# Patient Record
Sex: MALE | Race: WHITE | NOT HISPANIC OR LATINO | Employment: OTHER | URBAN - METROPOLITAN AREA
[De-identification: names, ages, dates, MRNs, and addresses within clinical notes are randomized per-mention and may not be internally consistent; named-entity substitution may affect disease eponyms.]

---

## 2017-06-16 ENCOUNTER — TRANSFERRED RECORDS (OUTPATIENT)
Dept: HEALTH INFORMATION MANAGEMENT | Facility: CLINIC | Age: 62
End: 2017-06-16

## 2017-07-10 DIAGNOSIS — M54.50 BACK PAIN, LUMBOSACRAL: Primary | ICD-10-CM

## 2017-07-21 ENCOUNTER — HOSPITAL ENCOUNTER (OUTPATIENT)
Dept: GENERAL RADIOLOGY | Facility: CLINIC | Age: 62
End: 2017-07-21
Attending: NEUROLOGICAL SURGERY
Payer: COMMERCIAL

## 2017-07-21 ENCOUNTER — HOSPITAL ENCOUNTER (OUTPATIENT)
Dept: MRI IMAGING | Facility: CLINIC | Age: 62
Discharge: HOME | End: 2017-07-21
Attending: NEUROLOGICAL SURGERY | Admitting: NEUROLOGICAL SURGERY
Payer: COMMERCIAL

## 2017-07-21 DIAGNOSIS — M54.50 BACK PAIN, LUMBOSACRAL: ICD-10-CM

## 2017-07-21 PROCEDURE — 40000986 XR LUMBAR SPINE G/E 4 VW

## 2017-07-21 PROCEDURE — 72148 MRI LUMBAR SPINE W/O DYE: CPT

## 2017-07-26 ENCOUNTER — OFFICE VISIT (OUTPATIENT)
Dept: NEUROSURGERY | Facility: CLINIC | Age: 62
End: 2017-07-26
Attending: NEUROLOGICAL SURGERY
Payer: COMMERCIAL

## 2017-07-26 VITALS
SYSTOLIC BLOOD PRESSURE: 128 MMHG | TEMPERATURE: 99.1 F | OXYGEN SATURATION: 95 % | BODY MASS INDEX: 31.1 KG/M2 | HEIGHT: 69 IN | HEART RATE: 93 BPM | DIASTOLIC BLOOD PRESSURE: 86 MMHG | WEIGHT: 210 LBS

## 2017-07-26 DIAGNOSIS — M43.10 ACQUIRED SPONDYLOLISTHESIS: ICD-10-CM

## 2017-07-26 DIAGNOSIS — M54.50 BACK PAIN, LUMBOSACRAL: Primary | ICD-10-CM

## 2017-07-26 PROCEDURE — 99203 OFFICE O/P NEW LOW 30 MIN: CPT | Performed by: NEUROLOGICAL SURGERY

## 2017-07-26 PROCEDURE — 99211 OFF/OP EST MAY X REQ PHY/QHP: CPT | Performed by: NEUROLOGICAL SURGERY

## 2017-07-26 ASSESSMENT — PAIN SCALES - GENERAL: PAINLEVEL: MILD PAIN (2)

## 2017-07-26 NOTE — MR AVS SNAPSHOT
"              After Visit Summary   2017    Lucio Hernandez    MRN: [id number]           Patient Information     Date Of Birth          1955        Visit Information        Provider Department      2017 11:40 AM Dale Szymanski MD [facility]        Care Instructions    -Please call clinic at [phone] if you wish to proceed with surgery.             Follow-ups after your visit        Who to contact     If you have questions or need follow up information about today's clinic visit or your schedule please contact [facility] directly at [phone].  Normal or non-critical lab and imaging results will be communicated to you by MyChart, letter or phone within 4 business days after the clinic has received the results. If you do not hear from us within 7 days, please contact the clinic through [employer] or phone. If you have a critical or abnormal lab result, we will notify you by phone as soon as possible.  Submit refill requests through [employer] or call your pharmacy and they will forward the refill request to us. Please allow 3 business days for your refill to be completed.          Additional Information About Your Visit        MyChart Information     [employer] lets you send messages to your doctor, view your test results, renew your prescriptions, schedule appointments and more. To sign up, go to [email or web address] . Click on \"Log in\" on the left side of the screen, which will take you to the Welcome page. Then click on \"Sign up Now\" on the right side of the page.     You will be asked to enter the access code listed below, as well as some personal information. Please follow the directions to create your username and password.     Your access code is: [email or web address]  Expires: 10/24/2017 12:38 PM     Your access code will  in 90 days. If you need help or a new code, please call your [facility] clinic or [phone].        Care EveryWhere ID     " "This is your Care EveryWhere ID. This could be used by other organizations to access your [facility] medical records  [id number]        Your Vitals Were     Pulse Temperature Height Pulse Oximetry BMI (Body Mass Index)       93 99.1  F (37.3  C) 5' 9\" (1.753 m) 95% 31.01 kg/m2        Blood Pressure from Last 3 Encounters:   07/26/17 128/86   12/07/15 122/80   10/12/15 120/61    Weight from Last 3 Encounters:   07/26/17 210 lb (95.3 kg)   12/07/15 206 lb 12.8 oz (93.8 kg)   10/12/15 216 lb 12.8 oz (98.3 kg)              Today, you had the following     No orders found for display       Primary Care Provider Office Phone # Fax #    Dean Cody [phone]       [place] [address]        Equal Access to Services     [facility]: Hadii aad ku hadasho Soomaali, waaxda luqadaha, qaybta kaalmada adeegyada, waxay idiin hayaan ángel ontiveros . So [facility] [phone].    ATENCIÓN: Si marcia espserg, tiene a vela disposición servicios gratuitos de asistencia lingüística. Llame al [phone].    We comply with applicable federal civil rights laws and [place] laws. We do not discriminate on the basis of race, color, national origin, age, disability sex, sexual orientation or gender identity.            Thank you!     Thank you for choosing [facility]  for your care. Our goal is always to provide you with excellent care. Hearing back from our patients is one way we can continue to improve our services. Please take a few minutes to complete the written survey that you may receive in the mail after your visit with us. Thank you!             Your Updated Medication List - Protect others around you: Learn how to safely use, store and throw away your medicines at www.disposemymeds.org.          This list is accurate as of: 7/26/17 12:38 PM.  Always use your most recent med list.                   Brand Name Dispense Instructions for use " Diagnosis    LISINOPRIL PO      Take 20 mg by mouth daily        multivitamin, therapeutic Tabs tablet      Take 1 tablet by mouth daily

## 2017-07-26 NOTE — NURSING NOTE
"Lucio Hernandez is a 61 year old male who presents for:  Chief Complaint   Patient presents with     Neurologic Problem     F/U on XR and MRI, low back pain        Initial Vitals:  /86 (BP Location: Right arm, Patient Position: Chair, Cuff Size: Adult Large)  Pulse 93  Temp 99.1  F (37.3  C)  Ht 5' 9\" (1.753 m)  Wt 210 lb (95.3 kg)  SpO2 95%  BMI 31.01 kg/m2 Estimated body mass index is 31.01 kg/(m^2) as calculated from the following:    Height as of this encounter: 5' 9\" (1.753 m).    Weight as of this encounter: 210 lb (95.3 kg).. Body surface area is 2.15 meters squared. BP completed using cuff size: large  Mild Pain (2)    Do you feel safe in your environment?  Yes  Do you need any refills today? No    Nursing Comments: F/U on XR and MRI, low back pain.  Patient rates low back pain today as 2.      5 min. nursing intake time  Merary Duvall CMA      Discharge plan:    -Please call clinic at [phone] if you wish to proceed with surgery.     2 min. nursing discharge time  Merary Duvall CMA    "

## 2017-07-27 NOTE — PROGRESS NOTES
Neurosurgery Spine Consult [facility]        CC: LBP    Primary care Provider: Dean Cody    Referring provider:   No referring provider defined for this encounter.      [place]: DrPhong Lucio Hernandez is a 61 year old male that presents to clinic with a complaint of low back pain and pelvic pain. He has had these symptoms for 6 months and has been seen by Dr. De La Rosa. He has insomnia with laying on his sides. His pain appears to be in the trochanteric hip area. He has no clear radicular pain. He has tried PT and IRASEMA without resolution.       Past Medical History:   Diagnosis Date     Cancer (H)      Hypertension      Uncomplicated asthma     seasonal        Past Surgical History:   Procedure Laterality Date     BACK SURGERY  2000    C5-C6 framinotomy     CYSTOSCOPY, BLADDER NECK CUTS, COMBINED N/A 8/27/2015    Procedure: COMBINED CYSTOSCOPY, BLADDER NECK CUTS;  Surgeon: Paco Haskins MD;  Location:  OR     CYSTOSCOPY, DILATE URETHRA, COMBINED N/A 2/27/2015    Procedure: COMBINED CYSTOSCOPY, DILATE URETHRA;  Surgeon: Paco Haskins MD;  Location:  OR     CYSTOSCOPY, DILATE URETHRA, COMBINED N/A 4/21/2015    Procedure: COMBINED CYSTOSCOPY, DILATE URETHRA;  Surgeon: Eduardo Landaverde MD;  Location:  OR     CYSTOSCOPY, DILATE URETHRA, COMBINED N/A 4/23/2015    Procedure: COMBINED CYSTOSCOPY, DILATE URETHRA;  Surgeon: Paco Haskins MD;  Location:  OR     ENT SURGERY      tonsillectomy     GENITOURINARY SURGERY  2006    prostatectomy     INSERT CATHETER BLADDER N/A 4/23/2015    Procedure: INSERT CATHETER BLADDER;  Surgeon: Paco Haskins MD;  Location:  OR     LAPAROSCOPIC APPENDECTOMY  6/11/2014    Procedure: LAPAROSCOPIC APPENDECTOMY;  Surgeon: Scar Schroeder MD;  Location:  OR     ORTHOPEDIC SURGERY      shoulder x 3       Current Outpatient Prescriptions   Medication     LISINOPRIL PO     multivitamin, therapeutic (THERA-VIT) TABS     No  current facility-administered medications for this visit.        No Known Allergies    Social History     Social History     Marital status:      Spouse name: N/A     Number of children: N/A     Years of education: N/A     Social History Main Topics     Smoking status: Never Smoker     Smokeless tobacco: Never Used     Alcohol use Yes      Comment: one drink a day      Drug use: No     Sexual activity: Not Asked     Other Topics Concern     None     Social History Narrative       History reviewed. No pertinent family history.      Review Of Systems  Skin: negative  Eyes: negative  Ears/Nose/Throat: negative  Respiratory: No shortness of breath, dyspnea on exertion, cough, or hemoptysis  Cardiovascular: negative  Gastrointestinal: negative  Genitourinary: negative  Musculoskeletal: as above and back pain  Neurologic: as above  Psychiatric: negative  Hematologic/Lymphatic/Immunologic: negative  Endocrine: negative    B/P: 128/86, T: 99.1, P: 93, R: Data Unavailable    Examination:  Awake  Alert  Oriented x 3  Speech clear  Cranial nerves II - XII intact  Face symmetric  Back nontender   Normal ROM of back  Motor exam    RLE - iliopsoas 5/5, quads 5/5, hamstrings 5/5, dorsiflexion 5/5, plantar flexion 5/5, eversion 5/5, inversion 5/5, EHL 5/5   LLE -  iliopsoas 5/5, quads 5/5, hamstrings 5/5, dorsiflexion 5/5, plantar flexion 5/5, eversion 5/5, inversion 5/5, EHL 5/5  Sensation intact  Clonus negative  DTR 1+  Negative Lase'gareth's sign  Ambulation stable    Imaging:   MRI lumbar - L4-5 grade 1 spondylolisthesis with stenosis and L5-S1 DDD with a small right sided bulging disk without compression of the root    Flexion/Extension - L4-5 gross instability       Assessment/Plan:   1. We discussed a L4-5 TLIF. He will think about this and call if he wants to proceed. I discussed with the patient and spouse the risk of surgery to include, but, not be limited to; nerve injury, pseudoarthrosis, failure of hardware,  failure of improvement of symptoms, CSF leak,  infection, post op hematoma, the need for recurrent surgery, paralysis, coma and death         Dale Szymanski MD, MS, FAANS  Neurosurgeon  [facility]  [facility]  [address] [place] [address], [address]  [place] [address] [phone]

## 2017-10-31 ENCOUNTER — HOSPITAL ENCOUNTER (EMERGENCY)
Facility: CLINIC | Age: 62
Discharge: HOME | End: 2017-11-01
Attending: EMERGENCY MEDICINE | Admitting: EMERGENCY MEDICINE
Payer: COMMERCIAL

## 2017-10-31 VITALS
DIASTOLIC BLOOD PRESSURE: 89 MMHG | WEIGHT: 215 LBS | HEIGHT: 69 IN | TEMPERATURE: 98.4 F | BODY MASS INDEX: 31.84 KG/M2 | RESPIRATION RATE: 18 BRPM | SYSTOLIC BLOOD PRESSURE: 143 MMHG | OXYGEN SATURATION: 95 %

## 2017-10-31 DIAGNOSIS — R07.81 RIB PAIN ON LEFT SIDE: ICD-10-CM

## 2017-10-31 PROCEDURE — 99283 EMERGENCY DEPT VISIT LOW MDM: CPT | Mod: 25

## 2017-10-31 NOTE — ED AVS SNAPSHOT
Emergency Department    [address] [facility] [address]    Phone:  [phone]    Fax:  [phone]                                       Lucio Hernandez   MRN: [id number]    Department:   Emergency Department   Date of Visit:  10/31/2017           Patient Information     Date Of Birth          1955        Your diagnoses for this visit were:     Rib pain on left side        You were seen by Carlos Cortes MD.      Follow-up Information     Follow up with Dean Cody MD. Call in 2 days.    Specialty:  Internal Medicine    Why:  As needed    Contact information:    [address] [address]  [phone]          Follow up with  Emergency Department.    Specialty:  EMERGENCY MEDICINE    Why:  If symptoms worsen    Contact information:    [address] [facility] [id number] [phone]        Discharge Instructions          *CHEST PAIN, UNCERTAIN CAUSE    Based on your exam today, the exact cause of your chest pain is not certain. Your condition does not seem serious at this time, and your pain does not appear to be coming from your heart. However, sometimes the signs of a serious problem take more time to appear. Therefore, watch for the warning signs listed below.  HOME CARE:  1. Rest today and avoid strenuous activity.  2. Take any prescribed medicine as directed.  FOLLOW UP with your doctor in 1-3 days.   GET PROMPT MEDICAL ATTENTION if any of the following occur:    A change in the type of pain: if it feels different, becomes more severe, lasts longer, or begins to spread into your shoulder, arm, neck, jaw or back    Shortness of breath or increased pain with breathing    Weakness, dizziness, or fainting    Cough with blood or dark colored sputum (phlegm)    Fever over 101  F (38.3  C)    Swelling, pain or redness in one leg    9555-8469 [employer]. [address] [place] [address], [place]  [address]. All rights reserved. This information is not intended as a substitute for professional medical care. Always follow your healthcare professional's instructions.  This information has been modified by your health care provider with permission from the publisher.      24 Hour Appointment Hotline       To make an appointment at any [facility], call [phone] ([phone]). If you don't have a family doctor or clinic, we will help you find one. [facility] are conveniently located to serve the needs of you and your family.             Review of your medicines      START taking        Dose / Directions Last dose taken    guaiFENesin-codeine 100-10 MG/5ML Soln solution   Commonly known as:  ROBITUSSIN AC   Dose:  1 tsp.   Quantity:  40 mL        Take 5 mLs by mouth every 4 hours as needed for cough   Refills:  0          Our records show that you are taking the medicines listed below. If these are incorrect, please call your family doctor or clinic.        Dose / Directions Last dose taken    LISINOPRIL PO   Dose:  20 mg        Take 20 mg by mouth daily   Refills:  0        multivitamin, therapeutic Tabs tablet   Dose:  1 tablet        Take 1 tablet by mouth daily   Refills:  0                Prescriptions were sent or printed at these locations (1 Prescription)                   Other Prescriptions                Printed at Department/Unit printer (1 of 1)         guaiFENesin-codeine (ROBITUSSIN AC) 100-10 MG/5ML SOLN solution                Procedures and tests performed during your visit     Ribs XR, unilat 3 views + PA chest,  left      Orders Needing Specimen Collection     None      Pending Results     Date and Time Order Name Status Description    10/31/2017 2356 Ribs XR, unilat 3 views + PA chest,  left Preliminary             Pending Culture Results     No orders found for last 3 day(s).            Pending Results Instructions     If you had any lab results that were not finalized at the  time of your Discharge, you can call the ED Lab Result RN at [phone]. You will be contacted by this team for any positive Lab results or changes in treatment. The nurses are available 7 days a week from 10A to 6:30P.  You can leave a message 24 hours per day and they will return your call.        Test Results From Your Hospital Stay        11/1/2017 12:34 AM      Narrative     XR RIBS & CHEST LT 3VW  11/1/2017 12:15 AM      HISTORY: Left rib pain after coughing. Rule out pneumothorax.     COMPARISON: None.    FINDINGS: Frontal chest and two views of the lower left ribs. The  heart size is normal. There is minimal left basilar atelectasis or  scar. The lungs are otherwise clear. No pneumothorax. There is no  evidence of rib fracture.        Impression     IMPRESSION: No acute abnormality.                Clinical Quality Measure: Blood Pressure Screening     Your blood pressure was checked while you were in the emergency department today. The last reading we obtained was  BP: 143/89 . Please read the guidelines below about what these numbers mean and what you should do about them.  If your systolic blood pressure (the top number) is less than 120 and your diastolic blood pressure (the bottom number) is less than 80, then your blood pressure is normal. There is nothing more that you need to do about it.  If your systolic blood pressure (the top number) is 120-139 or your diastolic blood pressure (the bottom number) is 80-89, your blood pressure may be higher than it should be. You should have your blood pressure rechecked within a year by a primary care provider.  If your systolic blood pressure (the top number) is 140 or greater or your diastolic blood pressure (the bottom number) is 90 or greater, you may have high blood pressure. High blood pressure is treatable, but if left untreated over time it can put you at risk for heart attack, stroke, or kidney failure. You should have your blood pressure rechecked by a  "primary care provider within the next 4 weeks.  If your provider in the emergency department today gave you specific instructions to follow-up with your doctor or provider even sooner than that, you should follow that instruction and not wait for up to 4 weeks for your follow-up visit.        Thank you for choosing [facility]       Thank you for choosing [facility] for your care. Our goal is always to provide you with excellent care. Hearing back from our patients is one way we can continue to improve our services. Please take a few minutes to complete the written survey that you may receive in the mail after you visit with us. Thank you!        [employer] Information     [employer] lets you send messages to your doctor, view your test results, renew your prescriptions, schedule appointments and more. To sign up, go to [email or web address] . Click on \"Log in\" on the left side of the screen, which will take you to the Welcome page. Then click on \"Sign up Now\" on the right side of the page.     You will be asked to enter the access code listed below, as well as some personal information. Please follow the directions to create your username and password.     Your access code is: [email or web address]  Expires: 2018 12:44 AM     Your access code will  in 90 days. If you need help or a new code, please call your [facility] clinic or [phone].        Care EveryWhere ID     This is your Care EveryWhere ID. This could be used by other organizations to access your [facility] medical records  [id number]        Equal Access to Services     [employer]KHJIND[employer] ASHER : Hadii chase pretty [place], waaxda lucullenadaha, qaybta kaalmada catracho, puneet ontiveros . So [facility] [phone].    ATENCIÓN: Si habla español, tiene a vela disposición servicios gratuitos de asistencia lingüística. Llame al [phone].    We comply with applicable federal civil rights laws and [place] laws. We do not discriminate on the basis of " race, color, national origin, age, disability, sex, sexual orientation, or gender identity.            After Visit Summary       This is your record. Keep this with you and show to your community pharmacist(s) and doctor(s) at your next visit.

## 2017-10-31 NOTE — ED AVS SNAPSHOT
Emergency Department    [address] [place] [address]    Phone:  [phone]    Fax:  [phone]                                       Lucio Hernandez   MRN: [id number]    Department:   Emergency Department   Date of Visit:  10/31/2017           After Visit Summary Signature Page     I have received my discharge instructions, and my questions have been answered. I have discussed any challenges I see with this plan with the nurse or doctor.    ..........................................................................................................................................  Patient/Patient Representative Signature      ..........................................................................................................................................  Patient Representative Print Name and Relationship to Patient    ..................................................               ................................................  Date                                            Time    ..........................................................................................................................................  Reviewed by Signature/Title    ...................................................              ..............................................  Date                                                            Time

## 2017-11-01 ENCOUNTER — APPOINTMENT (OUTPATIENT)
Dept: GENERAL RADIOLOGY | Facility: CLINIC | Age: 62
End: 2017-11-01
Attending: EMERGENCY MEDICINE
Payer: COMMERCIAL

## 2017-11-01 PROCEDURE — 71101 X-RAY EXAM UNILAT RIBS/CHEST: CPT | Mod: LT

## 2017-11-01 RX ORDER — CODEINE PHOSPHATE AND GUAIFENESIN 10; 100 MG/5ML; MG/5ML
1 SOLUTION ORAL EVERY 4 HOURS PRN
Qty: 40 ML | Refills: 0 | Status: ON HOLD | OUTPATIENT
Start: 2017-11-01 | End: 2017-12-07

## 2017-11-01 ASSESSMENT — ENCOUNTER SYMPTOMS
FEVER: 0
NECK PAIN: 0
COUGH: 1

## 2017-11-01 NOTE — DISCHARGE INSTRUCTIONS
*CHEST PAIN, UNCERTAIN CAUSE    Based on your exam today, the exact cause of your chest pain is not certain. Your condition does not seem serious at this time, and your pain does not appear to be coming from your heart. However, sometimes the signs of a serious problem take more time to appear. Therefore, watch for the warning signs listed below.  HOME CARE:  1. Rest today and avoid strenuous activity.  2. Take any prescribed medicine as directed.  FOLLOW UP with your doctor in 1-3 days.   GET PROMPT MEDICAL ATTENTION if any of the following occur:    A change in the type of pain: if it feels different, becomes more severe, lasts longer, or begins to spread into your shoulder, arm, neck, jaw or back    Shortness of breath or increased pain with breathing    Weakness, dizziness, or fainting    Cough with blood or dark colored sputum (phlegm)    Fever over 101  F (38.3  C)    Swelling, pain or redness in one leg    8787-9556 [employer]. [address] [place] [address]. All rights reserved. This information is not intended as a substitute for professional medical care. Always follow your healthcare professional's instructions.  This information has been modified by your health care provider with permission from the publisher.

## 2017-11-01 NOTE — ED PROVIDER NOTES
"  History     Chief Complaint:  Rib Pain    HPI   Lucio Hernandez is a 61 year old male with a history of prostate cancer, hypertension, and uncomplicated asthma who presents with rib pain. The patient states he has had a cough for 2 weeks and saw his primary doctor today where he was diagnosed with mild pneumonia and possible bronchitis. He was prescribed Augmentin and Prednisone. Throughout the day today, the patient reports he has had continual coughing and after an intense coughing fit, he felt a sharp pain in his left rib area. The patient states his persistent coughing has made the rib pain worse throughout the day, so he came in for evaluation. Pain constant, non radiating, sharp in nature. The patient reports his cough is worse when he is lying down, and he is trying to use inhalers and Nyquil during the night to no significant relief.     Allergies:  No known drug allergies    Medications:    Lisinopril  Multivitamin  Augmentin  Prednisone    Past Medical History:    Cancer, prostate  Hypertension  Uncomplicated asthma    Past Surgical History:    C5-C6 framinotomy  Tonsillectomy  Prostatectomy  Laparoscopic appendectomy  Orthopedic surgery, shoulder x3    Family History:    History reviewed. No pertinent family history.     Social History:  Smoking status: No  Alcohol use: Yes, one drink daily  PCP: Dean Cody  Presents to the ED with his spouse  Marital Status:   [2]     Review of Systems   Constitutional: Negative for fever.   Respiratory: Positive for cough.    Musculoskeletal: Negative for neck pain.        Positive for left-sided rib pain   All other systems reviewed and are negative.    Physical Exam     Patient Vitals for the past 24 hrs:   BP Temp Temp src Heart Rate Resp SpO2 Height Weight   10/31/17 2338 143/89 98.4  F (36.9  C) Temporal 87 18 95 % 1.753 m (5' 9\") 97.5 kg (215 lb)     Physical Exam  General: Pt seen on [facility], pleasant, cooperative, and alert to " conversation  Eyes: Tracking well, clear conj BL  ENT: MMM, neck supple with no TTP  CV: no JVD noted  Respiratory:   No tachypnea, no accessory m use, speaking in full sentences.  + mild ttp to R lateral inf rib.  No skin changes.  Lungs CTAB   Abd: Soft, non tender, no guarding, no rebound. Non distended  Skin: No skin changes, no edema or rash present to extremities  Neuro: Clear speech and no facial droop.  Psych: Not RIS, no e/o AH/VH        Emergency Department Course     Imaging:  Radiographic findings were communicated with the patient who voiced understanding of the findings.    Ribs XR, unilat 3 views + PA chest, left:  No acute abnormality.  As read by Radiology.    Emergency Department Course:  Past medical records, nursing notes, and vitals reviewed.  0019: I performed an exam of the patient and obtained history, as documented above.  The patient was sent for a rib and chest x-ray while in the emergency department, findings above.    0050: I rechecked the patient. Explained findings to the patient and his spouse.    I rechecked the patient. Findings and plan explained to the patient. Patient discharged home with instructions regarding supportive care, medications, and reasons to return. The importance of close follow-up was reviewed.     Impression & Plan      Medical Decision Making:  Lucio Hernandez is a 61 year old male who presents with left-sided chest pain after a coughing fit, unclear cause at this point. The patient had mild flank tenderness, but no reason to have a contusion or broken rib. He has a normal physical exam aside from the mild focal tenderness. Additionally, his rib and chest x-ray was unremarkable. He has normal vital signs here, and he is respiratory appropriately. He does have appropriate therapy for what appears to be pneumonia diagnosed in the outpatient setting. With this in mind, we will give Robitussin with Codeine for several nights only, and instruct over the counter  Robitussin throughout the day. He can also take Tylenol and Motrin for his left-sided rib pain, which I do feel is likely to his coughing fit given this is what the patient states. The patient is okay with this plan.    Diagnosis:    ICD-10-CM   1. Rib pain on left side R07.81     Disposition: Discharged to home    Discharge Medications:  New Prescriptions    GUAIFENESIN-CODEINE (ROBITUSSIN AC) 100-10 MG/5ML SOLN SOLUTION    Take 5 mLs by mouth every 4 hours as needed for cough     Meghan Portillo  10/31/2017    EMERGENCY DEPARTMENT    I, Meghan Portillo, am serving as a scribe at 12:19 AM on 11/1/2017 to document services personally performed by Carlos oCrtes based on my observations and the provider's statements to me.        Carlos Cortes MD  11/01/17 0326

## 2017-12-01 ENCOUNTER — ANESTHESIA EVENT (OUTPATIENT)
Dept: SURGERY | Facility: CLINIC | Age: 62
DRG: 501 | End: 2017-12-01
Payer: COMMERCIAL

## 2017-12-02 ENCOUNTER — APPOINTMENT (OUTPATIENT)
Dept: GENERAL RADIOLOGY | Facility: CLINIC | Age: 62
DRG: 501 | End: 2017-12-02
Attending: THORACIC SURGERY (CARDIOTHORACIC VASCULAR SURGERY)
Payer: COMMERCIAL

## 2017-12-02 ENCOUNTER — SURGERY (OUTPATIENT)
Age: 62
End: 2017-12-02

## 2017-12-02 ENCOUNTER — HOSPITAL ENCOUNTER (INPATIENT)
Facility: CLINIC | Age: 62
LOS: 5 days | Discharge: HOME | DRG: 501 | End: 2017-12-07
Attending: THORACIC SURGERY (CARDIOTHORACIC VASCULAR SURGERY) | Admitting: THORACIC SURGERY (CARDIOTHORACIC VASCULAR SURGERY)
Payer: COMMERCIAL

## 2017-12-02 ENCOUNTER — ANESTHESIA (OUTPATIENT)
Dept: SURGERY | Facility: CLINIC | Age: 62
DRG: 501 | End: 2017-12-02
Payer: COMMERCIAL

## 2017-12-02 DIAGNOSIS — S22.42XK CLOSED FRACTURE OF MULTIPLE RIBS OF LEFT SIDE WITH NONUNION, SUBSEQUENT ENCOUNTER: Primary | ICD-10-CM

## 2017-12-02 PROBLEM — S22.49XA RIB FRACTURES: Status: ACTIVE | Noted: 2017-12-02

## 2017-12-02 LAB
ABO + RH BLD: NORMAL
ABO + RH BLD: NORMAL
ANION GAP SERPL CALCULATED.3IONS-SCNC: 5 MMOL/L (ref 3–14)
BLD GP AB SCN SERPL QL: NORMAL
BLOOD BANK CMNT PATIENT-IMP: NORMAL
BUN SERPL-MCNC: 19 MG/DL (ref 7–30)
CALCIUM SERPL-MCNC: 8.6 MG/DL (ref 8.5–10.1)
CHLORIDE SERPL-SCNC: 103 MMOL/L (ref 94–109)
CO2 SERPL-SCNC: 31 MMOL/L (ref 20–32)
CREAT SERPL-MCNC: 0.85 MG/DL (ref 0.66–1.25)
ERYTHROCYTE [DISTWIDTH] IN BLOOD BY AUTOMATED COUNT: 13.1 % (ref 10–15)
GFR SERPL CREATININE-BSD FRML MDRD: >90 ML/MIN/1.7M2
GLUCOSE SERPL-MCNC: 109 MG/DL (ref 70–99)
HCT VFR BLD AUTO: 48.4 % (ref 40–53)
HGB BLD-MCNC: 17.1 G/DL (ref 13.3–17.7)
INR PPP: 0.81 (ref 0.86–1.14)
MCH RBC QN AUTO: 34.7 PG (ref 26.5–33)
MCHC RBC AUTO-ENTMCNC: 35.3 G/DL (ref 31.5–36.5)
MCV RBC AUTO: 98 FL (ref 78–100)
PLATELET # BLD AUTO: 233 10E9/L (ref 150–450)
POTASSIUM SERPL-SCNC: 3.9 MMOL/L (ref 3.4–5.3)
RBC # BLD AUTO: 4.93 10E12/L (ref 4.4–5.9)
SODIUM SERPL-SCNC: 139 MMOL/L (ref 133–144)
SPECIMEN EXP DATE BLD: NORMAL
WBC # BLD AUTO: 10.8 10E9/L (ref 4–11)

## 2017-12-02 PROCEDURE — 71000013 ZZH RECOVERY PHASE 1 LEVEL 1 EA ADDTL HR: Performed by: THORACIC SURGERY (CARDIOTHORACIC VASCULAR SURGERY)

## 2017-12-02 PROCEDURE — S0020 INJECTION, BUPIVICAINE HYDRO: HCPCS | Performed by: THORACIC SURGERY (CARDIOTHORACIC VASCULAR SURGERY)

## 2017-12-02 PROCEDURE — S0028 INJECTION, FAMOTIDINE, 20 MG: HCPCS | Performed by: THORACIC SURGERY (CARDIOTHORACIC VASCULAR SURGERY)

## 2017-12-02 PROCEDURE — 0KQJ0ZZ REPAIR LEFT THORAX MUSCLE, OPEN APPROACH: ICD-10-PCS | Performed by: THORACIC SURGERY (CARDIOTHORACIC VASCULAR SURGERY)

## 2017-12-02 PROCEDURE — 88304 TISSUE EXAM BY PATHOLOGIST: CPT | Mod: 26 | Performed by: THORACIC SURGERY (CARDIOTHORACIC VASCULAR SURGERY)

## 2017-12-02 PROCEDURE — C1713 ANCHOR/SCREW BN/BN,TIS/BN: HCPCS | Performed by: THORACIC SURGERY (CARDIOTHORACIC VASCULAR SURGERY)

## 2017-12-02 PROCEDURE — 25000128 H RX IP 250 OP 636: Performed by: ANESTHESIOLOGY

## 2017-12-02 PROCEDURE — 36000093 ZZH SURGERY LEVEL 4 1ST 30 MIN: Performed by: THORACIC SURGERY (CARDIOTHORACIC VASCULAR SURGERY)

## 2017-12-02 PROCEDURE — 25000128 H RX IP 250 OP 636: Performed by: NURSE ANESTHETIST, CERTIFIED REGISTERED

## 2017-12-02 PROCEDURE — 37000008 ZZH ANESTHESIA TECHNICAL FEE, 1ST 30 MIN: Performed by: THORACIC SURGERY (CARDIOTHORACIC VASCULAR SURGERY)

## 2017-12-02 PROCEDURE — 21400002 ZZH R&B CCU CICU CRITICAL

## 2017-12-02 PROCEDURE — 88311 DECALCIFY TISSUE: CPT | Performed by: THORACIC SURGERY (CARDIOTHORACIC VASCULAR SURGERY)

## 2017-12-02 PROCEDURE — 86901 BLOOD TYPING SEROLOGIC RH(D): CPT | Performed by: THORACIC SURGERY (CARDIOTHORACIC VASCULAR SURGERY)

## 2017-12-02 PROCEDURE — 85610 PROTHROMBIN TIME: CPT | Performed by: THORACIC SURGERY (CARDIOTHORACIC VASCULAR SURGERY)

## 2017-12-02 PROCEDURE — 40000986 XR CHEST PORT 1 VW

## 2017-12-02 PROCEDURE — 88304 TISSUE EXAM BY PATHOLOGIST: CPT | Performed by: THORACIC SURGERY (CARDIOTHORACIC VASCULAR SURGERY)

## 2017-12-02 PROCEDURE — 25000566 ZZH SEVOFLURANE, EA 15 MIN: Performed by: THORACIC SURGERY (CARDIOTHORACIC VASCULAR SURGERY)

## 2017-12-02 PROCEDURE — 27210995 ZZH RX 272: Performed by: THORACIC SURGERY (CARDIOTHORACIC VASCULAR SURGERY)

## 2017-12-02 PROCEDURE — 36415 COLL VENOUS BLD VENIPUNCTURE: CPT | Performed by: THORACIC SURGERY (CARDIOTHORACIC VASCULAR SURGERY)

## 2017-12-02 PROCEDURE — 0PH104Z INSERTION OF INTERNAL FIXATION DEVICE INTO 1 TO 2 RIBS, OPEN APPROACH: ICD-10-PCS | Performed by: THORACIC SURGERY (CARDIOTHORACIC VASCULAR SURGERY)

## 2017-12-02 PROCEDURE — 86900 BLOOD TYPING SEROLOGIC ABO: CPT | Performed by: THORACIC SURGERY (CARDIOTHORACIC VASCULAR SURGERY)

## 2017-12-02 PROCEDURE — 80048 BASIC METABOLIC PNL TOTAL CA: CPT | Performed by: THORACIC SURGERY (CARDIOTHORACIC VASCULAR SURGERY)

## 2017-12-02 PROCEDURE — 25000128 H RX IP 250 OP 636: Performed by: THORACIC SURGERY (CARDIOTHORACIC VASCULAR SURGERY)

## 2017-12-02 PROCEDURE — 25000125 ZZHC RX 250: Performed by: THORACIC SURGERY (CARDIOTHORACIC VASCULAR SURGERY)

## 2017-12-02 PROCEDURE — 85027 COMPLETE CBC AUTOMATED: CPT | Performed by: THORACIC SURGERY (CARDIOTHORACIC VASCULAR SURGERY)

## 2017-12-02 PROCEDURE — 37000009 ZZH ANESTHESIA TECHNICAL FEE, EACH ADDTL 15 MIN: Performed by: THORACIC SURGERY (CARDIOTHORACIC VASCULAR SURGERY)

## 2017-12-02 PROCEDURE — 88311 DECALCIFY TISSUE: CPT | Mod: 26 | Performed by: THORACIC SURGERY (CARDIOTHORACIC VASCULAR SURGERY)

## 2017-12-02 PROCEDURE — 36000063 ZZH SURGERY LEVEL 4 EA 15 ADDTL MIN: Performed by: THORACIC SURGERY (CARDIOTHORACIC VASCULAR SURGERY)

## 2017-12-02 PROCEDURE — 86850 RBC ANTIBODY SCREEN: CPT | Performed by: THORACIC SURGERY (CARDIOTHORACIC VASCULAR SURGERY)

## 2017-12-02 PROCEDURE — 27211024 ZZHC OR SUPPLY OTHER OPNP: Performed by: THORACIC SURGERY (CARDIOTHORACIC VASCULAR SURGERY)

## 2017-12-02 PROCEDURE — 25000132 ZZH RX MED GY IP 250 OP 250 PS 637: Performed by: THORACIC SURGERY (CARDIOTHORACIC VASCULAR SURGERY)

## 2017-12-02 PROCEDURE — 40000170 ZZH STATISTIC PRE-PROCEDURE ASSESSMENT II: Performed by: THORACIC SURGERY (CARDIOTHORACIC VASCULAR SURGERY)

## 2017-12-02 PROCEDURE — 71000012 ZZH RECOVERY PHASE 1 LEVEL 1 FIRST HR: Performed by: THORACIC SURGERY (CARDIOTHORACIC VASCULAR SURGERY)

## 2017-12-02 PROCEDURE — 40000885 ZZH STATISTIC STEP DOWN HRS EVENING

## 2017-12-02 PROCEDURE — 27210794 ZZH OR GENERAL SUPPLY STERILE: Performed by: THORACIC SURGERY (CARDIOTHORACIC VASCULAR SURGERY)

## 2017-12-02 PROCEDURE — 25000125 ZZHC RX 250: Performed by: NURSE ANESTHETIST, CERTIFIED REGISTERED

## 2017-12-02 RX ORDER — LIDOCAINE 40 MG/G
CREAM TOPICAL
Status: DISCONTINUED | OUTPATIENT
Start: 2017-12-02 | End: 2017-12-07 | Stop reason: HOSPADM

## 2017-12-02 RX ORDER — ONDANSETRON 4 MG/1
4 TABLET, ORALLY DISINTEGRATING ORAL EVERY 30 MIN PRN
Status: DISCONTINUED | OUTPATIENT
Start: 2017-12-02 | End: 2017-12-02 | Stop reason: HOSPADM

## 2017-12-02 RX ORDER — FENTANYL CITRATE 50 UG/ML
INJECTION, SOLUTION INTRAMUSCULAR; INTRAVENOUS PRN
Status: DISCONTINUED | OUTPATIENT
Start: 2017-12-02 | End: 2017-12-02

## 2017-12-02 RX ORDER — CLONAZEPAM 0.25 MG/1
0.25 TABLET, ORALLY DISINTEGRATING ORAL 2 TIMES DAILY PRN
Status: DISCONTINUED | OUTPATIENT
Start: 2017-12-02 | End: 2017-12-07 | Stop reason: HOSPADM

## 2017-12-02 RX ORDER — LISINOPRIL 10 MG/1
20 TABLET ORAL DAILY
Status: DISCONTINUED | OUTPATIENT
Start: 2017-12-03 | End: 2017-12-07 | Stop reason: HOSPADM

## 2017-12-02 RX ORDER — HYDROMORPHONE HYDROCHLORIDE 2 MG/1
2-4 TABLET ORAL
Status: DISCONTINUED | OUTPATIENT
Start: 2017-12-02 | End: 2017-12-07 | Stop reason: HOSPADM

## 2017-12-02 RX ORDER — CODEINE PHOSPHATE AND GUAIFENESIN 10; 100 MG/5ML; MG/5ML
5 SOLUTION ORAL EVERY 4 HOURS PRN
Status: DISCONTINUED | OUTPATIENT
Start: 2017-12-02 | End: 2017-12-07 | Stop reason: HOSPADM

## 2017-12-02 RX ORDER — NALOXONE HYDROCHLORIDE 0.4 MG/ML
.1-.4 INJECTION, SOLUTION INTRAMUSCULAR; INTRAVENOUS; SUBCUTANEOUS
Status: DISCONTINUED | OUTPATIENT
Start: 2017-12-02 | End: 2017-12-02

## 2017-12-02 RX ORDER — DIPHENHYDRAMINE HCL 25 MG
25 CAPSULE ORAL EVERY 6 HOURS PRN
Status: DISCONTINUED | OUTPATIENT
Start: 2017-12-02 | End: 2017-12-07 | Stop reason: HOSPADM

## 2017-12-02 RX ORDER — CEFAZOLIN SODIUM 2 G/100ML
2 INJECTION, SOLUTION INTRAVENOUS
Status: COMPLETED | OUTPATIENT
Start: 2017-12-02 | End: 2017-12-02

## 2017-12-02 RX ORDER — PROCHLORPERAZINE 25 MG
25 SUPPOSITORY, RECTAL RECTAL EVERY 12 HOURS PRN
Status: DISCONTINUED | OUTPATIENT
Start: 2017-12-02 | End: 2017-12-07 | Stop reason: HOSPADM

## 2017-12-02 RX ORDER — NEOSTIGMINE METHYLSULFATE 1 MG/ML
VIAL (ML) INJECTION PRN
Status: DISCONTINUED | OUTPATIENT
Start: 2017-12-02 | End: 2017-12-02

## 2017-12-02 RX ORDER — PROPOFOL 10 MG/ML
INJECTION, EMULSION INTRAVENOUS PRN
Status: DISCONTINUED | OUTPATIENT
Start: 2017-12-02 | End: 2017-12-02

## 2017-12-02 RX ORDER — DIPHENHYDRAMINE HYDROCHLORIDE 50 MG/ML
25 INJECTION INTRAMUSCULAR; INTRAVENOUS EVERY 6 HOURS PRN
Status: DISCONTINUED | OUTPATIENT
Start: 2017-12-02 | End: 2017-12-07 | Stop reason: HOSPADM

## 2017-12-02 RX ORDER — VECURONIUM BROMIDE 1 MG/ML
INJECTION, POWDER, LYOPHILIZED, FOR SOLUTION INTRAVENOUS PRN
Status: DISCONTINUED | OUTPATIENT
Start: 2017-12-02 | End: 2017-12-02

## 2017-12-02 RX ORDER — CALCIUM CARBONATE 500 MG/1
1000 TABLET, CHEWABLE ORAL 4 TIMES DAILY PRN
Status: DISCONTINUED | OUTPATIENT
Start: 2017-12-02 | End: 2017-12-07 | Stop reason: HOSPADM

## 2017-12-02 RX ORDER — ROPIVACAINE HYDROCHLORIDE 2 MG/ML
INJECTION, SOLUTION EPIDURAL; INFILTRATION; PERINEURAL PRN
Status: DISCONTINUED | OUTPATIENT
Start: 2017-12-02 | End: 2017-12-02

## 2017-12-02 RX ORDER — BUPIVACAINE HYDROCHLORIDE 5 MG/ML
INJECTION, SOLUTION PERINEURAL PRN
Status: DISCONTINUED | OUTPATIENT
Start: 2017-12-02 | End: 2017-12-02 | Stop reason: HOSPADM

## 2017-12-02 RX ORDER — ONDANSETRON 4 MG/1
4 TABLET, ORALLY DISINTEGRATING ORAL EVERY 6 HOURS PRN
Status: DISCONTINUED | OUTPATIENT
Start: 2017-12-02 | End: 2017-12-02

## 2017-12-02 RX ORDER — HYDROMORPHONE HYDROCHLORIDE 1 MG/ML
.3-.5 INJECTION, SOLUTION INTRAMUSCULAR; INTRAVENOUS; SUBCUTANEOUS EVERY 5 MIN PRN
Status: DISCONTINUED | OUTPATIENT
Start: 2017-12-02 | End: 2017-12-02 | Stop reason: HOSPADM

## 2017-12-02 RX ORDER — GLYCOPYRROLATE 0.2 MG/ML
INJECTION, SOLUTION INTRAMUSCULAR; INTRAVENOUS PRN
Status: DISCONTINUED | OUTPATIENT
Start: 2017-12-02 | End: 2017-12-02

## 2017-12-02 RX ORDER — ONDANSETRON 2 MG/ML
4 INJECTION INTRAMUSCULAR; INTRAVENOUS EVERY 6 HOURS PRN
Status: DISCONTINUED | OUTPATIENT
Start: 2017-12-02 | End: 2017-12-02

## 2017-12-02 RX ORDER — NALOXONE HYDROCHLORIDE 0.4 MG/ML
.1-.4 INJECTION, SOLUTION INTRAMUSCULAR; INTRAVENOUS; SUBCUTANEOUS
Status: DISCONTINUED | OUTPATIENT
Start: 2017-12-02 | End: 2017-12-07 | Stop reason: HOSPADM

## 2017-12-02 RX ORDER — ONDANSETRON 4 MG/1
4 TABLET, ORALLY DISINTEGRATING ORAL EVERY 6 HOURS PRN
Status: DISCONTINUED | OUTPATIENT
Start: 2017-12-02 | End: 2017-12-07 | Stop reason: HOSPADM

## 2017-12-02 RX ORDER — ACETAMINOPHEN 325 MG/1
650 TABLET ORAL EVERY 4 HOURS PRN
Status: DISCONTINUED | OUTPATIENT
Start: 2017-12-05 | End: 2017-12-07 | Stop reason: HOSPADM

## 2017-12-02 RX ORDER — MAGNESIUM HYDROXIDE 1200 MG/15ML
LIQUID ORAL PRN
Status: DISCONTINUED | OUTPATIENT
Start: 2017-12-02 | End: 2017-12-02 | Stop reason: HOSPADM

## 2017-12-02 RX ORDER — NALBUPHINE HYDROCHLORIDE 10 MG/ML
2.5-5 INJECTION, SOLUTION INTRAMUSCULAR; INTRAVENOUS; SUBCUTANEOUS EVERY 6 HOURS PRN
Status: DISCONTINUED | OUTPATIENT
Start: 2017-12-02 | End: 2017-12-07 | Stop reason: HOSPADM

## 2017-12-02 RX ORDER — LIDOCAINE HYDROCHLORIDE 20 MG/ML
INJECTION, SOLUTION INFILTRATION; PERINEURAL PRN
Status: DISCONTINUED | OUTPATIENT
Start: 2017-12-02 | End: 2017-12-02

## 2017-12-02 RX ORDER — LISINOPRIL 10 MG/1
20 TABLET ORAL DAILY
Status: DISCONTINUED | OUTPATIENT
Start: 2017-12-02 | End: 2017-12-02

## 2017-12-02 RX ORDER — SODIUM CHLORIDE 9 MG/ML
INJECTION, SOLUTION INTRAVENOUS CONTINUOUS
Status: DISCONTINUED | OUTPATIENT
Start: 2017-12-02 | End: 2017-12-03

## 2017-12-02 RX ORDER — ONDANSETRON 2 MG/ML
4 INJECTION INTRAMUSCULAR; INTRAVENOUS EVERY 6 HOURS PRN
Status: DISCONTINUED | OUTPATIENT
Start: 2017-12-02 | End: 2017-12-07 | Stop reason: HOSPADM

## 2017-12-02 RX ORDER — ACETAMINOPHEN 325 MG/1
975 TABLET ORAL EVERY 8 HOURS
Status: COMPLETED | OUTPATIENT
Start: 2017-12-02 | End: 2017-12-05

## 2017-12-02 RX ORDER — SODIUM CHLORIDE, SODIUM LACTATE, POTASSIUM CHLORIDE, CALCIUM CHLORIDE 600; 310; 30; 20 MG/100ML; MG/100ML; MG/100ML; MG/100ML
INJECTION, SOLUTION INTRAVENOUS CONTINUOUS
Status: DISCONTINUED | OUTPATIENT
Start: 2017-12-02 | End: 2017-12-02 | Stop reason: HOSPADM

## 2017-12-02 RX ORDER — ONDANSETRON 2 MG/ML
4 INJECTION INTRAMUSCULAR; INTRAVENOUS EVERY 30 MIN PRN
Status: DISCONTINUED | OUTPATIENT
Start: 2017-12-02 | End: 2017-12-02 | Stop reason: HOSPADM

## 2017-12-02 RX ORDER — PROCHLORPERAZINE MALEATE 10 MG
10 TABLET ORAL EVERY 6 HOURS PRN
Status: DISCONTINUED | OUTPATIENT
Start: 2017-12-02 | End: 2017-12-07 | Stop reason: HOSPADM

## 2017-12-02 RX ORDER — FENTANYL CITRATE 50 UG/ML
50-100 INJECTION, SOLUTION INTRAMUSCULAR; INTRAVENOUS
Status: DISCONTINUED | OUTPATIENT
Start: 2017-12-02 | End: 2017-12-03

## 2017-12-02 RX ORDER — ONDANSETRON 2 MG/ML
INJECTION INTRAMUSCULAR; INTRAVENOUS PRN
Status: DISCONTINUED | OUTPATIENT
Start: 2017-12-02 | End: 2017-12-02

## 2017-12-02 RX ORDER — GINSENG 100 MG
CAPSULE ORAL 3 TIMES DAILY
Status: DISCONTINUED | OUTPATIENT
Start: 2017-12-02 | End: 2017-12-07

## 2017-12-02 RX ORDER — DEXAMETHASONE SODIUM PHOSPHATE 4 MG/ML
INJECTION, SOLUTION INTRA-ARTICULAR; INTRALESIONAL; INTRAMUSCULAR; INTRAVENOUS; SOFT TISSUE PRN
Status: DISCONTINUED | OUTPATIENT
Start: 2017-12-02 | End: 2017-12-02

## 2017-12-02 RX ORDER — FENTANYL CITRATE 0.05 MG/ML
25-50 INJECTION, SOLUTION INTRAMUSCULAR; INTRAVENOUS
Status: DISCONTINUED | OUTPATIENT
Start: 2017-12-02 | End: 2017-12-02 | Stop reason: HOSPADM

## 2017-12-02 RX ORDER — IBUPROFEN 600 MG/1
600 TABLET, FILM COATED ORAL 4 TIMES DAILY
Status: DISCONTINUED | OUTPATIENT
Start: 2017-12-02 | End: 2017-12-07 | Stop reason: HOSPADM

## 2017-12-02 RX ORDER — AMOXICILLIN 250 MG
1-2 CAPSULE ORAL 2 TIMES DAILY
Status: DISCONTINUED | OUTPATIENT
Start: 2017-12-02 | End: 2017-12-07 | Stop reason: HOSPADM

## 2017-12-02 RX ADMIN — IBUPROFEN 600 MG: 600 TABLET ORAL at 18:30

## 2017-12-02 RX ADMIN — PHENYLEPHRINE HYDROCHLORIDE 100 MCG: 10 INJECTION INTRAVENOUS at 08:57

## 2017-12-02 RX ADMIN — SODIUM CHLORIDE 1000 ML: 0.9 IRRIGANT IRRIGATION at 08:51

## 2017-12-02 RX ADMIN — ONDANSETRON 4 MG: 2 INJECTION INTRAMUSCULAR; INTRAVENOUS at 09:46

## 2017-12-02 RX ADMIN — PHENYLEPHRINE HYDROCHLORIDE 200 MCG: 10 INJECTION INTRAVENOUS at 09:36

## 2017-12-02 RX ADMIN — FENTANYL CITRATE 50 MCG: 50 INJECTION, SOLUTION INTRAMUSCULAR; INTRAVENOUS at 08:29

## 2017-12-02 RX ADMIN — SUCCINYLCHOLINE CHLORIDE 100 MG: 20 INJECTION, SOLUTION INTRAMUSCULAR; INTRAVENOUS at 08:29

## 2017-12-02 RX ADMIN — NEOSTIGMINE METHYLSULFATE 5 MG: 1 INJECTION INTRAMUSCULAR; INTRAVENOUS; SUBCUTANEOUS at 10:01

## 2017-12-02 RX ADMIN — PHENYLEPHRINE HYDROCHLORIDE 100 MCG: 10 INJECTION INTRAVENOUS at 09:54

## 2017-12-02 RX ADMIN — PHENYLEPHRINE HYDROCHLORIDE 200 MCG: 10 INJECTION INTRAVENOUS at 10:12

## 2017-12-02 RX ADMIN — PHENYLEPHRINE HYDROCHLORIDE 100 MCG: 10 INJECTION INTRAVENOUS at 09:09

## 2017-12-02 RX ADMIN — DEXAMETHASONE SODIUM PHOSPHATE 4 MG: 4 INJECTION, SOLUTION INTRA-ARTICULAR; INTRALESIONAL; INTRAMUSCULAR; INTRAVENOUS; SOFT TISSUE at 08:34

## 2017-12-02 RX ADMIN — PHENYLEPHRINE HYDROCHLORIDE 200 MCG: 10 INJECTION INTRAVENOUS at 09:28

## 2017-12-02 RX ADMIN — SODIUM CHLORIDE, POTASSIUM CHLORIDE, SODIUM LACTATE AND CALCIUM CHLORIDE: 600; 310; 30; 20 INJECTION, SOLUTION INTRAVENOUS at 09:58

## 2017-12-02 RX ADMIN — BUPIVACAINE HYDROCHLORIDE 30 ML: 5 INJECTION, SOLUTION PERINEURAL at 10:19

## 2017-12-02 RX ADMIN — MIDAZOLAM HYDROCHLORIDE 2 MG: 1 INJECTION, SOLUTION INTRAMUSCULAR; INTRAVENOUS at 07:53

## 2017-12-02 RX ADMIN — FENTANYL CITRATE 50 MCG: 50 INJECTION, SOLUTION INTRAMUSCULAR; INTRAVENOUS at 10:02

## 2017-12-02 RX ADMIN — PHENYLEPHRINE HYDROCHLORIDE 100 MCG: 10 INJECTION INTRAVENOUS at 09:10

## 2017-12-02 RX ADMIN — Medication 0.5 MG: at 11:43

## 2017-12-02 RX ADMIN — MIDAZOLAM HYDROCHLORIDE 1 MG: 1 INJECTION, SOLUTION INTRAMUSCULAR; INTRAVENOUS at 08:01

## 2017-12-02 RX ADMIN — ACETAMINOPHEN 975 MG: 325 TABLET, FILM COATED ORAL at 18:30

## 2017-12-02 RX ADMIN — FENTANYL CITRATE 50 MCG: 50 INJECTION, SOLUTION INTRAMUSCULAR; INTRAVENOUS at 09:05

## 2017-12-02 RX ADMIN — ROPIVACAINE HYDROCHLORIDE: 5 INJECTION, SOLUTION EPIDURAL; INFILTRATION; PERINEURAL at 12:10

## 2017-12-02 RX ADMIN — PHENYLEPHRINE HYDROCHLORIDE 100 MCG: 10 INJECTION INTRAVENOUS at 09:13

## 2017-12-02 RX ADMIN — PROPOFOL 30 MG: 10 INJECTION, EMULSION INTRAVENOUS at 09:56

## 2017-12-02 RX ADMIN — ROCURONIUM BROMIDE 10 MG: 10 INJECTION INTRAVENOUS at 08:41

## 2017-12-02 RX ADMIN — ROCURONIUM BROMIDE 40 MG: 10 INJECTION INTRAVENOUS at 08:37

## 2017-12-02 RX ADMIN — SENNOSIDES AND DOCUSATE SODIUM 2 TABLET: 8.6; 5 TABLET ORAL at 21:05

## 2017-12-02 RX ADMIN — VECURONIUM BROMIDE 1 MG: 1 INJECTION, POWDER, LYOPHILIZED, FOR SOLUTION INTRAVENOUS at 08:53

## 2017-12-02 RX ADMIN — FENTANYL CITRATE 100 MCG: 50 INJECTION, SOLUTION INTRAMUSCULAR; INTRAVENOUS at 08:00

## 2017-12-02 RX ADMIN — SODIUM CHLORIDE, POTASSIUM CHLORIDE, SODIUM LACTATE AND CALCIUM CHLORIDE: 600; 310; 30; 20 INJECTION, SOLUTION INTRAVENOUS at 09:05

## 2017-12-02 RX ADMIN — ROPIVACAINE HYDROCHLORIDE 6 ML: 2 INJECTION, SOLUTION EPIDURAL; INFILTRATION at 08:43

## 2017-12-02 RX ADMIN — VECURONIUM BROMIDE 2 MG: 1 INJECTION, POWDER, LYOPHILIZED, FOR SOLUTION INTRAVENOUS at 09:31

## 2017-12-02 RX ADMIN — MIDAZOLAM HYDROCHLORIDE 1 MG: 1 INJECTION, SOLUTION INTRAMUSCULAR; INTRAVENOUS at 08:22

## 2017-12-02 RX ADMIN — CEFAZOLIN SODIUM 2 G: 2 INJECTION, SOLUTION INTRAVENOUS at 08:36

## 2017-12-02 RX ADMIN — PHENYLEPHRINE HYDROCHLORIDE 100 MCG: 10 INJECTION INTRAVENOUS at 08:54

## 2017-12-02 RX ADMIN — PHENYLEPHRINE HYDROCHLORIDE 200 MCG: 10 INJECTION INTRAVENOUS at 09:16

## 2017-12-02 RX ADMIN — ROPIVACAINE HYDROCHLORIDE 5 ML: 2 INJECTION, SOLUTION EPIDURAL; INFILTRATION at 11:58

## 2017-12-02 RX ADMIN — PHENYLEPHRINE HYDROCHLORIDE 100 MCG: 10 INJECTION INTRAVENOUS at 09:42

## 2017-12-02 RX ADMIN — SODIUM CHLORIDE, POTASSIUM CHLORIDE, SODIUM LACTATE AND CALCIUM CHLORIDE: 600; 310; 30; 20 INJECTION, SOLUTION INTRAVENOUS at 08:22

## 2017-12-02 RX ADMIN — DEXMEDETOMIDINE HYDROCHLORIDE 20 MCG: 100 INJECTION, SOLUTION INTRAVENOUS at 10:08

## 2017-12-02 RX ADMIN — CLONAZEPAM 0.25 MG: 0.25 TABLET, ORALLY DISINTEGRATING ORAL at 21:06

## 2017-12-02 RX ADMIN — PHENYLEPHRINE HYDROCHLORIDE 200 MCG: 10 INJECTION INTRAVENOUS at 09:23

## 2017-12-02 RX ADMIN — RANITIDINE 150 MG: 150 TABLET ORAL at 21:07

## 2017-12-02 RX ADMIN — PHENYLEPHRINE HYDROCHLORIDE 100 MCG: 10 INJECTION INTRAVENOUS at 09:06

## 2017-12-02 RX ADMIN — PHENYLEPHRINE HYDROCHLORIDE 100 MCG: 10 INJECTION INTRAVENOUS at 09:30

## 2017-12-02 RX ADMIN — SODIUM CHLORIDE: 9 INJECTION, SOLUTION INTRAVENOUS at 16:59

## 2017-12-02 RX ADMIN — GLYCOPYRROLATE 0.8 MG: 0.2 INJECTION, SOLUTION INTRAMUSCULAR; INTRAVENOUS at 10:01

## 2017-12-02 RX ADMIN — PROPOFOL 200 MG: 10 INJECTION, EMULSION INTRAVENOUS at 08:29

## 2017-12-02 RX ADMIN — PHENYLEPHRINE HYDROCHLORIDE 100 MCG: 10 INJECTION INTRAVENOUS at 08:41

## 2017-12-02 RX ADMIN — ROPIVACAINE HYDROCHLORIDE 4 ML: 2 INJECTION, SOLUTION EPIDURAL; INFILTRATION at 10:14

## 2017-12-02 RX ADMIN — GLYCOPYRROLATE 0.2 MG: 0.2 INJECTION, SOLUTION INTRAMUSCULAR; INTRAVENOUS at 09:28

## 2017-12-02 RX ADMIN — LIDOCAINE HYDROCHLORIDE 60 MG: 20 INJECTION, SOLUTION INFILTRATION; PERINEURAL at 08:29

## 2017-12-02 ASSESSMENT — LIFESTYLE VARIABLES: TOBACCO_USE: 0

## 2017-12-02 ASSESSMENT — COPD QUESTIONNAIRES: COPD: 0

## 2017-12-02 NOTE — BRIEF OP NOTE
[facility] Brief Operative Note    Pre-operative diagnosis: INTERCOSTAL HERNIA, fracture 7th and 8th left ribs   Post-operative diagnosis same   Procedure: LEFT THORACOTOMY, REPAIR OF INTERCOSTAL HERNIA, INTERNAL FIXATION LEFT 7TH AND 8 TH RIB FRACTURES    Surgeon(s): Surgeon(s) and Role:     * Myron Damon MD - Primary     * Klaus Estrella MD - Assisting   Estimated blood loss: 100 mL    Specimens:   ID Type Source Tests Collected by Time Destination   A : portion of 8th rib, intercostal tissue Tissue Other SURGICAL PATHOLOGY EXAM Myron Damon MD 12/2/2017  9:09 AM    B : PORTION OF 7TH RIB Tissue Other SURGICAL PATHOLOGY EXAM Myron Damon MD 12/2/2017  9:29 AM       Findings: AS ABOVE

## 2017-12-02 NOTE — OR NURSING
Time out done in pre-op before epidural insertion.  Present Dr. Morris Valderrama, Christine Hodgkins, ALESSANDRO and Lety Love RN.  Cardiac monitor, NIBP, oximetry attached.

## 2017-12-02 NOTE — IP AVS SNAPSHOT
[facility] [facility]    [address] [place] [address]    Phone:  [phone]                                       After Visit Summary   12/2/2017    Lucio Hernandez    MRN: [id number]           After Visit Summary Signature Page     I have received my discharge instructions, and my questions have been answered. I have discussed any challenges I see with this plan with the nurse or doctor.    ..........................................................................................................................................  Patient/Patient Representative Signature      ..........................................................................................................................................  Patient Representative Print Name and Relationship to Patient    ..................................................               ................................................  Date                                            Time    ..........................................................................................................................................  Reviewed by Signature/Title    ...................................................              ..............................................  Date                                                            Time

## 2017-12-02 NOTE — OR NURSING
RN transfers patient to [address] without incident -- 2 bags of belongings and glasses returned to patient. RN gives handoff report with LYNNETTE Holman.

## 2017-12-02 NOTE — ANESTHESIA PREPROCEDURE EVALUATION
Anesthesia Evaluation     . Pt has had prior anesthetic.     No history of anesthetic complications          ROS/MED HX    ENT/Pulmonary:     (+)asthma , recent URI unresolved cough: . .   (-) tobacco use, COPD and sleep apnea   Neurologic:       Cardiovascular:     (+) hypertension----. : . . . :. .      (-) CAD   METS/Exercise Tolerance:     Hematologic:         Musculoskeletal:         GI/Hepatic:        (-) GERD and liver disease   Renal/Genitourinary:      (-) renal disease   Endo:      (-) Type I DM and Type II DM   Psychiatric:         Infectious Disease:         Malignancy:   (+) Malignancy History of Prostate          Other:                     Physical Exam      Airway   Mallampati: III  TM distance: >3 FB  Neck ROM: full    Dental   (+) chipped  Comment: Worn edges    Cardiovascular   Rhythm and rate: regular and normal      Pulmonary    breath sounds clear to auscultation                    Anesthesia Plan      History & Physical Review  History and physical reviewed and following examination; no interval change.    ASA Status:  3 .    NPO Status:  > 8 hours    Plan for General and ETT with Intravenous induction. Maintenance will be Inhalation.    PONV prophylaxis:  Ondansetron (or other 5HT-3) and Dexamethasone or Solumedrol  Additional equipment: Videolaryngoscope and Double Lumen ETT      Postoperative Care  Postoperative pain management:  Neuraxial analgesia.      Consents  Anesthetic plan, risks, benefits and alternatives discussed with:  Patient..                          .

## 2017-12-02 NOTE — IP AVS SNAPSHOT
MRN:[id number]                      After Visit Summary   12/2/2017    Lucio Hernandez    MRN: [id number]           Thank you!     Thank you for choosing [facility] for your care. Our goal is always to provide you with excellent care. Hearing back from our patients is one way we can continue to improve our services. Please take a few minutes to complete the written survey that you may receive in the mail after you visit with us. Thank you!        Patient Information     Date Of Birth          1955        Designated Caregiver       Most Recent Value    Caregiver    Will someone help with your care after discharge? yes    Name of designated caregiver Yarelis Hernandez    Phone number of caregiver [phone]    Caregiver address [address]       About your hospital stay     You were admitted on:  December 2, 2017 You last received care in the:  [facility] [facility]    You were discharged on:  December 7, 2017        Reason for your hospital stay       Lung surgery with Dr. Damon.   Pathology of portion of left 7th and 8th ribs and intercostal tissue confirms negative for malignancy.                  Who to Call     For medical emergencies, please call 911.  For non-urgent questions about your medical care, please call your primary care provider or clinic, [phone]  For questions related to your surgery, please call your surgery clinic        Attending Provider     Provider Specialty    Myron Damon MD Thoracic Diseases       Primary Care Provider Office Phone # Fax #    Dean Cody [phone]      After Care Instructions     Activity       Your activity upon discharge: no lifting greater than 8-10 pounds with your left arm.  Conduct range of motion activities to left shoulder as directed to prevent frozen shoulder.            Diet       Follow this diet upon discharge: Orders Placed This Encounter      Advance Diet  "as Tolerated: Regular Diet Adult                  Further instructions from your care team       [facility]  Discharge Orders & Follow-up Care  Video-Assisted: Thoracoscopy - Thoracotomy    Call Siobhan at Dr. Damon  office at [phone] to make a return appointment with a chest x-ray on_Monday, Dec. 18_.  Your appointment will be with Meme Smith PA-C and Dr. Damon.      Our office is located at:  [address], [address], [place] [address].  Siobhan will explain where to park when you call for an appointment.    A. Patient Care  Call Dr Damon  office @ [phone] if:  ? Severe chills or a fever or 100.4 F.  temperature on two occasions  ? Increased incisional pain and/or redness  ? Presence of unusual incisional or chest tube site drainage  ? Coughing up bright red blood or greenish-yellow secretions  ? Significant increase in shortness of breath    Pain Relief  You have been given a prescription for narcotic pain medicine.  You may also take ibuprofen and acetaminophen over the counter.  Recommended dosages are:  600 mg Ibuprofen every 6 hours as needed and 650 mg Acetaminophen every 4 hours as needed.  Many patients get good pain relief by \"staggering\" these medications.     No driving while on narcotics.     Activity  _XXX__ No heavy lifting greater than 8-10 pounds on the operative side for 4-6 weeks for a thoracotomy    Wound Care  Remove all dressings tomorrow morning, Dec. 8 and then you may shower.  Wash the incision and chest tube site(s) daily with soap and water. No bathing or immersing incision underwater for approximately 2 weeks or until the chest tube sites are completely healed.     Place a dry gauze dressing over the chest tube site(s) because it(they) will drain a few days.  Do not be alarmed if there is drainage of a large amount of fluid (should be pink or yellow-- or somewhat bloody) either spontaneously or with coughing or exertion. If " "this happens, just place a large dry gauze dressing over the chest tube site-- it will stop and scab over in about a week or so. Once the drainage stops, then leave the chest tube site(s) open to air.     White steri strips will be present on the incision(s). They will peel off within about 10 days-- otherwise, they will be removed at your post-op appointment.     B. Respiratory  _XXX__ Utilize Incentive spirometer 10 times in a row every few hours while awake for a week after discharging home from the hospital            Pending Results     Date and Time Order Name Status Description    2017 0005 XR Chest 2 Views Preliminary             Statement of Approval     Ordered          17 1326  I have reviewed and agree with all the recommendations and orders detailed in this document.  EFFECTIVE NOW     Approved and electronically signed by:  Meme Smith PA-C             Admission Information     Date & Time Provider Department Dept. Phone    2017 Myron Damon MD [facility] [facility] [phone]      Your Vitals Were     Blood Pressure Pulse Temperature Respirations Weight Pulse Oximetry    146/87 (BP Location: Left arm) 90 99.4  F (37.4  C) (Oral) 16 98.6 kg (217 lb 6 oz) 96%    BMI (Body Mass Index)                   32.1 kg/m2           MyChart Information     [employer] lets you send messages to your doctor, view your test results, renew your prescriptions, schedule appointments and more. To sign up, go to [email or web address] . Click on \"Log in\" on the left side of the screen, which will take you to the Welcome page. Then click on \"Sign up Now\" on the right side of the page.     You will be asked to enter the access code listed below, as well as some personal information. Please follow the directions to create your username and password.     Your access code is: [email or web address]  Expires: 2018 11:44 PM     Your access code will  in 90 days. If you need " help or a new code, please call your [place] clinic or [phone].        Care EveryWhere ID     This is your Care EveryWhere ID. This could be used by other organizations to access your [place] medical records  [id number]        Equal Access to Services     SUKHJINDER ASHER : Hadii aad ku hadliutawanda K[employer], wadianada luqadaha, qaybta kaalmada adejamari, puneet gonzalez pinalyndsey jernigancorkyjake florian. So [facility] [place] [phone].    ATENCIÓN: Si habla español, tiene a vela disposición servicios gratuitos de asistencia lingüística. Llame al [phone].    We comply with applicable federal civil rights laws and [place] laws. We do not discriminate on the basis of race, color, national origin, age, disability, sex, sexual orientation, or gender identity.               Review of your medicines      START taking        Dose / Directions    acetaminophen 325 MG tablet   Commonly known as:  TYLENOL        Dose:  650 mg   Take 2 tablets (650 mg) by mouth every 4 hours as needed for other (surgical pain)   Quantity:  100 tablet   Refills:  0       HYDROmorphone 2 MG tablet   Commonly known as:  DILAUDID        Dose:  2-4 mg   Take 1-2 tablets (2-4 mg) by mouth every 3 hours as needed for moderate to severe pain   Quantity:  80 tablet   Refills:  0       ibuprofen 600 MG tablet   Commonly known as:  ADVIL/MOTRIN        Dose:  600 mg   Take 1 tablet (600 mg) by mouth 4 times daily (before meals and nightly)   Quantity:  120 tablet   Refills:  0       polyethylene glycol Packet   Commonly known as:  MIRALAX/GLYCOLAX        Dose:  17 g   Take 17 g by mouth daily as needed for constipation   Quantity:  7 packet   Refills:  0       senna-docusate 8.6-50 MG per tablet   Commonly known as:  SENOKOT-S;PERICOLACE        Dose:  1-2 tablet   Take 1-2 tablets by mouth 2 times daily as needed for constipation   Quantity:  40 tablet   Refills:  0         CONTINUE these medicines which have NOT CHANGED        Dose / Directions    CLONAZEPAM PO         Dose:  0.25 mg   Take 0.25 mg by mouth 2 times daily as needed for anxiety   Refills:  0       LISINOPRIL PO        Dose:  20 mg   Take 20 mg by mouth daily   Refills:  0       multivitamin, therapeutic Tabs tablet        Dose:  1 tablet   Take 1 tablet by mouth daily   Refills:  0         STOP taking     guaiFENesin-codeine 100-10 MG/5ML Soln solution   Commonly known as:  ROBITUSSIN AC                Where to get your medicines      These medications were sent to [facility] [place], [place] - [address]  [address], [place] [address]     Phone:  [phone]     senna-docusate 8.6-50 MG per tablet         Some of these will need a paper prescription and others can be bought over the counter. Ask your nurse if you have questions.     Bring a paper prescription for each of these medications     HYDROmorphone 2 MG tablet       You don't need a prescription for these medications     acetaminophen 325 MG tablet    ibuprofen 600 MG tablet    polyethylene glycol Packet                Protect others around you: Learn how to safely use, store and throw away your medicines at www.disposemymeds.org.             Medication List: This is a list of all your medications and when to take them. Check marks below indicate your daily home schedule. Keep this list as a reference.      Medications           Morning Afternoon Evening Bedtime As Needed    acetaminophen 325 MG tablet   Commonly known as:  TYLENOL   Take 2 tablets (650 mg) by mouth every 4 hours as needed for other (surgical pain)   Last time this was given:  650 mg on 12/7/2017  9:03 AM                                   CLONAZEPAM PO   Take 0.25 mg by mouth 2 times daily as needed for anxiety   Last time this was given:  0.25 mg on 12/6/2017  8:26 PM                                   HYDROmorphone 2 MG tablet   Commonly known as:  DILAUDID   Take 1-2 tablets (2-4 mg) by mouth every 3 hours as needed for moderate to severe pain   Last time  this was given:  2 mg on 12/7/2017  1:11 PM                                   ibuprofen 600 MG tablet   Commonly known as:  ADVIL/MOTRIN   Take 1 tablet (600 mg) by mouth 4 times daily (before meals and nightly)   Last time this was given:  600 mg on 12/7/2017  1:09 PM                                            LISINOPRIL PO   Take 20 mg by mouth daily   Last time this was given:  20 mg on 12/7/2017  9:04 AM                                   multivitamin, therapeutic Tabs tablet   Take 1 tablet by mouth daily                                   polyethylene glycol Packet   Commonly known as:  MIRALAX/GLYCOLAX   Take 17 g by mouth daily as needed for constipation                                   senna-docusate 8.6-50 MG per tablet   Commonly known as:  SENOKOT-S;PERICOLACE   Take 1-2 tablets by mouth 2 times daily as needed for constipation   Last time this was given:  2 tablets on 12/7/2017  9:03 AM

## 2017-12-02 NOTE — ANESTHESIA CARE TRANSFER NOTE
Patient: Lucio Hernandez    Procedure(s):  LEFT THORACOTOMY, REPAIR OF INTERCOSTAL HERNIA  AND INTERNAL FIXATION RIB FRACTURES OF RIBS 7 AND 8 - Wound Class: II-Clean Contaminated    Diagnosis: INTERCOSTAL HERNIA  Diagnosis Additional Information: No value filed.    Anesthesia Type:   General, ETT     Note:  Airway :Face Mask  Patient transferred to:PACU  Comments: Level of Conscious:Awake  Vital Signs   BP:110/68   HR:82   RR:14   O2 Saturation:98   Oxygen LPM: 8   Temp:36.5  Dentition:Unchanged from preop  Patient Status:Stable  Report to PACU RN.Handoff Report: Identifed the Patient, Identified the Reponsible Provider, Reviewed the pertinent medical history, Discussed the surgical course, Reviewed Intra-OP anesthesia mangement and issues during anesthesia, Set expectations for post-procedure period and Allowed opportunity for questions and acknowledgement of understanding      Vitals: (Last set prior to Anesthesia Care Transfer)    CRNA VITALS  12/2/2017 0957 - 12/2/2017 1036      12/2/2017             NIBP: 117/75    Pulse: 75    NIBP Mean: 86    Ht Rate: 74    SpO2: 98 %    Resp Rate (set): 10                Electronically Signed By: Christine Marie Volp Hodgkins, CRNA, APRN CRNA  December 2, 2017  10:36 AM

## 2017-12-02 NOTE — ANESTHESIA POSTPROCEDURE EVALUATION
Patient: Lucio Hernandez    Procedure(s):  LEFT THORACOTOMY, REPAIR OF INTERCOSTAL HERNIA  AND INTERNAL FIXATION RIB FRACTURES OF RIBS 7 AND 8 - Wound Class: II-Clean Contaminated    Diagnosis:INTERCOSTAL HERNIA  Diagnosis Additional Information: No value filed.    Anesthesia Type:  General, ETT    Note:  Anesthesia Post Evaluation    Patient location during evaluation: PACU  Patient participation: Able to fully participate in evaluation  Level of consciousness: awake and alert  Pain management: adequate  Airway patency: patent  Cardiovascular status: acceptable  Respiratory status: acceptable  Hydration status: acceptable  PONV: none     Anesthetic complications: None    Comments: T3 to T11 epidural level. Mild shoulder pain only complaint.         Last vitals:  Vitals:    12/02/17 1130 12/02/17 1140 12/02/17 1150   BP: 113/76 121/83 120/84   Pulse:      Resp: 14 16 12   Temp: 37.1  C (98.8  F) 37.1  C (98.8  F) 37.2  C (99  F)   SpO2: 96% 95% 94%         Electronically Signed By: Godfrey Valderrama MD  December 2, 2017  12:19 PM

## 2017-12-02 NOTE — ANESTHESIA PROCEDURE NOTES
Peripheral nerve/Neuraxial procedure note : epidural catheter  Pre-Procedure  Performed by JOSE C SPENCE  Referred by ANTWON SARKAR  Location: OB      Pre-Anesthestic Checklist: patient identified, IV checked, risks and benefits discussed, informed consent, monitors and equipment checked, pre-op evaluation, at physician/surgeon's request and post-op pain management    Timeout  Correct Patient: Yes   Correct Procedure: Yes   Correct Site: Yes   Correct Laterality: N/A   Correct Position: Yes   Site Marked: N/A   .   Procedure Documentation    .    Procedure:    Epidural catheter.   (left paramedian approach) Injection technique: LORT saline   Local skin infiltrated with mL of 1% lidocaine.  JOSIE at 7 cm     Patient Prep;mask, sterile gloves, chlorhexidine gluconate and isopropyl alcohol, patient draped.  .  Needle: Touhy needle Needle Gauge: 17.    Needle Length (Inches) 3.5  # of attempts: 1 and # of redirects:  .   . .  Catheter threaded easily  3 cm epidural space.  .   .    Assessment/Narrative  Paresthesias: No.  .  .  Aspiration negative for heme or CSF  . Test dose of 3 mL lidocaine 1.5% w/ 1:200,000 epinephrine at. Test dose negative for signs of intravascular, subdural or intrathecal injection. Comments:  No complications, sterile dressing.

## 2017-12-03 ENCOUNTER — APPOINTMENT (OUTPATIENT)
Dept: GENERAL RADIOLOGY | Facility: CLINIC | Age: 62
DRG: 501 | End: 2017-12-03
Attending: THORACIC SURGERY (CARDIOTHORACIC VASCULAR SURGERY)
Payer: COMMERCIAL

## 2017-12-03 PROCEDURE — 25000125 ZZHC RX 250: Performed by: THORACIC SURGERY (CARDIOTHORACIC VASCULAR SURGERY)

## 2017-12-03 PROCEDURE — 25000132 ZZH RX MED GY IP 250 OP 250 PS 637: Performed by: THORACIC SURGERY (CARDIOTHORACIC VASCULAR SURGERY)

## 2017-12-03 PROCEDURE — 40000884 ZZH STATISTIC STEP DOWN HRS NIGHT

## 2017-12-03 PROCEDURE — 25000128 H RX IP 250 OP 636: Performed by: THORACIC SURGERY (CARDIOTHORACIC VASCULAR SURGERY)

## 2017-12-03 PROCEDURE — 71010 XR CHEST PORT 1 VW: CPT

## 2017-12-03 PROCEDURE — 12000007 ZZH R&B INTERMEDIATE

## 2017-12-03 RX ADMIN — CLONAZEPAM 0.25 MG: 0.25 TABLET, ORALLY DISINTEGRATING ORAL at 20:46

## 2017-12-03 RX ADMIN — ACETAMINOPHEN 975 MG: 325 TABLET, FILM COATED ORAL at 18:58

## 2017-12-03 RX ADMIN — BACITRACIN: 500 OINTMENT TOPICAL at 11:23

## 2017-12-03 RX ADMIN — RANITIDINE 150 MG: 150 TABLET ORAL at 20:46

## 2017-12-03 RX ADMIN — SENNOSIDES AND DOCUSATE SODIUM 2 TABLET: 8.6; 5 TABLET ORAL at 20:46

## 2017-12-03 RX ADMIN — ACETAMINOPHEN 975 MG: 325 TABLET, FILM COATED ORAL at 03:04

## 2017-12-03 RX ADMIN — IBUPROFEN 600 MG: 600 TABLET ORAL at 00:47

## 2017-12-03 RX ADMIN — DIPHENHYDRAMINE HYDROCHLORIDE 25 MG: 25 CAPSULE ORAL at 20:46

## 2017-12-03 RX ADMIN — ACETAMINOPHEN 975 MG: 325 TABLET, FILM COATED ORAL at 11:23

## 2017-12-03 RX ADMIN — RANITIDINE 150 MG: 150 TABLET ORAL at 08:51

## 2017-12-03 RX ADMIN — SENNOSIDES AND DOCUSATE SODIUM 2 TABLET: 8.6; 5 TABLET ORAL at 08:51

## 2017-12-03 RX ADMIN — SODIUM CHLORIDE: 9 INJECTION, SOLUTION INTRAVENOUS at 00:47

## 2017-12-03 RX ADMIN — IBUPROFEN 600 MG: 600 TABLET ORAL at 13:41

## 2017-12-03 RX ADMIN — IBUPROFEN 600 MG: 600 TABLET ORAL at 23:42

## 2017-12-03 RX ADMIN — IBUPROFEN 600 MG: 600 TABLET ORAL at 17:46

## 2017-12-03 NOTE — PROGRESS NOTES
PERIOPERATIVE AND INTERVENTIONAL PAIN SERVICE CONTINUOUS NERVE INFUSION NOTE    S: Pt reports excellent pain control via continuous epidural infusion.  Reports pain 1/10 at rest and 3/10 with movement.  Pt is ambulating with assistance and denies any weakness or paresthesias.  Patient is currently without nausea or vomiting. Patient is tolerating a reg diet.      O:  Temperatures:  Current - Temp: 36.9  C (98.5  F); Max - Temp  Av.1  C (98.7  F)  Min: 36.7  C (98  F)  Max: 37.6  C (99.7  F)   Respiration range: Resp  Av.1  Min: 10  Max: 116   Pulse range: Pulse  Av  Min: 89  Max: 91   Blood pressure range: Systolic (24hrs), Av , Min:79 , Max:153   ; Diastolic (24hrs), Av, Min:53, Max:116     Pulse oximetry range: SpO2  Av.1 %  Min: 92 %  Max: 100 %     Strength 5/5 and symmetric grossly in bilateral LE   Level to cold sensation:T5-8 bilat   Catheter site with dressing c/d/i, no erythema, heme, edema     Total IV narcotics last 24 hours 0   Total oral narcotics last 24 hours 0    A/P:  Lucio FALL Mary is a 62 year old male POD #1 s/p Intercostal hernia repair on  with thoracic epidural catheter for analgesia.  Pt is receiving adequate analgesia with infusion of 6ml/hour of 0.2% ropivacaine with 5mcg/ml fentanyl.  Pt is  ambulating without difficulty, no weakness or paresthesias.    - continue current infusion of 6ml/hour  - will continue to follow and adjust as needed      Godfrey Orozco DO  12/3/2017 7:14 AM

## 2017-12-03 NOTE — PROGRESS NOTES
THORACIC SURGERY POD # 1    Doing well  AVSS on RA  No bleeding  Decreasing CT output  CXR:  Left lung expanded    Wound ok    Satisfactory    Epidural  CT suction  resp care ++  Ambulate    ESTEBAN PINA MD [facility] ONCOLOGY THORACIC SURGERY  CELL:  [phone]  OFFICE: [phone]

## 2017-12-03 NOTE — PLAN OF CARE
Problem: Patient Care Overview  Goal: Plan of Care/Patient Progress Review  Outcome: Improving  VSS, LS clear this morning, pain well controlled w/ epidural, CT intact - no AL or crepitus, IS to 2250, Up to chair this morning, adequate UOP in silvestre, IV SL for high urine output and mild crackles in bases.

## 2017-12-03 NOTE — PLAN OF CARE
Problem: Patient Care Overview  Goal: Plan of Care/Patient Progress Review  Outcome: No Change  Pt A&O. BP still a little soft. Epidural infusing at 6 ml/hr and is managing pain well. Denies feeling dizzy/lightheaded. No numbness or tingling. Continued left shoulder pain - warm pack given. CT to suction, no air leak or crepitus. Thoracotomy incision reinforced. Good urine output per silvestre catheter.

## 2017-12-03 NOTE — PLAN OF CARE
Problem: Patient Care Overview  Goal: Plan of Care/Patient Progress Review  Outcome: No Change  Soft BP's, now improving. Epidural infusing. Site CDI. Denies numbness or tingling to extremities. Able to move legs without difficulty. Felt weak and a little dizzy when we attempted to sit pt up at bedside. Unable to stand d/t weakness. Had some chills this evening now resolved. Sats mid 90's with O2 at 2L. CT to suction, no airleak, no crepitous. Sero-sang drainage. Olsen patent with good output. IS to 2000.

## 2017-12-04 ENCOUNTER — APPOINTMENT (OUTPATIENT)
Dept: GENERAL RADIOLOGY | Facility: CLINIC | Age: 62
DRG: 501 | End: 2017-12-04
Attending: THORACIC SURGERY (CARDIOTHORACIC VASCULAR SURGERY)
Payer: COMMERCIAL

## 2017-12-04 LAB — GLUCOSE BLDC GLUCOMTR-MCNC: 92 MG/DL (ref 70–99)

## 2017-12-04 PROCEDURE — 12000007 ZZH R&B INTERMEDIATE

## 2017-12-04 PROCEDURE — 00000146 ZZHCL STATISTIC GLUCOSE BY METER IP

## 2017-12-04 PROCEDURE — 25000132 ZZH RX MED GY IP 250 OP 250 PS 637: Performed by: THORACIC SURGERY (CARDIOTHORACIC VASCULAR SURGERY)

## 2017-12-04 PROCEDURE — 71010 XR CHEST PORT 1 VW: CPT

## 2017-12-04 PROCEDURE — 25000128 H RX IP 250 OP 636: Performed by: ANESTHESIOLOGY

## 2017-12-04 RX ORDER — BISACODYL 10 MG
10 SUPPOSITORY, RECTAL RECTAL DAILY PRN
Status: DISCONTINUED | OUTPATIENT
Start: 2017-12-04 | End: 2017-12-07 | Stop reason: HOSPADM

## 2017-12-04 RX ORDER — POLYETHYLENE GLYCOL 3350 17 G/17G
17 POWDER, FOR SOLUTION ORAL DAILY
Status: DISCONTINUED | OUTPATIENT
Start: 2017-12-04 | End: 2017-12-04

## 2017-12-04 RX ORDER — POLYETHYLENE GLYCOL 3350 17 G/17G
17 POWDER, FOR SOLUTION ORAL DAILY PRN
Status: DISCONTINUED | OUTPATIENT
Start: 2017-12-04 | End: 2017-12-07 | Stop reason: HOSPADM

## 2017-12-04 RX ADMIN — IBUPROFEN 600 MG: 600 TABLET ORAL at 11:19

## 2017-12-04 RX ADMIN — BISACODYL 10 MG: 10 SUPPOSITORY RECTAL at 07:29

## 2017-12-04 RX ADMIN — RANITIDINE 150 MG: 150 TABLET ORAL at 21:27

## 2017-12-04 RX ADMIN — BACITRACIN: 500 OINTMENT TOPICAL at 08:33

## 2017-12-04 RX ADMIN — CLONAZEPAM 0.25 MG: 0.25 TABLET, ORALLY DISINTEGRATING ORAL at 21:27

## 2017-12-04 RX ADMIN — ACETAMINOPHEN 975 MG: 325 TABLET, FILM COATED ORAL at 19:44

## 2017-12-04 RX ADMIN — SENNOSIDES AND DOCUSATE SODIUM 2 TABLET: 8.6; 5 TABLET ORAL at 08:15

## 2017-12-04 RX ADMIN — IBUPROFEN 600 MG: 600 TABLET ORAL at 21:27

## 2017-12-04 RX ADMIN — ACETAMINOPHEN 975 MG: 325 TABLET, FILM COATED ORAL at 05:56

## 2017-12-04 RX ADMIN — RANITIDINE 150 MG: 150 TABLET ORAL at 08:15

## 2017-12-04 RX ADMIN — ROPIVACAINE HYDROCHLORIDE: 5 INJECTION, SOLUTION EPIDURAL; INFILTRATION; PERINEURAL at 04:15

## 2017-12-04 RX ADMIN — LISINOPRIL 20 MG: 10 TABLET ORAL at 08:15

## 2017-12-04 RX ADMIN — DIPHENHYDRAMINE HYDROCHLORIDE 25 MG: 25 CAPSULE ORAL at 21:27

## 2017-12-04 RX ADMIN — IBUPROFEN 600 MG: 600 TABLET ORAL at 18:31

## 2017-12-04 RX ADMIN — SENNOSIDES AND DOCUSATE SODIUM 1 TABLET: 8.6; 5 TABLET ORAL at 21:27

## 2017-12-04 RX ADMIN — ACETAMINOPHEN 975 MG: 325 TABLET, FILM COATED ORAL at 11:19

## 2017-12-04 NOTE — PLAN OF CARE
Problem: Patient Care Overview  Goal: Plan of Care/Patient Progress Review  Outcome: Improving  CT to suction, no air leak, no crepitous. Epidural effective for pain management. Ambulates with 1 assist. Olsen patent.

## 2017-12-04 NOTE — PLAN OF CARE
Problem: Patient Care Overview  Goal: Plan of Care/Patient Progress Review  Outcome: Improving  Pain controlled, rated at 3-5/10 with epidural, tylenol and ibuprofen. Chest tube patent to suction with no air leak or crepitis. Incision with scant SS drainage. Silvestre patent with good urine output. Had large BM this am after given suppository. SBA. Ambulation and IS encouraged. Lung sounds clear. Regular diet. Epidural and silvestre out tomorrow with chest tube d/c tomorrow or Wednesday.

## 2017-12-04 NOTE — PROGRESS NOTES
Thoracic Surgery POD #2:  /66 (BP Location: Left arm)  Pulse 78  Temp 98.5  F (36.9  C) (Oral)  Resp 16  SpO2 95%  CXR: lungs bilaterally expanded, left CT in good position  CT:  Serous output, no air leak, 310 cc over 24 hours  Olsen: yellow urine, 2350 cc over 24 hours  S:  Very constipated- tried Senokot-S daily, prune juice, suppository. Discussed Fleets enema, Miralax, possible need for digital disimpaction. No SOB, walking without difficulty, pain minimal. Denies cough, nausea.  O:  Inc.:  Dry, benign, steris intact  CT:  Intact, no AL  Epidural: bandage intact  P:  Fleets enema, miralax, ambulation, daily CXR  Anticipate removing epidural tomorrow, may DC CT tomorrow or Wednesday    Meme Smith PA-C with Dr. Myron [facility]  Cell [phone]

## 2017-12-04 NOTE — PLAN OF CARE
Problem: Patient Care Overview  Goal: Plan of Care/Patient Progress Review  Outcome: No Change  VSS. Afebrile. LS diminished on L. Constipation reported. Senna and prune juice given without results. Supp given. Denies nausea. Adequate urine output via silvestre. CMS intact. Incision intact with scant SS drainage. CT to suction, no air leak or crepitus. Pain well managed with Epidural continuing at 6ml/hr and scheduled tylenol and ibuprofen. Slept between cares.

## 2017-12-04 NOTE — PROGRESS NOTES
Pt says he's been doing very well.  Only mild soreness from chest tubes.    Lucio Hernandez is a 62 year old male POD #2 s/p Intercostal hernia repair on  with thoracic epidural catheter for analgesia.  Pt is receiving adequate analgesia with infusion of 6ml/hour of 0.2% ropivacaine with 5mcg/ml fentanyl.  Pt is  ambulating without difficulty, no weakness or paresthesias.  Only mild pruritis.  Continue current infusion of 6ml/hour.  Everett Caballero MD  [phone]

## 2017-12-04 NOTE — OP NOTE
DATE OF OPERATION:  12/02/2017.      SURGEON:  Myron Damon MD       FIRST ASSISTANT:  Klaus sEtrella MD      PREOPERATIVE DIAGNOSES:   1.  Fractures of the 7th and 8th left ribs.   2.  Left intercostal hernia.      POSTOPERATIVE DIAGNOSES:   1.  Fractures of the 7th and 8th left ribs.   2.  Left intercostal hernia.      PROCEDURES:   1.  Left thoracotomy.   2.  Internal fixations with plates and screws, left 7th and 8th rib fractures.   3.  Repair of left intercostal hernia.      ANESTHESIA:  General.      INDICATIONS:  Lucio Hernandez is a 62-year-old gentleman with a significant cough approximately a month ago.  During a coughing spell, he developed some severe pain.  A CT scan done a few weeks later shows evidence of a small intercostal hernia and a fracture of the 8th rib.  Two days ago, the patient had significant worsening of his pain.  CT scan done yesterday shows that the intercostal hernia has widened.  There is a small pneumothorax, a small pleural effusion, some minimal subcutaneous emphysema and a new fracture of the 7th rib.  Based on the findings on CT scan and his symptoms, internal fixation of the rib fractures with closure of the intercostal hernia is indicated for treatment.      DESCRIPTION OF PROCEDURE:  The patient was brought to the OR and placed in supine position.  Under general anesthesia with double-lumen endotracheal tube, the patient was placed in a right lateral decubitus position.  Left chest was prepared and draped in the usual fashion using ChloraPrep.  Ventilation of the left lung was discontinued.  A posterolateral incision was made over the intercostal defect.  Dissection was carried down through the anterior aspect of the serratus anterior muscle and latissimus dorsi muscle.  There was a large intercostal hernia.  There are displaced fractures of the 8th rib and the 7th rib.  The intercostal hernia was in the 8th intercostal space.  Internal fixations of the rib  fractures were done with the plates and screws with excellent fixation.  Then, through a separate stab wound, a 24-[place] Raghav drain was placed.  Hemostasis was verified and was excellent.  Then, the intercostal hernia was repaired with multiple pericostal sutures of #5 Ethibond.  Anteriorly, there was still separation of the costal margin which was purposely left in that position.  The incision was closed with running #1 Vicryl in muscular layer, running 2-0 Vicryl subcutaneous tissues, skin closed with Insorb staples and Steri-Strips.  Estimated blood loss 200 mL.  Needle and sponge counts correct.      Dr. Klaus Estrella was the first assistant during the procedure.  His role as first assistant was essential and necessary in accomplishing this type of procedure as described above, providing exposure and retraction.         MYRON DAMON MD             D: 2017 12:26   T: 2017 14:52   MT: TS      Name:     JAYME PINEDA   MRN:      [id number]        Account:        [id number]   :      1955           Procedure Date: 2017      Document: [id number]       cc: Myron Damon MD

## 2017-12-05 ENCOUNTER — APPOINTMENT (OUTPATIENT)
Dept: GENERAL RADIOLOGY | Facility: CLINIC | Age: 62
DRG: 501 | End: 2017-12-05
Attending: THORACIC SURGERY (CARDIOTHORACIC VASCULAR SURGERY)
Payer: COMMERCIAL

## 2017-12-05 PROCEDURE — 71010 XR CHEST PORT 1 VW: CPT

## 2017-12-05 PROCEDURE — 25000132 ZZH RX MED GY IP 250 OP 250 PS 637: Performed by: THORACIC SURGERY (CARDIOTHORACIC VASCULAR SURGERY)

## 2017-12-05 PROCEDURE — 12000007 ZZH R&B INTERMEDIATE

## 2017-12-05 PROCEDURE — 25000128 H RX IP 250 OP 636: Performed by: ANESTHESIOLOGY

## 2017-12-05 RX ADMIN — ROPIVACAINE HYDROCHLORIDE: 5 INJECTION, SOLUTION EPIDURAL; INFILTRATION; PERINEURAL at 13:30

## 2017-12-05 RX ADMIN — SENNOSIDES AND DOCUSATE SODIUM 2 TABLET: 8.6; 5 TABLET ORAL at 08:09

## 2017-12-05 RX ADMIN — LISINOPRIL 20 MG: 10 TABLET ORAL at 08:09

## 2017-12-05 RX ADMIN — CLONAZEPAM 0.25 MG: 0.25 TABLET, ORALLY DISINTEGRATING ORAL at 21:20

## 2017-12-05 RX ADMIN — RANITIDINE 150 MG: 150 TABLET ORAL at 08:09

## 2017-12-05 RX ADMIN — ACETAMINOPHEN 975 MG: 325 TABLET, FILM COATED ORAL at 03:58

## 2017-12-05 RX ADMIN — IBUPROFEN 600 MG: 600 TABLET ORAL at 21:20

## 2017-12-05 RX ADMIN — DIPHENHYDRAMINE HYDROCHLORIDE 25 MG: 25 CAPSULE ORAL at 21:20

## 2017-12-05 RX ADMIN — ACETAMINOPHEN 975 MG: 325 TABLET, FILM COATED ORAL at 11:08

## 2017-12-05 RX ADMIN — IBUPROFEN 600 MG: 600 TABLET ORAL at 01:21

## 2017-12-05 RX ADMIN — IBUPROFEN 600 MG: 600 TABLET ORAL at 13:08

## 2017-12-05 RX ADMIN — RANITIDINE 150 MG: 150 TABLET ORAL at 21:20

## 2017-12-05 RX ADMIN — IBUPROFEN 600 MG: 600 TABLET ORAL at 18:01

## 2017-12-05 RX ADMIN — SENNOSIDES AND DOCUSATE SODIUM 2 TABLET: 8.6; 5 TABLET ORAL at 21:20

## 2017-12-05 RX ADMIN — BACITRACIN: 500 OINTMENT TOPICAL at 08:09

## 2017-12-05 NOTE — PLAN OF CARE
Problem: Lung Surgery (via Thoracotomy) (Adult)  Goal: Signs and Symptoms of Listed Potential Problems Will be Absent, Minimized or Managed (Lung Surgery)  Signs and symptoms of listed potential problems will be absent, minimized or managed by discharge/transition of care (reference Lung Surgery (via Thoracotomy) (Adult) CPG).   Outcome: Improving  A/O. VSS. LS diminished in YAMIL, LLL. Encouraging use of IS. Thoracotomy incision with steri strips ABILIO, scant drainage. Chest tube site CDI. No crepitus, No air leak. CT to suction. Epidural infusing. Silvestre patent with adequate output. Tolerating regular diet. Epidural and silvestre to d/c tomorrow. CT possibly d/c tomorrow or Wednesday. Ambulating in halls SBA.

## 2017-12-05 NOTE — PLAN OF CARE
Problem: Patient Care Overview  Goal: Plan of Care/Patient Progress Review  VSS. CT to suction with 90ml out this shift; no air leak or crepitis. CT and epidural dressings CDI. Pain controlled with epidural at 6ml/hr. Independent. Silvestre with good urine output. Plan to D/C CT, epidural, and silvestre tomorrow morning.

## 2017-12-05 NOTE — PROGRESS NOTES
EPIDURAL FOLLOW UP NOTE    S: Pt reports good pain control via continuous epidural infusion.  Reports pain 2/10 at rest and 3/10 with movement.  Pt is ambulating with assistance and denies any weakness or paresthesias.  Patient is currently without nausea or vomiting. Patient is tolerating a reg diet.  Patient is currently taking nothing for anticoagulation.    O:  Temperatures:  Current - Temp: 37  C (98.6  F); Max - Temp  Av.2  C (99  F)  Min: 36.9  C (98.5  F)  Max: 37.5  C (99.5  F)   Respiration range: Resp  Av.3  Min: 16  Max: 18   Pulse range: Pulse  Av.8  Min: 78  Max: 91   Blood pressure range: Systolic (24hrs), Av , Min:111 , Max:131   ; Diastolic (24hrs), Av, Min:66, Max:89     Pulse oximetry range: SpO2  Av.3 %  Min: 94 %  Max: 95 %     Catheter site with dressing c/d/i, no erythema, heme, edema        A/P:  Lucio FALL Mary is a 62 year old male POD # 3 s/p intercostal hernia repair with thoracic epidural catheter for analgesia.  Pt is receiving adequate analgesia with infusion of 6ml/hour of 0.2% ropivacaine.  Pt is  ambulating without difficulty, no weakness or paresthesias..    - continue current infusion of 6ml/hour  - Epidural can be removed when CT comes out and pt prepares for d/c.   - Please call if CT comes out today so that the Epidural can be removed, and he can start oral narcotics.       Godfrey Orozco, DO  2017 5:59 AM

## 2017-12-05 NOTE — PROGRESS NOTES
THORACIC SURGERY POD # 3    Feeling well  AVSS on RA  Good U/O  CT output serous  400 ml last 24 hrs    CXR OK    Same Rx for now    Path pending    ESTEBAN PINA MD [facility] ONCOLOGY THORACIC SURGERY  CELL:  [phone]  OFFICE: [phone]

## 2017-12-05 NOTE — PLAN OF CARE
Problem: Patient Care Overview  Goal: Plan of Care/Patient Progress Review  Outcome: Improving  Pt A/O, VSS on RA.  Pt reports pain 2-3/10, managed with fentanyl epidural, scheduled tylenol and ibuprofen. Epidural dressing CDI. Chest incision with steri strips, scant drainage, ABILIO.  CT to -20 suction, no air leak, no crepitus, serosanguinous output.  LS clear, IS to 2000.  BS active, + gas, good urine output.  Silvestre patent.  Regular diet, denies nausea. Up w/SBA.  Anticipate epidural and silvestre out later today.  Possible CT removal on Wednesday.

## 2017-12-06 ENCOUNTER — APPOINTMENT (OUTPATIENT)
Dept: GENERAL RADIOLOGY | Facility: CLINIC | Age: 62
DRG: 501 | End: 2017-12-06
Attending: THORACIC SURGERY (CARDIOTHORACIC VASCULAR SURGERY)
Payer: COMMERCIAL

## 2017-12-06 PROCEDURE — 12000007 ZZH R&B INTERMEDIATE

## 2017-12-06 PROCEDURE — 71010 XR CHEST PORT 1 VW: CPT

## 2017-12-06 PROCEDURE — 25000132 ZZH RX MED GY IP 250 OP 250 PS 637: Performed by: THORACIC SURGERY (CARDIOTHORACIC VASCULAR SURGERY)

## 2017-12-06 RX ADMIN — HYDROMORPHONE HYDROCHLORIDE 2 MG: 2 TABLET ORAL at 16:41

## 2017-12-06 RX ADMIN — RANITIDINE 150 MG: 150 TABLET ORAL at 20:15

## 2017-12-06 RX ADMIN — SENNOSIDES AND DOCUSATE SODIUM 1 TABLET: 8.6; 5 TABLET ORAL at 09:44

## 2017-12-06 RX ADMIN — BACITRACIN: 500 OINTMENT TOPICAL at 22:11

## 2017-12-06 RX ADMIN — HYDROMORPHONE HYDROCHLORIDE 4 MG: 2 TABLET ORAL at 23:28

## 2017-12-06 RX ADMIN — IBUPROFEN 600 MG: 600 TABLET ORAL at 01:01

## 2017-12-06 RX ADMIN — LISINOPRIL 20 MG: 10 TABLET ORAL at 09:44

## 2017-12-06 RX ADMIN — IBUPROFEN 600 MG: 600 TABLET ORAL at 14:13

## 2017-12-06 RX ADMIN — HYDROMORPHONE HYDROCHLORIDE 4 MG: 2 TABLET ORAL at 20:15

## 2017-12-06 RX ADMIN — ACETAMINOPHEN 650 MG: 325 TABLET, FILM COATED ORAL at 16:41

## 2017-12-06 RX ADMIN — RANITIDINE 150 MG: 150 TABLET ORAL at 09:44

## 2017-12-06 RX ADMIN — CLONAZEPAM 0.25 MG: 0.25 TABLET, ORALLY DISINTEGRATING ORAL at 20:26

## 2017-12-06 RX ADMIN — IBUPROFEN 600 MG: 600 TABLET ORAL at 18:06

## 2017-12-06 RX ADMIN — IBUPROFEN 600 MG: 600 TABLET ORAL at 23:28

## 2017-12-06 NOTE — PLAN OF CARE
Problem: Patient Care Overview  Goal: Plan of Care/Patient Progress Review  Outcome: Improving  CT and epidural removed. Will d/c silvestre tonight at 2300. Up independently.

## 2017-12-06 NOTE — PLAN OF CARE
Problem: Lung Surgery (via Thoracotomy) (Adult)  Goal: Signs and Symptoms of Listed Potential Problems Will be Absent, Minimized or Managed (Lung Surgery)  Signs and symptoms of listed potential problems will be absent, minimized or managed by discharge/transition of care (reference Lung Surgery (via Thoracotomy) (Adult) CPG).   Outcome: Improving  A/O. VSS. Lung sounds clear. Chest tube to suction. No air leak, no crepitus. Incision CDI/ABILIO. Epidural infusing at 6ml/hr. Denies pain. Using IS independently. +BS, +Flatus. Silvestre patent with adequate output. Tolerating regular diet. Ambulating in halls independently. Plan to d/c silvestre, epidural, and chest tube tomorrow.

## 2017-12-06 NOTE — PROGRESS NOTES
THORACIC SURGERY POD # 4    Doing ok  AVSS on RA minimal CT output    CXR unchanged    CT out  D/c silvestre at 23:00  Anesthesia to D/C epidural    ESTEBAN PINA MD [facility] ONCOLOGY THORACIC SURGERY  CELL:  [phone]  OFFICE: [phone]

## 2017-12-06 NOTE — PROVIDER NOTIFICATION
"At 1845 Dr. Damon notified, pt stated he felt like he \"re herniated the intracostal space\".  Dr. Damon at the bedside and assessed the pt, no new orders received or noted.  "

## 2017-12-06 NOTE — PLAN OF CARE
Problem: Patient Care Overview  Goal: Plan of Care/Patient Progress Review  Outcome: Improving  Patient is A&O, VSS. Chest tube to suction, no air leaks or crepitus site CDI. Epidural infusing at 6 ml/hr, denies pain. BS +, Flatus + Olsen with output 325. Up independently although patient has remained in bed this shift. Tolerating regular diet. Lungs have fine crackles to the bases, clear with TCDB, patient using IS independently.

## 2017-12-07 ENCOUNTER — APPOINTMENT (OUTPATIENT)
Dept: GENERAL RADIOLOGY | Facility: CLINIC | Age: 62
DRG: 501 | End: 2017-12-07
Attending: THORACIC SURGERY (CARDIOTHORACIC VASCULAR SURGERY)
Payer: COMMERCIAL

## 2017-12-07 VITALS
HEART RATE: 90 BPM | DIASTOLIC BLOOD PRESSURE: 87 MMHG | BODY MASS INDEX: 32.1 KG/M2 | OXYGEN SATURATION: 96 % | TEMPERATURE: 99.4 F | RESPIRATION RATE: 16 BRPM | WEIGHT: 217.37 LBS | SYSTOLIC BLOOD PRESSURE: 146 MMHG

## 2017-12-07 LAB — COPATH REPORT: NORMAL

## 2017-12-07 PROCEDURE — 71020 XR CHEST 2 VW: CPT

## 2017-12-07 PROCEDURE — 25000132 ZZH RX MED GY IP 250 OP 250 PS 637: Performed by: THORACIC SURGERY (CARDIOTHORACIC VASCULAR SURGERY)

## 2017-12-07 RX ORDER — AMOXICILLIN 250 MG
1-2 CAPSULE ORAL 2 TIMES DAILY PRN
Qty: 40 TABLET | Refills: 0 | Status: SHIPPED | OUTPATIENT
Start: 2017-12-07 | End: 2018-10-28

## 2017-12-07 RX ORDER — IBUPROFEN 600 MG/1
600 TABLET, FILM COATED ORAL
Qty: 120 TABLET | Status: ON HOLD
Start: 2017-12-07 | End: 2017-12-29

## 2017-12-07 RX ORDER — HYDROMORPHONE HYDROCHLORIDE 2 MG/1
2-4 TABLET ORAL
Qty: 80 TABLET | Refills: 0 | Status: SHIPPED | OUTPATIENT
Start: 2017-12-07 | End: 2018-10-28

## 2017-12-07 RX ORDER — POLYETHYLENE GLYCOL 3350 17 G/17G
17 POWDER, FOR SOLUTION ORAL DAILY PRN
Qty: 7 PACKET | Status: ON HOLD
Start: 2017-12-07 | End: 2017-12-27

## 2017-12-07 RX ORDER — ACETAMINOPHEN 325 MG/1
650 TABLET ORAL EVERY 4 HOURS PRN
Qty: 100 TABLET
Start: 2017-12-07 | End: 2018-10-28

## 2017-12-07 RX ADMIN — ACETAMINOPHEN 650 MG: 325 TABLET, FILM COATED ORAL at 09:03

## 2017-12-07 RX ADMIN — SENNOSIDES AND DOCUSATE SODIUM 2 TABLET: 8.6; 5 TABLET ORAL at 09:03

## 2017-12-07 RX ADMIN — HYDROMORPHONE HYDROCHLORIDE 2 MG: 2 TABLET ORAL at 09:04

## 2017-12-07 RX ADMIN — IBUPROFEN 600 MG: 600 TABLET ORAL at 13:09

## 2017-12-07 RX ADMIN — ACETAMINOPHEN 650 MG: 325 TABLET, FILM COATED ORAL at 05:29

## 2017-12-07 RX ADMIN — HYDROMORPHONE HYDROCHLORIDE 4 MG: 2 TABLET ORAL at 05:29

## 2017-12-07 RX ADMIN — HYDROMORPHONE HYDROCHLORIDE 4 MG: 2 TABLET ORAL at 02:37

## 2017-12-07 RX ADMIN — LISINOPRIL 20 MG: 10 TABLET ORAL at 09:04

## 2017-12-07 RX ADMIN — RANITIDINE 150 MG: 150 TABLET ORAL at 09:03

## 2017-12-07 RX ADMIN — HYDROMORPHONE HYDROCHLORIDE 2 MG: 2 TABLET ORAL at 13:11

## 2017-12-07 RX ADMIN — IBUPROFEN 600 MG: 600 TABLET ORAL at 02:37

## 2017-12-07 NOTE — PLAN OF CARE
"Problem: Patient Care Overview  Goal: Plan of Care/Patient Progress Review  A&Ox4, VS except varying elevated temp, 101 deg F max. IS encouraged, used ind, pt ambulated halls x2 ind. Pt reported \"I feel hot and cold.\" Ate dinner, good appetite, reported \"feeling better.\" Dilaudid and tylenol given x1. Incision ABILIO w/ steristrips. Chest tube site covered, c/d/i. Olsen with AUO, to be removed tonight @ 2300.      "

## 2017-12-07 NOTE — DISCHARGE INSTRUCTIONS
"[facility]  Discharge Orders & Follow-up Care  Video-Assisted: Thoracoscopy - Thoracotomy    Call Siobhan at Dr. Damon  office at [phone] to make a return appointment with a chest x-ray on_Monday, Dec. 18_.  Your appointment will be with Meme Smith PA-C and Dr. Damon.      Our office is located at:  [facility], [address] [place] [address], [address], [place] [address].  Siobhan will explain where to park when you call for an appointment.    A. Patient Care  Call Dr Damon  office @ [phone] if:  ? Severe chills or a fever or 100.4 F.  temperature on two occasions  ? Increased incisional pain and/or redness  ? Presence of unusual incisional or chest tube site drainage  ? Coughing up bright red blood or greenish-yellow secretions  ? Significant increase in shortness of breath    Pain Relief  You have been given a prescription for narcotic pain medicine.  You may also take ibuprofen and acetaminophen over the counter.  Recommended dosages are:  600 mg Ibuprofen every 6 hours as needed and 650 mg Acetaminophen every 4 hours as needed.  Many patients get good pain relief by \"staggering\" these medications.     No driving while on narcotics.     Activity  _XXX__ No heavy lifting greater than 8-10 pounds on the operative side for 4-6 weeks for a thoracotomy    Wound Care  Remove all dressings tomorrow morning, Dec. 8 and then you may shower.  Wash the incision and chest tube site(s) daily with soap and water. No bathing or immersing incision underwater for approximately 2 weeks or until the chest tube sites are completely healed.     Place a dry gauze dressing over the chest tube site(s) because it(they) will drain a few days.  Do not be alarmed if there is drainage of a large amount of fluid (should be pink or yellow-- or somewhat bloody) either spontaneously or with coughing or exertion. If this happens, just place a large dry gauze dressing over the chest tube site-- it will stop and " scab over in about a week or so. Once the drainage stops, then leave the chest tube site(s) open to air.     White steri strips will be present on the incision(s). They will peel off within about 10 days-- otherwise, they will be removed at your post-op appointment.     B. Respiratory  _XXX__ Utilize Incentive spirometer 10 times in a row every few hours while awake for a week after discharging home from the hospital

## 2017-12-07 NOTE — PLAN OF CARE
Problem: Patient Care Overview  Goal: Plan of Care/Patient Progress Review  Outcome: Improving  Discharging to home with wife. Instructions reviewed and questions answered.

## 2017-12-07 NOTE — PLAN OF CARE
Problem: Patient Care Overview  Goal: Plan of Care/Patient Progress Review  Outcome: Improving  A/o x4. VSS, Regular diet. Independent in room. Good use of IS. Chest tube site leaked dressing changed, ointment applied. Incisions ABILIO. Pain managed with PRN Dilaudid. Olsen to be removed tonight. D/c pending

## 2017-12-07 NOTE — PROGRESS NOTES
Thoracic Surgery POD #5: Seen around 1300  /87 (BP Location: Left arm)  Pulse 90  Temp 99.4  F (37.4  C) (Oral)  Resp 16  Wt 98.6 kg (217 lb 6 oz)  SpO2 96%  BMI 32.1 kg/m2  CXR: No PTX, no infiltrate, OK, 7th and 8th rib fixture devices in good position  Final path: portion of left 7th and 8th rib plus intercostal tissue show fracture and granulation tissue and reactive inflammation and no evidence of malignancy  S:  Doing well- pain well-controlled, ambulating without dyspnea, +BM, independent void, long discussion about DC instructions, pain management, and follow up. Questions addressed. Discussed final pathology results.  O:  Inc.:  Minimal erythematous reaction around incision, no fluctuance, no drainage, no warmth, dry  CT site: occlusive dressing in place  P:  OK to DC home this afternoon on dilaudid, tylenol and ibuprofen plus Senokot-S  See me and Dr. Damon on Dec. 18 with CXR for fup    Meme Smith PA-C with Dr. Myron Damon  [facility]  Cell [phone]

## 2017-12-07 NOTE — PLAN OF CARE
Problem: Patient Care Overview  Goal: Plan of Care/Patient Progress Review  Outcome: Improving  A+Ox4. VSS on RA. Tmax 99. Incision ABILIO with steristrips. CT site CDI. Pain controlled with dilaudid and ibuprofen. LS clear. Voiding. IS 2000. Independent. Regular diet. CXR done at 0600.

## 2017-12-14 NOTE — DISCHARGE SUMMARY
THORACIC SURGERY HOSPITAL DISCHARGE SUMMARY  [facility] ONCOLOGY - THORACIC SURGERY  [address], [address]  [place] [address]  Phone [phone]  [email or web address]    12/13/2017     Dean Cody [address] / [facility]  Phone: [phone]   Fax: [phone]        Re: Lucio Hernandez             1955             [id number]              Dates of Hospitalization: 12/2/2017 - 12/7/2017   Date of Service (when I saw the patient): 12/7/17    Dear Dr. Cody:     As you are aware, we had the pleasure of caring for your patient,  Lucio Hernandez here at [facility].  He is a 62-year-old gentleman with a significant cough approximately a month ago.  During a coughing spell, he developed some severe pain.  A CT scan done a few weeks later shows evidence of a small intercostal hernia and a fracture of the 8th rib.  Two days ago, the patient had significant worsening of his pain.  CT scan done yesterday shows that the intercostal hernia has widened.  There is a small pneumothorax, a small pleural effusion, some minimal subcutaneous emphysema and a new fracture of the 7th rib.  Based on the findings on CT scan and his symptoms, internal fixation of the rib fractures with closure of the intercostal hernia is indicated for treatment.     On 12/2/2017, Dr. Myron Damon performed the following:    Procedure/Surgery Information   Procedure: Procedure(s):  LEFT THORACOTOMY, REPAIR OF INTERCOSTAL HERNIA  AND INTERNAL FIXATION RIB FRACTURES OF RIBS 7 AND 8 - Wound Class: II-Clean Contaminated   Surgeon(s): Surgeon(s) and Role:     * Myron Damon MD - Primary     * Klaus Estrella MD - Assisting   Specimens:   ID Type Source Tests Collected by Time Destination   A : portion of 8th rib, intercostal tissue Tissue Other SURGICAL PATHOLOGY EXAM Myron Damon MD 12/2/2017  9:09 AM    B :  PORTION OF 7TH RIB Tissue Other SURGICAL PATHOLOGY EXAM Myron Damon MD 12/2/2017  9:29 AM         Final Surgical Pathology Revealed:  Portion of seventh and eighth ribs and intercostal tissue shows fragments of rib with fracture and reactive inflammation and some associated granulation tissue. Negative for malignancy.     His post-operative course was unremarkable.      Consultations This Hospital Stay   None    Lucio Hernandez has otherwise recovered sufficiently to be discharged to home today, 12/7/2017, on post-operative day number five for further convalescence.  His incisions are healing well with no signs or symptoms of infection.  His bowels have moved sufficiently and he is tolerating diet and activity, ambulating and transferring independently.  He is currently afebrile with stable vital signs as below.     Below, you will find a full discharge medication list and instructions.  We have arranged for Lucio Hernandez to follow-up with us in our [facility] in 7-10 days with a Chest X-ray prior to that appointment.  We thank you for allowing us to participate in the care of Lucio Hernandez here at [facility].  Please feel free to contact our office at [phone] with any questions or concerns or if we can be of any further assistance in the care of this patient.    Sincerely,    Dr. Myron Damon MD    D/C Summary Prepared by: Meme Smith PA-C    Discharge Medications:   Lucio Hernandez   Home Medication Instructions ENMANUEL:[id number]    Printed on:12/13/17 2014   Medication Information                      acetaminophen (TYLENOL) 325 MG tablet  Take 2 tablets (650 mg) by mouth every 4 hours as needed for other (surgical pain)             CLONAZEPAM PO  Take 0.25 mg by mouth 2 times daily as needed for anxiety             HYDROmorphone (DILAUDID) 2 MG tablet  Take 1-2 tablets (2-4 mg) by mouth every 3 hours as needed for moderate to severe pain             ibuprofen (ADVIL/MOTRIN)  600 MG tablet  Take 1 tablet (600 mg) by mouth 4 times daily (before meals and nightly)             LISINOPRIL PO  Take 20 mg by mouth daily             multivitamin, therapeutic (THERA-VIT) TABS  Take 1 tablet by mouth daily             polyethylene glycol (MIRALAX/GLYCOLAX) Packet  Take 17 g by mouth daily as needed for constipation             senna-docusate (SENOKOT-S;PERICOLACE) 8.6-50 MG per tablet  Take 1-2 tablets by mouth 2 times daily as needed for constipation                     Discharge Instructions:  1) Remove chest tube dressing on 12/09/17 and then it is Ok to shower.  Please wash both incision and chest tube site daily with soap and water.  You may cover the chest tube site daily with a clean band-aid or dry gauze if it continues to drain.  2) Steri-strips can be removed in 1 week or they will fall off when they are ready.  3) Continue daily use of your Incentive Spirometer, set of 10x in a row, every 1-2 hours while you are awake during the day.  4) No lifting, pushing or pulling >10-15 lbs for 4-6 weeks from the day of your surgery.  No driving while on narcotic pain medications.    Follow-Up Care:  1) Follow up with Meme Smith PA-C/Dr. Damon at the [place] [facility] in [place] ([address] [place] [address], [address], [place] [address]).  Call Siobhan at [phone] to schedule the appointment.  2) Follow up with Primary Care Provider, Dean Cody within 1 month of discharge for routine post-surgical care, wound check and follow up.  Please call [phone] to arrange this appointment.     CC  Patient Care Team:  Dean Cody MD as PCP - General (Internal Medicine)  Paco Haskins MD as MD (Urology)  Eduardo Landaverde MD as MD (Urology)  Joe Castañeda MD as Referring Physician (Urology)  Dean Cody MD as PCP (Internal Medicine)  Abdon Wells MD as MD (Urology)  Gia Ventura, RN as Nurse Coordinator (Urology)

## 2017-12-27 ENCOUNTER — TRANSFERRED RECORDS (OUTPATIENT)
Dept: HEALTH INFORMATION MANAGEMENT | Facility: CLINIC | Age: 62
End: 2017-12-27

## 2017-12-27 ENCOUNTER — APPOINTMENT (OUTPATIENT)
Dept: ULTRASOUND IMAGING | Facility: CLINIC | Age: 62
DRG: 186 | End: 2017-12-27
Attending: INTERNAL MEDICINE
Payer: COMMERCIAL

## 2017-12-27 ENCOUNTER — APPOINTMENT (OUTPATIENT)
Dept: ULTRASOUND IMAGING | Facility: CLINIC | Age: 62
DRG: 186 | End: 2017-12-27
Attending: PHYSICIAN ASSISTANT
Payer: COMMERCIAL

## 2017-12-27 ENCOUNTER — HOSPITAL ENCOUNTER (INPATIENT)
Facility: CLINIC | Age: 62
LOS: 2 days | Discharge: HOME | DRG: 186 | End: 2017-12-29
Attending: THORACIC SURGERY (CARDIOTHORACIC VASCULAR SURGERY) | Admitting: THORACIC SURGERY (CARDIOTHORACIC VASCULAR SURGERY)
Payer: COMMERCIAL

## 2017-12-27 ENCOUNTER — APPOINTMENT (OUTPATIENT)
Dept: GENERAL RADIOLOGY | Facility: CLINIC | Age: 62
DRG: 186 | End: 2017-12-27
Attending: RADIOLOGY
Payer: COMMERCIAL

## 2017-12-27 DIAGNOSIS — I26.99 OTHER ACUTE PULMONARY EMBOLISM WITHOUT ACUTE COR PULMONALE (H): Primary | ICD-10-CM

## 2017-12-27 LAB
ANION GAP SERPL CALCULATED.3IONS-SCNC: 7 MMOL/L (ref 3–14)
APPEARANCE FLD: NORMAL
BUN SERPL-MCNC: 8 MG/DL (ref 7–30)
CALCIUM SERPL-MCNC: 8.8 MG/DL (ref 8.5–10.1)
CHLORIDE SERPL-SCNC: 107 MMOL/L (ref 94–109)
CO2 SERPL-SCNC: 25 MMOL/L (ref 20–32)
COLOR FLD: NORMAL
CREAT SERPL-MCNC: 0.7 MG/DL (ref 0.66–1.25)
D DIMER PPP FEU-MCNC: 10.3 UG/ML FEU (ref 0–0.5)
EOSINOPHIL NFR FLD MANUAL: 4 %
ERYTHROCYTE [DISTWIDTH] IN BLOOD BY AUTOMATED COUNT: 12.5 % (ref 10–15)
GFR SERPL CREATININE-BSD FRML MDRD: >90 ML/MIN/1.7M2
GLUCOSE SERPL-MCNC: 127 MG/DL (ref 70–99)
GRAM STN SPEC: NORMAL
GRAM STN SPEC: NORMAL
HCT VFR BLD AUTO: 39.1 % (ref 40–53)
HGB BLD-MCNC: 13.8 G/DL (ref 13.3–17.7)
LYMPHOCYTES NFR FLD MANUAL: 58 %
MCH RBC QN AUTO: 33.5 PG (ref 26.5–33)
MCHC RBC AUTO-ENTMCNC: 35.3 G/DL (ref 31.5–36.5)
MCV RBC AUTO: 95 FL (ref 78–100)
MONOS+MACROS NFR FLD MANUAL: 10 %
NEUTS BAND NFR FLD MANUAL: 26 %
NT-PROBNP SERPL-MCNC: 23 PG/ML (ref 0–900)
OTHER CELLS FLD MANUAL: 2 %
PLATELET # BLD AUTO: 328 10E9/L (ref 150–450)
POTASSIUM SERPL-SCNC: 3.9 MMOL/L (ref 3.4–5.3)
RBC # BLD AUTO: 4.12 10E12/L (ref 4.4–5.9)
SODIUM SERPL-SCNC: 139 MMOL/L (ref 133–144)
SPECIMEN SOURCE FLD: NORMAL
SPECIMEN SOURCE: NORMAL
TROPONIN I SERPL-MCNC: <0.015 UG/L (ref 0–0.04)
WBC # BLD AUTO: 8.7 10E9/L (ref 4–11)
WBC # FLD AUTO: 370 /UL

## 2017-12-27 PROCEDURE — 83880 ASSAY OF NATRIURETIC PEPTIDE: CPT | Performed by: INTERNAL MEDICINE

## 2017-12-27 PROCEDURE — 89051 BODY FLUID CELL COUNT: CPT | Performed by: PHYSICIAN ASSISTANT

## 2017-12-27 PROCEDURE — 87205 SMEAR GRAM STAIN: CPT | Performed by: PHYSICIAN ASSISTANT

## 2017-12-27 PROCEDURE — 40000986 XR CHEST 1 VW

## 2017-12-27 PROCEDURE — 87070 CULTURE OTHR SPECIMN AEROBIC: CPT | Performed by: PHYSICIAN ASSISTANT

## 2017-12-27 PROCEDURE — 84484 ASSAY OF TROPONIN QUANT: CPT | Performed by: INTERNAL MEDICINE

## 2017-12-27 PROCEDURE — 93970 EXTREMITY STUDY: CPT

## 2017-12-27 PROCEDURE — 99207 ZZC NO CHARGE VISIT/PATIENT NOT SEEN: CPT | Performed by: PHYSICIAN ASSISTANT

## 2017-12-27 PROCEDURE — 25000128 H RX IP 250 OP 636: Performed by: THORACIC SURGERY (CARDIOTHORACIC VASCULAR SURGERY)

## 2017-12-27 PROCEDURE — 84157 ASSAY OF PROTEIN OTHER: CPT | Performed by: PHYSICIAN ASSISTANT

## 2017-12-27 PROCEDURE — 99223 1ST HOSP IP/OBS HIGH 75: CPT | Performed by: INTERNAL MEDICINE

## 2017-12-27 PROCEDURE — 32555 ASPIRATE PLEURA W/ IMAGING: CPT

## 2017-12-27 PROCEDURE — 0W9B3ZZ DRAINAGE OF LEFT PLEURAL CAVITY, PERCUTANEOUS APPROACH: ICD-10-PCS | Performed by: RADIOLOGY

## 2017-12-27 PROCEDURE — 85379 FIBRIN DEGRADATION QUANT: CPT | Performed by: INTERNAL MEDICINE

## 2017-12-27 PROCEDURE — 85027 COMPLETE CBC AUTOMATED: CPT | Performed by: PHYSICIAN ASSISTANT

## 2017-12-27 PROCEDURE — 36415 COLL VENOUS BLD VENIPUNCTURE: CPT | Performed by: PHYSICIAN ASSISTANT

## 2017-12-27 PROCEDURE — 25000132 ZZH RX MED GY IP 250 OP 250 PS 637: Performed by: PHYSICIAN ASSISTANT

## 2017-12-27 PROCEDURE — 40000863 ZZH STATISTIC RADIOLOGY XRAY, US, CT, MAR, NM

## 2017-12-27 PROCEDURE — 83615 LACTATE (LD) (LDH) ENZYME: CPT | Performed by: PHYSICIAN ASSISTANT

## 2017-12-27 PROCEDURE — 12000007 ZZH R&B INTERMEDIATE

## 2017-12-27 PROCEDURE — 80048 BASIC METABOLIC PNL TOTAL CA: CPT | Performed by: PHYSICIAN ASSISTANT

## 2017-12-27 RX ORDER — NALOXONE HYDROCHLORIDE 0.4 MG/ML
.1-.4 INJECTION, SOLUTION INTRAMUSCULAR; INTRAVENOUS; SUBCUTANEOUS
Status: DISCONTINUED | OUTPATIENT
Start: 2017-12-27 | End: 2017-12-29 | Stop reason: HOSPADM

## 2017-12-27 RX ORDER — LISINOPRIL 10 MG/1
20 TABLET ORAL DAILY
Status: DISCONTINUED | OUTPATIENT
Start: 2017-12-28 | End: 2017-12-29 | Stop reason: HOSPADM

## 2017-12-27 RX ORDER — PROCHLORPERAZINE 25 MG
25 SUPPOSITORY, RECTAL RECTAL EVERY 12 HOURS PRN
Status: DISCONTINUED | OUTPATIENT
Start: 2017-12-27 | End: 2017-12-29 | Stop reason: HOSPADM

## 2017-12-27 RX ORDER — AMOXICILLIN 250 MG
2 CAPSULE ORAL 2 TIMES DAILY PRN
Status: DISCONTINUED | OUTPATIENT
Start: 2017-12-27 | End: 2017-12-29 | Stop reason: HOSPADM

## 2017-12-27 RX ORDER — PROCHLORPERAZINE MALEATE 10 MG
10 TABLET ORAL EVERY 6 HOURS PRN
Status: DISCONTINUED | OUTPATIENT
Start: 2017-12-27 | End: 2017-12-29 | Stop reason: HOSPADM

## 2017-12-27 RX ORDER — ONDANSETRON 2 MG/ML
4 INJECTION INTRAMUSCULAR; INTRAVENOUS EVERY 6 HOURS PRN
Status: DISCONTINUED | OUTPATIENT
Start: 2017-12-27 | End: 2017-12-29 | Stop reason: HOSPADM

## 2017-12-27 RX ORDER — HYDROMORPHONE HYDROCHLORIDE 2 MG/1
2-4 TABLET ORAL EVERY 4 HOURS PRN
Status: DISCONTINUED | OUTPATIENT
Start: 2017-12-27 | End: 2017-12-29 | Stop reason: HOSPADM

## 2017-12-27 RX ORDER — FLUTICASONE PROPIONATE 50 MCG
2 SPRAY, SUSPENSION (ML) NASAL 2 TIMES DAILY
Status: DISCONTINUED | OUTPATIENT
Start: 2017-12-27 | End: 2017-12-29 | Stop reason: HOSPADM

## 2017-12-27 RX ORDER — ALBUTEROL SULFATE 5 MG/ML
2.5 SOLUTION RESPIRATORY (INHALATION)
Status: DISCONTINUED | OUTPATIENT
Start: 2017-12-27 | End: 2017-12-29 | Stop reason: HOSPADM

## 2017-12-27 RX ORDER — FLUTICASONE PROPIONATE 50 MCG
2 SPRAY, SUSPENSION (ML) NASAL 2 TIMES DAILY
COMMUNITY
End: 2018-10-28

## 2017-12-27 RX ORDER — CLONAZEPAM 0.5 MG/1
.25-.5 TABLET ORAL 2 TIMES DAILY PRN
Status: DISCONTINUED | OUTPATIENT
Start: 2017-12-27 | End: 2017-12-27

## 2017-12-27 RX ORDER — CLONAZEPAM 0.5 MG/1
0.5 TABLET ORAL 2 TIMES DAILY PRN
Status: DISCONTINUED | OUTPATIENT
Start: 2017-12-27 | End: 2017-12-29 | Stop reason: HOSPADM

## 2017-12-27 RX ORDER — ONDANSETRON 4 MG/1
4 TABLET, ORALLY DISINTEGRATING ORAL EVERY 6 HOURS PRN
Status: DISCONTINUED | OUTPATIENT
Start: 2017-12-27 | End: 2017-12-29 | Stop reason: HOSPADM

## 2017-12-27 RX ORDER — ACETAMINOPHEN 325 MG/1
650 TABLET ORAL EVERY 4 HOURS PRN
Status: DISCONTINUED | OUTPATIENT
Start: 2017-12-27 | End: 2017-12-29 | Stop reason: HOSPADM

## 2017-12-27 RX ORDER — AMOXICILLIN 250 MG
1 CAPSULE ORAL 2 TIMES DAILY PRN
Status: DISCONTINUED | OUTPATIENT
Start: 2017-12-27 | End: 2017-12-29 | Stop reason: HOSPADM

## 2017-12-27 RX ADMIN — HYDROMORPHONE HYDROCHLORIDE 4 MG: 2 TABLET ORAL at 22:04

## 2017-12-27 RX ADMIN — HEPARIN SODIUM 1450 UNITS/HR: 10000 INJECTION, SOLUTION INTRAVENOUS at 20:25

## 2017-12-27 RX ADMIN — ACETAMINOPHEN 650 MG: 325 TABLET, FILM COATED ORAL at 18:24

## 2017-12-27 RX ADMIN — Medication 6500 UNITS: at 20:22

## 2017-12-27 RX ADMIN — CLONAZEPAM 0.5 MG: 0.5 TABLET ORAL at 22:04

## 2017-12-27 NOTE — CONSULTS
See full dictation in EPIC.    For vascular medical concerns on this patient during business hours M-F, call the [facility] at [phone] to have the rounding / on call Vascular Medicine (NOT VASCULAR SURGERY) MD paged. After business hours M-F,  for medical concerns on this patient, please page hospitalist staff. For vascular surgical questions on this patient , please page the appropriate surgeon (primary vascular surgeon or on call vascular surgeon) based upon the time of day.      A/P:    1-Hemodynamically insignificant small right posteromedial RLL pulmonary emboli provoked in association with recent thoracotomy (currnetly POD25) for internal fixation of rib fractures (7and 8) with enlarging loculated pleural effusions.    At present I recommend the following:    A-US thoracentesis    B-check biomarkers (d dimer, troponin, BNP)    C-check duplex legs    D-check TTE for right heart strain    E-therapeutic AC with IV UFH for now in case patient has post procedural bleeding after thoracentesis    F-If after 24 to 48 hours of clinical and post procedural stability, transition to oral AC. I favor NOAC in the form of Xarelto 15 mg PO BID times 21 days, then 20 mg daily thereafter.     G-anticipate 3 or 6 months Xarelto AC    H-I will see the patient in outpt f/u at the [facility] to coordinate cessation of AC.    I-As this is first lifetime VTE, and it was provoked: no thrombophilia evaluation is warranted.    J-hospitalist staff to please provide after hours cross coverage.

## 2017-12-27 NOTE — IP AVS SNAPSHOT
MRN:[id number]                      After Visit Summary   12/27/2017    Lucio Hernandez    MRN: [id number]           Thank you!     Thank you for choosing [facility] for your care. Our goal is always to provide you with excellent care. Hearing back from our patients is one way we can continue to improve our services. Please take a few minutes to complete the written survey that you may receive in the mail after you visit with us. Thank you!        Patient Information     Date Of Birth          1955        Designated Caregiver       Most Recent Value    Caregiver    Will someone help with your care after discharge? yes    Name of designated caregiver Yarelis Hernandez, wife    Phone number of caregiver [phone]    Caregiver address [address] [place] [address]      About your hospital stay     You were admitted on:  December 27, 2017 You last received care in the:  [place] [address] [facility]    You were discharged on:  December 29, 2017        Reason for your hospital stay       You were hospitalized for a pulmonary embolism and pleural effusion.                  Who to Call     For medical emergencies, please call 911.  For non-urgent questions about your medical care, please call your primary care provider or clinic, [phone]          Attending Provider     Provider Specialty    Myron Damon MD Thoracic Diseases       Primary Care Provider Office Phone # Fax #    Dean Cody [phone]       When to contact your care team       Call Dr Damon office at [phone] if you have any of the following:   --temperature greater than 101.5F,  --increasing shortness of breath,  --increased swelling or purulent discharge at incision site,  --Or any other questions or concerns.                  After Care Instructions     Activity       Your activity upon discharge:   --Activity as tolerated,  --No driving while on narcotic pain  medication,  --No lifting greater than 10-15 pounds on the surgery side for 2 weeks,    --Walk several times per day,  --Use the incentive spirometer at least 10 times per day,  --Continue to move the shoulder on the surgery side including reaching up high and stretching to help the muscles heal.            Discharge Instructions       Discharging RN to call [phone] to have Ms. Shelley Patel schedule a 30 minute hospital f/u visit with Dr. Olsen in 3 weeks at the [facility]. Please do this prior to discharge.                  Follow-up Appointments     Follow Up and recommended labs and tests       Follow up with primary care provider, Dean Cody, within 7 days to evaluate medication change (Xarelto) and to follow up on results (CT PE Rule Out).            Follow-up and recommended labs and tests       Follow-Up Care:  Follow up with Dr. Damon (Thoracic Surgery) on Monday 1/15/2018 at the [facility] in [place].  Please call silvia Mccannr, at [phone] to schedule this appointment -- she may also schedule you for a chest x-ray immediately prior to seeing Dr. Damon.    Follow up with Primary Care Provider, Dean Cody, within 1 month of discharge for routine post-surgical care, wound check and follow up.  Please call [phone] to arrange this appointment.                  Further instructions from your care team       Pulmonary Embolism (PE)  A pulmonary embolus is most often due to a blood clot that develops in a deep vein of the leg (deep vein thrombosis). If that clot, breaks loose and travels to the lung, it is called a pulmonary embolism (PE). This can cut off the flow of blood in the lungs.  A blood clot in the lungs is a medical emergency and may cause death.   Healthcare providers use the term venous thromboembolism (VTE) to describe these 2 conditions: deep vein thrombosis and pulmonary embolism. They use the term VTE because the 2 conditions are very closely related. And,  because their prevention and treatment are also closely related.      A pulmonary embolism occurs when a blood clot forms in a vein and travels to the lungs.   How is pulmonary embolism diagnosed?  Your healthcare provider examines you and asks about your symptoms and health history. You may also have one or more of the following:    Blood tests to check for blood clotting or other problems    Imaging tests to look for clots in the veins or lung    Electrocardiography (ECG) to test how well the heart is working  How is pulmonary embolism treated?    Blood-thinning medicines (anticoagulants). These medicines thin the blood. They may be given as a pill, as an injection, or through a tube into a vein (intravenous or IV). Blood thinners help prevent more blood clots from forming. They also help to prevent an existing clot from getting larger.    Thrombolysis. Thrombolytic medicines are used to quickly dissolve a blood clot. A long, narrow tube (catheter) is used to deliver medicine directly to the clot. Thrombolytic medicines increase the risk of bleeding so they are used very carefully.    Inferior vena cava (IVC) filter surgery. The vena cava is the body s largest vein. It carries blood from the body to the heart. A small filter traps blood clots in the lower body and prevents them from traveling to the lungs. The filter is inserted into the vein through a catheter. The filter may be used if blood thinners cannot be taken or if they don't work.     Pulmonary embolectomy. This is a procedure to remove a blood clot in the lungs. It may be done with surgery or with a catheter inserted in the body. It may be done when other treatments aren't safe or don't work.  What are the long-term concerns?  With treatment, blood clots are usually dissolved or removed. Some treatments can even help prevent future clots. But having a PE can put you at risk for another life-threatening blood clot. So, you will likely need to take  anticoagulants to help keep blood clots from forming again. You may need to take this medicine for months or years.  You may also need to make lifestyle changes. This may include getting more active and eating healthier. You may need to wear elastic (compression) stockings and and take breaks on long trips.  Call 911  Call 911 or get emergency help if you have symptoms of a blood clot that has traveled to the lungs. The symptoms include:    Chest pain    Trouble breathing    Coughing (may cough up blood)    Fainting    Fast heartbeat    Sweating  Call 911 if you have heavy or uncontrolled bleeding.  When to call your healthcare provider  Call your healthcare provider if you have swelling or pain in your leg, arm, or other area. These are symptoms of a blood clot.  You may have bleeding if you take medicine to help prevent blood clots.  Call your healthcare provider if you have signs or symptoms of bleeding. This includes:    Blood in the urine    Bleeding with bowel movements    Bleeding from the nose, gums, a cut, or vagina   Date Last Reviewed: 2/1/2017 2000-2017 [employer]. [address] [place] [address]. All rights reserved. This information is not intended as a substitute for professional medical care. Always follow your healthcare professional's instructions.      Pending Results     Date and Time Order Name Status Description    12/28/2017 0854 CT Abd Aorta Bilataeral Iliofem Angio Preliminary     12/27/2017 1730 Fluid Culture Aerobic Bacterial Preliminary             Statement of Approval     Ordered          12/29/17 1116  I have reviewed and agree with all the recommendations and orders detailed in this document.  EFFECTIVE NOW     Approved and electronically signed by:  Meme Smith, PANiyaC             Admission Information     Date & Time Provider Department Dept. Phone    12/27/2017 Myron Damon MD [place] [address] [facility] [phone]     "  Your Vitals Were     Blood Pressure Pulse Temperature Respirations Weight Pulse Oximetry    121/78 (BP Location: Right arm) 85 98.8  F (37.1  C) (Oral) 16 96.6 kg (212 lb 15.4 oz) 96%    BMI (Body Mass Index)                   31.45 kg/m2           [facility] Information     [facility] lets you send messages to your doctor, view your test results, renew your prescriptions, schedule appointments and more. To sign up, go to [email or web address] . Click on \"Log in\" on the left side of the screen, which will take you to the Welcome page. Then click on \"Sign up Now\" on the right side of the page.     You will be asked to enter the access code listed below, as well as some personal information. Please follow the directions to create your username and password.     Your access code is: [email or web address]  Expires: 2018 11:44 PM     Your access code will  in 90 days. If you need help or a new code, please call your [place] clinic or [phone].        Care EveryWhere ID     This is your Care EveryWhere ID. This could be used by other organizations to access your [place] medical records  [id number]        Equal Access to Services     SUKHJINDER ASHER : Neymar williamo Abena, waaxda luqadaha, qaybta kaalmada adeegyada, puneet florian. So [facility] [phone].    ATENCIÓN: Si habla español, tiene a vela disposición servicios gratuitos de asistencia lingüística. Llame al [phone].    We comply with applicable federal civil rights laws and [place] laws. We do not discriminate on the basis of race, color, national origin, age, disability, sex, sexual orientation, or gender identity.               Review of your medicines      START taking        Dose / Directions    * rivaroxaban ANTICOAGULANT 15 MG Tabs tablet   Commonly known as:  XARELTO   Used for:  Other acute pulmonary embolism without acute cor pulmonale (H)        Dose:  15 mg   Take 1 tablet (15 mg) by mouth 2 times daily (with " meals)   Quantity:  42 tablet   Refills:  0       * rivaroxaban ANTICOAGULANT 20 MG Tabs tablet   Commonly known as:  XARELTO   Used for:  Other acute pulmonary embolism without acute cor pulmonale (H)        Dose:  20 mg   Start taking on:  1/19/2018   Take 1 tablet (20 mg) by mouth daily (with dinner)   Quantity:  90 tablet   Refills:  1       * Notice:  This list has 2 medication(s) that are the same as other medications prescribed for you. Read the directions carefully, and ask your doctor or other care provider to review them with you.      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, subsequent encounter        Dose:  650 mg   Take 2 tablets (650 mg) by mouth every 4 hours as needed for other (surgical pain)   Quantity:  100 tablet   Refills:  0       CLONAZEPAM PO        Dose:  0.5 mg   Take 0.5 mg by mouth 3 times daily as needed for anxiety   Refills:  0       fluticasone 50 MCG/ACT spray   Commonly known as:  FLONASE        Dose:  2 spray   Spray 2 sprays into both nostrils 2 times daily   Refills:  0       HYDROmorphone 2 MG tablet   Commonly known as:  DILAUDID   Used for:  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, subsequent encounter        Dose:  2-4 mg   Take 1-2 tablets (2-4 mg) by mouth every 3 hours as needed for moderate to severe pain   Quantity:  80 tablet   Refills:  0       LISINOPRIL PO        Dose:  20 mg   Take 20 mg by mouth daily   Refills:  0       multivitamin, therapeutic Tabs tablet        Dose:  1 tablet   Take 1 tablet by mouth daily   Refills:  0       OMEPRAZOLE PO        Dose:  20 mg   Take 20 mg by mouth daily as needed   Refills:  0       senna-docusate 8.6-50 MG per tablet   Commonly known as:  SENOKOT-S;PERICOLACE   Used for:  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,  subsequent encounter        Dose:  1-2 tablet   Take 1-2 tablets by mouth 2 times daily as needed for constipation   Quantity:  40 tablet   Refills:  0         STOP taking     ibuprofen 600 MG tablet   Commonly known as:  ADVIL/MOTRIN                Where to get your medicines      These medications were sent to [facility] [place], [place] - [address]  [address] [place] [address]     Phone:  [phone]     rivaroxaban ANTICOAGULANT 15 MG Tabs tablet    rivaroxaban ANTICOAGULANT 20 MG Tabs tablet                Protect others around you: Learn how to safely use, store and throw away your medicines at www.disposemymeds.org.             Medication List: This is a list of all your medications and when to take them. Check marks below indicate your daily home schedule. Keep this list as a reference.      Medications           Morning Afternoon Evening Bedtime As Needed    acetaminophen 325 MG tablet   Commonly known as:  TYLENOL   Take 2 tablets (650 mg) by mouth every 4 hours as needed for other (surgical pain)   Last time this was given:  650 mg on 12/28/2017  8:08 PM                                   CLONAZEPAM PO   Take 0.5 mg by mouth 3 times daily as needed for anxiety   Last time this was given:  0.5 mg on 12/28/2017  8:04 PM                                   fluticasone 50 MCG/ACT spray   Commonly known as:  FLONASE   Spray 2 sprays into both nostrils 2 times daily   Last time this was given:  2 sprays on 12/29/2017  9:43 AM                                      HYDROmorphone 2 MG tablet   Commonly known as:  DILAUDID   Take 1-2 tablets (2-4 mg) by mouth every 3 hours as needed for moderate to severe pain   Last time this was given:  4 mg on 12/29/2017 12:47 AM                                   LISINOPRIL PO   Take 20 mg by mouth daily   Last time this was given:  20 mg on 12/29/2017  9:43 AM                                   multivitamin, therapeutic Tabs tablet   Take 1 tablet  by mouth daily                                   OMEPRAZOLE PO   Take 20 mg by mouth daily as needed   Last time this was given:  20 mg on 12/29/2017  9:43 AM                                   * rivaroxaban ANTICOAGULANT 15 MG Tabs tablet   Commonly known as:  XARELTO   Take 1 tablet (15 mg) by mouth 2 times daily (with meals)   Last time this was given:  15 mg on 12/29/2017  9:43 AM                                      * rivaroxaban ANTICOAGULANT 20 MG Tabs tablet   Commonly known as:  XARELTO   Take 1 tablet (20 mg) by mouth daily (with dinner)   Start taking on:  1/19/2018   Last time this was given:  15 mg on 12/29/2017  9:43 AM                    Start 1/19/2018 with dinner                 senna-docusate 8.6-50 MG per tablet   Commonly known as:  SENOKOT-S;PERICOLACE   Take 1-2 tablets by mouth 2 times daily as needed for constipation   Last time this was given:  1 tablet on 12/28/2017  8:08 PM                                   * Notice:  This list has 2 medication(s) that are the same as other medications prescribed for you. Read the directions carefully, and ask your doctor or other care provider to review them with you.

## 2017-12-27 NOTE — H&P
[facility]  History and Physical  Hospitalist       Date of Admission:  12/27/2017  Date of Service (when I saw the patient): 12/27/17    Assessment & Plan   Lucio Hernandez is a 62 year old male who presents with mild intermittent asthma, hypertension, prostate cancer, history of urethral stricture s/p dilation, and recent thoracotomy and repair of intercostal hernia and internal fixation of ribs 7 and 8 who presents to [facility] with pulmonary embolism.        Pulmonary embolism   Get EKG, CBC, BMP, Echo.  Start heparin gtt.  Discussed Lovenox bridge to Coumadin vs starting NOAC.  Patient will discuss this with Dr Olsen and decide on best option.       Symptomatic left pleural effusion.  Suspect post-surgical and is compounding shortness of breath from PE.  Will need a therapeutic thoracentesis.  U/S guided thoracentesis ordered.  Hopefully can be done before staring anticoagulation, otherwise, will need to hold Heparin for appropriate time frame before undergoing procedure.       Left flank/side pain.   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  Uses dilaudid and occasional Klonopin.  Both ordered.  Bowel regimen ordered because of narcotic use.       Hypertension.  Lisinopril reordered.       Code Status: Full Code    Disposition: Expected discharge in 2-3 days once anticoagulation has been started and thoracentesis completed.    TOTAL TIME SPENT:  90 minutes spent on floor time including chart review, consultation, and coordination of care.    Daniela Garcia PA-C with Dr Damon      Primary Care Physician   Dean Cody    Chief Complaint   Shortness of breath    History is obtained from the patient and EPIC.     History of Present Illness   Lucio Hernandez is a 62 year old male who presents with mild intermittent asthma, hypertension, prostate cancer, history of urethral stricture s/p dilation, and recent thoracotomy and repair of intercostal hernia and internal  fixation of ribs 7 and 8 who presents to [facility] with pulmonary embolism.   He called in to clinic today with progressive shortness of breath over the last 5-7 days.  No fever, chills, night sweats.  Thoracotomy incision is well-healed.  He has ongoing pain in his left rib/flank since surgery, but the shortness of breath and fast heart rate are new.  Dr Damon ordered a CT PE Rule Out which was completed at [facility] this afternoon and shows a moderate left pleural effusion and a new right lower lobe pulmonary embolism.      Because of the patient's symptoms and need for not only anticoagulation but also a therapeutic thoracentesis, patient was admitted to the hospital for management.      Past Medical History    1. Hypertension.  2. Prostate cancer s/p radiation.  3. Urethral stricture s/p dilation.  4. Anxiety.  5. Mild intermittent asthma.   6. Intercostal neuralgia.     Past Surgical History   I have reviewed this patient's surgical history and updated it with pertinent information if needed.  Past Surgical History:   Procedure Laterality Date     BACK SURGERY  2000    C5-C6 framinotomy     CYSTOSCOPY, BLADDER NECK CUTS, COMBINED N/A 8/27/2015    Procedure: COMBINED CYSTOSCOPY, BLADDER NECK CUTS;  Surgeon: Paco Haskins MD;  Location:  OR     CYSTOSCOPY, DILATE URETHRA, COMBINED N/A 2/27/2015    Procedure: COMBINED CYSTOSCOPY, DILATE URETHRA;  Surgeon: Paco Haskins MD;  Location:  OR     CYSTOSCOPY, DILATE URETHRA, COMBINED N/A 4/21/2015    Procedure: COMBINED CYSTOSCOPY, DILATE URETHRA;  Surgeon: Eduardo Landaverde MD;  Location:  OR     CYSTOSCOPY, DILATE URETHRA, COMBINED N/A 4/23/2015    Procedure: COMBINED CYSTOSCOPY, DILATE URETHRA;  Surgeon: Paco Haskins MD;  Location:  OR     ENT SURGERY      tonsillectomy     GENITOURINARY SURGERY  2006    prostatectomy     INSERT CATHETER BLADDER N/A 4/23/2015    Procedure: INSERT CATHETER BLADDER;  Surgeon: Paco Haskins MD;   Location:  OR     LAPAROSCOPIC APPENDECTOMY  6/11/2014    Procedure: LAPAROSCOPIC APPENDECTOMY;  Surgeon: Scar Schroeder MD;  Location:  OR     ORTHOPEDIC SURGERY      shoulder x 3     THORACOTOMY Left 12/2/2017    Procedure: THORACOTOMY;  LEFT THORACOTOMY, REPAIR OF INTERCOSTAL HERNIA  AND INTERNAL FIXATION RIB FRACTURES OF RIBS 7 AND 8;  Surgeon: Myron Damon MD;  Location:  OR       Prior to Admission Medications   Prior to Admission Medications   Prescriptions Last Dose Informant Patient Reported? Taking?   CLONAZEPAM PO   Yes No   Sig: Take 0.25 mg by mouth 2 times daily as needed for anxiety   HYDROmorphone (DILAUDID) 2 MG tablet   No No   Sig: Take 1-2 tablets (2-4 mg) by mouth every 3 hours as needed for moderate to severe pain   LISINOPRIL PO   Yes No   Sig: Take 20 mg by mouth daily   acetaminophen (TYLENOL) 325 MG tablet   No No   Sig: Take 2 tablets (650 mg) by mouth every 4 hours as needed for other (surgical pain)   ibuprofen (ADVIL/MOTRIN) 600 MG tablet   No No   Sig: Take 1 tablet (600 mg) by mouth 4 times daily (before meals and nightly)   multivitamin, therapeutic (THERA-VIT) TABS   Yes No   Sig: Take 1 tablet by mouth daily   polyethylene glycol (MIRALAX/GLYCOLAX) Packet   No No   Sig: Take 17 g by mouth daily as needed for constipation   senna-docusate (SENOKOT-S;PERICOLACE) 8.6-50 MG per tablet   No No   Sig: Take 1-2 tablets by mouth 2 times daily as needed for constipation      Facility-Administered Medications: None     Allergies   No Known Allergies    Social History   .  Lives in [place].  MD, works as an ENT.  Nonsmoker.  1 alcoholic drink daily.     Family History   Reviewed.  Noncontributory.     Review of Systems   14 point comprehensive ROS undertaken with pertinent positives and negatives as above and otherwise unremarkable.     Physical Exam   Temp: 98.7  F (37.1  C) Temp src: Oral BP: 138/88 Pulse: 107   Resp: (!) 105 SpO2: 97 % O2 Device: None (Room  air)    Vital Signs with Ranges  Temp:  [98.7  F (37.1  C)] 98.7  F (37.1  C)  Pulse:  [107] 107  Resp:  [105] 105  BP: (138)/(88) 138/88  SpO2:  [97 %] 97 %  0 lbs 0 oz    General Appearance:  Pleasant, cooperative, alert. Well developed, well nourished.   HEENT: Normocephalic, atraumatic.  Extra occular mm intact.  Sclera clear. PERRL.    Lungs: Clear to auscultation bilaterally.  No wheeze.  Diminished at left lung base.   Heart: Fast rate, regular rhythm.  No appreciated murmur.  Normal S1/S2.  No S3/S4.  Chest:  Thoracotomy incision well-healed.   Abdomen: Soft, nontender non distended.    Musculoskeletal:  Moving x 4 spontaneously with CMS intact x4.  Normal bulk and tone.  No LE edema.     Neuro: Alert and oriented x3.  CN II-XII grossly intact and symmetric.    Nonfocal exam.      Data   Data reviewed today:  I personally reviewed the chest CT image(s) showing left pleural effusion, RLL PE. .  CT done at [facility].

## 2017-12-27 NOTE — IP AVS SNAPSHOT
[place] [address] [facility]    [address] [place] [address]    Phone:  [phone]                                       After Visit Summary   12/27/2017    Lucio Hernandez    MRN: [id number]           After Visit Summary Signature Page     I have received my discharge instructions, and my questions have been answered. I have discussed any challenges I see with this plan with the nurse or doctor.    ..........................................................................................................................................  Patient/Patient Representative Signature      ..........................................................................................................................................  Patient Representative Print Name and Relationship to Patient    ..................................................               ................................................  Date                                            Time    ..........................................................................................................................................  Reviewed by Signature/Title    ...................................................              ..............................................  Date                                                            Time

## 2017-12-27 NOTE — PROVIDER NOTIFICATION
EARLE Garcia PA-C @ 16:31 on 12/27/17--Okay to start Heparin drip now with bolus.  Clarification of Heparin 10a goal range is DVT/PE High Intensity protocol with Heparin 10a goal range = 0.3 - 0.7 units/ml.  Kerri Jennings [facility]

## 2017-12-27 NOTE — PROGRESS NOTES
HOSPITALIST CONSULT CHART CHECK:    Hospitalist service was consulted for cross coverage only. We will peripherally follow and chart check throughout the week.      - For vascular medical concerns during business hours M-F, call the [facility] at [phone] to have the rounding/on call Vascular Medicine (NOT VASCULAR SURGERY) MD paged.    - After business hours M-F, for medical concerns on this patient, please page hospitalist staff.    - For vascular surgical questions, please page the appropriate surgeon (primary vascular surgeon or on call vascular surgeon) based upon the time of day.       Serafin Gamez PA-C

## 2017-12-28 ENCOUNTER — APPOINTMENT (OUTPATIENT)
Dept: CARDIOLOGY | Facility: CLINIC | Age: 62
DRG: 186 | End: 2017-12-28
Attending: PHYSICIAN ASSISTANT
Payer: COMMERCIAL

## 2017-12-28 ENCOUNTER — APPOINTMENT (OUTPATIENT)
Dept: CT IMAGING | Facility: CLINIC | Age: 62
DRG: 186 | End: 2017-12-28
Attending: INTERNAL MEDICINE
Payer: COMMERCIAL

## 2017-12-28 ENCOUNTER — APPOINTMENT (OUTPATIENT)
Dept: GENERAL RADIOLOGY | Facility: CLINIC | Age: 62
DRG: 186 | End: 2017-12-28
Attending: THORACIC SURGERY (CARDIOTHORACIC VASCULAR SURGERY)
Payer: COMMERCIAL

## 2017-12-28 LAB
ANION GAP SERPL CALCULATED.3IONS-SCNC: 6 MMOL/L (ref 3–14)
BASOPHILS # BLD AUTO: 0 10E9/L (ref 0–0.2)
BASOPHILS NFR BLD AUTO: 0.3 %
BUN SERPL-MCNC: 8 MG/DL (ref 7–30)
CALCIUM SERPL-MCNC: 8.8 MG/DL (ref 8.5–10.1)
CHLORIDE SERPL-SCNC: 107 MMOL/L (ref 94–109)
CO2 SERPL-SCNC: 25 MMOL/L (ref 20–32)
CREAT SERPL-MCNC: 0.69 MG/DL (ref 0.66–1.25)
DIFFERENTIAL METHOD BLD: ABNORMAL
EOSINOPHIL # BLD AUTO: 0.2 10E9/L (ref 0–0.7)
EOSINOPHIL NFR BLD AUTO: 2.5 %
ERYTHROCYTE [DISTWIDTH] IN BLOOD BY AUTOMATED COUNT: 12.6 % (ref 10–15)
GFR SERPL CREATININE-BSD FRML MDRD: >90 ML/MIN/1.7M2
GLUCOSE SERPL-MCNC: 124 MG/DL (ref 70–99)
HCT VFR BLD AUTO: 40.4 % (ref 40–53)
HGB BLD-MCNC: 13.9 G/DL (ref 13.3–17.7)
IMM GRANULOCYTES # BLD: 0.1 10E9/L (ref 0–0.4)
IMM GRANULOCYTES NFR BLD: 0.8 %
LDH FLD L TO P-CCNC: 923 U/L
LMWH PPP CHRO-ACNC: 0.35 IU/ML
LMWH PPP CHRO-ACNC: 0.36 IU/ML
LMWH PPP CHRO-ACNC: 0.52 IU/ML
LYMPHOCYTES # BLD AUTO: 1.3 10E9/L (ref 0.8–5.3)
LYMPHOCYTES NFR BLD AUTO: 17.5 %
MCH RBC QN AUTO: 32.8 PG (ref 26.5–33)
MCHC RBC AUTO-ENTMCNC: 34.4 G/DL (ref 31.5–36.5)
MCV RBC AUTO: 95 FL (ref 78–100)
MONOCYTES # BLD AUTO: 0.8 10E9/L (ref 0–1.3)
MONOCYTES NFR BLD AUTO: 10.9 %
NEUTROPHILS # BLD AUTO: 5 10E9/L (ref 1.6–8.3)
NEUTROPHILS NFR BLD AUTO: 68 %
NRBC # BLD AUTO: 0 10*3/UL
NRBC BLD AUTO-RTO: 0 /100
PLATELET # BLD AUTO: 314 10E9/L (ref 150–450)
POTASSIUM SERPL-SCNC: 4 MMOL/L (ref 3.4–5.3)
PROT FLD-MCNC: 3.4 G/DL
PROT SERPL-MCNC: 7 G/DL (ref 6.8–8.8)
RBC # BLD AUTO: 4.24 10E12/L (ref 4.4–5.9)
SODIUM SERPL-SCNC: 138 MMOL/L (ref 133–144)
SPECIMEN SOURCE FLD: NORMAL
SPECIMEN SOURCE FLD: NORMAL
WBC # BLD AUTO: 7.3 10E9/L (ref 4–11)

## 2017-12-28 PROCEDURE — 83615 LACTATE (LD) (LDH) ENZYME: CPT | Performed by: INTERNAL MEDICINE

## 2017-12-28 PROCEDURE — 75635 CT ANGIO ABDOMINAL ARTERIES: CPT

## 2017-12-28 PROCEDURE — 99233 SBSQ HOSP IP/OBS HIGH 50: CPT | Performed by: INTERNAL MEDICINE

## 2017-12-28 PROCEDURE — 93306 TTE W/DOPPLER COMPLETE: CPT

## 2017-12-28 PROCEDURE — 12000007 ZZH R&B INTERMEDIATE

## 2017-12-28 PROCEDURE — 25000128 H RX IP 250 OP 636: Performed by: THORACIC SURGERY (CARDIOTHORACIC VASCULAR SURGERY)

## 2017-12-28 PROCEDURE — 40000556 ZZH STATISTIC PERIPHERAL IV START W US GUIDANCE

## 2017-12-28 PROCEDURE — 25000125 ZZHC RX 250: Performed by: THORACIC SURGERY (CARDIOTHORACIC VASCULAR SURGERY)

## 2017-12-28 PROCEDURE — 25000125 ZZHC RX 250

## 2017-12-28 PROCEDURE — 85520 HEPARIN ASSAY: CPT | Performed by: INTERNAL MEDICINE

## 2017-12-28 PROCEDURE — 71010 XR CHEST PORT 1 VW: CPT

## 2017-12-28 PROCEDURE — 93306 TTE W/DOPPLER COMPLETE: CPT | Mod: 26 | Performed by: INTERNAL MEDICINE

## 2017-12-28 PROCEDURE — 85520 HEPARIN ASSAY: CPT | Performed by: PHYSICIAN ASSISTANT

## 2017-12-28 PROCEDURE — 84155 ASSAY OF PROTEIN SERUM: CPT | Performed by: INTERNAL MEDICINE

## 2017-12-28 PROCEDURE — 36415 COLL VENOUS BLD VENIPUNCTURE: CPT | Performed by: PHYSICIAN ASSISTANT

## 2017-12-28 PROCEDURE — 25500064 ZZH RX 255 OP 636: Performed by: THORACIC SURGERY (CARDIOTHORACIC VASCULAR SURGERY)

## 2017-12-28 PROCEDURE — 36415 COLL VENOUS BLD VENIPUNCTURE: CPT | Performed by: INTERNAL MEDICINE

## 2017-12-28 PROCEDURE — 80048 BASIC METABOLIC PNL TOTAL CA: CPT | Performed by: INTERNAL MEDICINE

## 2017-12-28 PROCEDURE — 85520 HEPARIN ASSAY: CPT | Performed by: THORACIC SURGERY (CARDIOTHORACIC VASCULAR SURGERY)

## 2017-12-28 PROCEDURE — 36415 COLL VENOUS BLD VENIPUNCTURE: CPT | Performed by: THORACIC SURGERY (CARDIOTHORACIC VASCULAR SURGERY)

## 2017-12-28 PROCEDURE — 85025 COMPLETE CBC W/AUTO DIFF WBC: CPT | Performed by: INTERNAL MEDICINE

## 2017-12-28 PROCEDURE — 25000132 ZZH RX MED GY IP 250 OP 250 PS 637: Performed by: PHYSICIAN ASSISTANT

## 2017-12-28 RX ORDER — LIDOCAINE 40 MG/G
CREAM TOPICAL
Status: COMPLETED
Start: 2017-12-28 | End: 2017-12-28

## 2017-12-28 RX ORDER — IOPAMIDOL 755 MG/ML
100 INJECTION, SOLUTION INTRAVASCULAR ONCE
Status: COMPLETED | OUTPATIENT
Start: 2017-12-28 | End: 2017-12-28

## 2017-12-28 RX ADMIN — LISINOPRIL 20 MG: 10 TABLET ORAL at 09:02

## 2017-12-28 RX ADMIN — IOPAMIDOL 100 ML: 755 INJECTION, SOLUTION INTRAVENOUS at 12:37

## 2017-12-28 RX ADMIN — SENNOSIDES AND DOCUSATE SODIUM 1 TABLET: 8.6; 5 TABLET ORAL at 20:08

## 2017-12-28 RX ADMIN — LIDOCAINE: 40 CREAM TOPICAL at 10:44

## 2017-12-28 RX ADMIN — HYDROMORPHONE HYDROCHLORIDE 4 MG: 2 TABLET ORAL at 02:32

## 2017-12-28 RX ADMIN — ACETAMINOPHEN 650 MG: 325 TABLET, FILM COATED ORAL at 06:49

## 2017-12-28 RX ADMIN — SODIUM CHLORIDE 70 ML: 9 INJECTION, SOLUTION INTRAVENOUS at 12:37

## 2017-12-28 RX ADMIN — SULFUR HEXAFLUORIDE 2 ML: KIT at 10:50

## 2017-12-28 RX ADMIN — CLONAZEPAM 0.5 MG: 0.5 TABLET ORAL at 20:04

## 2017-12-28 RX ADMIN — OMEPRAZOLE 20 MG: 20 CAPSULE, DELAYED RELEASE ORAL at 09:02

## 2017-12-28 RX ADMIN — ACETAMINOPHEN 650 MG: 325 TABLET, FILM COATED ORAL at 20:08

## 2017-12-28 RX ADMIN — ACETAMINOPHEN 650 MG: 325 TABLET, FILM COATED ORAL at 13:25

## 2017-12-28 RX ADMIN — HYDROMORPHONE HYDROCHLORIDE 4 MG: 2 TABLET ORAL at 20:04

## 2017-12-28 RX ADMIN — HEPARIN SODIUM 1450 UNITS/HR: 10000 INJECTION, SOLUTION INTRAVENOUS at 06:49

## 2017-12-28 RX ADMIN — FLUTICASONE PROPIONATE 2 SPRAY: 50 SPRAY, METERED NASAL at 09:05

## 2017-12-28 NOTE — PROGRESS NOTES
(L) thoracentesis done by Dr. Dan under ultrasound after consent signed. Tolerated procedure well. 100cc bloody returns sent to lab. CXR done post procedure show no pneumothorax. Site with band aid intact. Report called to Sebastian CHURCH on station 33. #22 saline lock placed prior to procedure.

## 2017-12-28 NOTE — CONSULTS
VASCULAR MEDICINE CONSULTATION      REQUESTING PHYSICIAN:  Myron Damon MD.      REASON FOR CONSULTATION:  Evaluation and management of pulmonary embolus.      IMPRESSION:     1.  Hemodynamically insignificant small right posterior medial right lower lobe pulmonary emboli in association with recent thoracotomy (currently postop day 25) for internal fixation of rib fractures secondary to intercostal herniation with intermittent development of traumatic (coughing associated) small pneumothorax and presently with enlarged loculated pleural effusions.      The patient is currently postoperative day 25 from the above-referenced procedure, which was mandated due to enlarging intercostal hernia with the interval development of a pneumothorax in association with prominent coughing.  The patient had instrumentation of rib fractures 7 and 8.  He was able to be discharged from the hospital.  He has not really felt well for the last 7-10 days.  He has had shortness of breath, wherein he has felt as if he has gotten winded more easily than he should based upon his level of physical conditioning.  Over the [place] holiday, he simply sought to see if this would pass.  It did not.  This morning, the patient wound up developing significant shortness of breath in association with walking his dog on a limited basis.  He therefore contacted Dr. Damon who ordered a CT scan showing a larger loculated moderate volume left pleural effusions and slight enlargement of mediastinal lymph nodes.  Incidentally discovered in association with this were multifocal small volume pulmonary emboli leading into the right posterior medial right lower lobe.  We were contacted to provide evaluation and management suggestions regarding the above.  At present I recommend the following:     a.  Proceed with ultrasound-guided thoracentesis as you are.     b.  Check D-dimer, troponin and BNP.       c.  Check duplex lower extremities.     d.  Check  transthoracic echocardiogram for right heart strain.     e.  Proceed with therapeutic anticoagulation presently in the form of IV unfractionated heparin.  Monitor the patient during the case in the next 24-48 hours after his thoracentesis to ascertain if he has postprocedural bleeding in which case IV unfractionated heparin can be shut off and the patient can potentially have an IVC filter placed.     f.  If after 24-48 hours of clinical and postprocedural stability, the patient should be transitioned to oral anticoagulation, I would favor the use of a novel oral anticoagulant in the form of Xarelto 15 mg p.o. b.i.d. x21 days, then 20 mg daily thereafter.     g.  I would anticipate a briefer 3-6 month course of Xarelto anticoagulation.       h.  This is the patient's first lifetime venous thromboembolic event and is clearly provoked in association with the above referenced surgical procedure and subsequent inactivity.  No thrombophilia evaluation is currently warranted.       i.  Hospital staff will please provide after hours medical cross coverage.      j.  I will see the patient in outpatient followup in the [facility] to coordinate eventual cessation of anticoagulation.        CHIEF COMPLAINT:  Shortness of breath.        HISTORY OF PRESENT ILLNESS:  Lucio Hernandez is a lifetime nonsmoking 62-year-old white male who presented with an enlarging intercostal hernia and rib fractures of ribs 7 and 8.  He is postop day 25 from internal fixation of the above.  Over the course of approximately the last 7-10 days, the patient has felt not quite right.  He has developed increasing shortness of breath with decreasing amounts of inactivity.  CT scan was ordered as delineated above with incidental finding of small pulmonary embolus.  Of note, the patient's loculated pleural effusion is likely to be the more prominent etiology of the shortness of breath.  He is currently undergoing an ultrasound-guided thoracentesis  for drainage of the above.      REVIEW OF SYSTEMS:  The patient has pleuritic chest pain.  He denies typical anginal chest pain.  He is short of breath.  He denies fevers, chills, night sweats.      The remainder of his 14-point review of systems is within normal limits.      MEDICATIONS PRIOR TO ADMISSION:   1.  Lisinopril 20 mg daily.   2.  Flonase 2 sprays b.i.d.   3.  Prilosec 20 mg daily.   4.  Tylenol p.r.n.   5.  Clonazepam 0.5 mg p.o. t.i.d.   6.  Dilaudid 2 mg orally p.r.n.   7.  Ibuprofen 600 mg q.i.d. p.r.n.   8.  MVI 1 p.o. daily.   9.  Senokot-S 1-2 tabs p.o. b.i.d.      ALLERGIES:  No known drug allergies.      PREVIOUS MEDICAL HISTORY:   1.  Asthma.   2.  Hypertension.   3.  Prostate cancer.   4.  Pulmonary embolus as identified above.   5.  Rib fractures 7 and 8 with enlarging intercostal hernia and subsequent coughing associated pneumothorax.      PREVIOUS SURGICAL HISTORY:   1.  Left thoracotomy.   2.  Shoulder surgery.   3.  Laparoscopic appendectomy 06/11/2014.   4.  Prostatectomy, 2006.   5.  Cystoscopy, 02/27/2015 with urethral dilation.  Subsequent cystoscopies 04/21/2015, 04/23/2015, 08/27/2015.     6.  C5-C6 foraminotomy, calendar year 2000.      FAMILY HISTORY:  Noncontributory.      SOCIAL HISTORY:  The patient is a lifelong nonsmoker.  He is an otolaryngologist who is .      PHYSICAL EXAMINATION:   GENERAL:  The patient is awake, alert and oriented.   VITAL SIGNS:  Blood pressure is 114/77, pulse ox 94% on room air, respiratory rate is 12, pulse is 105, temperature is 98.7 degrees Fahrenheit.   HEENT:  Oropharynx within normal limits.   NECK:  Reveals no JVD, thyromegaly, lymphadenopathy.   LUNGS:  Reveal diminished breath sounds bilaterally.   HEART:  Regular rhythm, tachycardic.  Normal S1, S2, no S3, S4, murmur, gallop or rub.  Thoracotomy incision are well healed.   ABDOMEN:  Soft, nondistended, nontender.   EXTREMITIES:  Without cyanosis, clubbing or edema.  There is no lower  extremity asymmetry.   NEUROLOGIC:  Nonfocal.      LABORATORY DATA:  Reveals CT scan as delineated above.      ASSESSMENT AND PLAN:  Please see recommendations as above.         CLARIBEL EVANS MD             D: 2017 17:10   T: 2017 21:17   MT: BETY      Name:     JAYME PINEDA   MRN:      [id number]        Account:       [id number]   :      1955           Consult Date:  2017      Document: [id number]

## 2017-12-28 NOTE — PLAN OF CARE
Problem: Patient Care Overview  Goal: Plan of Care/Patient Progress Review  Outcome: Improving  Pt A/Ox4. AVSS ex elevated BP, on RA. LS coarse in left lower lobe. Dressing from thoracentesis CDI. Pt reports less SOB than yesterday but still present. Independent. Pt straight caths himself, reports clear yellow urine. Pain controlled with po tylenol. Hep gtt 14.5mL/hr, labs within goal range. Had CT scan and echo today.

## 2017-12-28 NOTE — PROGRESS NOTES
THORACIC SURGERY    Appreciate Dr. Olsen's assistance  Stable  On RA  No DVT  CT abdomen pending    CT shows small left pleural effusion  Culture negative    Anticoagulation as per Dr. Olsen    Left pleural effusion shows resolve without intervention    Possible d/c tomorrow    ESTEBAN PINA MD [facility] ONCOLOGY THORACIC SURGERY  CELL:  [phone]  OFFICE: [phone]

## 2017-12-28 NOTE — PLAN OF CARE
Problem: Patient Care Overview  Goal: Plan of Care/Patient Progress Review  Outcome: Improving  VSS, LS w/ courser crackles in left base, 100mL off from Lt thoracentesis yesterday. Echo scheduled for today and will find out future plans from there. No signs of increased SOB but no improvement either, currently on Hep gtt @ 1450 units/hr, mild pain controlled w/ oral dilaudid, voiding adequately, up Independently.

## 2017-12-28 NOTE — PROGRESS NOTES
[facility]  Vascular Medicine Progress Note       For vascular medical concerns on this patient during business hours M-F, call the [facility] at [phone] to have the rounding / on call Vascular Medicine (NOT VASCULAR SURGERY) MD paged. After business hours M-F,  for medical concerns on this patient, please page hospitalist staff.          Assessment and Plan:   Active Problems:        Pulmonary embolism provoked with postop inactivity noted on POD25 after open fixation left 7th and 8th rib fractures      Assessment:     doing better form respiratory perspective (although at rest presently),     no DVT on LE duplex      Plan:     see below regarding pleural effusions,     maintain on IV UFH until determination as to management of effusions versus watchful waiting, start Xarelto once determination made regarding no operative management of effusions    Await TTE    Check CT venogram , rule out caval thrombosis, place filter if caval thrombosis noted       Loculated pleural effusions in association with open fixation left 7th and 8th rib fractures  (due to enlarging intercostal hernia, cough associated PTX)       Assessment:     Fluid sent for studies, cultures and gram stain negative to date, lymphocytic fluid predominance, Serum WBC normal    Diagnostic thoracentesis  - only 100 cc fluid removed      Plan:     Discuss management with thoracic surgery                   Interval History:   doing well; no cp, improving sob, no n/v/d, or abd pain.              Review of Systems:   The 10 point Review of Systems is negative other than noted in the HPI             Medications:       lisinopril (PRINIVIL/ZESTRIL) tablet 20 mg  20 mg Oral Daily     fluticasone  2 spray Both Nostrils BID     omeprazole (priLOSEC) CR capsule 20 mg  20 mg Oral QAM                  Physical Exam:     Patient Vitals for the past 24 hrs:   BP Temp Temp src Pulse Heart Rate Resp SpO2 Weight   12/28/17 0804  126/86 98.3  F (36.8  C) Oral - 85 16 96 % -   12/28/17 0654 - - - - - - - 212 lb 15.4 oz (96.6 kg)   12/28/17 0330 - - - - - 16 - -   12/28/17 0232 125/86 97.5  F (36.4  C) Axillary - 84 16 - -   12/27/17 2300 - - - - - 18 - -   12/27/17 1951 114/82 99.5  F (37.5  C) Oral - 93 18 95 % -   12/27/17 1814 (!) 131/94 99.1  F (37.3  C) Oral - 94 18 96 % -   12/27/17 1739 123/78 - - - 101 20 96 % -   12/27/17 1729 (!) 139/93 - - - 103 - 97 % -   12/27/17 1720 128/78 - - - 98 - 97 % -   12/27/17 1640 114/77 - - - 99 - 94 % -   12/27/17 1602 138/88 98.7  F (37.1  C) Oral 107 - 16 97 % -     Wt Readings from Last 4 Encounters:   12/28/17 212 lb 15.4 oz (96.6 kg)   12/07/17 217 lb 6 oz (98.6 kg)   10/31/17 215 lb (97.5 kg)   07/26/17 210 lb (95.3 kg)       Intake/Output Summary (Last 24 hours) at 12/28/17 0909  Last data filed at 12/28/17 0600   Gross per 24 hour   Intake            712.3 ml   Output                0 ml   Net            712.3 ml       Constitutional: normal  Eyes: normal  ENT: normal  Neck: Supple, symmetrical, trachea midline, no adenopathy, thyroid symmetric, not enlarged and no tenderness, skin normal.  Hematologic / Lymphatic: normal  Back: normal  Lungs: decreased BS bilateral bases  Cardiovascular: Regular rate and rhythm, normal S1 and S2, no S3 or S4, and no murmur noted.  Chest / Breast: normal  Abdomen: normal  Musculoskeletal: normal  Neurologic: normal  Neuropsychiatric: normal  Skin: normal           Data:     Results for orders placed or performed during the hospital encounter of 12/27/17 (from the past 24 hour(s))   [place] [facility] IP Consult: Patient to be seen: Routine - within 24 hours; Anticoagulation, Dr Damon already spoke to Dr Olsen; Consultant may enter orders: Yes    Narrative    Srinath Olsen MD     12/27/2017  4:59 PM  See full dictation in EPIC.    For vascular medical concerns on this patient during business   hours M-F, call the [facility]  [place] at [phone]   to have the rounding / on call Vascular Medicine (NOT VASCULAR   SURGERY) MD paged. After business hours M-F,  for medical   concerns on this patient, please page hospitalist staff. For   vascular surgical questions on this patient , please page the   appropriate surgeon (primary vascular surgeon or on call vascular   surgeon) based upon the time of day.      A/P:    1-Hemodynamically insignificant small right posteromedial RLL   pulmonary emboli provoked in association with recent thoracotomy   (currnetly POD25) for internal fixation of rib fractures (7and 8)   with enlarging loculated pleural effusions.    At present I recommend the following:    A-US thoracentesis    B-check biomarkers (d dimer, troponin, BNP)    C-check duplex legs    D-check TTE for right heart strain    E-therapeutic AC with IV UFH for now in case patient has post   procedural bleeding after thoracentesis    F-If after 24 to 48 hours of clinical and post procedural   stability, transition to oral AC. I favor NOAC in the form of   Xarelto 15 mg PO BID times 21 days, then 20 mg daily thereafter.     G-anticipate 3 or 6 months Xarelto AC    H-I will see the patient in outpt f/u at the [facility] to coordinate   cessation of AC.    I-As this is first lifetime VTE, and it was provoked: no   thrombophilia evaluation is warranted.    J-hospitalist staff to please provide after hours cross coverage.          Cell count with differential fluid   Result Value Ref Range    Body Fluid Analysis Source Pleural fluid     % Neutrophils Fluid 26 %    % Lymphocytes Fluid 58 %    % Mono/Macro Fluid 10 %    % Eosinophils Fluid 4 %    % Other Cells Fluid 2 %    Color Fluid Red     Appearance Fluid Cloudy     WBC Fluid 370 /uL   Gram stain   Result Value Ref Range    Specimen Description Pleural fluid Left     Gram Stain No organisms seen     Gram Stain Few  WBC'S seen  predominantly mononuclear cells      US Lower Extremity Venous Duplex  Bilateral    Narrative    US LOWER EXTREMITY VENOUS DUPLEX BILATERAL  12/27/2017 5:55 PM    HISTORY:  eval for DVT;     TECHNIQUE:  Venous Doppler US including color flow and Doppler  waveform analysis.    FINDINGS: No thrombus was observed and there was normal  compressibility, phasic flow, and augmentation.       Impression    IMPRESSION: No evidence of  right or left lower extremity deep venous  thrombosis.    PHILLIP WHITFIELD MD   XR Chest 1 View    Narrative    CHEST ONE VIEW 12/27/2017 5:57 PM     HISTORY: Post thoracentesis.     COMPARISON: 12/7/2017       Impression    IMPRESSION: No evidence of pneumothorax. Increasing opacity at the  left lung base and left midlung compared to 12/7/2017 which could  represent a combination of pleural/interfissural fluid and parenchymal  opacity. The right lung remains clear. No right pleural effusion. Left  rib plates are again noted and unchanged.    PHILLIP WHITFIELD MD   Basic metabolic panel   Result Value Ref Range    Sodium 139 133 - 144 mmol/L    Potassium 3.9 3.4 - 5.3 mmol/L    Chloride 107 94 - 109 mmol/L    Carbon Dioxide 25 20 - 32 mmol/L    Anion Gap 7 3 - 14 mmol/L    Glucose 127 (H) 70 - 99 mg/dL    Urea Nitrogen 8 7 - 30 mg/dL    Creatinine 0.70 0.66 - 1.25 mg/dL    GFR Estimate >90 >60 mL/min/1.7m2    GFR Estimate If Black >90 >60 mL/min/1.7m2    Calcium 8.8 8.5 - 10.1 mg/dL   CBC with platelets   Result Value Ref Range    WBC 8.7 4.0 - 11.0 10e9/L    RBC Count 4.12 (L) 4.4 - 5.9 10e12/L    Hemoglobin 13.8 13.3 - 17.7 g/dL    Hematocrit 39.1 (L) 40.0 - 53.0 %    MCV 95 78 - 100 fl    MCH 33.5 (H) 26.5 - 33.0 pg    MCHC 35.3 31.5 - 36.5 g/dL    RDW 12.5 10.0 - 15.0 %    Platelet Count 328 150 - 450 10e9/L   D dimer quantitative   Result Value Ref Range    D Dimer 10.3 (H) 0.0 - 0.50 ug/ml FEU   Troponin I   Result Value Ref Range    Troponin I ES <0.015 0.000 - 0.045 ug/L   NT proBNP inpatient and ED   Result Value Ref Range    N-Terminal Pro BNP Inpatient 23 0  - 900 pg/mL   Heparin 10a Level   Result Value Ref Range    Heparin 10A Level 0.52 IU/mL   XR Chest Port 1 View    Narrative    CHEST PORTABLE ONE VIEW December 28, 2017 5:35 AM     HISTORY: Pleural effusion.     COMPARISON: 12/27/2017.    FINDINGS: Internal fixation hardware in place for left rib fractures  as previously demonstrated. Mild blunting left costophrenic angle due  to fluid or thickened pleura. Right lung clear. Patchy opacity left  mid lung is diminishing as compared with yesterday.      Impression    IMPRESSION: The opacity in the left lung base and costophrenic angle  is unchanged and likely pleural fluid. The opacity in the left midlung  is less prominent today.    RONNELL RUSSELL MD   CBC with platelets differential   Result Value Ref Range    WBC 7.3 4.0 - 11.0 10e9/L    RBC Count 4.24 (L) 4.4 - 5.9 10e12/L    Hemoglobin 13.9 13.3 - 17.7 g/dL    Hematocrit 40.4 40.0 - 53.0 %    MCV 95 78 - 100 fl    MCH 32.8 26.5 - 33.0 pg    MCHC 34.4 31.5 - 36.5 g/dL    RDW 12.6 10.0 - 15.0 %    Platelet Count 314 150 - 450 10e9/L    Diff Method Automated Method     % Neutrophils 68.0 %    % Lymphocytes 17.5 %    % Monocytes 10.9 %    % Eosinophils 2.5 %    % Basophils 0.3 %    % Immature Granulocytes 0.8 %    Nucleated RBCs 0 0 /100    Absolute Neutrophil 5.0 1.6 - 8.3 10e9/L    Absolute Lymphocytes 1.3 0.8 - 5.3 10e9/L    Absolute Monocytes 0.8 0.0 - 1.3 10e9/L    Absolute Eosinophils 0.2 0.0 - 0.7 10e9/L    Absolute Basophils 0.0 0.0 - 0.2 10e9/L    Abs Immature Granulocytes 0.1 0 - 0.4 10e9/L    Absolute Nucleated RBC 0.0    Basic metabolic panel   Result Value Ref Range    Sodium 138 133 - 144 mmol/L    Potassium 4.0 3.4 - 5.3 mmol/L    Chloride 107 94 - 109 mmol/L    Carbon Dioxide 25 20 - 32 mmol/L    Anion Gap 6 3 - 14 mmol/L    Glucose 124 (H) 70 - 99 mg/dL    Urea Nitrogen 8 7 - 30 mg/dL    Creatinine 0.69 0.66 - 1.25 mg/dL    GFR Estimate >90 >60 mL/min/1.7m2    GFR Estimate If Black >90 >60  mL/min/1.7m2    Calcium 8.8 8.5 - 10.1 mg/dL   Heparin Xa (10a) Level   Result Value Ref Range    Heparin 10A Level 0.36 IU/mL

## 2017-12-28 NOTE — PLAN OF CARE
Problem: Patient Care Overview  Goal: Plan of Care/Patient Progress Review  Outcome: Improving  A/Ox4. VSS. Direct admit for PE and pleural effusion;  Left middle back US guided thoracentesis with 100cc off; CDI with bandaid.  Minimal relief of SOB; RA and sats are in the mid 90s. No evidence of DVT. Heparin gtt started at 2025 at 1450 units/hr with 6500 unit bolus. Next 10a at 0230. Pain from previous thoracotomy incision controlled with Klonopin and dilaudid. Echo ordered.

## 2017-12-28 NOTE — PROVIDER NOTIFICATION
Contacted ALEXIA Braun regarding heparin gtt and bolus start time and to confirm that the bolus should be given.  Daniela wants to wait until 2000 to start gtt and confirmed she wants the bolus given.

## 2017-12-29 VITALS
OXYGEN SATURATION: 96 % | TEMPERATURE: 98.8 F | BODY MASS INDEX: 31.45 KG/M2 | WEIGHT: 212.96 LBS | RESPIRATION RATE: 16 BRPM | SYSTOLIC BLOOD PRESSURE: 121 MMHG | HEART RATE: 85 BPM | DIASTOLIC BLOOD PRESSURE: 78 MMHG

## 2017-12-29 LAB
ANION GAP SERPL CALCULATED.3IONS-SCNC: 6 MMOL/L (ref 3–14)
BASOPHILS # BLD AUTO: 0 10E9/L (ref 0–0.2)
BASOPHILS NFR BLD AUTO: 0.3 %
BUN SERPL-MCNC: 14 MG/DL (ref 7–30)
CALCIUM SERPL-MCNC: 8.8 MG/DL (ref 8.5–10.1)
CHLORIDE SERPL-SCNC: 110 MMOL/L (ref 94–109)
CO2 SERPL-SCNC: 24 MMOL/L (ref 20–32)
CREAT SERPL-MCNC: 0.68 MG/DL (ref 0.66–1.25)
DIFFERENTIAL METHOD BLD: ABNORMAL
EOSINOPHIL # BLD AUTO: 0.3 10E9/L (ref 0–0.7)
EOSINOPHIL NFR BLD AUTO: 3.9 %
ERYTHROCYTE [DISTWIDTH] IN BLOOD BY AUTOMATED COUNT: 12.5 % (ref 10–15)
GFR SERPL CREATININE-BSD FRML MDRD: >90 ML/MIN/1.7M2
GLUCOSE SERPL-MCNC: 105 MG/DL (ref 70–99)
HCT VFR BLD AUTO: 41.5 % (ref 40–53)
HGB BLD-MCNC: 14.1 G/DL (ref 13.3–17.7)
IMM GRANULOCYTES # BLD: 0.1 10E9/L (ref 0–0.4)
IMM GRANULOCYTES NFR BLD: 1.4 %
LDH SERPL L TO P-CCNC: 232 U/L (ref 85–227)
LMWH PPP CHRO-ACNC: 0.36 IU/ML
LYMPHOCYTES # BLD AUTO: 1.3 10E9/L (ref 0.8–5.3)
LYMPHOCYTES NFR BLD AUTO: 20.7 %
MCH RBC QN AUTO: 32.4 PG (ref 26.5–33)
MCHC RBC AUTO-ENTMCNC: 34 G/DL (ref 31.5–36.5)
MCV RBC AUTO: 95 FL (ref 78–100)
MONOCYTES # BLD AUTO: 0.6 10E9/L (ref 0–1.3)
MONOCYTES NFR BLD AUTO: 9.4 %
NEUTROPHILS # BLD AUTO: 4.2 10E9/L (ref 1.6–8.3)
NEUTROPHILS NFR BLD AUTO: 64.3 %
NRBC # BLD AUTO: 0 10*3/UL
NRBC BLD AUTO-RTO: 0 /100
PLATELET # BLD AUTO: 294 10E9/L (ref 150–450)
POTASSIUM SERPL-SCNC: 4.1 MMOL/L (ref 3.4–5.3)
RBC # BLD AUTO: 4.35 10E12/L (ref 4.4–5.9)
SODIUM SERPL-SCNC: 140 MMOL/L (ref 133–144)
WBC # BLD AUTO: 6.5 10E9/L (ref 4–11)

## 2017-12-29 PROCEDURE — 99232 SBSQ HOSP IP/OBS MODERATE 35: CPT | Performed by: INTERNAL MEDICINE

## 2017-12-29 PROCEDURE — 25000128 H RX IP 250 OP 636

## 2017-12-29 PROCEDURE — 25000132 ZZH RX MED GY IP 250 OP 250 PS 637: Performed by: PHYSICIAN ASSISTANT

## 2017-12-29 PROCEDURE — 85025 COMPLETE CBC W/AUTO DIFF WBC: CPT | Performed by: THORACIC SURGERY (CARDIOTHORACIC VASCULAR SURGERY)

## 2017-12-29 PROCEDURE — 36415 COLL VENOUS BLD VENIPUNCTURE: CPT | Performed by: THORACIC SURGERY (CARDIOTHORACIC VASCULAR SURGERY)

## 2017-12-29 PROCEDURE — 85520 HEPARIN ASSAY: CPT | Performed by: THORACIC SURGERY (CARDIOTHORACIC VASCULAR SURGERY)

## 2017-12-29 PROCEDURE — 83615 LACTATE (LD) (LDH) ENZYME: CPT | Performed by: THORACIC SURGERY (CARDIOTHORACIC VASCULAR SURGERY)

## 2017-12-29 PROCEDURE — 80048 BASIC METABOLIC PNL TOTAL CA: CPT | Performed by: THORACIC SURGERY (CARDIOTHORACIC VASCULAR SURGERY)

## 2017-12-29 PROCEDURE — 25000132 ZZH RX MED GY IP 250 OP 250 PS 637: Performed by: INTERNAL MEDICINE

## 2017-12-29 RX ADMIN — LISINOPRIL 20 MG: 10 TABLET ORAL at 09:43

## 2017-12-29 RX ADMIN — FLUTICASONE PROPIONATE 2 SPRAY: 50 SPRAY, METERED NASAL at 09:43

## 2017-12-29 RX ADMIN — OMEPRAZOLE 20 MG: 20 CAPSULE, DELAYED RELEASE ORAL at 09:43

## 2017-12-29 RX ADMIN — RIVAROXABAN 15 MG: 15 TABLET, FILM COATED ORAL at 09:43

## 2017-12-29 RX ADMIN — HYDROMORPHONE HYDROCHLORIDE 4 MG: 2 TABLET ORAL at 00:47

## 2017-12-29 RX ADMIN — HEPARIN SODIUM 1450 UNITS/HR: 10000 INJECTION, SOLUTION INTRAVENOUS at 02:07

## 2017-12-29 NOTE — PROGRESS NOTES
THORACIC SURGERY    Doing well  AVSS on RA    Reviewed investigation LE doppler and abd CT negative for source of clots    OK to d/c    F/u with CXR in 2 weeks    Discussed    ESTEBAN IPNA MD [facility] ONCOLOGY THORACIC SURGERY  CELL:  [phone]  OFFICE: [phone]

## 2017-12-29 NOTE — PLAN OF CARE
Problem: Patient Care Overview  Goal: Plan of Care/Patient Progress Review  Outcome: Improving  Pt A/Ox4. AVSS, on RA. LS coarse in left lower lobe. Dressing from thoracentesis CDI. Pt reports less SOB than yesterday but still present. Independent. Pt straight caths himself, reports clear yellow urine; uses his own supplies from home. Pain controlled with po tylenol. Hep gtt 14.5mL/hr, labs within goal range; next 10a draw with am labs. Had CT scan (no clots evident) and echo today. Possibly D/C tomorrow

## 2017-12-29 NOTE — DISCHARGE INSTRUCTIONS
Pulmonary Embolism (PE)  A pulmonary embolus is most often due to a blood clot that develops in a deep vein of the leg (deep vein thrombosis). If that clot, breaks loose and travels to the lung, it is called a pulmonary embolism (PE). This can cut off the flow of blood in the lungs.  A blood clot in the lungs is a medical emergency and may cause death.   Healthcare providers use the term venous thromboembolism (VTE) to describe these 2 conditions: deep vein thrombosis and pulmonary embolism. They use the term VTE because the 2 conditions are very closely related. And, because their prevention and treatment are also closely related.      A pulmonary embolism occurs when a blood clot forms in a vein and travels to the lungs.   How is pulmonary embolism diagnosed?  Your healthcare provider examines you and asks about your symptoms and health history. You may also have one or more of the following:    Blood tests to check for blood clotting or other problems    Imaging tests to look for clots in the veins or lung    Electrocardiography (ECG) to test how well the heart is working  How is pulmonary embolism treated?    Blood-thinning medicines (anticoagulants). These medicines thin the blood. They may be given as a pill, as an injection, or through a tube into a vein (intravenous or IV). Blood thinners help prevent more blood clots from forming. They also help to prevent an existing clot from getting larger.    Thrombolysis. Thrombolytic medicines are used to quickly dissolve a blood clot. A long, narrow tube (catheter) is used to deliver medicine directly to the clot. Thrombolytic medicines increase the risk of bleeding so they are used very carefully.    Inferior vena cava (IVC) filter surgery. The vena cava is the body s largest vein. It carries blood from the body to the heart. A small filter traps blood clots in the lower body and prevents them from traveling to the lungs. The filter is inserted into the vein through  a catheter. The filter may be used if blood thinners cannot be taken or if they don't work.     Pulmonary embolectomy. This is a procedure to remove a blood clot in the lungs. It may be done with surgery or with a catheter inserted in the body. It may be done when other treatments aren't safe or don't work.  What are the long-term concerns?  With treatment, blood clots are usually dissolved or removed. Some treatments can even help prevent future clots. But having a PE can put you at risk for another life-threatening blood clot. So, you will likely need to take anticoagulants to help keep blood clots from forming again. You may need to take this medicine for months or years.  You may also need to make lifestyle changes. This may include getting more active and eating healthier. You may need to wear elastic (compression) stockings and and take breaks on long trips.  Call 911  Call 911 or get emergency help if you have symptoms of a blood clot that has traveled to the lungs. The symptoms include:    Chest pain    Trouble breathing    Coughing (may cough up blood)    Fainting    Fast heartbeat    Sweating  Call 911 if you have heavy or uncontrolled bleeding.  When to call your healthcare provider  Call your healthcare provider if you have swelling or pain in your leg, arm, or other area. These are symptoms of a blood clot.  You may have bleeding if you take medicine to help prevent blood clots.  Call your healthcare provider if you have signs or symptoms of bleeding. This includes:    Blood in the urine    Bleeding with bowel movements    Bleeding from the nose, gums, a cut, or vagina   Date Last Reviewed: 2/1/2017 2000-2017 [employer]. [address] [place] [address], [place] [address]. All rights reserved. This information is not intended as a substitute for professional medical care. Always follow your healthcare professional's instructions.

## 2017-12-29 NOTE — PLAN OF CARE
Problem: Patient Care Overview  Goal: Plan of Care/Patient Progress Review  Outcome: Improving  A/Ox4, AVSS, tolerating reg diet, IND. Thoracentesis puncture site CDI. LS diminished in bases. BS active, passing flatus. No c/o pain this am. Pulses +2, CMS intact, tele NSR. Patient discharging to home, patient transporting himself home in personal vehicle parked in ramp. patient verbalized understanding of all discharge instructions.

## 2017-12-29 NOTE — PROGRESS NOTES
[facility]  Vascular Medicine Progress Note       For vascular medical concerns on this patient during business hours M-F, call the [facility] at [phone] to have the rounding / on call Vascular Medicine (NOT VASCULAR SURGERY) MD paged. After business hours M-F,  for medical concerns on this patient, please page hospitalist staff.          Assessment and Plan:   Active Problems:        Pulmonary embolism provoked with postop inactivity noted on POD25 after open fixation left 7th and 8th rib fractures      Assessment:     doing better form respiratory perspective (although at rest presently),     no DVT on LE duplex, no caval thrombosis on CTV, no Rt heart strain on TTE      Plan:     see below regarding pleural effusions    D/C today on Xarelto, anticipate 3 months Xarelto AC, will check d dimer in three months, if normal, will stop Xarelto, recheck one month later off of Xarelto, if increases significantly may need to go back on Xarelto        Loculated pleural effusions in association with open fixation left 7th and 8th rib fractures  (due to enlarging intercostal hernia, cough associated PTX)       Assessment:     Fluid not sent for studies, cultures and gram stain negative to date, lymphocytic fluid predominance, Serum WBC normal    Diagnostic thoracentesis  - only 100 cc fluid removed      Plan:     Monitor conservatively                   Interval History:   doing well; no cp, improving sob, no n/v/d, or abd pain.              Review of Systems:   The 10 point Review of Systems is negative other than noted in the HPI             Medications:       sodium chloride (PF)  3 mL Intracatheter Q8H     rivaroxaban ANTICOAGULANT  15 mg Oral BID w/meals     lisinopril (PRINIVIL/ZESTRIL) tablet 20 mg  20 mg Oral Daily     fluticasone  2 spray Both Nostrils BID     omeprazole (priLOSEC) CR capsule 20 mg  20 mg Oral QAM                  Physical Exam:     Patient Vitals for the past  24 hrs:   BP Temp Temp src Pulse Heart Rate Resp SpO2   17 0802 129/90 98.5  F (36.9  C) Oral - 85 16 96 %   17 0400 - - - - -  -   17 0200 - - - - -  -   17 0132 - - - - - 16 -   17 0000 115/80 97.6  F (36.4  C) Oral 85 85 16 94 %   17 1936 117/78 98.2  F (36.8  C) Axillary 91 - 16 94 %   17 1536 138/87 98.9  F (37.2  C) Oral 89 - 16 95 %   17 1255 (!) 144/92 98.8  F (37.1  C) Oral 89 90 16 94 %     Wt Readings from Last 4 Encounters:   17 212 lb 15.4 oz (96.6 kg)   17 217 lb 6 oz (98.6 kg)   10/31/17 215 lb (97.5 kg)   17 210 lb (95.3 kg)       Intake/Output Summary (Last 24 hours) at 17 0909  Last data filed at 17 0600   Gross per 24 hour   Intake            712.3 ml   Output                0 ml   Net            712.3 ml       Constitutional: normal  Eyes: normal  ENT: normal  Neck: Supple, symmetrical, trachea midline, no adenopathy, thyroid symmetric, not enlarged and no tenderness, skin normal.  Hematologic / Lymphatic: normal  Back: normal  Lungs: decreased BS bilateral bases  Cardiovascular: Regular rate and rhythm, normal S1 and S2, no S3 or S4, and no murmur noted.  Chest / Breast: normal  Abdomen: normal  Musculoskeletal: normal  Neurologic: normal  Neuropsychiatric: normal  Skin: normal           Data:     Results for orders placed or performed during the hospital encounter of 17 (from the past 24 hour(s))   Echo Complete with Lumason    Narrative    [id number]  [facility] access hospital[facility]  [id number]  [id number]^CLEMENTE^ANATOLIY^[facility]  Echocardiography Laboratory  [address] [place] [id number]        Name: JAYME PINEDA  MRN: [id number]  : 1955  Study Date: 2017 09:50 AM  Age: 62 yrs  Gender: Male  Patient Location: [facility]  Reason For Study: Pulmonary Emboli  Ordering Physician: ANATOLIY CORNEJO  Referring Physician: FRANKIE MARTINEZ  Performed By: Nola Campbell RDCS     BSA: 2.1  m2  Height: 69 in  Weight: 212 lb  HR: 93  BP: 138/88 mmHg  _____________________________________________________________________________  __        Procedure  Contrast Lumason. Complete Echo Adult.  _____________________________________________________________________________  __        Interpretation Summary     The left ventricle is normal in size.  The visual ejection fraction is estimated at 60-65%.  Grade I or early diastolic dysfunction.  No regional wall motion abnormalities noted.  The right ventricle is normal in size and function.  Right ventricular systolic pressure could not be approximated due to  inadequate tricuspid regurgitation.  Trivial pericardial effusion  There are no echocardiographic indications of cardiac tamponade.  No significant valvular heart disease.  _____________________________________________________________________________  __        Left Ventricle  The left ventricle is normal in size. There is normal left ventricular wall  thickness. The visual ejection fraction is estimated at 60-65%. Grade I or  early diastolic dysfunction. No regional wall motion abnormalities noted.     Right Ventricle  The right ventricle is normal in size and function.     Atria  Normal left atrial size. Right atrial size is normal. There is no color  Doppler evidence of an atrial shunt.     Mitral Valve  The mitral valve leaflets are mildly thickened. There is trace mitral  regurgitation.        Tricuspid Valve  There is trace tricuspid regurgitation. Normal IVC (1.5-2.5cm) with >50%  respiratory collapse; right atrial pressure is estimated at 5-10mmHg. Right  ventricular systolic pressure could not be approximated due to inadequate  tricuspid regurgitation.     Aortic Valve  There is trivial trileaflet aortic sclerosis. No aortic regurgitation is  present.     Pulmonic Valve  There is trace pulmonic valvular regurgitation.     Vessels  Normal size aorta. Borderline aortic root dilatation.      Pericardium  Trivial pericardial effusion. There are no echocardiographic indications of  cardiac tamponade. Pleural effusion.        Rhythm  Sinus rhythm was noted.  _____________________________________________________________________________  __  MMode/2D Measurements & Calculations  IVSd: 0.97 cm     LVIDd: 4.6 cm  LVIDs: 2.7 cm  LVPWd: 1.1 cm  FS: 42.5 %  EDV(Teich): 99.2 ml  ESV(Teich): 26.2 ml  LV mass(C)d: 167.0 grams  LV mass(C)dI: 78.9 grams/m2  Ao root diam: 3.6 cm  LA dimension: 3.1 cm  asc Aorta Diam: 3.5 cm  LA/Ao: 0.85  LVOT diam: 2.3 cm  LVOT area: 4.2 cm2  LA Volume (BP): 31.8 ml  LA Volume Index (BP): 15.0 ml/m2  RWT: 0.47           Doppler Measurements & Calculations  MV E max hernesto: 49.0 cm/sec  MV A max hernesto: 72.3 cm/sec  MV E/A: 0.68  MV dec time: 0.26 sec  E/E' av.0  Lateral E/e': 6.4  Medial E/e': 7.7           _____________________________________________________________________________  __           Report approved by: Christian Jenkins 2017 12:18 PM      Heparin 10a Level   Result Value Ref Range    Heparin 10A Level 0.35 IU/mL   CT Abd Aorta Bilataeral Iliofem Angio    Narrative    CTA VENOGRAM ABDOMINAL AORTA BILATERAL ILIOFEMORAL RUNOFF   2017  4:05 PM     HISTORY: Rule out caval thrombosis, peritoneal fibrosis; patient with  PE without source.    TECHNIQUE: CT venogram abdomen pelvis with runoff to the toes after  the administration of 100 mL Isovue 370 IV. Radiation dose for this  scan was reduced using automated exposure control, adjustment of the  mA and/or kV according to patient size, or iterative reconstruction  technique. 3-D images were created at an independent workstation under  concurrent supervision at the request of the ordering provider.    COMPARISON: DVT study 2017    FINDINGS:   Lung bases: Small left pleural effusion with associated atelectasis.  There is a small amount of fluid within the fissure. No right pleural  effusion. No pericardial  effusion.    Abdomen: The spleen, kidneys, adrenal glands, liver, gallbladder and  pancreas show no focal abnormality. Hepatic and portal veins are  patent. No intrahepatic or extrahepatic biliary dilatation. No  intraperitoneal free air or free fluid.    Small and large bowel are normal in caliber without evidence of  obstruction. Sigmoid diverticulosis without evidence of  diverticulitis. Postoperative changes of appendectomy. Bladder is  normal. Postoperative changes of prostatectomy. No abdominal aortic  aneurysm. No abdominal or pelvic lymphadenopathy.    Abdominal vasculature: The inferior vena cava is patent without  evidence of thrombus. Bilateral renal veins are patent. Common iliac,  external iliac and internal iliac veins are patent. Evaluation of the  femoral, popliteal and tibial veins is limited secondary to contrast  bolus timing.    Bones: No suspicious bony lesions.      Impression    IMPRESSION:  1. The inferior vena cava, common iliac, external iliac and common  femoral veins are patent without evidence of thrombosis.  2. Small left pleural effusion with associated atelectasis.  3. Evaluation of the veins throughout the right and left lower  extremities is limited secondary to contrast bolus timing.  4. Diverticulosis without evidence of diverticulitis.    Heparin Xa (10a) Level   Result Value Ref Range    Heparin 10A Level 0.36 IU/mL   Lactate Dehydrogenase   Result Value Ref Range    Lactate Dehydrogenase 232 (H) 85 - 227 U/L   CBC with platelets differential   Result Value Ref Range    WBC 6.5 4.0 - 11.0 10e9/L    RBC Count 4.35 (L) 4.4 - 5.9 10e12/L    Hemoglobin 14.1 13.3 - 17.7 g/dL    Hematocrit 41.5 40.0 - 53.0 %    MCV 95 78 - 100 fl    MCH 32.4 26.5 - 33.0 pg    MCHC 34.0 31.5 - 36.5 g/dL    RDW 12.5 10.0 - 15.0 %    Platelet Count 294 150 - 450 10e9/L    Diff Method Automated Method     % Neutrophils 64.3 %    % Lymphocytes 20.7 %    % Monocytes 9.4 %    % Eosinophils 3.9 %    %  Basophils 0.3 %    % Immature Granulocytes 1.4 %    Nucleated RBCs 0 0 /100    Absolute Neutrophil 4.2 1.6 - 8.3 10e9/L    Absolute Lymphocytes 1.3 0.8 - 5.3 10e9/L    Absolute Monocytes 0.6 0.0 - 1.3 10e9/L    Absolute Eosinophils 0.3 0.0 - 0.7 10e9/L    Absolute Basophils 0.0 0.0 - 0.2 10e9/L    Abs Immature Granulocytes 0.1 0 - 0.4 10e9/L    Absolute Nucleated RBC 0.0    Basic metabolic panel   Result Value Ref Range    Sodium 140 133 - 144 mmol/L    Potassium 4.1 3.4 - 5.3 mmol/L    Chloride 110 (H) 94 - 109 mmol/L    Carbon Dioxide 24 20 - 32 mmol/L    Anion Gap 6 3 - 14 mmol/L    Glucose 105 (H) 70 - 99 mg/dL    Urea Nitrogen 14 7 - 30 mg/dL    Creatinine 0.68 0.66 - 1.25 mg/dL    GFR Estimate >90 >60 mL/min/1.7m2    GFR Estimate If Black >90 >60 mL/min/1.7m2    Calcium 8.8 8.5 - 10.1 mg/dL

## 2017-12-29 NOTE — PLAN OF CARE
Problem: Patient Care Overview  Goal: Plan of Care/Patient Progress Review  Outcome: Improving  AVSS Tele NSR. Pain controlled with PO Dilaudid. PT denies SOB. LS clear. CMS intact. Up ind in room. Heparin gtt 14.5ml/hr. Voiding well. Puncture site WDL CDI.

## 2018-01-01 LAB
BACTERIA SPEC CULT: NO GROWTH
SPECIMEN SOURCE: NORMAL

## 2018-01-04 NOTE — DISCHARGE SUMMARY
THORACIC SURGERY HOSPITAL DISCHARGE SUMMARY  [facility] - [facility] ONCOLOGY - THORACIC SURGERY  [address], [address]  [place] [address]  Phone [phone]  [email or web address]    1/4/2018     Dean Cody [facility] [address] / [facility]*  Phone: [phone]   Fax: [phone]        Re: Lucio Hernandez             1955             [id number]              Dates of Hospitalization: 12/27/2017 - 12/29/2017   Date of Service (when I saw the patient): 12/29/17    Dear Dr. Cody:     As you are aware, we had the pleasure of caring for your patient,  Lucio Hernandez here at [facility].  He is a 62 year old male who presents with mild intermittent asthma, hypertension, prostate cancer, history of urethral stricture s/p dilation, and recent thoracotomy and repair of intercostal hernia and internal fixation of ribs 7 and 8 who [facility] with pulmonary embolism.        Consultations This Hospital Stay   PHARMACY TO DOSE HEPARIN  [facility] IP CONSULT  PHARMACY TO DOSE HEPARIN  HOSPITALIST IP CONSULT     Workup included: US of both LE that demonstrated no LE DVT, CT venogram that did not demonstrate any caval thrombosis, and TTE that showed no right heart strain. Patient was discharged to home on Xarelto for an anticipated timeframe of 3 months. He will see Dr. Olsen at that point for d dimer check and if normal, Xarelto will likely be stopped with recheck one month later off of Xarelto and if significantly increases then may need to go back on Xarelto again.     Lucio Hernandez has otherwise recovered sufficiently to be discharged to home today, 12/29/2017, on hospital day number three for further convalescence.  No signs or symptoms of infection.  His bowels have moved sufficiently and he is tolerating diet and activity, ambulating and transferring independently.  He is currently afebrile with stable vital  signs as below. Loculated left pleural effusion underwent 12/27/17 thoracentesis with only 100 cc removed-- should resolve on its own without further intervention.    Below, you will find a full discharge medication list and instructions.  We have arranged for Lucio Hernandez to follow-up with us in our [facility] in 7-10 days with a Chest X-ray prior to that appointment.  We thank you for allowing us to participate in the care of Lucio Hernandez here at [facility].  Please feel free to contact our office at [phone] with any questions or concerns or if we can be of any further assistance in the care of this patient.    Sincerely,    Dr. Myron Damon MD    D/C Summary Prepared by: Meme Smith PA-C    Discharge Medications:  Discharge Medication List as of 12/29/2017 12:09 PM      START taking these medications    Details   !! rivaroxaban ANTICOAGULANT (XARELTO) 15 MG TABS tablet Take 1 tablet (15 mg) by mouth 2 times daily (with meals), Disp-42 tablet, R-0, E-Prescribe      !! rivaroxaban ANTICOAGULANT (XARELTO) 20 MG TABS tablet Take 1 tablet (20 mg) by mouth daily (with dinner), Disp-90 tablet, R-1, E-Prescribe       !! - Potential duplicate medications found. Please discuss with provider.      CONTINUE these medications which have NOT CHANGED    Details   fluticasone (FLONASE) 50 MCG/ACT spray Spray 2 sprays into both nostrils 2 times daily, Historical      OMEPRAZOLE PO Take 20 mg by mouth daily as needed, Historical      acetaminophen (TYLENOL) 325 MG tablet Take 2 tablets (650 mg) by mouth every 4 hours as needed for other (surgical pain), Disp-100 tablet, No Print Out      HYDROmorphone (DILAUDID) 2 MG tablet Take 1-2 tablets (2-4 mg) by mouth every 3 hours as needed for moderate to severe pain, Disp-80 tablet, R-0, Local Print      senna-docusate (SENOKOT-S;PERICOLACE) 8.6-50 MG per tablet Take 1-2 tablets by mouth 2 times daily as needed for constipation, Disp-40 tablet, R-0,  E-Prescribe      CLONAZEPAM PO Take 0.5 mg by mouth 3 times daily as needed for anxiety , Historical      LISINOPRIL PO Take 20 mg by mouth daily, Historical      multivitamin, therapeutic (THERA-VIT) TABS Take 1 tablet by mouth daily, Historical         STOP taking these medications       ibuprofen (ADVIL/MOTRIN) 600 MG tablet Comments:   Reason for Stopping:               Follow-Up Care:  1) Follow up with Meme Smith PA-C/Dr. Damon at the [facility] in [place] ([address] [place] [address], [address], [place] [address]).  Call Siobhan at [phone] to schedule the appointment.  2) Follow up with Primary Care Provider, Dean Cody within 1 month of discharge for routine post-surgical care, wound check and follow up.  Please call [phone] to arrange this appointment.     Data   Most Recent 3 CBC's:  Recent Labs   Lab Test  12/29/17   0800  12/28/17   0814  12/27/17   1925   WBC  6.5  7.3  8.7   HGB  14.1  13.9  13.8   MCV  95  95  95   PLT  294  314  328      Most Recent 3 BMP's:  Recent Labs   Lab Test  12/29/17   0800  12/28/17   0814  12/27/17   1925   NA  140  138  139   POTASSIUM  4.1  4.0  3.9   CHLORIDE  110*  107  107   CO2  24  25  25   BUN  14  8  8   CR  0.68  0.69  0.70   ANIONGAP  6  6  7   GIANA  8.8  8.8  8.8   GLC  105*  124*  127*     Most Recent 2 LFT's:  Recent Labs   Lab Test  06/11/14   0510   AST  34   ALT  45   ALKPHOS  49   BILITOTAL  1.1     Most Recent INR's and Anticoagulation Dosing History:  Anticoagulation Dose History     Recent Dosing and Labs Latest Ref Rng & Units 6/28/2006 12/2/2017    INR 0.86 - 1.14 0.94 0.81(L)        Most Recent 3 Troponin's:  Recent Labs   Lab Test  12/27/17   1925  06/11/14   0510   TROPI  <0.015  <0.012     Most Recent Cholesterol Panel:No lab results found.  Most Recent 6 Bacteria Isolates From Any Culture (See EPIC Reports for Culture Details):  Recent Labs   Lab Test  12/27/17   1730   CULT  No growth     Most Recent TSH, T4 and A1c  Labs:  Recent Labs   Lab Test  01/06/12   1511   TSH  4.33         CCPatient Care Team:  Dean Cody MD as PCP - General (Internal Medicine)  Paco Haskins MD as MD (Urology)  Eduardo Landaverde MD as MD (Urology)  Joe Castañeda MD as Referring Physician (Urology)  Dean Cody MD as PCP (Internal Medicine)  Abdon Wells MD as MD (Urology)  Gia Ventura, RN as Nurse Coordinator (Urology)

## 2018-01-19 ENCOUNTER — OFFICE VISIT (OUTPATIENT)
Dept: OTHER | Facility: CLINIC | Age: 63
End: 2018-01-19
Attending: INTERNAL MEDICINE
Payer: COMMERCIAL

## 2018-01-19 VITALS
DIASTOLIC BLOOD PRESSURE: 92 MMHG | HEART RATE: 95 BPM | WEIGHT: 216 LBS | SYSTOLIC BLOOD PRESSURE: 127 MMHG | OXYGEN SATURATION: 97 % | BODY MASS INDEX: 31.9 KG/M2

## 2018-01-19 DIAGNOSIS — I26.99 OTHER ACUTE PULMONARY EMBOLISM WITHOUT ACUTE COR PULMONALE (H): Primary | ICD-10-CM

## 2018-01-19 PROCEDURE — G0463 HOSPITAL OUTPT CLINIC VISIT: HCPCS

## 2018-01-19 PROCEDURE — 99214 OFFICE O/P EST MOD 30 MIN: CPT | Mod: ZP | Performed by: INTERNAL MEDICINE

## 2018-01-19 NOTE — NURSING NOTE
"Chief Complaint   Patient presents with     Consult     Hospital follow up       Initial /85 (BP Location: Right arm, Patient Position: Chair, Cuff Size: Adult Large)  Pulse 90  Wt 216 lb (98 kg)  SpO2 97%  BMI 31.9 kg/m2 Estimated body mass index is 31.9 kg/(m^2) as calculated from the following:    Height as of 10/31/17: 5' 9\" (1.753 m).    Weight as of this encounter: 216 lb (98 kg).  Medication Reconciliation: complete     Donna Aragon MA   "

## 2018-01-19 NOTE — PROGRESS NOTES
Lucio Hernandez is a 62 year old male who is presenting at the current time to discuss his diagnosi(es) of a PE associated with ORIF of rib fractures.      Review Of Systems  Skin: negative  Eyes: negative  Ears/Nose/Throat: negative  Respiratory: No shortness of breath, dyspnea on exertion, cough, or hemoptysis  Cardiovascular: negative  Gastrointestinal: negative  Genitourinary: negative  Musculoskeletal: negative  Neurologic: negative  Psychiatric: negative  Hematologic/Lymphatic/Immunologic: negative  Endocrine: negative      PAST MEDICAL HISTORY:                  Past Medical History:   Diagnosis Date     Cancer (H)      Hypertension      Uncomplicated asthma     seasonal        PAST SURGICAL HISTORY:                  Past Surgical History:   Procedure Laterality Date     BACK SURGERY  2000    C5-C6 framinotomy     CYSTOSCOPY, BLADDER NECK CUTS, COMBINED N/A 8/27/2015    Procedure: COMBINED CYSTOSCOPY, BLADDER NECK CUTS;  Surgeon: Paco Haskins MD;  Location:  OR     CYSTOSCOPY, DILATE URETHRA, COMBINED N/A 2/27/2015    Procedure: COMBINED CYSTOSCOPY, DILATE URETHRA;  Surgeon: Paco Haskins MD;  Location:  OR     CYSTOSCOPY, DILATE URETHRA, COMBINED N/A 4/21/2015    Procedure: COMBINED CYSTOSCOPY, DILATE URETHRA;  Surgeon: Eduardo Landaverde MD;  Location:  OR     CYSTOSCOPY, DILATE URETHRA, COMBINED N/A 4/23/2015    Procedure: COMBINED CYSTOSCOPY, DILATE URETHRA;  Surgeon: Paco Haskins MD;  Location:  OR     ENT SURGERY      tonsillectomy     GENITOURINARY SURGERY  2006    prostatectomy     INSERT CATHETER BLADDER N/A 4/23/2015    Procedure: INSERT CATHETER BLADDER;  Surgeon: Paco Haskins MD;  Location:  OR     LAPAROSCOPIC APPENDECTOMY  6/11/2014    Procedure: LAPAROSCOPIC APPENDECTOMY;  Surgeon: Scar Schroeder MD;  Location:  OR     ORTHOPEDIC SURGERY      shoulder x 3     THORACOTOMY Left 12/2/2017    Procedure: THORACOTOMY;  LEFT THORACOTOMY, REPAIR OF INTERCOSTAL  HERNIA  AND INTERNAL FIXATION RIB FRACTURES OF RIBS 7 AND 8;  Surgeon: Myron Damon MD;  Location:  OR       CURRENT MEDICATIONS:                  Current Outpatient Prescriptions   Medication Sig Dispense Refill     rivaroxaban ANTICOAGULANT (XARELTO) 15 MG TABS tablet Take 1 tablet (15 mg) by mouth 2 times daily (with meals) 42 tablet 0     rivaroxaban ANTICOAGULANT (XARELTO) 20 MG TABS tablet Take 1 tablet (20 mg) by mouth daily (with dinner) 90 tablet 1     fluticasone (FLONASE) 50 MCG/ACT spray Spray 2 sprays into both nostrils 2 times daily       OMEPRAZOLE PO Take 20 mg by mouth daily as needed       acetaminophen (TYLENOL) 325 MG tablet Take 2 tablets (650 mg) by mouth every 4 hours as needed for other (surgical pain) 100 tablet      senna-docusate (SENOKOT-S;PERICOLACE) 8.6-50 MG per tablet Take 1-2 tablets by mouth 2 times daily as needed for constipation 40 tablet 0     CLONAZEPAM PO Take 0.5 mg by mouth 3 times daily as needed for anxiety        LISINOPRIL PO Take 20 mg by mouth daily       multivitamin, therapeutic (THERA-VIT) TABS Take 1 tablet by mouth daily       HYDROmorphone (DILAUDID) 2 MG tablet Take 1-2 tablets (2-4 mg) by mouth every 3 hours as needed for moderate to severe pain (Patient not taking: Reported on 1/19/2018) 80 tablet 0       ALLERGIES:                No Known Allergies    SOCIAL HISTORY:                  Social History     Social History     Marital status:      Spouse name: N/A     Number of children: N/A     Years of education: N/A     Occupational History     Not on file.     Social History Main Topics     Smoking status: Never Smoker     Smokeless tobacco: Never Used     Alcohol use Yes      Comment: one drink a day      Drug use: No     Sexual activity: Not on file     Other Topics Concern     Not on file     Social History Narrative       FAMILY HISTORY:                 No family history on file.      Physical exam Reveals:    O/P: WNL  HEENT:  WNL  NECK: No JVD, thyromegaly, or lymphadenopathy  HEART: RRR, no murmurs, gallops, or rubs  LUNGS: CTA bilaterally without rales, wheezes, or rhonchi  GI: NABS, nondistended, nontender, soft  EXT:without cyanosis, clubbing, or edema  NEURO: nonfocal  : no flank tenderness    A/P:    (I26.99) Pulmonary embolism provoked with postop inactivity noted on POD25 after open fixation left 7th and 8th rib fractures  (primary encounter diagnosis)  Comment: this was his first lifetime event. The volume of PE was small, no DVT could be identified, he is doing well clinically. There was no cor pulmonale. He will be ACd for a brief four (rahter than three as he will be flying to [facility] in April)  month course. I will check a dd dimer on him in April and will stop Xarelto if it is normal then. I will also repeat a d dimer one month later off of Xarelto to ascertain if it has risen significantly, in which case we could consider a longer course of AC  Plan: D dimer quantitative

## 2018-01-19 NOTE — MR AVS SNAPSHOT
"              After Visit Summary   1/19/2018    Lucio Hernandez    MRN: [id number]           Patient Information     Date Of Birth          1955        Visit Information        Provider Department      1/19/2018 11:00 AM Srinath Olsen MD [facility]  [facility] Center      Today's Diagnoses     Pulmonary embolism provoked with postop inactivity noted on POD25 after open fixation left 7th and 8th rib fractures    -  1       Follow-ups after your visit        Future tests that were ordered for you today     Open Future Orders        Priority Expected Expires Ordered    D dimer quantitative Routine 4/2/2018 5/19/2018 1/19/2018            Who to contact     If you have questions or need follow up information about today's clinic visit or your schedule please contact [facility] directly at [phone].  Normal or non-critical lab and imaging results will be communicated to you by Rep[employer], letter or phone within 4 business days after the clinic has received the results. If you do not hear from us within 7 days, please contact the clinic through MyChart or phone. If you have a critical or abnormal lab result, we will notify you by phone as soon as possible.  Submit refill requests through [employer] or call your pharmacy and they will forward the refill request to us. Please allow 3 business days for your refill to be completed.          Additional Information About Your Visit        MyChart Information     [employer] lets you send messages to your doctor, view your test results, renew your prescriptions, schedule appointments and more. To sign up, go to [email or web address] . Click on \"Log in\" on the left side of the screen, which will take you to the Welcome page. Then click on \"Sign up Now\" on the right side of the page.     You will be asked to enter the access code listed below, as well as some personal information. Please follow " the directions to create your username and password.     Your access code is: [email or web address]  Expires: 2018 11:44 PM     Your access code will  in 90 days. If you need help or a new code, please call your [place] clinic or [phone].        Care EveryWhere ID     This is your Care EveryWhere ID. This could be used by other organizations to access your [facility] medical records  [id number]        Your Vitals Were     Pulse Pulse Oximetry BMI (Body Mass Index)             95 97% 31.9 kg/m2          Blood Pressure from Last 3 Encounters:   18 (!) 127/92   17 121/78   17 146/87    Weight from Last 3 Encounters:   18 216 lb (98 kg)   17 212 lb 15.4 oz (96.6 kg)   17 217 lb 6 oz (98.6 kg)               Primary Care Provider Office Phone # Fax #    Dean Cody [phone]       [place] [address]        Equal Access to Services     [facility]: Hadii aad ku hadasho Soomaali, waaxda luqadaha, qaybta kaalmada adeegyada, puneet walkerin hayaan adeeg jenna ontiveros . So [facility] [phone].    ATENCIÓN: Si habla español, tiene a vela disposición servicios gratuitos de asistencia lingüística. Llame al [phone].    We comply with applicable federal civil rights laws and [place] laws. We do not discriminate on the basis of race, color, national origin, age, disability, sex, sexual orientation, or gender identity.            Thank you!     Thank you for choosing [facility] VASC[facility] [place]  for your care. Our goal is always to provide you with excellent care. Hearing back from our patients is one way we can continue to improve our services. Please take a few minutes to complete the written survey that you may receive in the mail after your visit with us. Thank you!             Your Updated Medication List - Protect others around you: Learn how to safely use, store and throw away your medicines at  www.disposemymeds.org.          This list is accurate as of: 1/19/18 11:51 AM.  Always use your most recent med list.                   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, subsequent encounter       CLONAZEPAM PO      Take 0.5 mg by mouth 3 times daily as needed for anxiety        fluticasone 50 MCG/ACT spray    FLONASE     Spray 2 sprays into both nostrils 2 times daily        HYDROmorphone 2 MG tablet    DILAUDID    80 tablet    Take 1-2 tablets (2-4 mg) by mouth every 3 hours as needed for moderate to severe pain    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, subsequent encounter       LISINOPRIL PO      Take 20 mg by mouth daily        multivitamin, therapeutic Tabs tablet      Take 1 tablet by mouth daily        OMEPRAZOLE PO      Take 20 mg by mouth daily as needed        * rivaroxaban ANTICOAGULANT 15 MG Tabs tablet    XARELTO    42 tablet    Take 1 tablet (15 mg) by mouth 2 times daily (with meals)    Other acute pulmonary embolism without acute cor pulmonale (H)       * rivaroxaban ANTICOAGULANT 20 MG Tabs tablet    XARELTO    90 tablet    Take 1 tablet (20 mg) by mouth daily (with dinner)    Other acute pulmonary embolism without acute cor pulmonale (H)       senna-docusate 8.6-50 MG per tablet    SENOKOT-S;PERICOLACE    40 tablet    Take 1-2 tablets by mouth 2 times daily as needed for constipation    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 Read the directions carefully, and ask your doctor or other care provider to review them with you.

## 2018-01-22 ENCOUNTER — TELEPHONE (OUTPATIENT)
Dept: OTHER | Facility: CLINIC | Age: 63
End: 2018-01-22

## 2018-01-22 NOTE — TELEPHONE ENCOUNTER
Pt LM asking if it would be ok for him to take his once a day xarelto in the morning with breakfast, rather than in the evening.  LM for pt stating that as long as he is consistent, it does not matter which time he takes his med.  Pt to callback if any further questions.  Anya Ivey, VADIMN, RN

## 2018-02-15 DIAGNOSIS — I26.99 OTHER ACUTE PULMONARY EMBOLISM WITHOUT ACUTE COR PULMONALE (H): ICD-10-CM

## 2018-03-01 ENCOUNTER — TELEPHONE (OUTPATIENT)
Dept: OTHER | Facility: CLINIC | Age: 63
End: 2018-03-01

## 2018-03-01 NOTE — TELEPHONE ENCOUNTER
Patient called and would like to know how long he needs to hold Xarelto before and after epidural steriod injection in back  Please advise  Mary Riley, RN, BSN

## 2018-03-01 NOTE — TELEPHONE ENCOUNTER
Three days for an IRASEMA. However, I would prefer he not get the IRASEMA undertaken until three months after his initial PE (i.e. 3/27/18). If he truly can not wait that long due to pain, then so be it, it is just my preference that he wait. In any event, he should hold it for three days before, and not resume it until 24 hours after the IRASEMA.

## 2018-10-28 ENCOUNTER — APPOINTMENT (OUTPATIENT)
Dept: ULTRASOUND IMAGING | Facility: CLINIC | Age: 63
End: 2018-10-28
Attending: PHYSICIAN ASSISTANT
Payer: COMMERCIAL

## 2018-10-28 ENCOUNTER — HOSPITAL ENCOUNTER (EMERGENCY)
Facility: CLINIC | Age: 63
Discharge: HOME | End: 2018-10-28
Attending: PHYSICIAN ASSISTANT | Admitting: PHYSICIAN ASSISTANT
Payer: COMMERCIAL

## 2018-10-28 VITALS
HEIGHT: 69 IN | WEIGHT: 192 LBS | RESPIRATION RATE: 16 BRPM | DIASTOLIC BLOOD PRESSURE: 87 MMHG | SYSTOLIC BLOOD PRESSURE: 136 MMHG | TEMPERATURE: 98.5 F | OXYGEN SATURATION: 98 % | BODY MASS INDEX: 28.44 KG/M2

## 2018-10-28 DIAGNOSIS — I80.8 SUPERFICIAL THROMBOPHLEBITIS OF RIGHT UPPER EXTREMITY: ICD-10-CM

## 2018-10-28 PROCEDURE — 99284 EMERGENCY DEPT VISIT MOD MDM: CPT | Mod: 25

## 2018-10-28 PROCEDURE — 93971 EXTREMITY STUDY: CPT | Mod: RT

## 2018-10-28 RX ORDER — CETIRIZINE HYDROCHLORIDE 10 MG/1
10 TABLET ORAL DAILY
COMMUNITY

## 2018-10-28 ASSESSMENT — ENCOUNTER SYMPTOMS
SHORTNESS OF BREATH: 0
MYALGIAS: 1

## 2018-10-28 NOTE — ED AVS SNAPSHOT
Emergency Department    [address] [facility] [address]    Phone:  [phone]    Fax:  [phone]                                       Lucio Hernandez   MRN: [id number]    Department:   Emergency Department   Date of Visit:  10/28/2018           Patient Information     Date Of Birth          1955        Your diagnoses for this visit were:     Superficial thrombophlebitis of right upper extremity        You were seen by Radha Lima PA-C.      Follow-up Information     Follow up with Srinath Olsen MD.    Specialty:  Cardiology    Contact information:    [address]  [facility] [id number] [phone]          Follow up with  Emergency Department.    Specialty:  EMERGENCY MEDICINE    Why:  As needed, If symptoms worsen    Contact information:    [address] [facility] [id number] [phone]        Discharge Instructions       *Call Dr. Olsen tomorrow to discuss treatment plan  *Start Xarelto tonight, 15 mg BID x 21d, then 20mg daily   *Return for acutely worsening pain, chest pain, shortness of breath, or any other new/concerning symptoms.         Superficial Thrombophlebitis   The superficial veins are the veins near the surface of the skin. Superficial thrombophlebitis is a problem that occurs when one or more of these veins become red, irritated, and swollen. This is most often because of a blood clot.  Causes  The problem may occur after injury to a vein. It may also occur after having an intravenous (IV) line placed. Other factors that can make the problem more likely include:    Varicose veins    Venous insufficiency    Bleeding disorders    Prolonged periods of rest and not moving around    IV drug abuse  Symptoms  Symptoms may appear in the affected area. They can include:    Pain    Tenderness    Redness    Warmth    Swelling    Hardening of the vein  In most cases, superficial thrombophlebitis resolves on its own with no problems.  Treatment is focused on relieving symptoms.  Sometimes, there is a risk that the deep veins in the body may also be involved. This can lead to more serious problems. In such cases, further testing and treatments may be needed. Your healthcare provider can tell you more about this.  Home care  To help relieve pain and swelling, you may be told to:    Apply heat or cold to the affected area. Do this for up to 10 minutes as often as directed.  ? Heat: Use a warm compress, such as a heating pad.  ? Cold: Use a cold compress, such as a cold pack or bag of ice wrapped in a thin towel.    Take nonsteroidal anti-inflammatory drugs (NSAIDS), such as ibuprofen. In some cases, other pain medicines may be prescribed.    Keep the affected limb (arm or leg) raised above heart level as directed.    Wear elastic compression stockings or bandages as directed.    Don't sit or stand for long periods. Get up and walk often.  To help treat a blood clot, a blood thinner (anticoagulant) may be prescribed. If this is needed, be sure to take the medicine exactly as directed.  Follow-up care  Follow up with your healthcare provider as advised. If imaging tests are done, they will be reviewed by a doctor. You ll be told the results and any new findings that may affect your treatment.  When to seek medical advice  Call your healthcare provider right away if any of these occur:    Fever of 100.4 F (38 C) or higher, or as directed by your healthcare provider    Increasing pain, swelling, or tenderness in the affected area    Spreading warmth or redness in the affected area  Call 911  Call 911 if any of these occur:    Trouble breathing    Chest pain or discomfort that worsens with deep breathing or coughing    Coughing (may cough up blood)    Fast or irregular heartbeat    Sweating    Anxiety    Lightheadedness, dizziness, or fainting    Extreme confusion    Extreme drowsiness or trouble waking up    New pain in the chest, arm, shoulder, neck,  or upper back  Date Last Reviewed: 9/21/2015 2000-2017 [employer]. [address] [place] [address], [place] [address]. All rights reserved. This information is not intended as a substitute for professional medical care. Always follow your healthcare professional's instructions.          24 Hour Appointment Hotline       To make an appointment at any [facility], call [phone] ([phone]). If you don't have a family doctor or clinic, we will help you find one. [facility] clinics are conveniently located to serve the needs of you and your family.             Review of your medicines      START taking        Dose / Directions Last dose taken    rivaroxaban ANTICOAGULANT 15 MG Tabs tablet   Commonly known as:  XARELTO   Dose:  15 mg   Quantity:  28 tablet        Take 1 tablet (15 mg) by mouth 2 times daily (with meals) for 14 days   Refills:  0          Our records show that you are taking the medicines listed below. If these are incorrect, please call your family doctor or clinic.        Dose / Directions Last dose taken    cetirizine 10 MG tablet   Commonly known as:  zyrTEC   Dose:  10 mg        Take 10 mg by mouth daily   Refills:  0        LISINOPRIL PO   Dose:  20 mg        Take 20 mg by mouth daily   Refills:  0                Prescriptions were sent or printed at these locations (1 Prescription)                   Other Prescriptions                Printed at Department/Unit printer (1 of 1)         rivaroxaban ANTICOAGULANT (XARELTO) 15 MG TABS tablet                Procedures and tests performed during your visit     US Upper Extremity Venous Duplex Right      Orders Needing Specimen Collection     None      Pending Results     Date and Time Order Name Status Description    10/28/2018 3350 US Upper Extremity Venous Duplex Right Preliminary             Pending Culture Results     No orders found from 10/26/2018 to 10/29/2018.            Pending Results Instructions     If you had any lab  results that were not finalized at the time of your Discharge, you can call the ED Lab Result RN at [phone]. You will be contacted by this team for any positive Lab results or changes in treatment. The nurses are available 7 days a week from 10A to 6:30P.  You can leave a message 24 hours per day and they will return your call.        Test Results From Your Hospital Stay        10/28/2018  5:59 PM      Narrative     US UPPER EXTREMITY VENOUS DUPLEX RIGHT  10/28/2018 5:50 PM     HISTORY: Cord-like lesion just proximal to right antecubital area.  History of PE.      COMPARISON: None.    TECHNIQUE: Examination of the upper extremity veins was performed with  graded compression and 2-D ultrasound and color doppler spectral  waveform analysis.     FINDINGS: There is acute appearing thrombus in the basilic vein and a  branch of the basilic vein. This clot extends from the upper humerus  to the antecubital fossa. No other thrombus identified.        Impression     IMPRESSION: Acute appearing thrombus in the basilic vein and in a  branch of the basilic vein extending from the level of the upper  humerus to the antecubital fossa.                   Clinical Quality Measure: Blood Pressure Screening     Your blood pressure was checked while you were in the emergency department today. The last reading we obtained was  BP: 136/87 . Please read the guidelines below about what these numbers mean and what you should do about them.  If your systolic blood pressure (the top number) is less than 120 and your diastolic blood pressure (the bottom number) is less than 80, then your blood pressure is normal. There is nothing more that you need to do about it.  If your systolic blood pressure (the top number) is 120-139 or your diastolic blood pressure (the bottom number) is 80-89, your blood pressure may be higher than it should be. You should have your blood pressure rechecked within a year by a primary care provider.  If your  "systolic blood pressure (the top number) is 140 or greater or your diastolic blood pressure (the bottom number) is 90 or greater, you may have high blood pressure. High blood pressure is treatable, but if left untreated over time it can put you at risk for heart attack, stroke, or kidney failure. You should have your blood pressure rechecked by a primary care provider within the next 4 weeks.  If your provider in the emergency department today gave you specific instructions to follow-up with your doctor or provider even sooner than that, you should follow that instruction and not wait for up to 4 weeks for your follow-up visit.        Thank you for choosing [facility]       Thank you for choosing [facility] for your care. Our goal is always to provide you with excellent care. Hearing back from our patients is one way we can continue to improve our services. Please take a few minutes to complete the written survey that you may receive in the mail after you visit with us. Thank you!        [employer] Information     [employer] lets you send messages to your doctor, view your test results, renew your prescriptions, schedule appointments and more. To sign up, go to [email or web address] . Click on \"Log in\" on the left side of the screen, which will take you to the Welcome page. Then click on \"Sign up Now\" on the right side of the page.     You will be asked to enter the access code listed below, as well as some personal information. Please follow the directions to create your username and password.     Your access code is: [email or web address]  Expires: 2019  6:42 PM     Your access code will  in 90 days. If you need help or a new code, please call your [facility] clinic or [phone].        Care EveryWhere ID     This is your Care EveryWhere ID. This could be used by other organizations to access your [facility] medical records  [id number]        Equal Access to Services     SUKHJINDER YEE: Neymar Kraft, " shane fernández, puneet arrieta. So [facility] [phone].    ATENCIÓN: Si habla español, tiene a vela disposición servicios gratuitos de asistencia lingüística. Llame al [phone].    We comply with applicable federal civil rights laws and [place] laws. We do not discriminate on the basis of race, color, national origin, age, disability, sex, sexual orientation, or gender identity.            After Visit Summary       This is your record. Keep this with you and show to your community pharmacist(s) and doctor(s) at your next visit.

## 2018-10-28 NOTE — DISCHARGE INSTRUCTIONS
*Call Dr. Olsen tomorrow to discuss treatment plan  *Start Xarelto tonight, 15 mg BID x 21d, then 20mg daily   *Return for acutely worsening pain, chest pain, shortness of breath, or any other new/concerning symptoms.         Superficial Thrombophlebitis   The superficial veins are the veins near the surface of the skin. Superficial thrombophlebitis is a problem that occurs when one or more of these veins become red, irritated, and swollen. This is most often because of a blood clot.  Causes  The problem may occur after injury to a vein. It may also occur after having an intravenous (IV) line placed. Other factors that can make the problem more likely include:    Varicose veins    Venous insufficiency    Bleeding disorders    Prolonged periods of rest and not moving around    IV drug abuse  Symptoms  Symptoms may appear in the affected area. They can include:    Pain    Tenderness    Redness    Warmth    Swelling    Hardening of the vein  In most cases, superficial thrombophlebitis resolves on its own with no problems. Treatment is focused on relieving symptoms.  Sometimes, there is a risk that the deep veins in the body may also be involved. This can lead to more serious problems. In such cases, further testing and treatments may be needed. Your healthcare provider can tell you more about this.  Home care  To help relieve pain and swelling, you may be told to:    Apply heat or cold to the affected area. Do this for up to 10 minutes as often as directed.  ? Heat: Use a warm compress, such as a heating pad.  ? Cold: Use a cold compress, such as a cold pack or bag of ice wrapped in a thin towel.    Take nonsteroidal anti-inflammatory drugs (NSAIDS), such as ibuprofen. In some cases, other pain medicines may be prescribed.    Keep the affected limb (arm or leg) raised above heart level as directed.    Wear elastic compression stockings or bandages as directed.    Don't sit or stand for long periods. Get up and walk  often.  To help treat a blood clot, a blood thinner (anticoagulant) may be prescribed. If this is needed, be sure to take the medicine exactly as directed.  Follow-up care  Follow up with your healthcare provider as advised. If imaging tests are done, they will be reviewed by a doctor. You ll be told the results and any new findings that may affect your treatment.  When to seek medical advice  Call your healthcare provider right away if any of these occur:    Fever of 100.4 F (38 C) or higher, or as directed by your healthcare provider    Increasing pain, swelling, or tenderness in the affected area    Spreading warmth or redness in the affected area  Call 911  Call 911 if any of these occur:    Trouble breathing    Chest pain or discomfort that worsens with deep breathing or coughing    Coughing (may cough up blood)    Fast or irregular heartbeat    Sweating    Anxiety    Lightheadedness, dizziness, or fainting    Extreme confusion    Extreme drowsiness or trouble waking up    New pain in the chest, arm, shoulder, neck, or upper back  Date Last Reviewed: 9/21/2015 2000-2017 [employer]. [address] [place] [address]. All rights reserved. This information is not intended as a substitute for professional medical care. Always follow your healthcare professional's instructions.

## 2018-10-28 NOTE — ED AVS SNAPSHOT
Emergency Department    [address] [place] [id number]    Phone:  [phone]    Fax:  [phone]                                       Lucio Hernandez   MRN: [id number]    Department:   Emergency Department   Date of Visit:  10/28/2018           After Visit Summary Signature Page     I have received my discharge instructions, and my questions have been answered. I have discussed any challenges I see with this plan with the nurse or doctor.    ..........................................................................................................................................  Patient/Patient Representative Signature      ..........................................................................................................................................  Patient Representative Print Name and Relationship to Patient    ..................................................               ................................................  Date                                   Time    ..........................................................................................................................................  Reviewed by Signature/Title    ...................................................              ..............................................  Date                                               Time          22EPIC Rev 08/18

## 2018-10-28 NOTE — ED PROVIDER NOTES
"  History     Chief Complaint:  Arm Pain - Feeling a \"coil\" in R arm.    HPI   Lucio Hernandez is a 62 year old male who presents with right arm pain where he feels a \"coil\" in his right arm. The patient stated the pain started in the middle of the night yesterday and he could physically roll the coil. The patient had a previous pulmonary embolism and was anticoagulant on xarelto although he has not been on blood thinners since April. The patient has been very active in the past month walking and participating in physical therapy. The patient denies any trauma, chest pain, shortness of breath, calf pain, or lower extremity discomfort or swelling.     Allergies:  No known drug allergies     Medications:    Zyrtec  Lisinopril    Past Medical History:    Cancer  Hypertension  Pulmonary Embolism  Asthma  Appendicitis  Rib Fractures    Past Surgical History:    Back Surgery  Tonsillectomy  Prostatectomy  Should x3  Thoracotomy    Family History:    History reviewed. No pertinent family history.     Social History:  The patient is not a smoker and drinks about 1 drink per day.   Marital Status:   [2]     Review of Systems   Respiratory: Negative for shortness of breath.    Cardiovascular: Negative for chest pain and leg swelling.   Musculoskeletal: Positive for myalgias.        No lower extremity pain   All other systems reviewed and are negative.    Physical Exam     Patient Vitals for the past 24 hrs:   BP Temp Temp src Heart Rate Resp SpO2 Height Weight   10/28/18 1616 136/87 - - - - 98 % - -   10/28/18 1613 - 98.5  F (36.9  C) Temporal 89 16 - 1.753 m (5' 9\") 87.1 kg (192 lb)     Physical Exam  General: Alert, interactive. In no acute distress.   Head:  Scalp is atraumatic.              Neck:  Normal range of motion.   CV:  Regular rate and rhythm. No murmur. 2+ radial pulses bilaterally.   Resp:  Breath sounds are clear bilaterally. Non-labored, no retractions or accessory muscle use.  MS:  Normal range of " motion throughout. Hardening over the vein just above the right antecubital region. No overlying erythema.   Skin:  Warm and dry.   Neuro:  Strength and sensation grossly intact.   Psych:  Awake. Alert.  Appropriate interactions.     Emergency Department Course   Imaging:  Radiographic findings were communicated with the patient who voiced understanding of the findings.  US Upper Extremity Venous Duplex Right  IMPRESSION: Acute appearing thrombus in the basilic vein and in a branch of the basilic vein extending from the level of the upper humerus to the antecubital fossa.  As read by Radiology.    Emergency Department Course:  Past medical records, nursing notes, and vitals reviewed.  1648: I performed an exam of the patient and obtained history, as documented above.  1759: The patient was sent for a US Upper Extremity Venous Duplex Right while in the emergency department, findings above.  1831: I rechecked the patient. Explained findings to patient.  1846: I rechecked the patient. Findings and plan explained to the patient. Patient discharged home with instructions regarding supportive care, medications, and reasons to return. The importance of close follow-up was reviewed.     Impression & Plan    Medical Decision Making:  Lucio Hernandez is a 62 year old male with a medical history including pulmonary embolism (discontinued Xarelto approximately 6 months ago) presents for evaluation of right arm pain and hardened vein on examination. Recently had IV placed 2 weeks ago to the right antecubital area. US revealed an acute appearing superficial thrombus in the basilic vein from the level of the upper humerus to the antecubital region. Given the extension of clot burden, plan is to restart Xarelto and follow up closely with Dr. Olsen, who is familiar with the patient to discuss need for continuation of anticoagulation. Patient agrees with the plan.  There are no symptoms to suggest this has progressed to pulmonary  embolism and patient understands symptoms that should prompt immediate return to the ED including chest pain or  shortness of breath.  Patient understands risk vs. benefit of Xarelto as he has been on this medication in the past. All questions/concerns addressed prior to discharge home.     Diagnosis:    ICD-10-CM    1. Superficial thrombophlebitis of right upper extremity I80.8        Disposition:  discharged to home    Discharge Medications:  New Prescriptions    RIVAROXABAN ANTICOAGULANT (XARELTO) 15 MG TABS TABLET    Take 1 tablet (15 mg) by mouth 2 times daily (with meals) for 14 days         Jason Sosa  10/28/2018    EMERGENCY DEPARTMENT  I, Jason Sosa, am serving as a scribe at 4:48 PM on 10/28/2018 to document services personally performed by Radha Lima PA-C based on my observations and the provider's statements to me.       Radha Lima PA-C  10/28/18 2154

## 2018-11-07 ENCOUNTER — OFFICE VISIT (OUTPATIENT)
Dept: OTHER | Facility: CLINIC | Age: 63
End: 2018-11-07
Attending: INTERNAL MEDICINE
Payer: COMMERCIAL

## 2018-11-07 ENCOUNTER — HOSPITAL ENCOUNTER (OUTPATIENT)
Dept: ULTRASOUND IMAGING | Facility: CLINIC | Age: 63
Discharge: HOME | End: 2018-11-07
Attending: INTERNAL MEDICINE | Admitting: INTERNAL MEDICINE
Payer: COMMERCIAL

## 2018-11-07 VITALS
HEART RATE: 81 BPM | DIASTOLIC BLOOD PRESSURE: 83 MMHG | WEIGHT: 197 LBS | OXYGEN SATURATION: 97 % | BODY MASS INDEX: 29.09 KG/M2 | SYSTOLIC BLOOD PRESSURE: 120 MMHG

## 2018-11-07 DIAGNOSIS — I82.621 ACUTE DEEP VEIN THROMBOSIS (DVT) OF RIGHT UPPER EXTREMITY, UNSPECIFIED VEIN (H): ICD-10-CM

## 2018-11-07 DIAGNOSIS — I82.621 ACUTE DEEP VEIN THROMBOSIS (DVT) OF RIGHT UPPER EXTREMITY, UNSPECIFIED VEIN (H): Primary | ICD-10-CM

## 2018-11-07 PROCEDURE — 93971 EXTREMITY STUDY: CPT | Mod: RT

## 2018-11-07 PROCEDURE — G0463 HOSPITAL OUTPT CLINIC VISIT: HCPCS | Mod: 25

## 2018-11-07 PROCEDURE — 99214 OFFICE O/P EST MOD 30 MIN: CPT | Mod: ZP | Performed by: INTERNAL MEDICINE

## 2018-11-07 NOTE — MR AVS SNAPSHOT
After Visit Summary   11/7/2018    Lucio Hernandez    MRN: [id number]           Patient Information     Date Of Birth          1955        Visit Information        Provider Department      11/7/2018 8:40 AM Srinath lOsen MD [facility]      Today's Diagnoses     Acute catheter asociated superficial thrombosis of right upper extremity, basilic vein (H)    -  1       Follow-ups after your visit        Your next 10 appointments already scheduled     Nov 28, 2018  1:00 PM CST   US VENOUS with VUS4   [facility] ([facility])    [address]  [place] [address]   [phone]           How do I prepare for my exam? (Food and drink instructions) No Food and Drink Restrictions.  How do I prepare for my exam? (Other instructions) You do not need to do anything special to prepare for your exam.  What should I wear: Wear comfortable clothes.  How long does the exam take: Most ultrasounds take 30 to 60 minutes.  What should I bring: Bring a list of your medicines, including vitamins, minerals and over-the-counter drugs. It is safest to leave personal items at home.  Do I need a :  No  is needed.  What do I need to tell my doctor: Tell your doctor about any allergies you may have.  What should I do after the exam: No restrictions, You may resume normal activities.  What is this test: An ultrasound uses sound waves to make pictures of the body. Sound waves do not cause pain. The only discomfort may be the pressure of the wand against your skin or full bladder.  Who should I call with questions: If you have any questions, please call the Imaging Department where you will have your exam. Directions, parking instructions, and other information is available on our website, [email or web address].            Nov 28, 2018  1:40 PM CST   Return Visit with  "Srinath Olsen MD   [facility] ([facility])    [address]  [place] [address] [phone]              Future tests that were ordered for you today     Open Future Orders        Priority Expected Expires Ordered    D dimer quantitative Routine 2018    US Extremity Upper Venous rt Routine 2018    US Extremity Upper Venous rt Routine 2018            Who to contact     If you have questions or need follow up information about today's clinic visit or your schedule please contact [facility] [place] directly at [phone].  Normal or non-critical lab and imaging results will be communicated to you by [employer], letter or phone within 4 business days after the clinic has received the results. If you do not hear from us within 7 days, please contact the clinic through [employer] or phone. If you have a critical or abnormal lab result, we will notify you by phone as soon as possible.  Submit refill requests through [employer] or call your pharmacy and they will forward the refill request to us. Please allow 3 business days for your refill to be completed.          Additional Information About Your Visit        [employer] Information     [employer] lets you send messages to your doctor, view your test results, renew your prescriptions, schedule appointments and more. To sign up, go to [email or web address] . Click on \"Log in\" on the left side of the screen, which will take you to the Welcome page. Then click on \"Sign up Now\" on the right side of the page.     You will be asked to enter the access code listed below, as well as some personal information. Please follow the directions to create your username and password.     Your access code is: [email or web address]  Expires: 2019  5:42 PM     Your access code will  in 90 days. If you need " help or a new code, please call your [facility] clinic or [phone].        Care EveryWhere ID     This is your Care EveryWhere ID. This could be used by other organizations to access your [facility] medical records  [id number]        Your Vitals Were     Pulse Pulse Oximetry BMI (Body Mass Index)             81 97% 29.09 kg/m2          Blood Pressure from Last 3 Encounters:   11/07/18 120/83   10/28/18 136/87   01/19/18 (!) 127/92    Weight from Last 3 Encounters:   11/07/18 197 lb (89.4 kg)   10/28/18 192 lb (87.1 kg)   01/19/18 216 lb (98 kg)                 Today's Medication Changes          These changes are accurate as of 11/7/18 11:45 AM.  If you have any questions, ask your nurse or doctor.               These medicines have changed or have updated prescriptions.        Dose/Directions    * rivaroxaban ANTICOAGULANT 15 MG Tabs tablet   Commonly known as:  XARELTO   This may have changed:  Another medication with the same name was added. Make sure you understand how and when to take each.   Changed by:  Srinath Olsen MD        Dose:  15 mg   Take 1 tablet (15 mg) by mouth 2 times daily (with meals) for 14 days   Quantity:  28 tablet   Refills:  0       * rivaroxaban ANTICOAGULANT 20 MG Tabs tablet   Commonly known as:  XARELTO   This may have changed:  You were already taking a medication with the same name, and this prescription was added. Make sure you understand how and when to take each.   Used for:  Acute deep vein thrombosis (DVT) of right upper extremity, unspecified vein (H)   Changed by:  Srinath Olsen MD        Dose:  20 mg   Take 1 tablet (20 mg) by mouth daily (with dinner)   Quantity:  21 tablet   Refills:  0       * Notice:  This list has 2 medication(s) that are the same as other medications prescribed for you. Read the directions carefully, and ask your doctor or other care provider to review them with you.         Where to get your medicines      Some of these will  need a paper prescription and others can be bought over the counter.  Ask your nurse if you have questions.     You don't need a prescription for these medications     rivaroxaban ANTICOAGULANT 20 MG Tabs tablet                Primary Care Provider Office Phone # Fax #    Dean Cody [phone]       [address] [address]        Equal Access to Services     SUKHJINDER ASHER : Hadii aad ku hadasho Soomaali, waaxda luqadaha, qaybta kaalmada adeegyada, waxay idiin hayaan adeeg khcorkysh la'aan ah. So [facility] [phone].    ATENCIÓN: Si habla espserg, tiene a vela disposición servicios gratuitos de asistencia lingüística. Llame al [phone].    We comply with applicable federal civil rights laws and [place] laws. We do not discriminate on the basis of race, color, national origin, age, disability, sex, sexual orientation, or gender identity.            Thank you!     Thank you for choosing [facility] [place]  for your care. Our goal is always to provide you with excellent care. Hearing back from our patients is one way we can continue to improve our services. Please take a few minutes to complete the written survey that you may receive in the mail after your visit with us. Thank you!             Your Updated Medication List - Protect others around you: Learn how to safely use, store and throw away your medicines at www.disposemymeds.org.          This list is accurate as of 11/7/18 11:45 AM.  Always use your most recent med list.                   Brand Name Dispense Instructions for use Diagnosis    cetirizine 10 MG tablet    zyrTEC     Take 10 mg by mouth daily        LISINOPRIL PO      Take 20 mg by mouth daily        * rivaroxaban ANTICOAGULANT 15 MG Tabs tablet    XARELTO    28 tablet    Take 1 tablet (15 mg) by mouth 2 times daily (with meals) for 14 days        * rivaroxaban ANTICOAGULANT 20 MG Tabs tablet    XARELTO    21 tablet    Take  1 tablet (20 mg) by mouth daily (with dinner)    Acute deep vein thrombosis (DVT) of right upper extremity, unspecified vein (H)       * Notice:  This list has 2 medication(s) that are the same as other medications prescribed for you. Read the directions carefully, and ask your doctor or other care provider to review them with you.

## 2018-11-07 NOTE — PROGRESS NOTES
Lucio Hernandez is a 63 year old male who is presenting at the current time to discuss his diagnosi(es) of an antecubital fossa associated peripheral IV superficial thrombosis of his basilic vein acquired after an AC fossa peripheral IV was placed at time of colonoscopy.      Review Of Systems  Skin: negative  Eyes: negative  Ears/Nose/Throat: negative  Respiratory: No shortness of breath, dyspnea on exertion, cough, or hemoptysis  Cardiovascular: negative  Gastrointestinal: negative  Genitourinary: negative  Musculoskeletal: negative  Neurologic: negative  Psychiatric: negative  Hematologic/Lymphatic/Immunologic: negative  Endocrine: negative     PAST MEDICAL HISTORY:                  Past Medical History:   Diagnosis Date     Cancer (H)      Hypertension      Pulmonary embolism (H)      Uncomplicated asthma     seasonal        PAST SURGICAL HISTORY:                  Past Surgical History:   Procedure Laterality Date     BACK SURGERY  2000    C5-C6 framinotomy     CYSTOSCOPY, BLADDER NECK CUTS, COMBINED N/A 8/27/2015    Procedure: COMBINED CYSTOSCOPY, BLADDER NECK CUTS;  Surgeon: Paco Haskins MD;  Location:  OR     CYSTOSCOPY, DILATE URETHRA, COMBINED N/A 2/27/2015    Procedure: COMBINED CYSTOSCOPY, DILATE URETHRA;  Surgeon: Paco Haskins MD;  Location:  OR     CYSTOSCOPY, DILATE URETHRA, COMBINED N/A 4/21/2015    Procedure: COMBINED CYSTOSCOPY, DILATE URETHRA;  Surgeon: Eduardo Landaverde MD;  Location:  OR     CYSTOSCOPY, DILATE URETHRA, COMBINED N/A 4/23/2015    Procedure: COMBINED CYSTOSCOPY, DILATE URETHRA;  Surgeon: Paco Haskins MD;  Location:  OR     ENT SURGERY      tonsillectomy     GENITOURINARY SURGERY  2006    prostatectomy     INSERT CATHETER BLADDER N/A 4/23/2015    Procedure: INSERT CATHETER BLADDER;  Surgeon: Paco Haskins MD;  Location:  OR     LAPAROSCOPIC APPENDECTOMY  6/11/2014    Procedure: LAPAROSCOPIC APPENDECTOMY;  Surgeon: Scar Schroeder MD;   Location:  OR     ORTHOPEDIC SURGERY      shoulder x 3     THORACOTOMY Left 12/2/2017    Procedure: THORACOTOMY;  LEFT THORACOTOMY, REPAIR OF INTERCOSTAL HERNIA  AND INTERNAL FIXATION RIB FRACTURES OF RIBS 7 AND 8;  Surgeon: Myron Damon MD;  Location:  OR       CURRENT MEDICATIONS:                  Current Outpatient Prescriptions   Medication Sig Dispense Refill     cetirizine (ZYRTEC) 10 MG tablet Take 10 mg by mouth daily       LISINOPRIL PO Take 20 mg by mouth daily       rivaroxaban ANTICOAGULANT (XARELTO) 15 MG TABS tablet Take 1 tablet (15 mg) by mouth 2 times daily (with meals) for 14 days 28 tablet 0       ALLERGIES:                No Known Allergies    SOCIAL HISTORY:                  Social History     Social History     Marital status:      Spouse name: N/A     Number of children: N/A     Years of education: N/A     Occupational History     Not on file.     Social History Main Topics     Smoking status: Never Smoker     Smokeless tobacco: Never Used     Alcohol use Yes      Comment: one drink a day      Drug use: No     Sexual activity: Not on file     Other Topics Concern     Not on file     Social History Narrative       FAMILY HISTORY:                 No family history on file.      Physical exam Reveals:    O/P: WNL  HEENT: WNL  NECK: No JVD, thyromegaly, or lymphadenopathy  HEART: RRR, no murmurs, gallops, or rubs  LUNGS: CTA bilaterally without rales, wheezes, or rhonchi  GI: NABS, nondistended, nontender, soft  EXT:without cyanosis, clubbing, or edema  NEURO: nonfocal  : no flank tenderness   Upper extremity on right: no obvious cords    A/P:    (I82.621) Acute catheter asociated superficial thrombosis of right upper extremity, basilic vein (H)  (primary encounter diagnosis)  Comment: This is his second lifetime VTE, his first was a provoked (post thoracotomy) small, HD insignificant PE for which he was ACd therapeutically for six months, and then stopped Xarelto. His  current event is a superficial thrombosis. He was seen in the ER and treated therapeutically with Xarelto. On Xarelto, with the catheter having been removed, his arm fells much better. His duplex of today reveals significant but incomplete resorption. I explained to the patient this is not a DVT. I explained to the patient this is easily attributable to the provocative event of peripheral IV insertion. I explained to the patient that the treatment of catheter associated venous thrombosis of the upper extremity is to remove the catheter and perform serial imaging. He was treated more aggressively with therapeutic AC The thrombosis is now improved with AC and he is tolerating this AC. I recommended six weeks total AC. I would not recommend lifelong AC both events were smal, easily provoked, and the current event is not a DVT.  Plan: US Extremity Upper Venous rt. He will switch to Xarelto 20 mg daily when his 15 mg dose runs out.

## 2018-11-07 NOTE — NURSING NOTE
"Lucio Hernandez is a 63 year old male who presents for:  Chief Complaint   Patient presents with     RECHECK     f/u to ER visit 10/28/18 for bood clot above recent IV site, pt on 14 days Xarelto         Vitals:    Vitals:    11/07/18 0841   BP: 120/83   BP Location: Right arm   Patient Position: Sitting   Cuff Size: Adult Large   Pulse: 81   SpO2: 97%   Weight: 197 lb (89.4 kg)       BMI:  Estimated body mass index is 29.09 kg/(m^2) as calculated from the following:    Height as of 10/28/18: 5' 9\" (1.753 m).    Weight as of this encounter: 197 lb (89.4 kg).    Pain Score:  Data Unavailable        Donna Munoz"

## 2018-11-27 ENCOUNTER — HOSPITAL ENCOUNTER (OUTPATIENT)
Dept: LAB | Facility: CLINIC | Age: 63
Discharge: HOME | End: 2018-11-27
Attending: INTERNAL MEDICINE | Admitting: INTERNAL MEDICINE
Payer: COMMERCIAL

## 2018-11-27 DIAGNOSIS — I82.621 ACUTE DEEP VEIN THROMBOSIS (DVT) OF RIGHT UPPER EXTREMITY, UNSPECIFIED VEIN (H): ICD-10-CM

## 2018-11-27 LAB — D DIMER PPP FEU-MCNC: 0.7 UG/ML FEU (ref 0–0.5)

## 2018-11-27 PROCEDURE — 36415 COLL VENOUS BLD VENIPUNCTURE: CPT | Performed by: INTERNAL MEDICINE

## 2018-11-27 PROCEDURE — 85379 FIBRIN DEGRADATION QUANT: CPT | Performed by: INTERNAL MEDICINE

## 2018-11-28 ENCOUNTER — OFFICE VISIT (OUTPATIENT)
Dept: OTHER | Facility: CLINIC | Age: 63
End: 2018-11-28
Attending: INTERNAL MEDICINE
Payer: COMMERCIAL

## 2018-11-28 ENCOUNTER — HOSPITAL ENCOUNTER (OUTPATIENT)
Dept: ULTRASOUND IMAGING | Facility: CLINIC | Age: 63
Discharge: HOME | End: 2018-11-28
Attending: INTERNAL MEDICINE | Admitting: INTERNAL MEDICINE
Payer: COMMERCIAL

## 2018-11-28 VITALS
SYSTOLIC BLOOD PRESSURE: 114 MMHG | OXYGEN SATURATION: 97 % | DIASTOLIC BLOOD PRESSURE: 73 MMHG | BODY MASS INDEX: 29.77 KG/M2 | HEART RATE: 89 BPM | WEIGHT: 201.6 LBS

## 2018-11-28 DIAGNOSIS — I82.621 ACUTE DEEP VEIN THROMBOSIS (DVT) OF RIGHT UPPER EXTREMITY, UNSPECIFIED VEIN (H): Primary | ICD-10-CM

## 2018-11-28 DIAGNOSIS — I82.621 ACUTE DEEP VEIN THROMBOSIS (DVT) OF RIGHT UPPER EXTREMITY, UNSPECIFIED VEIN (H): ICD-10-CM

## 2018-11-28 PROCEDURE — 93971 EXTREMITY STUDY: CPT | Mod: RT

## 2018-11-28 PROCEDURE — 99214 OFFICE O/P EST MOD 30 MIN: CPT | Mod: ZP | Performed by: INTERNAL MEDICINE

## 2018-11-28 PROCEDURE — G0463 HOSPITAL OUTPT CLINIC VISIT: HCPCS | Mod: 25

## 2018-11-28 NOTE — PROGRESS NOTES
Lucio Hernandez is a 63 year old male who is presenting at the current time to discuss his diagnosi(es) of       Acute deep vein thrombosis (DVT) of right upper extremity, unspecified vein (H)     Review Of Systems  Skin: negative  Eyes: negative  Ears/Nose/Throat: negative  Respiratory: No shortness of breath, dyspnea on exertion, cough, or hemoptysis  Cardiovascular: negative  Gastrointestinal: negative  Genitourinary: negative  Musculoskeletal: negative  Neurologic: negative  Psychiatric: negative  Hematologic/Lymphatic/Immunologic: negative  Endocrine: negative    PAST MEDICAL HISTORY:                  Past Medical History:   Diagnosis Date     Cancer (H)      Hypertension      Pulmonary embolism (H)      Uncomplicated asthma     seasonal        PAST SURGICAL HISTORY:                  Past Surgical History:   Procedure Laterality Date     BACK SURGERY  2000    C5-C6 framinotomy     CYSTOSCOPY, BLADDER NECK CUTS, COMBINED N/A 8/27/2015    Procedure: COMBINED CYSTOSCOPY, BLADDER NECK CUTS;  Surgeon: Paco Haskins MD;  Location:  OR     CYSTOSCOPY, DILATE URETHRA, COMBINED N/A 2/27/2015    Procedure: COMBINED CYSTOSCOPY, DILATE URETHRA;  Surgeon: Paco Hsakins MD;  Location:  OR     CYSTOSCOPY, DILATE URETHRA, COMBINED N/A 4/21/2015    Procedure: COMBINED CYSTOSCOPY, DILATE URETHRA;  Surgeon: Eduardo Landaverde MD;  Location:  OR     CYSTOSCOPY, DILATE URETHRA, COMBINED N/A 4/23/2015    Procedure: COMBINED CYSTOSCOPY, DILATE URETHRA;  Surgeon: Paco Haskins MD;  Location:  OR     ENT SURGERY      tonsillectomy     GENITOURINARY SURGERY  2006    prostatectomy     INSERT CATHETER BLADDER N/A 4/23/2015    Procedure: INSERT CATHETER BLADDER;  Surgeon: Paco Haskins MD;  Location:  OR     LAPAROSCOPIC APPENDECTOMY  6/11/2014    Procedure: LAPAROSCOPIC APPENDECTOMY;  Surgeon: Scar Schroeder MD;  Location:  OR     ORTHOPEDIC SURGERY      shoulder x 3     THORACOTOMY Left 12/2/2017     Procedure: THORACOTOMY;  LEFT THORACOTOMY, REPAIR OF INTERCOSTAL HERNIA  AND INTERNAL FIXATION RIB FRACTURES OF RIBS 7 AND 8;  Surgeon: Myron Damon MD;  Location:  OR       CURRENT MEDICATIONS:                  Current Outpatient Prescriptions   Medication Sig Dispense Refill     cetirizine (ZYRTEC) 10 MG tablet Take 10 mg by mouth daily       LISINOPRIL PO Take 20 mg by mouth daily       rivaroxaban ANTICOAGULANT (XARELTO) 20 MG TABS tablet Take 1 tablet (20 mg) by mouth daily (with dinner) 45 tablet 0     [DISCONTINUED] rivaroxaban ANTICOAGULANT (XARELTO) 20 MG TABS tablet Take 1 tablet (20 mg) by mouth daily (with dinner) 21 tablet 0       ALLERGIES:                No Known Allergies    SOCIAL HISTORY:                  Social History     Social History     Marital status:      Spouse name: N/A     Number of children: N/A     Years of education: N/A     Occupational History     Not on file.     Social History Main Topics     Smoking status: Never Smoker     Smokeless tobacco: Never Used     Alcohol use Yes      Comment: one drink a day      Drug use: No     Sexual activity: Not on file     Other Topics Concern     Not on file     Social History Narrative       FAMILY HISTORY:                 No family history on file.         Physical exam reveals no obvious palpable cord in RUE    A/P:    (I82.621) Acute catheter asociated superficial thrombosis of right upper extremity, basilic vein (H)  (primary encounter diagnosis)  Comment: d dimer still elevated, venous duplex today reveals small persistent thrombus. Continue AC six additional weeks, check d dimer and imaging at that time, RTC thereafter to consider cessation of AC. Second lifetime VTE, but as inidcated in last note, both events are anatomically small and provoked in association with surgical or other procedures.  Plan: D dimer quantitative, US Extremity Upper Venous        rt, rivaroxaban ANTICOAGULANT (XARELTO) 20 MG         TABS  tablet

## 2018-11-28 NOTE — MR AVS SNAPSHOT
After Visit Summary   11/28/2018    Lucio Hernandez    MRN: [id number]           Patient Information     Date Of Birth          1955        Visit Information        Provider Department      11/28/2018 1:40 PM Srinath Olsen MD [facility]      Today's Diagnoses     Acute catheter asociated superficial thrombosis of right upper extremity, basilic vein (H)    -  1       Follow-ups after your visit        Future tests that were ordered for you today     Open Future Orders        Priority Expected Expires Ordered    D dimer quantitative Routine 1/9/2019 1/9/2019 11/28/2018    US Extremity Upper Venous rt Routine 1/9/2019 1/9/2019 11/28/2018            Who to contact     If you have questions or need follow up information about today's clinic visit or your schedule please contact [facility] directly at [phone].  Normal or non-critical lab and imaging results will be communicated to you by MyChart, letter or phone within 4 business days after the clinic has received the results. If you do not hear from us within 7 days, please contact the clinic through MyChart or phone. If you have a critical or abnormal lab result, we will notify you by phone as soon as possible.  Submit refill requests through [employer] or call your pharmacy and they will forward the refill request to us. Please allow 3 business days for your refill to be completed.          Additional Information About Your Visit        Care EveryWhere ID     This is your Care EveryWhere ID. This could be used by other organizations to access your [place] medical records  [id number]        Your Vitals Were     Pulse Pulse Oximetry BMI (Body Mass Index)             89 97% 29.77 kg/m2          Blood Pressure from Last 3 Encounters:   11/28/18 114/73   11/07/18 120/83   10/28/18 136/87    Weight from Last 3 Encounters:   11/28/18 201 lb 9.6 oz  (91.4 kg)   11/07/18 197 lb (89.4 kg)   10/28/18 192 lb (87.1 kg)                 Where to get your medicines      These medications were sent to [facility] [id number] [facility] - [place] - [address]  [address] [facility], [place] [address]     Phone:  [phone]     rivaroxaban ANTICOAGULANT 20 MG Tabs tablet          Primary Care Provider Office Phone # Fax #    Dean Cody [phone]       [address] [address]        Equal Access to Services     [facility]: Hadii aad ku hadasho Soomaali, waaxda luqadaha, qaybta kaalmada adeegyada, waxay idiin hayaan ángel ontiveros . So [facility] [phone].    ATENCIÓN: Si habla español, tiene a vela disposición servicios gratuitos de asistencia lingüística. Ll[facility] [phone].    We comply with applicable federal civil rights laws and [place] laws. We do not discriminate on the basis of race, color, national origin, age, disability, sex, sexual orientation, or gender identity.            Thank you!     Thank you for choosing [facility] [place]  for your care. Our goal is always to provide you with excellent care. Hearing back from our patients is one way we can continue to improve our services. Please take a few minutes to complete the written survey that you may receive in the mail after your visit with us. Thank you!             Your Updated Medication List - Protect others around you: Learn how to safely use, store and throw away your medicines at www.disposemymeds.org.          This list is accurate as of 11/28/18  2:12 PM.  Always use your most recent med list.                   Brand Name Dispense Instructions for use Diagnosis    cetirizine 10 MG tablet    zyrTEC     Take 10 mg by mouth daily        LISINOPRIL PO      Take 20 mg by mouth daily        rivaroxaban ANTICOAGULANT 20 MG Tabs tablet    XARELTO    45 tablet    Take 1 tablet (20 mg) by mouth daily  (with dinner)    Acute deep vein thrombosis (DVT) of right upper extremity, unspecified vein (H)

## 2018-11-28 NOTE — NURSING NOTE
"Lucio Hernandez is a 63 year old male who presents for:  Chief Complaint   Patient presents with     RECHECK     (1:00 VHC, 1:40 CAF) per Dr. Olsen        Vitals:    Vitals:    11/28/18 1341   BP: 114/73   BP Location: Right arm   Patient Position: Chair   Cuff Size: Adult Large   Pulse: 89   SpO2: 97%   Weight: 201 lb 9.6 oz (91.4 kg)       BMI:  Estimated body mass index is 29.77 kg/(m^2) as calculated from the following:    Height as of 10/28/18: 5' 9\" (1.753 m).    Weight as of this encounter: 201 lb 9.6 oz (91.4 kg).    Pain Score:  Data Unavailable        Britni Yen MA      "

## 2019-01-08 ENCOUNTER — HOSPITAL ENCOUNTER (OUTPATIENT)
Dept: LAB | Facility: CLINIC | Age: 64
Discharge: HOME | End: 2019-01-08
Attending: INTERNAL MEDICINE | Admitting: INTERNAL MEDICINE
Payer: COMMERCIAL

## 2019-01-08 ENCOUNTER — PRE VISIT (OUTPATIENT)
Dept: UROLOGY | Facility: CLINIC | Age: 64
End: 2019-01-08

## 2019-01-08 DIAGNOSIS — I82.621 ACUTE DEEP VEIN THROMBOSIS (DVT) OF RIGHT UPPER EXTREMITY, UNSPECIFIED VEIN (H): ICD-10-CM

## 2019-01-08 LAB — D DIMER PPP FEU-MCNC: <0.3 UG/ML FEU (ref 0–0.5)

## 2019-01-08 PROCEDURE — 85379 FIBRIN DEGRADATION QUANT: CPT | Performed by: INTERNAL MEDICINE

## 2019-01-08 PROCEDURE — 36415 COLL VENOUS BLD VENIPUNCTURE: CPT | Performed by: INTERNAL MEDICINE

## 2019-01-08 NOTE — TELEPHONE ENCOUNTER
MEDICAL RECORDS REQUEST   [facility] Clinic  [address] [place] [address]  PHONE: [phone]  Fax: [phone]        FUTURE VISIT INFORMATION                                                   Lucio Hernandez, : 1955 scheduled for future visit at [facility]    APPOINTMENT INFORMATION:    Date: 2019    Provider:  KASI KENDRICK    Reason for Visit/Diagnosis: BLADDER NECK CONTRACTURE    REFERRAL INFORMATION:    Referring provider:  SELF    Specialty: SELF    Referring providers clinic:  SELF    Clinic contact number:  SELF    RECORDS REQUESTED FOR VISIT                                                     NOTES  STATUS/DETAILS   OFFICE NOTE from referring provider  yes   OFFICE NOTE from other specialist  yes   DISCHARGE SUMMARY from hospital  no   DISCHARGE REPORT from the ER  no   OPERATIVE REPORT  yes   MEDICATION LIST  yes       PRE-VISIT CHECKLIST      Record collection complete Yes  PUT CALL OUT TO PT FOR ANY RECORDS PATIENT SEEN WITH [facility] IN    Appointment appropriately scheduled           (right time/right provider) Yes   MyChart activation Yes   Questionnaire complete If no, please explain IN PROCESS     Completed by: Charlene Antoine

## 2019-01-10 ENCOUNTER — OFFICE VISIT (OUTPATIENT)
Dept: OTHER | Facility: CLINIC | Age: 64
End: 2019-01-10
Attending: INTERNAL MEDICINE
Payer: COMMERCIAL

## 2019-01-10 ENCOUNTER — HOSPITAL ENCOUNTER (OUTPATIENT)
Dept: ULTRASOUND IMAGING | Facility: CLINIC | Age: 64
Discharge: HOME | End: 2019-01-10
Attending: INTERNAL MEDICINE | Admitting: INTERNAL MEDICINE
Payer: COMMERCIAL

## 2019-01-10 VITALS
DIASTOLIC BLOOD PRESSURE: 71 MMHG | WEIGHT: 206.8 LBS | BODY MASS INDEX: 30.54 KG/M2 | HEART RATE: 104 BPM | OXYGEN SATURATION: 97 % | SYSTOLIC BLOOD PRESSURE: 106 MMHG

## 2019-01-10 DIAGNOSIS — I82.621 ACUTE DEEP VEIN THROMBOSIS (DVT) OF RIGHT UPPER EXTREMITY, UNSPECIFIED VEIN (H): ICD-10-CM

## 2019-01-10 DIAGNOSIS — I82.621 ACUTE DEEP VEIN THROMBOSIS (DVT) OF RIGHT UPPER EXTREMITY, UNSPECIFIED VEIN (H): Primary | ICD-10-CM

## 2019-01-10 PROCEDURE — G0463 HOSPITAL OUTPT CLINIC VISIT: HCPCS | Mod: 25

## 2019-01-10 PROCEDURE — 99213 OFFICE O/P EST LOW 20 MIN: CPT | Mod: ZP | Performed by: INTERNAL MEDICINE

## 2019-01-10 PROCEDURE — 93971 EXTREMITY STUDY: CPT | Mod: RT

## 2019-01-10 NOTE — NURSING NOTE
"Lucio Hernandez is a 63 year old male who presents for:  Chief Complaint   Patient presents with     KANWAL guevara us (12:45 VHC, 1:40 CAF) history DVT of right upper extremity; 6 wk f/u to 11/28/18 visit with Dr Olsen. He will get D dimer at hospital prior to visit.        Vitals:    Vitals:    01/10/19 1315   BP: 106/71   BP Location: Right arm   Patient Position: Chair   Cuff Size: Adult Large   Pulse: 104   SpO2: 97%   Weight: 206 lb 12.8 oz (93.8 kg)       BMI:  Estimated body mass index is 30.54 kg/m  as calculated from the following:    Height as of 10/28/18: 5' 9\" (1.753 m).    Weight as of this encounter: 206 lb 12.8 oz (93.8 kg).    Pain Score:  Data Unavailable        Britni Yen MA    "

## 2019-01-10 NOTE — PROGRESS NOTES
Lucio Hernandez is a 63 year old male who is presenting at the current time to discuss his diagnosi(es) of     Acute catheter asociated superficial thrombosis of right upper extremity, basilic vein      Review Of Systems  Skin: negative  Eyes: negative  Ears/Nose/Throat: negative  Respiratory: No shortness of breath, dyspnea on exertion, cough, or hemoptysis  Cardiovascular: negative  Gastrointestinal: negative  Genitourinary: negative  Musculoskeletal: negative  Neurologic: negative  Psychiatric: negative  Hematologic/Lymphatic/Immunologic: negative  Endocrine: negative      PAST MEDICAL HISTORY:                  Past Medical History:   Diagnosis Date     Cancer (H)      Hypertension      Pulmonary embolism (H)      Uncomplicated asthma     seasonal        PAST SURGICAL HISTORY:                  Past Surgical History:   Procedure Laterality Date     BACK SURGERY  2000    C5-C6 framinotomy     CYSTOSCOPY, BLADDER NECK CUTS, COMBINED N/A 8/27/2015    Procedure: COMBINED CYSTOSCOPY, BLADDER NECK CUTS;  Surgeon: Paco Haskins MD;  Location:  OR     CYSTOSCOPY, DILATE URETHRA, COMBINED N/A 2/27/2015    Procedure: COMBINED CYSTOSCOPY, DILATE URETHRA;  Surgeon: Paco Haskins MD;  Location:  OR     CYSTOSCOPY, DILATE URETHRA, COMBINED N/A 4/21/2015    Procedure: COMBINED CYSTOSCOPY, DILATE URETHRA;  Surgeon: Eduardo Landaverde MD;  Location:  OR     CYSTOSCOPY, DILATE URETHRA, COMBINED N/A 4/23/2015    Procedure: COMBINED CYSTOSCOPY, DILATE URETHRA;  Surgeon: Paco Haskins MD;  Location:  OR     ENT SURGERY      tonsillectomy     GENITOURINARY SURGERY  2006    prostatectomy     INSERT CATHETER BLADDER N/A 4/23/2015    Procedure: INSERT CATHETER BLADDER;  Surgeon: Paco Haskins MD;  Location:  OR     LAPAROSCOPIC APPENDECTOMY  6/11/2014    Procedure: LAPAROSCOPIC APPENDECTOMY;  Surgeon: Scar Schroeder MD;  Location:  OR     ORTHOPEDIC SURGERY      shoulder x 3     THORACOTOMY Left  12/2/2017    Procedure: THORACOTOMY;  LEFT THORACOTOMY, REPAIR OF INTERCOSTAL HERNIA  AND INTERNAL FIXATION RIB FRACTURES OF RIBS 7 AND 8;  Surgeon: Myron Damon MD;  Location:  OR       CURRENT MEDICATIONS:                  Current Outpatient Medications   Medication Sig Dispense Refill     cetirizine (ZYRTEC) 10 MG tablet Take 10 mg by mouth daily       LISINOPRIL PO Take 20 mg by mouth daily       rivaroxaban ANTICOAGULANT (XARELTO) 20 MG TABS tablet Take 1 tablet (20 mg) by mouth daily (with dinner) 45 tablet 0     rivaroxaban ANTICOAGULANT (XARELTO) 15 MG TABS tablet Take 1 tablet (15 mg) by mouth 2 times daily (with meals) for 14 days 28 tablet 0       ALLERGIES:                No Known Allergies    SOCIAL HISTORY:                  Social History     Socioeconomic History     Marital status:      Spouse name: Not on file     Number of children: Not on file     Years of education: Not on file     Highest education level: Not on file   Social Needs     Financial resource strain: Not on file     Food insecurity - worry: Not on file     Food insecurity - inability: Not on file     Transportation needs - medical: Not on file     Transportation needs - non-medical: Not on file   Occupational History     Not on file   Tobacco Use     Smoking status: Never Smoker     Smokeless tobacco: Never Used   Substance and Sexual Activity     Alcohol use: Yes     Comment: one drink a day      Drug use: No     Sexual activity: Not on file   Other Topics Concern     Parent/sibling w/ CABG, MI or angioplasty before 65F 55M? Not Asked   Social History Narrative     Not on file       FAMILY HISTORY:                 No family history on file.     Physical exam was not performed as it was not indicated.    A/P:    (I82.621) Acute catheter asociated superficial thrombosis of right upper extremity, basilic vein (H)  (primary encounter diagnosis)  Comment: This is his second lifetime VTE, his first was a provoked  (post thoracotomy) small, HD insignificant PE for which he was ACd therapeutically for six months, and then stopped Xarelto. His current event is a superficial thrombosis. He was seen in the ER and treated therapeutically with Xarelto. On Xarelto, with the catheter having been removed, his arm fells much better. His duplex of today reveals significant but incomplete resorption. I explained to the patient this is not a DVT. I explained to the patient this is easily attributable to the provocative event of peripheral IV insertion. I explained to the patient that the treatment of catheter associated venous thrombosis of the upper extremity is to remove the catheter and perform serial imaging. He was treated more aggressively with therapeutic AC The thrombosis is now improved with AC and he is tolerating this AC. I originally in November 2018 recommended six weeks total AC. I did not recommend lifelong AC both events were small, easily provoked, and the current event is not a DVT. He had interval imaging previously which reveled improvement but not resolution in thrombosis. Currently, he has resolved his catheter associated SVT.  Plan: Stop AC. Check d diemr in one month off of AC. If it increases, reconsider therapeutic AC and thrombophilia eval.    Greater than one half the fifteen minutes total spent on the pt's visit were spent providing education and counselling to the patient regarding the above matters.

## 2019-01-21 ENCOUNTER — PATIENT OUTREACH (OUTPATIENT)
Dept: CARE COORDINATION | Facility: CLINIC | Age: 64
End: 2019-01-21

## 2019-01-24 ASSESSMENT — ENCOUNTER SYMPTOMS
JOINT SWELLING: 0
DIFFICULTY URINATING: 0
MYALGIAS: 0
BACK PAIN: 1
MUSCLE CRAMPS: 0
FLANK PAIN: 0
STIFFNESS: 1
ARTHRALGIAS: 0
NECK PAIN: 0
HEMATURIA: 0
MUSCLE WEAKNESS: 0
DYSURIA: 0

## 2019-01-28 ENCOUNTER — PRE VISIT (OUTPATIENT)
Dept: UROLOGY | Facility: CLINIC | Age: 64
End: 2019-01-28

## 2019-01-28 NOTE — TELEPHONE ENCOUNTER
Reason for visit: Difficulty self catheterizing      Relevant information: history of bladder neck contracture, last seen 2015    Records/imaging/labs/orders: all records available    Pt called: Message left asking pt to call back with more details about his change in health condition    At Rooming: flow/pvr if pt voids

## 2019-02-04 ENCOUNTER — OFFICE VISIT (OUTPATIENT)
Dept: UROLOGY | Facility: CLINIC | Age: 64
End: 2019-02-04
Payer: COMMERCIAL

## 2019-02-04 VITALS
HEIGHT: 69 IN | WEIGHT: 198 LBS | DIASTOLIC BLOOD PRESSURE: 78 MMHG | BODY MASS INDEX: 29.33 KG/M2 | SYSTOLIC BLOOD PRESSURE: 120 MMHG | HEART RATE: 81 BPM

## 2019-02-04 DIAGNOSIS — N32.0 BNC (BLADDER NECK CONTRACTURE): Primary | ICD-10-CM

## 2019-02-04 DIAGNOSIS — T66.XXXS RADIATION ADVERSE EFFECT, SEQUELA: ICD-10-CM

## 2019-02-04 RX ORDER — CIPROFLOXACIN 500 MG/1
TABLET, FILM COATED ORAL
Refills: 0 | COMMUNITY
Start: 2019-01-22 | End: 2019-11-04

## 2019-02-04 ASSESSMENT — PAIN SCALES - GENERAL: PAINLEVEL: MILD PAIN (2)

## 2019-02-04 ASSESSMENT — MIFFLIN-ST. JEOR: SCORE: 1673.5

## 2019-02-04 NOTE — PROGRESS NOTES
Urology Clinic    Abdon Wells MD  Date of Service: 2019     Name: Lucio Hernandez  MRN: [id number]  Age: 63 year old  : 1955  Referring provider: Abdon Wells     Assessment and Plan:  Assessment:  bladder neck contracture; currently self cathing every other day  Dr. Hernandez notes that he understands his goal is not necessarily repair at this time as he currently has excellent control of his urine and would not want to risk this. We discussed the risks and benefits of surgical intervention versus watchful waiting in his case in the setting of previous dilation and laser vaporization. We elected to proceed with the following plan:     Plan:  - Continue with self-catheterization every other day as needed, using a 14 curved coude Tiemann, otherwise use a 12 or 10 as needed (we provided these for him)  -Attempt self dilatation with a 16 when cathing well, stop if blood is visualized  -With international travel, have a stock of 14, 12, and 10 on hand. A suprapubic would also be an option due to its possible transient nature.  -If consistently unable to self cath, then we would have to get him open with endoscopic means.   -We discussed repair but noted that the stricture is proximal to the sphincter and would risks incontinence with either a trans-perineal or trans-abdominal approach to repair    Chief Complaint:   Difficulty with self-catheterization    HPI:   Lucio Hernandez  is a 63 year old male with a history of open RRP with Dr. Castañeda in 2006, followed by IMRT in 2006 with subsequent bladder neck contracture. He has undergone multiple dilatations and a laser vaporization. He presents here today for evaluation of difficulty with self-catheterization. I last saw the patient on 2015 for a cystoscopy for bladder neck contracture; at that time he was managing well with self dilation. At this time, his stricture was barely cephalad of his sphincter and his sphincter seems to  work very well so urethroplasty was noted to be risky for causing new incontinence. He elected to continue with self dilation and f/u prn.     Today, the patient states that he wanted to follow up about his options for occasional intermittent trouble with self catheterizations. The patient is currently using a 14 curved coude Tiemann about every other day. He notes that occasionally he experiences trouble fitting his 14 cather and feels that he is running into a blockage. He is curious if he should try a 12 at this time or otherwise use a 16 when things are going well to self dilate. The patient today arrives with his wife and describes that he recently is retired and is traveling more frequently. He is concerned about what he should do when he is traveling internationally. He notes he is most concerned about insecurity with an obstruction while away from home. Of note, the patient is being followed for his PSA levels following RRP, which he notes have been oscillating but well otherwise.     Review of Systems:   Pertinent items are noted in HPI or as below, remainder of complete ROS is negative.      Active Medications:      cetirizine (ZYRTEC) 10 MG tablet, Take 10 mg by mouth daily, Disp: , Rfl:      LISINOPRIL PO, Take 20 mg by mouth daily, Disp: , Rfl:      rivaroxaban ANTICOAGULANT (XARELTO) 15 MG TABS tablet, Take 1 tablet (15 mg) by mouth 2 times daily (with meals) for 14 days, Disp: 28 tablet, Rfl: 0     rivaroxaban ANTICOAGULANT (XARELTO) 20 MG TABS tablet, Take 1 tablet (20 mg) by mouth daily (with dinner), Disp: 45 tablet, Rfl: 0      Allergies:   The patient reports no known allergies.     Past Medical History:  Hypertension  Pulmonary embolism  Malignant neoplasm of the prostate  Rib fractures     Past Surgical History:  C5-C6 fainotomy  Cystoscopy bladder neck cuts and dilation of urethra  Appendectomy  Thoracotomy    Family History:   No past pertinent family history.     Social History:   The  patient denies smoking  The patient drinks one drink per day  The patient is recently retired    Ethnicity: white    I spent over 15 minutes with the patient.  Over half this time was spent on counseling.     Scribe Disclosure:   I, Klaus Zimmer, am serving as a scribe to document services personally performed by Abdon Wells MD at this visit, based upon the provider's statements to me. All documentation has been reviewed by the aforementioned provider prior to being entered into the official medical record.     Portions of this medical record were completed by a scribe. UPON MY REVIEW AND AUTHENTICATION BY ELECTRONIC SIGNATURE, this confirms (a) I performed the applicable clinical services, and (b) the record is accurate.    Answers for HPI/ROS submitted by the patient on 1/24/2019   General Symptoms: No  Skin Symptoms: No  HENT Symptoms: No  EYE SYMPTOMS: No  HEART SYMPTOMS: No  LUNG SYMPTOMS: No  INTESTINAL SYMPTOMS: No  URINARY SYMPTOMS: Yes  REPRODUCTIVE SYMPTOMS: No  SKELETAL SYMPTOMS: Yes  BLOOD SYMPTOMS: No  NERVOUS SYSTEM SYMPTOMS: No  MENTAL HEALTH SYMPTOMS: No  Trouble holding urine or incontinence: No  Pain or burning: No  Trouble starting or stopping: Yes  Increased frequency of urination: No  Blood in urine: No  Decreased frequency of urination: No  Frequent nighttime urination: No  Flank pain: No  Difficulty emptying bladder: No  Back pain: Yes  Muscle aches: No  Neck pain: No  Swollen joints: No  Joint pain: No  Bone pain: No  Muscle cramps: No  Muscle weakness: No  Joint stiffness: Yes  Bone fracture: No

## 2019-02-04 NOTE — NURSING NOTE
"Chief Complaint   Patient presents with     Follow Up     occasional difficulty with CIC       Blood pressure 120/78, pulse 81, height 1.753 m (5' 9\"), weight 89.8 kg (198 lb). Body mass index is 29.24 kg/m .    Patient Active Problem List   Diagnosis     Appendicitis     Radiation adverse effect     Bladder neck contracture     Malignant neoplasm of prostate (H)     Postprocedural membranous urethral stricture     Rib fractures     Pulmonary embolism (H)       No Known Allergies    Current Outpatient Medications   Medication Sig Dispense Refill     cetirizine (ZYRTEC) 10 MG tablet Take 10 mg by mouth daily       ciprofloxacin (CIPRO) 500 MG tablet Start 1/22/19  0     LISINOPRIL PO Take 20 mg by mouth daily       rivaroxaban ANTICOAGULANT (XARELTO) 15 MG TABS tablet Take 1 tablet (15 mg) by mouth 2 times daily (with meals) for 14 days 28 tablet 0     rivaroxaban ANTICOAGULANT (XARELTO) 20 MG TABS tablet Take 1 tablet (20 mg) by mouth daily (with dinner) 45 tablet 0       Social History     Tobacco Use     Smoking status: Never Smoker     Smokeless tobacco: Never Used   Substance Use Topics     Alcohol use: Yes     Comment: one drink a day      Drug use: No       Miryam Carias LPN  2/4/2019  3:32 PM  "

## 2019-02-04 NOTE — LETTER
RE: Lucio Hernandez  [address] [facility] [address]     Dear Colleague,    Thank you for referring your patient, Lucio Hernandez, to the [facility] UROLOGY AND INST FOR PROSTATE AND UROLOGIC CANCERS at [facility] Please see a copy of my visit note below.    Urology Clinic    Abdon Wells MD  Date of Service: 2019     Name: Lucio Hernandez  MRN: [id number]  Age: 63 year old  : 1955  Referring provider: Abdon Wells     Assessment and Plan:  Assessment:  bladder neck contracture; currently self cathing every other day  Dr. Hernandez notes that he understands his goal is not necessarily repair at this time as he currently has excellent control of his urine and would not want to risk this. We discussed the risks and benefits of surgical intervention versus watchful waiting in his case in the setting of previous dilation and laser vaporization. We elected to proceed with the following plan:     Plan:  - Continue with self-catheterization every other day as needed, using a 14 curved coude Tiemann, otherwise use a 12 or 10 as needed (we provided these for him)  -Attempt self dilatation with a 16 when cathing well, stop if blood is visualized  -With international travel, have a stock of 14, 12, and 10 on hand. A suprapubic would also be an option due to its possible transient nature.  -If consistently unable to self cath, then we would have to get him open with endoscopic means.   -We discussed repair but noted that the stricture is proximal to the sphincter and would risks incontinence with either a trans-perineal or trans-abdominal approach to repair    Chief Complaint:   Difficulty with self-catheterization    HPI:   Lucio Hernandez  is a 63 year old male with a history of open RRP with Dr. Castañeda in 2006, followed by IMRT in 2006 with subsequent bladder neck contracture. He has undergone multiple dilatations and a laser vaporization. He  presents here today for evaluation of difficulty with self-catheterization. I last saw the patient on 12/07/2015 for a cystoscopy for bladder neck contracture; at that time he was managing well with self dilation. At this time, his stricture was barely cephalad of his sphincter and his sphincter seems to work very well so urethroplasty was noted to be risky for causing new incontinence. He elected to continue with self dilation and f/u prn.     Today, the patient states that he wanted to follow up about his options for occasional intermittent trouble with self catheterizations. The patient is currently using a 14 curved coude Tiemann about every other day. He notes that occasionally he experiences trouble fitting his 14 cather and feels that he is running into a blockage. He is curious if he should try a 12 at this time or otherwise use a 16 when things are going well to self dilate. The patient today arrives with his wife and describes that he recently is retired and is traveling more frequently. He is concerned about what he should do when he is traveling internationally. He notes he is most concerned about insecurity with an obstruction while away from home. Of note, the patient is being followed for his PSA levels following RRP, which he notes have been oscillating but well otherwise.     Active Medications:      cetirizine (ZYRTEC) 10 MG tablet, Take 10 mg by mouth daily, Disp: , Rfl:      LISINOPRIL PO, Take 20 mg by mouth daily, Disp: , Rfl:      rivaroxaban ANTICOAGULANT (XARELTO) 15 MG TABS tablet, Take 1 tablet (15 mg) by mouth 2 times daily (with meals) for 14 days, Disp: 28 tablet, Rfl: 0     rivaroxaban ANTICOAGULANT (XARELTO) 20 MG TABS tablet, Take 1 tablet (20 mg) by mouth daily (with dinner), Disp: 45 tablet, Rfl: 0      Allergies:   The patient reports no known allergies.     Past Medical History:  Hypertension  Pulmonary embolism  Malignant neoplasm of the prostate  Rib fractures     Past Surgical  History:  C5-C6 fainotomy  Cystoscopy bladder neck cuts and dilation of urethra  Appendectomy  Thoracotomy    Family History:   No past pertinent family history.     Social History:   The patient denies smoking  The patient drinks one drink per day  The patient is recently retired    Ethnicity: white    I spent over 15 minutes with the patient.  Over half this time was spent on counseling.     Scribe Disclosure:   I, Klaus Florinasera, am serving as a scribe to document services personally performed by Abdon Wells MD at this visit, based upon the provider's statements to me. All documentation has been reviewed by the aforementioned provider prior to being entered into the official medical record.     Portions of this medical record were completed by a scribe. UPON MY REVIEW AND AUTHENTICATION BY ELECTRONIC SIGNATURE, this confirms (a) I performed the applicable clinical services, and (b) the record is accurate.    Again, thank you for allowing me to participate in the care of your patient.      Sincerely,    Abdon Wells MD

## 2019-05-29 ENCOUNTER — TRANSFERRED RECORDS (OUTPATIENT)
Dept: HEALTH INFORMATION MANAGEMENT | Facility: CLINIC | Age: 64
End: 2019-05-29

## 2019-06-06 NOTE — TELEPHONE ENCOUNTER
ONCOLOGY INTAKE: Records Information      APPT INFORMATION:  Referring provider:  Self  Referring provider s clinic:  n/a  Reason for visit/diagnosis: Prostate Cancer  Has patient been notified of appointment date and time?: Yes    RECORDS INFORMATION:  Were the records received with the referral (via [employer])? No    Has patient been seen for any external appt for this diagnosis? Yes    If yes, where? [facility] for [facility]    Has patient had any imaging or procedures outside of [place] for this condition? Yes      If Yes, where? [facility] for radiation      ADDITIONAL INFORMATION:  Patient will going to both [facility] to sign RAMBO forms within the next 24 hours

## 2019-06-07 ENCOUNTER — PRE VISIT (OUTPATIENT)
Dept: UROLOGY | Facility: CLINIC | Age: 64
End: 2019-06-07

## 2019-06-07 NOTE — TELEPHONE ENCOUNTER
MEDICAL RECORDS REQUEST   [facility]  Urology Clinic  [address] [place] [address]  PHONE: [phone]  Fax: [phone]        FUTURE VISIT INFORMATION                                                   Lucio Hernandez, : 1955 scheduled for future visit at [facility]    APPOINTMENT INFORMATION:    Date: 19 1:30PM     Provider:  Srinath Sandhu MD    Reason for Visit/Diagnosis: Prostate Cancer     REFERRAL INFORMATION:    Referring provider:  Self     Specialty: N/A    Referring providers clinic:  N/A    Clinic contact number:  N/A    RECORDS REQUESTED FOR VISIT                                                     NOTES  STATUS/DETAILS   OFFICE NOTE from referring provider  no   OFFICE NOTE from other specialist  yes    DISCHARGE SUMMARY from hospital  no   DISCHARGE REPORT from the ER  no   OPERATIVE REPORT  yes    MEDICATION LIST  yes   PROSTATE CANCER     BONE SCAN  yes   MRI OF PROSTATE  no   PATHOLOGY REPORT & SLIDES  in process   PSA (LAB)  yes     PRE-VISIT CHECKLIST      Record collection complete Yes-   [facility] recs scanned in Epic   Images in [facility] PACS   Fax to [facility] to fed-ex path slides -    Appointment appropriately scheduled           (right time/right provider) Yes   MyChart activation Yes   Questionnaire complete If no, please explain: In process      Completed by: Manda Soares

## 2019-06-27 ASSESSMENT — ENCOUNTER SYMPTOMS
MYALGIAS: 1
BACK PAIN: 1

## 2019-07-01 ENCOUNTER — PRE VISIT (OUTPATIENT)
Dept: UROLOGY | Facility: CLINIC | Age: 64
End: 2019-07-01

## 2019-07-01 ENCOUNTER — OFFICE VISIT (OUTPATIENT)
Dept: UROLOGY | Facility: CLINIC | Age: 64
End: 2019-07-01
Payer: COMMERCIAL

## 2019-07-01 VITALS
SYSTOLIC BLOOD PRESSURE: 144 MMHG | WEIGHT: 200 LBS | HEART RATE: 82 BPM | HEIGHT: 69 IN | DIASTOLIC BLOOD PRESSURE: 91 MMHG | BODY MASS INDEX: 29.62 KG/M2

## 2019-07-01 DIAGNOSIS — C61 PROSTATE CANCER (H): Primary | ICD-10-CM

## 2019-07-01 ASSESSMENT — PAIN SCALES - GENERAL: PAINLEVEL: NO PAIN (0)

## 2019-07-01 ASSESSMENT — MIFFLIN-ST. JEOR: SCORE: 1682.57

## 2019-07-01 NOTE — PROGRESS NOTES
Service Date: 07/01/2019      REASON FOR VISIT TODAY:  Prostate cancer.      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  The patient underwent and open radical prostatectomy in 2006 where pathology, per the patient, demonstrated Jg 7 disease with a positive margin.  He underwent salvage radiation in 2006 as well when his PSA was around 0.4, per the patient's recollection.  He notes his PSA was undetectable for a period of roughly 4 years but then the PSA started coming back and has been rising slowly since that time with an acceleration in the PSA changes in the last year or so.  The patient has been counseled on various options and has been scheduled for an Axumin PET scan later this week by his outside urologist.      Also of note, the patient does have a history of a bladder neck contracture and performs intermittent catheterization every other day to help keep things open.      PAST MEDICAL HISTORY:  Otherwise significant for the prostate cancer,  history of pulmonary embolism and rib fractures from coughing fits and appendicitis.      PAST SURGICAL HISTORY:  Includes the RRP, a lumbar fusion, a thoracotomy for the history of rib fractures and the pulmonic hernia, shoulder surgery x3, appendectomy and a cervical spine surgery.      MEDICATIONS:     1.  Zyrtec.   2.  Lisinopril.      ALLERGIES:  NO KNOWN DRUG ALLERGIES.      FAMILY HISTORY:  Noncontributory.      SOCIAL HISTORY:  Negative for tobacco.  Alcohol 1 drink a day.      REVIEW OF SYSTEMS:  Negative for fevers, chills, sweats, nausea, vomiting, unexplained weight changes.      PHYSICAL EXAMINATION:  His blood pressure 144/91, pulse is 82.  He is in no acute distress.      The patient's PSAs include values of 0.07 on 06/25/2015, 0.25 on 12/18/2015, 0.22 on 12/23/2016, 0.28 on 06/16/2017, 0.56 on 04/11/2018, 0.48 on 10/30/2018, 1.2 on 05/14/2019 and  1.4 on 05/23/2019.        The patient's PSA doubling time calculated from the 06/25/2015 to 05/14/2019 was 18 months and from 06/2017 to 05/14/2019, 12.5 months.      The patient has a bone scan from 09/29/2014 showing no evidence of metastatic disease.      ASSESSMENT AND PLAN:  Over half of today's 50-minute visit was spent counseling the patient regarding his prostate cancer.  I suggested to Dr. Hernandez that at this point we would agree with the Axumin PET scan as a next step to help determine where the location of the cells are that are the source of the PSA.  We discussed this can help determine further local recurrence versus distant disease.  This theoretically could affect treatment options as in certain circumstances, there may be a rationale for treating oligometastatic disease even potentially surgically.  We did discuss the limitations of the Axumin PET scan; however, in terms of both sensitivity and specificity.        We discussed other up imaging alternatives including conventional imaging with CT and bone scan versus choline-11 PET scan at [facility] and PSMA PET scans available at a handful of centers around the [place] and within the context of clinical trials.        At this point, the patient wishes to move forward with the Axumin PET scan, which again is very reasonable.  We will see what the results show and then make further plans after that.  I did  the patient that at some point in time, I anticipated he might need to be on hormone therapy.  It may just be a question of timing.  We also briefly discussed the androgen annihilation trial that we have open at the [place], but the patient's PSA doubling time needs to be less than 10 months to be eligible for this trial and the patient does not quite meet this criteria at this time.        Dr. Hernandez and his wife asked many good questions today and these were answered to their satisfaction.  We will see the patient back shortly after his  diagnosis Axumin PET scan.         CLARIBEL CACERES MD             D: 2019   T: 2019   MT: NAVARRO      Name:     JAYME PINEDA   MRN:      [id number]        Account:      [id number]   :      1955           Service Date: 2019      Document: [id number]

## 2019-07-01 NOTE — LETTER
7/1/2019       RE: Lucio Hernandez  [address] [facility] [address]     Dear Colleague,    Thank you for referring your patient, Lucio Hernandez, to the [facility] UROLOGY AND [facility] FOR PROSTATE AND UROLOGIC CANCERS at [facility]. Please see a copy of my visit note below.    Service Date: 07/01/2019      REASON FOR VISIT TODAY:  Prostate cancer.      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  The patient underwent and open radical prostatectomy in 2006 where pathology, per the patient, demonstrated [place] 7 disease with a positive margin.  He underwent salvage radiation in 2006 as well when his PSA was around 0.4, per the patient's recollection.  He notes his PSA was undetectable for a period of roughly 4 years but then the PSA started coming back and has been rising slowly since that time with an acceleration in the PSA changes in the last year or so.  The patient has been counseled on various options and has been scheduled for an Axumin PET scan later this week by his outside urologist.      Also of note, the patient does have a history of a bladder neck contracture and performs intermittent catheterization every other day to help keep things open.      PAST MEDICAL HISTORY:  Otherwise significant for the prostate cancer,  history of pulmonary embolism and rib fractures from coughing fits and appendicitis.      PAST SURGICAL HISTORY:  Includes the RRP, a lumbar fusion, a thoracotomy for the history of rib fractures and the pulmonic hernia, shoulder surgery x3, appendectomy and a cervical spine surgery.      MEDICATIONS:     1.  Zyrtec.   2.  Lisinopril.      ALLERGIES:  NO KNOWN DRUG ALLERGIES.      FAMILY HISTORY:  Noncontributory.      SOCIAL HISTORY:  Negative for tobacco.  Alcohol 1 drink a day.      REVIEW OF SYSTEMS:  Negative for fevers, chills, sweats, nausea,  vomiting, unexplained weight changes.      PHYSICAL EXAMINATION:  His blood pressure 144/91, pulse is 82.  He is in no acute distress.      The patient's PSAs include values of 0.07 on 06/25/2015, 0.25 on 12/18/2015, 0.22 on 12/23/2016, 0.28 on 06/16/2017, 0.56 on 04/11/2018, 0.48 on 10/30/2018, 1.2 on 05/14/2019 and 1.4 on 05/23/2019.        The patient's PSA doubling time calculated from the 06/25/2015 to 05/14/2019 was 18 months and from 06/2017 to 05/14/2019, 12.5 months.      The patient has a bone scan from 09/29/2014 showing no evidence of metastatic disease.      ASSESSMENT AND PLAN:  Over half of today's 50-minute visit was spent counseling the patient regarding his prostate cancer.  I suggested to Dr. Hernandez that at this point we would agree with the Axumin PET scan as a next step to help determine where the location of the cells are that are the source of the PSA.  We discussed this can help determine further local recurrence versus distant disease.  This theoretically could affect treatment options as in certain circumstances, there may be a rationale for treating oligometastatic disease even potentially surgically.  We did discuss the limitations of the Axumin PET scan; however, in terms of both sensitivity and specificity.        We discussed other up imaging alternatives including conventional imaging with CT and bone scan versus choline-11 PET scan at [facility] and PSMA PET scans available at a handful of centers around the [place] and within the context of clinical trials.        At this point, the patient wishes to move forward with the Axumin PET scan, which again is very reasonable.  We will see what the results show and then make further plans after that.  I did  the patient that at some point in time, I anticipated he might need to be on hormone therapy.  It may just be a question of timing.  We also briefly discussed the androgen annihilation trial that we have open at the [place], but  the patient's PSA doubling time needs to be less than 10 months to be eligible for this trial and the patient does not quite meet this criteria at this time.        Dr. Hernandez and his wife asked many good questions today and these were answered to their satisfaction.  We will see the patient back shortly after his diagnosis Axumin PET scan.         CLARIBEL SANDHU MD             D: 2019   T: 2019   MT: JH      Name:     JAYME HERNANDEZ   MRN:      [id number]        Account:      [id number]   :      1955           Service Date: 2019      Document: [id number]       Again, thank you for allowing me to participate in the care of your patient.      Sincerely,    Claribel Sandhu MD

## 2019-07-01 NOTE — PATIENT INSTRUCTIONS
Please make a appointment on 7/29 11am with      It was a pleasure meeting with you today.  Thank you for allowing me and my team the privilege of caring for you today.  YOU are the reason we are here, and I truly hope we provided you with the excellent service you deserve.  Please let us know if there is anything else we can do for you so that we can be sure you are leaving completely satisfied with your care experience.

## 2019-07-03 ENCOUNTER — TRANSFERRED RECORDS (OUTPATIENT)
Dept: HEALTH INFORMATION MANAGEMENT | Facility: CLINIC | Age: 64
End: 2019-07-03

## 2019-07-16 ENCOUNTER — PRE VISIT (OUTPATIENT)
Dept: UROLOGY | Facility: CLINIC | Age: 64
End: 2019-07-16

## 2019-07-29 ENCOUNTER — OFFICE VISIT (OUTPATIENT)
Dept: UROLOGY | Facility: CLINIC | Age: 64
End: 2019-07-29
Payer: COMMERCIAL

## 2019-07-29 VITALS
BODY MASS INDEX: 29.62 KG/M2 | HEART RATE: 65 BPM | SYSTOLIC BLOOD PRESSURE: 131 MMHG | WEIGHT: 200 LBS | HEIGHT: 69 IN | DIASTOLIC BLOOD PRESSURE: 79 MMHG

## 2019-07-29 DIAGNOSIS — C61 PROSTATE CANCER (H): Primary | ICD-10-CM

## 2019-07-29 DIAGNOSIS — C61 PROSTATE CANCER (H): ICD-10-CM

## 2019-07-29 LAB — PSA SERPL-MCNC: 1.45 UG/L (ref 0–4)

## 2019-07-29 ASSESSMENT — MIFFLIN-ST. JEOR: SCORE: 1682.57

## 2019-07-29 ASSESSMENT — PATIENT HEALTH QUESTIONNAIRE - PHQ9
SUM OF ALL RESPONSES TO PHQ QUESTIONS 1-9: 4
SUM OF ALL RESPONSES TO PHQ QUESTIONS 1-9: 4

## 2019-07-29 ASSESSMENT — PAIN SCALES - GENERAL: PAINLEVEL: MILD PAIN (2)

## 2019-07-29 NOTE — LETTER
7/29/2019       RE: Lucio Hernandez  [address] [facility] [address]     Dear Colleague,    Thank you for referring your patient, Lucio Hernandez, to the [facility] UROLOGY AND INST FOR PROSTATE AND UROLOGIC CANCERS at [facility]. Please see a copy of my visit note below.    Service Date: 07/29/2019      REASON FOR VISIT TODAY:  Prostate cancer.      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  The patient had a radical prostatectomy in 2006 demonstrating Jg 7 disease with a positive margin.  He had salvage radiation therapy in 2006 when his PSA was around 0.4 per the patient's recollection.  He notes his PSA was undetectable for 4 years, but then began coming back and recently has noted to have PSAs in the 1.2-1.4 range in May of this year.  He underwent an Axumin PET scan recently and comes in today in followup.      PHYSICAL EXAMINATION:  His blood pressure is 131/79.  Pulse is 65.  He is in no acute distress.      DATA:  The patient's Axumin PET scan shows 2 small areas of nodular uptake within the prostate bed without any evidence of lymphadenopathy or distant disease.  The patient had a PSA from today of 1.45.      ASSESSMENT AND PLAN:  Over half of today's 25 minute visit was spent counseling the patient regarding his prostate cancer.  I suggested to Dr. Hernandez that we are pleased to see there is no evidence of widely metastatic disease on his Axumin PET scan.  It appears to be a local recurrence in the pelvis, but unfortunately the patient already had salvage radiation therapy, and therefore salvage local options are limited.  We discussed options, including continued observation versus starting systemic hormone therapy versus participation in the androgen annihilation trial.  The patient is not quite a candidate for this at this point in time based on  his PSA doubling time, which does not appear to be less than 10 months at this time.  We will recheck a PSA in another couple of weeks, as the patient's PSA from today is the patient's first PSA in our system, and we would like to see what the rate of change is in our system before making further decisions.  We will have a phone appointment to go over those results in mid September.  The patient is in agreement with the plan, and we look forward to chatting with him about his PSA at that time.         CLARIBEL CACERES MD             D: 2019   T: 2019   MT: jori      Name:     JAYME PINEDA   MRN:      [id number]        Account:      [id number]   :      1955           Service Date: 2019      Document: [id number]

## 2019-07-29 NOTE — PATIENT INSTRUCTIONS
Please get PSA today  Please make a phone appointment in September with a PSA before       It was a pleasure meeting with you today.  Thank you for allowing me and my team the privilege of caring for you today.  YOU are the reason we are here, and I truly hope we provided you with the excellent service you deserve.  Please let us know if there is anything else we can do for you so that we can be sure you are leaving completely satisfied with your care experience.

## 2019-07-30 NOTE — PROGRESS NOTES
Service Date: 07/29/2019      REASON FOR VISIT TODAY:  Prostate cancer.      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  The patient had a radical prostatectomy in 2006 demonstrating [place] 7 disease with a positive margin.  He had salvage radiation therapy in 2006 when his PSA was around 0.4 per the patient's recollection.  He notes his PSA was undetectable for 4 years, but then began coming back and recently has noted to have PSAs in the 1.2-1.4 range in May of this year.  He underwent an Axumin PET scan recently and comes in today in followup.      PHYSICAL EXAMINATION:  His blood pressure is 131/79.  Pulse is 65.  He is in no acute distress.      DATA:  The patient's Axumin PET scan shows 2 small areas of nodular uptake within the prostate bed without any evidence of lymphadenopathy or distant disease.  The patient had a PSA from today of 1.45.      ASSESSMENT AND PLAN:  Over half of today's 25 minute visit was spent counseling the patient regarding his prostate cancer.  I suggested to Dr. Hernandez that we are pleased to see there is no evidence of widely metastatic disease on his Axumin PET scan.  It appears to be a local recurrence in the pelvis, but unfortunately the patient already had salvage radiation therapy, and therefore salvage local options are limited.  We discussed options, including continued observation versus starting systemic hormone therapy versus participation in the androgen annihilation trial.  The patient is not quite a candidate for this at this point in time based on his PSA doubling time, which does not appear to be less than 10 months at this time.  We will recheck a PSA in another couple of weeks, as the patient's PSA from today is the patient's first PSA in our system, and we would like to see what the rate of change is in our system before making further decisions.  We will  have a phone appointment to go over those results in mid September.  The patient is in agreement with the plan, and we look forward to chatting with him about his PSA at that time.         CLARIBEL CACERES MD             D: 2019   T: 2019   MT: jori      Name:     JAYME PINEDA   MRN:      [id number]        Account:      [id number]   :      1955           Service Date: 2019      Document: [id number]

## 2019-08-27 ENCOUNTER — PRE VISIT (OUTPATIENT)
Dept: UROLOGY | Facility: CLINIC | Age: 64
End: 2019-08-27

## 2019-08-27 DIAGNOSIS — C61 PROSTATE CANCER (H): Primary | ICD-10-CM

## 2019-08-27 NOTE — TELEPHONE ENCOUNTER
Chief Complaint PSA check     Records/Orders: PSA    Pt Contacted: called patient and left a message to please get PSA done 1 week before appointment     At Rooming: phone

## 2019-09-05 ENCOUNTER — HOSPITAL ENCOUNTER (OUTPATIENT)
Dept: LAB | Facility: CLINIC | Age: 64
Discharge: HOME | End: 2019-09-05
Admitting: UROLOGY
Payer: COMMERCIAL

## 2019-09-05 DIAGNOSIS — C61 PROSTATE CANCER (H): ICD-10-CM

## 2019-09-05 LAB — PSA SERPL-MCNC: 1.84 UG/L (ref 0–4)

## 2019-09-05 PROCEDURE — 36415 COLL VENOUS BLD VENIPUNCTURE: CPT | Performed by: UROLOGY

## 2019-09-05 PROCEDURE — 84153 ASSAY OF PSA TOTAL: CPT | Performed by: UROLOGY

## 2019-09-11 ENCOUNTER — VIRTUAL VISIT (OUTPATIENT)
Dept: UROLOGY | Facility: CLINIC | Age: 64
End: 2019-09-11
Payer: COMMERCIAL

## 2019-09-11 DIAGNOSIS — C61 PROSTATE CANCER (H): Primary | ICD-10-CM

## 2019-09-11 NOTE — PROGRESS NOTES
REASON FOR VISIT TODAY:  Prostate cancer.      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  The patient had a radical prostatectomy in 2006 demonstrating [place] 7 disease with a positive margin.  He had salvage radiation therapy in 2006 when his PSA was around 0.4 per the patient's recollection.  He notes his PSA was undetectable for 4 years, but then began coming back and recently has noted to have PSAs in the 1.2-1.4 range in May of this year.  He underwent an Axumin PET scan recently that shows no evidence of metastatic disease though there was uptake in the prostate bed.  PSA 7/29/19 was 1.45 ng/mL.  He has had a recent PSA.     OBJECTIVE:  PSA from 9/5/19 is 1.84 ng/mL    ASSESSMENT AND PLAN: Over half of today's 25-minute visit was spent counseling the patient regarding his prostate cancer.  The PSA doubling time from the most recent value back to 10/30/18 when the PSA was 0.48 ng/mL is calculated at 5.4 months.  I counseled the patient that this is a relatively short doubling time and that it would be reasonable to consider starting additional therapy at this time.  We discussed options including Lupron, vs. AFT-19 trial, vs Lupron and abiraterone or enzalutamide off lable for this indication.  I suggested he meet with Dr. Daniel Palomino to discuss further.   We will also have his PET scan uploaded and put into our system.

## 2019-09-13 ENCOUNTER — TELEPHONE (OUTPATIENT)
Dept: UROLOGY | Facility: CLINIC | Age: 64
End: 2019-09-13

## 2019-09-13 NOTE — TELEPHONE ENCOUNTER
ONCOLOGY INTAKE: Records Information      APPT INFORMATION: 28OCT19 Dr. Alejandro Palomino  Referring provider:  Dr. Srinath Sandhu  Referring provider s clinic:   Urology  Reason for visit/diagnosis:  Prostate Cancer  Has patient been notified of appointment date and time?: Yes    RECORDS INFORMATION:  Were the records received with the referral (via [employer])? Internal Referral    Has patient been seen for any external appt for this diagnosis? No    If yes, where? NA    Has patient had any imaging or procedures outside of [place] for this condition? No      If Yes, where? NA    ADDITIONAL INFORMATION:  [place] [id number]. Can be scheduled any day starting [id number] at any time of day. He requests that the appointment be rescheduled, even if we are unable to speak with him, and that we leave a detailed VM on his phone with the new appt details.

## 2019-09-16 NOTE — TELEPHONE ENCOUNTER
RECORDS STATUS - BREAST    RECORDS REQUESTED FROM: Internal Referral   DATE REQUESTED: In Epic   NOTES DETAILS STATUS   OFFICE NOTE from referring provider     OFFICE NOTE from medical oncologist     OFFICE NOTE from surgeon     OFFICE NOTE from radiation oncologist     DISCHARGE SUMMARY from hospital     DISCHARGE REPORT from the ER     OPERATIVE REPORT     MEDICATION LIST     CLINICAL TRIAL TREATMENTS TO DATE     LABS     PATHOLOGY REPORTS  (Tissue diagnosis, Stage, ER/MA percentage positive and intensity of staining, HER2 IHC, FISH, and all biopsies from breast and any distant metastasis)                     GENONOMIC TESTING     TYPE:   (Next Generation Sequencing, including Foundation One testing, and Oncotype score)     IMAGING (NEED IMAGES & REPORT)     CT SCANS     MRI     MAMMO     ULTRASOUND     PET     BONE SCAN     BRAIN MRI

## 2019-09-17 ENCOUNTER — TELEPHONE (OUTPATIENT)
Dept: ONCOLOGY | Facility: CLINIC | Age: 64
End: 2019-09-17

## 2019-09-17 NOTE — TELEPHONE ENCOUNTER
I left a voicemail for pt requesting a return call - sooner appt available per Melyssa MARX (inbasket below)       --INBASKET MESSAGE BELOW--    Yashira Szymanski Onc Adult New Patient Scheduling-[facility]      Hello!   Please see message below from Dr. Sandhu   Please call pt   Thanks!    Previous Messages      ----- Message -----   From: Melyssa Tan, RN   Sent: 9/17/2019   9:55 AM   To: Yashira Szymanski     Will you send them this message as well then? Thank you   ----- Message -----   From: Yashira Szymanski   Sent: 9/17/2019   9:53 AM   To: Melyssa Tan, RN     I sent it to the Onc scheduling pool and they were the ones that got him scheduled     ----- Message -----   From: Melyssa Tan, RN   Sent: 9/17/2019   9:49 AM   To: Yashira Szymanski     Did you schedule him with Candelario? See below.   ----- Message -----   From: Srinath Sandhu MD   Sent: 9/17/2019   8:06 AM   To: Melyssa Tan, RN     Also, if the patient wants to be seen sooner than 10/28/19, Dr. Palomino said he could see him sooner.  If the patient is comfortable with 10/28, that is fine too.  Thanks.     Bry

## 2019-09-20 NOTE — TELEPHONE ENCOUNTER
[facility] Call Center    Phone Message    May a detailed message be left on voicemail: yes    Reason for Call: Other: Patient calling because he was told Dr. Palomino would see him sooner. Unfortunately, we haven't been given a date and time. Would there be a specific date and time that Dr. Palomino would be able to see this patient prior to 10/28? The patient does not want to wait this long.     Action Taken: Message routed to:  [facility] ([facility] Zahira

## 2019-09-26 ENCOUNTER — TELEPHONE (OUTPATIENT)
Dept: ONCOLOGY | Facility: CLINIC | Age: 64
End: 2019-09-26

## 2019-09-30 ENCOUNTER — HEALTH MAINTENANCE LETTER (OUTPATIENT)
Age: 64
End: 2019-09-30

## 2019-10-15 ENCOUNTER — ONCOLOGY VISIT (OUTPATIENT)
Dept: ONCOLOGY | Facility: CLINIC | Age: 64
End: 2019-10-15
Attending: INTERNAL MEDICINE
Payer: COMMERCIAL

## 2019-10-15 VITALS
HEART RATE: 83 BPM | DIASTOLIC BLOOD PRESSURE: 77 MMHG | WEIGHT: 207.4 LBS | HEIGHT: 69 IN | BODY MASS INDEX: 30.72 KG/M2 | TEMPERATURE: 98.9 F | OXYGEN SATURATION: 98 % | SYSTOLIC BLOOD PRESSURE: 115 MMHG | RESPIRATION RATE: 16 BRPM

## 2019-10-15 DIAGNOSIS — C61 PROSTATE CANCER (H): Primary | ICD-10-CM

## 2019-10-15 PROBLEM — N35.919 URETHRAL STRICTURE: Status: ACTIVE | Noted: 2018-08-17

## 2019-10-15 PROBLEM — R33.9 RETENTION OF URINE: Status: ACTIVE | Noted: 2018-08-17

## 2019-10-15 PROBLEM — M48.00 SPINAL STENOSIS: Status: ACTIVE | Noted: 2018-08-17

## 2019-10-15 PROCEDURE — G0463 HOSPITAL OUTPT CLINIC VISIT: HCPCS | Mod: ZF

## 2019-10-15 PROCEDURE — 99205 OFFICE O/P NEW HI 60 MIN: CPT | Mod: ZP | Performed by: INTERNAL MEDICINE

## 2019-10-15 RX ORDER — CHLORAL HYDRATE 500 MG
2 CAPSULE ORAL DAILY
COMMUNITY
End: 2021-05-24

## 2019-10-15 ASSESSMENT — MIFFLIN-ST. JEOR: SCORE: 1716.39

## 2019-10-15 ASSESSMENT — PAIN SCALES - GENERAL: PAINLEVEL: NO PAIN (0)

## 2019-10-15 NOTE — LETTER
10/15/2019       RE: Lucio Hernandez  [address] [facility] [address]     Dear Colleague,    Thank you for referring your patient, Lucio Hernandez, to the [facility]. Please see a copy of my visit note below.    New patient consultation note    CC: Prostate cancer    HPI:T he patient had a radical prostatectomy in 2006 demonstrating Jg 7 disease with a positive margin.  He had salvage radiation therapy in 2006 when his PSA was around 0.4 per the patient's recollection.  He notes his PSA was undetectable for 4 years, but then began coming back and recently has noted to have PSAs in the 1.2-1.4 range in May of this year.  He underwent an Axumin PET scan recently that shows no evidence of metastatic disease though there was uptake in the prostate bed.  PSA 7/29/19 was 1.45 ng/mL.  He has had a recent PSA.    Had Prior history of urethral stricture. This may have been due to the prior radiation therapy and a Olsen Catheter complication postop.    Current symptoms include Progressive LUTS and a decreased stream  4/11/18 PSA = 0.56  10/30/18 PSA =0.48  5/14/19 PSA =1.2  5/29/19 PSA=1.4  7/29/19 PSA=1.45  9/5/19  PSA =1.84    PAST MEDICAL HISTORY:  Otherwise significant for the prostate cancer,  history of pulmonary embolism and rib fractures from coughing fits and appendicitis.      PAST SURGICAL HISTORY:  Includes the RRP, a lumbar fusion, a thoracotomy for the history of rib fractures and the pulmonic hernia, shoulder surgery x3, appendectomy and a cervical spine surgery.      MEDICATIONS:     1.  Zyrtec.   2.  Lisinopril.      ALLERGIES:  NO KNOWN DRUG ALLERGIES.      FAMILY HISTORY:  Noncontributory.      SOCIAL HISTORY:  Negative for tobacco.  Alcohol 1 drink a day.      REVIEW OF SYSTEMS:  Negative for fevers, chills, sweats, nausea, vomiting, unexplained weight changes.      Physical Exam  PHYSICAL EXAMINATION  Head NCAT  Neck Supple no nodes  Lungs CTA  Cor RRR no M  Abdomen  soft  Extremities no edema.   No skin lesions         6/28/2006 RP  FINAL PATHOLOGICAL DIAGNOSIS:  Modified bilateral pelvic lymph node dissection, bilateral nerve-sparing radical retropubic prostatectomy  Prostate with bilateral seminal vesicles, excision:  1.  Extensive adenocarcinoma, [place] primary grade 4 ( including  hypernephroid pattern), secondary grade 3 with tertiary pattern 5  (Jg grade 4 + 3 = total score 7 with tertiary pattern 5 also  present).  2.  Approximately 60% of gland is tumor.  3.  Positive margins, right and left side, focal margin positivity on  right, more abundant positive margin on the left and inferior and mid  location.  4.  Extensive background high grade prostatic intraepithelial neoplasia.    5.  Prominent perineural invasion and focal lymphovascular space  involvement identified.  6.  Negative bilateral seminal vesicles.  7.  Bilateral pelvic lymph nodes, negative for metastatic carcinoma (  immunoperoxidase stains performed).      PSA RESULTS        Results for CAIT PINEDA (MRN [id number]) as of 10/15/2019 14:05   Ref. Range 7/29/2019 12:56 9/5/2019 10:56   PSA Latest Ref Range: 0 - 4 ug/L 1.45 1.84     Impression and Plan    This is a 63-year-old gentleman with a serologic relapse of a [place] 4+3 = 7 with a tertiary pattern of Jg 5.  He has a doubling time that is under 9 months based on the most recent 2 measurements.  He is also status post salvage radiation therapy.    I spent approximately 1 hour with the patient and his wife discussing the systemic nature of his disease in light of the fact that he has progression after 2 forms of local therapy in addition to the rapid rate of doubling of the PSA.  We discussed in detail the various modalities of imaging and I relayed to him that many of our current imaging modalities are limited in their ability to  metastasis and the fact that even when patients have metastatic disease it is discoverable by standard  radiation techniques it is often a focal area that represents a systemic condition.    We then discussed the various modalities of hormonal therapy both investigational and standard.  I suggested to him that in the face of nonmetastatic serologic relapse that intermittent androgen deprivation therapy remains a standard of care.  We discussed the psychological physical and metabolic complications of androgen deprivation therapy which include cardiovascular risk weight gain development of insulin resistance and type 2 diabetes as well as osteoporosis.    We then discussed the investigational approach that we are undertaking here at the [facility] which includes a 3 arm study where in the control arm is androgen deprivation therapy for 1 year and the 2 study arms include treatment with androgen deprivation therapy plus and AR antagonist (apalutamide) versus the combination of Abiraterone plus apalutamide.    Patient was given an opportunity ask questions over the course of this visit I also indicated that he would be contacted by our study staff for consideration of enrollment into this clinical trial.    Alejandro Palomino MD

## 2019-10-15 NOTE — NURSING NOTE
"Oncology Rooming Note    October 15, 2019 1:58 PM   Lucio Hernandez is a 63 year old male who presents for:    Chief Complaint   Patient presents with     Oncology Clinic Visit     NEW; TJ Prostate Ca     Initial Vitals: /77   Pulse 83   Temp 98.9  F (37.2  C) (Oral)   Resp 16   Ht 1.753 m (5' 9.02\")   Wt 94.1 kg (207 lb 6.4 oz)   SpO2 98%   BMI 30.61 kg/m   Estimated body mass index is 30.61 kg/m  as calculated from the following:    Height as of this encounter: 1.753 m (5' 9.02\").    Weight as of this encounter: 94.1 kg (207 lb 6.4 oz). Body surface area is 2.14 meters squared.  No Pain (0) Comment: Data Unavailable   No LMP for male patient.  Allergies reviewed: Yes  Medications reviewed: Yes    Medications: Medication refills not needed today.  Pharm[facility] into [facility] [id number] [place] [id number]    Clinical concerns: No new concerns.       Katya Soto CMA              "

## 2019-10-22 ENCOUNTER — TELEPHONE (OUTPATIENT)
Dept: ONCOLOGY | Facility: CLINIC | Age: 64
End: 2019-10-22

## 2019-10-22 NOTE — TELEPHONE ENCOUNTER
Called and introduced self and clinical trial [id number] information to Lucio Mary.  Lucio is very interested in the clinical trial and verbalized agreement to have the informed consent form emailed to him at [email or web address].  Scheduled a phone call for [id number] to touch base with any questions or concerns regarding the clinical trial.    Lissa Butt  Clinical Research Coordinator-RN  Clinical Trials Office/[facility]  Desk Phone: [phone]   Pager: [phone]

## 2019-10-24 ENCOUNTER — TELEPHONE (OUTPATIENT)
Dept: ONCOLOGY | Facility: CLINIC | Age: 64
End: 2019-10-24

## 2019-10-24 NOTE — TELEPHONE ENCOUNTER
Called and spoke with Juventino Hernandez regarding clinical trial [id number].  Juventino had extensive questions and concerns the clinical trial and all were addressed with this phone call.  Made a plan for him to see Dr. Palomino on 11/04.    Lissa Butt  Clinical Research Coordinator-RN  Clinical Trials Office/[facility]  Desk Phone: [phone]   Pager: [phone]

## 2019-10-28 ENCOUNTER — PRE VISIT (OUTPATIENT)
Dept: ONCOLOGY | Facility: CLINIC | Age: 64
End: 2019-10-28

## 2019-11-04 ENCOUNTER — ONCOLOGY VISIT (OUTPATIENT)
Dept: ONCOLOGY | Facility: CLINIC | Age: 64
End: 2019-11-04
Attending: INTERNAL MEDICINE
Payer: COMMERCIAL

## 2019-11-04 ENCOUNTER — TELEPHONE (OUTPATIENT)
Facility: CLINIC | Age: 64
End: 2019-11-04

## 2019-11-04 ENCOUNTER — RESEARCH ENCOUNTER (OUTPATIENT)
Dept: ONCOLOGY | Facility: CLINIC | Age: 64
End: 2019-11-04

## 2019-11-04 VITALS
BODY MASS INDEX: 29.61 KG/M2 | HEIGHT: 69 IN | HEART RATE: 81 BPM | TEMPERATURE: 97.9 F | WEIGHT: 199.9 LBS | OXYGEN SATURATION: 97 % | SYSTOLIC BLOOD PRESSURE: 137 MMHG | RESPIRATION RATE: 16 BRPM | DIASTOLIC BLOOD PRESSURE: 84 MMHG

## 2019-11-04 DIAGNOSIS — C61 PROSTATE CANCER (H): Primary | ICD-10-CM

## 2019-11-04 DIAGNOSIS — C61 PROSTATE CANCER (H): ICD-10-CM

## 2019-11-04 LAB
ALBUMIN SERPL-MCNC: 4.2 G/DL (ref 3.4–5)
ALP SERPL-CCNC: 65 U/L (ref 40–150)
ALT SERPL W P-5'-P-CCNC: 36 U/L (ref 0–70)
ANION GAP SERPL CALCULATED.3IONS-SCNC: 7 MMOL/L (ref 3–14)
AST SERPL W P-5'-P-CCNC: 22 U/L (ref 0–45)
BASOPHILS # BLD AUTO: 0 10E9/L (ref 0–0.2)
BASOPHILS NFR BLD AUTO: 0.3 %
BILIRUB DIRECT SERPL-MCNC: 0.2 MG/DL (ref 0–0.2)
BILIRUB SERPL-MCNC: 0.6 MG/DL (ref 0.2–1.3)
BUN SERPL-MCNC: 22 MG/DL (ref 7–30)
CALCIUM SERPL-MCNC: 9.1 MG/DL (ref 8.5–10.1)
CHLORIDE SERPL-SCNC: 109 MMOL/L (ref 94–109)
CO2 SERPL-SCNC: 23 MMOL/L (ref 20–32)
CREAT SERPL-MCNC: 0.81 MG/DL (ref 0.66–1.25)
DIFFERENTIAL METHOD BLD: ABNORMAL
EOSINOPHIL # BLD AUTO: 0.2 10E9/L (ref 0–0.7)
EOSINOPHIL NFR BLD AUTO: 2.9 %
ERYTHROCYTE [DISTWIDTH] IN BLOOD BY AUTOMATED COUNT: 11.5 % (ref 10–15)
GFR SERPL CREATININE-BSD FRML MDRD: >90 ML/MIN/{1.73_M2}
GLUCOSE SERPL-MCNC: 92 MG/DL (ref 70–99)
HCT VFR BLD AUTO: 48.2 % (ref 40–53)
HGB BLD-MCNC: 16.7 G/DL (ref 13.3–17.7)
IMM GRANULOCYTES # BLD: 0 10E9/L (ref 0–0.4)
IMM GRANULOCYTES NFR BLD: 0.3 %
LYMPHOCYTES # BLD AUTO: 0.7 10E9/L (ref 0.8–5.3)
LYMPHOCYTES NFR BLD AUTO: 12 %
MCH RBC QN AUTO: 32.8 PG (ref 26.5–33)
MCHC RBC AUTO-ENTMCNC: 34.6 G/DL (ref 31.5–36.5)
MCV RBC AUTO: 95 FL (ref 78–100)
MONOCYTES # BLD AUTO: 0.5 10E9/L (ref 0–1.3)
MONOCYTES NFR BLD AUTO: 8.3 %
NEUTROPHILS # BLD AUTO: 4.5 10E9/L (ref 1.6–8.3)
NEUTROPHILS NFR BLD AUTO: 76.2 %
NRBC # BLD AUTO: 0 10*3/UL
NRBC BLD AUTO-RTO: 0 /100
PLATELET # BLD AUTO: 231 10E9/L (ref 150–450)
POTASSIUM SERPL-SCNC: 4.1 MMOL/L (ref 3.4–5.3)
PROT SERPL-MCNC: 7.2 G/DL (ref 6.8–8.8)
PSA SERPL-MCNC: 2.19 UG/L (ref 0–4)
RBC # BLD AUTO: 5.09 10E12/L (ref 4.4–5.9)
SODIUM SERPL-SCNC: 139 MMOL/L (ref 133–144)
WBC # BLD AUTO: 5.9 10E9/L (ref 4–11)

## 2019-11-04 PROCEDURE — 84153 ASSAY OF PSA TOTAL: CPT | Performed by: INTERNAL MEDICINE

## 2019-11-04 PROCEDURE — 80053 COMPREHEN METABOLIC PANEL: CPT | Performed by: INTERNAL MEDICINE

## 2019-11-04 PROCEDURE — 99214 OFFICE O/P EST MOD 30 MIN: CPT | Mod: ZP | Performed by: INTERNAL MEDICINE

## 2019-11-04 PROCEDURE — 84403 ASSAY OF TOTAL TESTOSTERONE: CPT | Performed by: INTERNAL MEDICINE

## 2019-11-04 PROCEDURE — G0463 HOSPITAL OUTPT CLINIC VISIT: HCPCS | Mod: ZF

## 2019-11-04 PROCEDURE — 85025 COMPLETE CBC W/AUTO DIFF WBC: CPT | Performed by: INTERNAL MEDICINE

## 2019-11-04 PROCEDURE — 36415 COLL VENOUS BLD VENIPUNCTURE: CPT

## 2019-11-04 PROCEDURE — 82248 BILIRUBIN DIRECT: CPT | Performed by: INTERNAL MEDICINE

## 2019-11-04 ASSESSMENT — PAIN SCALES - GENERAL: PAINLEVEL: MILD PAIN (2)

## 2019-11-04 ASSESSMENT — MIFFLIN-ST. JEOR: SCORE: 1682.37

## 2019-11-04 NOTE — NURSING NOTE
Chief Complaint   Patient presents with     Lab Only     Labs drawn via VPT BY MA in LAB.        Florencia Leone, RAHSMI

## 2019-11-04 NOTE — PROGRESS NOTES
[id number]: Informed Consent Note     The consent form, including purpose, risks and benefits, was reviewed with Lucio Hernandez, and all questions were answered before he signed the consent form. The patient understands that the study involves an active treatment phase as well as a post-treatment follow up phase.     Present during the discussion was  were Lucio Hernandez and Yarelis Hernandez. A copy of the signed form was provided to the patient. No procedures specific to this study were performed prior to the patient signing the consent form.    Consent Version Date: Version 7, dated 06/28/2019  Consent obtained by: Lissa Butt RN    Date: 11/04/2019  HIPAA authorization signed?: yes  HIPAA authorization version date: embedded into main informed consent form  Reproductive Evaluation     Subject name: Lucio Hernandez   I discussed with the patient that in order to participate in this study he must agree to use effective contraception during therapy and continuing  3 months following the last dose of study drug. Must also agree not to donate sperm during the study and for 3 months after receiving the last dose of study drug.. Examples of effective contraception were provided, including hormonal contraception, intrauterine devices, and double barrier contraception. He agreed to use effective contraception per the study's requirements.    Performance Status     Subject name: Lucio Hernandez   Eastern Cooperative Oncology Group (ECOG) Performance Status  GRADE ECOG PERFORMANCE STATUS   0 Fully active, able to carry on all pre-disease performance without restriction   1 Restricted in physically strenuous activity but ambulatory and able to carry out work of a light   2 Ambulatory and capable of all selfcare but unable to carry out any work activities; up and about more than 50% of waking hours   3 Capable of only limited selfcare; confined to bed or chair more than 50% of waking hours   4 Completely disabled; cannot carry on any  selfcare; totally confined to bed or chair   5 Dead     The patient was assessed on 11/04/2019 using the ECOG scale. ECOG Score: 0    Lissa Butt  Clinical Research Coordinator-RN  Clinical Trials Office/[facility]  Desk Phone: [phone]   Pager: [phone]

## 2019-11-04 NOTE — PROGRESS NOTES
Followup Visit Note  11-    CC: Prostate cancer  - here for followup    HPI:T he patient had a radical prostatectomy in 2006 demonstrating Jg 7 disease with a positive margin.  He had salvage radiation therapy in 2006 when his PSA was around 0.4 per the patient's recollection.  He notes his PSA was undetectable for 4 years, but then began coming back and recently has noted to have PSAs in the 1.2-1.4 range in May of this year.  He underwent an Axumin PET scan recently that shows no evidence of metastatic disease though there was uptake in the prostate bed.  PSA 7/29/19 was 1.45 ng/mL.  He has had a recent PSA.    Had Prior history of urethral stricture. This may have been due to the prior radiation therapy and a Olsen Catheter complication postop.    Current symptoms include Progressive LUTS and a decreased stream  4/11/18 PSA = 0.56  10/30/18 PSA =0.48  5/14/19 PSA =1.2  5/29/19 PSA=1.4  7/29/19 PSA=1.45  9/5/19  PSA =1.84  11/4/19 PSA=2.19    PAST MEDICAL HISTORY:  Otherwise significant for the prostate cancer,  history of pulmonary embolism and rib fractures from coughing fits and appendicitis.      PAST SURGICAL HISTORY:  Includes the RRP, a lumbar fusion, a thoracotomy for the history of rib fractures and the pulmonic hernia, shoulder surgery x3, appendectomy and a cervical spine surgery.      MEDICATIONS:     1.  Zyrtec.   2.  Lisinopril.      ALLERGIES:  NO KNOWN DRUG ALLERGIES.      FAMILY HISTORY:  Noncontributory.      SOCIAL HISTORY:  Negative for tobacco.  Alcohol 1 drink a day. Pt is an Otolaryngologist.      REVIEW OF SYSTEMS:  Negative for fevers, chills, sweats, nausea, vomiting, unexplained weight changes.      Physical Exam  Deferred today         6/28/2006 RP  FINAL PATHOLOGICAL DIAGNOSIS:  Modified bilateral pelvic lymph node dissection, bilateral nerve-sparing radical retropubic prostatectomy  Prostate with bilateral seminal vesicles, excision:  1.  Extensive adenocarcinoma, Jg  primary grade 4 ( including  hypernephroid pattern), secondary grade 3 with tertiary pattern 5  (Jg grade 4 + 3 = total score 7 with tertiary pattern 5 also  present).  2.  Approximately 60% of gland is tumor.  3.  Positive margins, right and left side, focal margin positivity on  right, more abundant positive margin on the left and inferior and mid  location.  4.  Extensive background high grade prostatic intraepithelial neoplasia.    5.  Prominent perineural invasion and focal lymphovascular space  involvement identified.  6.  Negative bilateral seminal vesicles.  7.  Bilateral pelvic lymph nodes, negative for metastatic carcinoma (  immunoperoxidase stains performed).      PSA RESULTS  5/14/19 PSA =1.2  5/29/19 PSA=1.4  7/29/19 PSA=1.45  9/5/19  PSA =1.84  11/4/19 PSA=2.19        Impression and Plan    This is a 63-year-old gentleman with a serologic relapse of a Jg 4+3 = 7 with a tertiary pattern of [place] 5.  He has a doubling time that is under 9 months based on the most recent 2 measurements.  He is also status post salvage radiation therapy.    The patient returned for followup today to discuss the AFT19 study, he has read the consent and has several questions related to it.     We spent about 35 minutes discussing this and the plan of treatment, followup and evaluation on the study.    Patient was given an opportunity ask questions over the course of this visit   He has opted to move forward with study enrollment and we will work with study staff to expedite the workup and initiation of treatment,    Alejandro Palomino MD

## 2019-11-04 NOTE — NURSING NOTE
"Oncology Rooming Note    November 4, 2019 8:20 AM   Lucio Hernandez is a 63 year old male who presents for:    Chief Complaint   Patient presents with     Oncology Clinic Visit     UMP RETURN- PROSTATE CA     Initial Vitals: /84 (BP Location: Right arm, Patient Position: Chair, Cuff Size: Adult Regular)   Pulse 81   Temp 97.9  F (36.6  C) (Oral)   Resp 16   Ht 1.753 m (5' 9.02\")   Wt 90.7 kg (199 lb 14.4 oz)   SpO2 97%   BMI 29.51 kg/m   Estimated body mass index is 29.51 kg/m  as calculated from the following:    Height as of this encounter: 1.753 m (5' 9.02\").    Weight as of this encounter: 90.7 kg (199 lb 14.4 oz). Body surface area is 2.1 meters squared.  Mild Pain (2) Comment: Data Unavailable   No LMP for male patient.  Allergies reviewed: Yes  Medications reviewed: Yes    Medications: Medication refills not needed today.  Pharmacy [facility] into [facility] [id number] [place] [id number]    Clinical concerns: No new concerns. Candelario was notified.      Guille Pereyra LPN            "

## 2019-11-08 ENCOUNTER — HOSPITAL ENCOUNTER (OUTPATIENT)
Dept: NUCLEAR MEDICINE | Facility: CLINIC | Age: 64
Setting detail: NUCLEAR MEDICINE
End: 2019-11-08
Attending: INTERNAL MEDICINE
Payer: COMMERCIAL

## 2019-11-08 ENCOUNTER — HOSPITAL ENCOUNTER (OUTPATIENT)
Dept: CT IMAGING | Facility: CLINIC | Age: 64
Discharge: HOME | End: 2019-11-08
Attending: INTERNAL MEDICINE | Admitting: INTERNAL MEDICINE
Payer: COMMERCIAL

## 2019-11-08 DIAGNOSIS — Z00.6 RESEARCH STUDY PATIENT: ICD-10-CM

## 2019-11-08 DIAGNOSIS — C61 PROSTATE CANCER (H): ICD-10-CM

## 2019-11-08 LAB — TESTOST SERPL-MCNC: 393 NG/DL (ref 240–950)

## 2019-11-08 PROCEDURE — 74177 CT ABD & PELVIS W/CONTRAST: CPT

## 2019-11-08 PROCEDURE — 78306 BONE IMAGING WHOLE BODY: CPT

## 2019-11-08 PROCEDURE — 34300033 ZZH RX 343: Performed by: INTERNAL MEDICINE

## 2019-11-08 PROCEDURE — A9503 TC99M MEDRONATE: HCPCS | Performed by: INTERNAL MEDICINE

## 2019-11-08 PROCEDURE — 25000128 H RX IP 250 OP 636: Performed by: INTERNAL MEDICINE

## 2019-11-08 PROCEDURE — 25000125 ZZHC RX 250: Performed by: INTERNAL MEDICINE

## 2019-11-08 PROCEDURE — 71260 CT THORAX DX C+: CPT

## 2019-11-08 RX ORDER — IOPAMIDOL 755 MG/ML
98 INJECTION, SOLUTION INTRAVASCULAR ONCE
Status: COMPLETED | OUTPATIENT
Start: 2019-11-08 | End: 2019-11-08

## 2019-11-08 RX ORDER — TC 99M MEDRONATE 20 MG/10ML
25 INJECTION, POWDER, LYOPHILIZED, FOR SOLUTION INTRAVENOUS ONCE
Status: COMPLETED | OUTPATIENT
Start: 2019-11-08 | End: 2019-11-08

## 2019-11-08 RX ADMIN — SODIUM CHLORIDE 68 ML: 9 INJECTION, SOLUTION INTRAVENOUS at 12:51

## 2019-11-08 RX ADMIN — TC 99M MEDRONATE 21.6 MCI.: 20 INJECTION, POWDER, LYOPHILIZED, FOR SOLUTION INTRAVENOUS at 12:15

## 2019-11-08 RX ADMIN — IOPAMIDOL 98 ML: 755 INJECTION, SOLUTION INTRAVENOUS at 12:51

## 2019-11-13 PROBLEM — R97.20 ELEVATED PROSTATE SPECIFIC ANTIGEN (PSA): Status: ACTIVE | Noted: 2019-11-13

## 2019-11-14 ENCOUNTER — ONCOLOGY VISIT (OUTPATIENT)
Dept: ONCOLOGY | Facility: CLINIC | Age: 64
End: 2019-11-14
Attending: PHYSICIAN ASSISTANT
Payer: COMMERCIAL

## 2019-11-14 ENCOUNTER — RESEARCH ENCOUNTER (OUTPATIENT)
Dept: ONCOLOGY | Facility: CLINIC | Age: 64
End: 2019-11-14

## 2019-11-14 ENCOUNTER — APPOINTMENT (OUTPATIENT)
Dept: LAB | Facility: CLINIC | Age: 64
End: 2019-11-14
Attending: PHYSICIAN ASSISTANT
Payer: COMMERCIAL

## 2019-11-14 VITALS
BODY MASS INDEX: 29.51 KG/M2 | DIASTOLIC BLOOD PRESSURE: 76 MMHG | OXYGEN SATURATION: 98 % | TEMPERATURE: 98.1 F | HEART RATE: 60 BPM | WEIGHT: 199.96 LBS | SYSTOLIC BLOOD PRESSURE: 114 MMHG

## 2019-11-14 DIAGNOSIS — C61 PROSTATE CANCER (H): ICD-10-CM

## 2019-11-14 DIAGNOSIS — R97.20 ELEVATED PROSTATE SPECIFIC ANTIGEN (PSA): ICD-10-CM

## 2019-11-14 DIAGNOSIS — C61 PROSTATE CANCER (H): Primary | ICD-10-CM

## 2019-11-14 LAB
ALBUMIN SERPL-MCNC: 3.8 G/DL (ref 3.4–5)
ALP SERPL-CCNC: 58 U/L (ref 40–150)
ALT SERPL W P-5'-P-CCNC: 32 U/L (ref 0–70)
AST SERPL W P-5'-P-CCNC: 21 U/L (ref 0–45)
BASOPHILS # BLD AUTO: 0 10E9/L (ref 0–0.2)
BASOPHILS NFR BLD AUTO: 0.8 %
BILIRUB DIRECT SERPL-MCNC: 0.2 MG/DL (ref 0–0.2)
BILIRUB SERPL-MCNC: 0.6 MG/DL (ref 0.2–1.3)
DIFFERENTIAL METHOD BLD: ABNORMAL
EOSINOPHIL # BLD AUTO: 0.1 10E9/L (ref 0–0.7)
EOSINOPHIL NFR BLD AUTO: 2.6 %
ERYTHROCYTE [DISTWIDTH] IN BLOOD BY AUTOMATED COUNT: 11.8 % (ref 10–15)
HCT VFR BLD AUTO: 46.4 % (ref 40–53)
HGB BLD-MCNC: 16 G/DL (ref 13.3–17.7)
IMM GRANULOCYTES # BLD: 0 10E9/L (ref 0–0.4)
IMM GRANULOCYTES NFR BLD: 0.2 %
LYMPHOCYTES # BLD AUTO: 1 10E9/L (ref 0.8–5.3)
LYMPHOCYTES NFR BLD AUTO: 19.3 %
MCH RBC QN AUTO: 33.3 PG (ref 26.5–33)
MCHC RBC AUTO-ENTMCNC: 34.5 G/DL (ref 31.5–36.5)
MCV RBC AUTO: 97 FL (ref 78–100)
MONOCYTES # BLD AUTO: 0.4 10E9/L (ref 0–1.3)
MONOCYTES NFR BLD AUTO: 8.1 %
NEUTROPHILS # BLD AUTO: 3.4 10E9/L (ref 1.6–8.3)
NEUTROPHILS NFR BLD AUTO: 69 %
NRBC # BLD AUTO: 0 10*3/UL
NRBC BLD AUTO-RTO: 0 /100
PLATELET # BLD AUTO: 249 10E9/L (ref 150–450)
PLATELET # BLD EST: ABNORMAL 10*3/UL
PROT SERPL-MCNC: 6.8 G/DL (ref 6.8–8.8)
PSA SERPL-MCNC: 2.27 UG/L (ref 0–4)
RBC # BLD AUTO: 4.8 10E12/L (ref 4.4–5.9)
T4 FREE SERPL-MCNC: 1.01 NG/DL (ref 0.76–1.46)
TSH SERPL DL<=0.005 MIU/L-ACNC: 5.42 MU/L (ref 0.4–4)
WBC # BLD AUTO: 4.9 10E9/L (ref 4–11)

## 2019-11-14 PROCEDURE — 84439 ASSAY OF FREE THYROXINE: CPT | Performed by: PHYSICIAN ASSISTANT

## 2019-11-14 PROCEDURE — 80076 HEPATIC FUNCTION PANEL: CPT | Performed by: PHYSICIAN ASSISTANT

## 2019-11-14 PROCEDURE — 84153 ASSAY OF PSA TOTAL: CPT | Performed by: PHYSICIAN ASSISTANT

## 2019-11-14 PROCEDURE — 85025 COMPLETE CBC W/AUTO DIFF WBC: CPT | Performed by: PHYSICIAN ASSISTANT

## 2019-11-14 PROCEDURE — 25000128 H RX IP 250 OP 636: Mod: ZF | Performed by: INTERNAL MEDICINE

## 2019-11-14 PROCEDURE — 84403 ASSAY OF TOTAL TESTOSTERONE: CPT | Performed by: PHYSICIAN ASSISTANT

## 2019-11-14 PROCEDURE — 96402 CHEMO HORMON ANTINEOPL SQ/IM: CPT

## 2019-11-14 PROCEDURE — 84443 ASSAY THYROID STIM HORMONE: CPT | Performed by: PHYSICIAN ASSISTANT

## 2019-11-14 PROCEDURE — 99214 OFFICE O/P EST MOD 30 MIN: CPT | Mod: ZP | Performed by: PHYSICIAN ASSISTANT

## 2019-11-14 PROCEDURE — 36415 COLL VENOUS BLD VENIPUNCTURE: CPT

## 2019-11-14 PROCEDURE — G0463 HOSPITAL OUTPT CLINIC VISIT: HCPCS | Mod: ZF

## 2019-11-14 RX ORDER — FLUTICASONE PROPIONATE 50 MCG
2 SPRAY, SUSPENSION (ML) NASAL DAILY
COMMUNITY

## 2019-11-14 RX ORDER — LORAZEPAM 0.5 MG/1
0.5 TABLET ORAL EVERY 4 HOURS PRN
Qty: 30 TABLET | Refills: 0 | Status: SHIPPED | OUTPATIENT
Start: 2019-11-14 | End: 2021-05-24

## 2019-11-14 RX ADMIN — DEGARELIX 240 MG: KIT at 12:11

## 2019-11-14 ASSESSMENT — PAIN SCALES - GENERAL: PAINLEVEL: NO PAIN (0)

## 2019-11-14 NOTE — NURSING NOTE
"Oncology Rooming Note    November 14, 2019 11:00 AM   Lucio Hernandez is a 64 year old male who presents for:    Chief Complaint   Patient presents with     Blood Draw     Labs drawn via VPT by MA in LAB. VS taken. Pt. checked in for appt.      Initial Vitals: /76   Pulse 60   Temp 98.1  F (36.7  C) (Oral)   Wt 90.7 kg (199 lb 15.3 oz)   SpO2 98%   BMI 29.51 kg/m   Estimated body mass index is 29.51 kg/m  as calculated from the following:    Height as of 11/4/19: 1.753 m (5' 9.02\").    Weight as of this encounter: 90.7 kg (199 lb 15.3 oz). Body surface area is 2.1 meters squared.  No Pain (0) Comment: Data Unavailable   No LMP for male patient.  Allergies reviewed: Yes  Medications reviewed: Yes    Medications: Medication refills not needed today.  Pharmacy name entered into [employer] [id number] [place] [id number] [address]    Clinical concerns: No new concerns today. Brandi Soto was notified.      MACARENA SARMIENTO, RASHMI            "

## 2019-11-14 NOTE — NURSING NOTE
Chief Complaint   Patient presents with     Blood Draw     Labs drawn via VPT by MA in LAB. VS taken. Pt. checked in for appt.        Florencia Leone, CMA

## 2019-11-14 NOTE — NURSING NOTE
C1D1 Firmagon administered by RN in clinic as two 120mg injections. Given by deep subcutaneous route in BL abdominal tissue. Patient tolerated injections well. Ice pack applied. Patient left clinic by foot accompanied by spouse.      Charlee Salas, VADIMN, RN, PHN  Clinic RN

## 2019-11-14 NOTE — PROGRESS NOTES
Followup Visit Note  Nov 14, 2019    CC: Prostate cancer     HPI: Lucio Hernandez is a 64 year old male who has prostate cancer. He had a radical prostatectomy in 2006 demonstrating [place] 7 disease with a positive margin.  He had salvage radiation therapy in 2006 when his PSA was around 0.4 per the patient's recollection.  He notes his PSA was undetectable for 4 years, but then began coming back and recently has noted to have PSAs in the 1.2-1.4 range in May of this year.  He underwent an Axumin PET scan recently that shows no evidence of metastatic disease though there was uptake in the prostate bed.      Had Prior history of urethral stricture. This may have been due to the prior radiation therapy and a Olsen Catheter complication postop.    4/11/18 PSA = 0.56  10/30/18 PSA =0.48  5/14/19 PSA =1.2  5/29/19 PSA=1.4  7/29/19 PSA=1.45  9/5/19  PSA =1.84  11/4/19 PSA=2.19    INTERIM HISTORY:  Lucio presents today with his wife Yarelis prior to starting on clinical trial.     He has been trying to lose weight, doing so by improving diet (limiting calories) and exericse (8-10 hours per week). He is trying to get down to his college weight.     His urinary stream continues to be decreased. He still straight caths every other day (has been doing this for 4 years now). Otherwise feeling well and no problems. Denies any pain. Eating and drinking normal. No f/c or night sweats.     ROS: 10 point ROS neg other than the symptoms noted above in the HPI.    PAST MEDICAL HISTORY:  Otherwise significant for the prostate cancer,  history of pulmonary embolism and rib fractures from coughing fits and appendicitis.      PAST SURGICAL HISTORY:  Includes the RRP, a lumbar fusion, a thoracotomy for the history of rib fractures and the pulmonic hernia, shoulder surgery x3, appendectomy and a cervical spine surgery.      MEDICATIONS:     1.  Zyrtec.   2.  Lisinopril.      ALLERGIES:  NO KNOWN DRUG ALLERGIES.      FAMILY HISTORY:   Noncontributory.      SOCIAL HISTORY:  Negative for tobacco.  Alcohol 1 drink a day. Pt is a retired Otolaryngologist (ENT surgeon).      REVIEW OF SYSTEMS:  Negative for fevers, chills, sweats, nausea, vomiting, unexplained weight changes.      Physical Exam  /76   Pulse 60   Temp 98.1  F (36.7  C) (Oral)   Wt 90.7 kg (199 lb 15.3 oz)   SpO2 98%   BMI 29.51 kg/m    Wt Readings from Last 4 Encounters:   11/14/19 90.7 kg (199 lb 15.3 oz)   11/04/19 90.7 kg (199 lb 14.4 oz)   10/15/19 94.1 kg (207 lb 6.4 oz)   07/29/19 90.7 kg (200 lb)     General: Pleasant male in no acute distress  HEENT: EOMI, PERRLA, no scleral icterus, mucus membranes moist and no lesions or thrush  Lymph: Neck is supple and shows no nodes in cervical or supraclavicular   Heart:  RRR, no murmur, gallop or rub  Lungs: CTA-B, no wheezes, rhonchi or rales  Abdomen: Normal bowel sounds, soft, non tender, not distended, no rebound or guarding, no palpable masses  Extremities: No pitting edema  Skin: No significant rashes or lesions  Neuro: CN II-XII are intact    Laboratory Data   11/14/2019 10:44   Albumin 3.8   Protein Total 6.8   Bilirubin Total 0.6   Alkaline Phosphatase 58   ALT 32   AST 21   Bilirubin Direct 0.2   PSA 2.27   T4 Free 1.01   TSH 5.42 (H)   WBC 4.9   Hemoglobin 16.0   Hematocrit 46.4   Platelet Count 249   RBC Count 4.80   MCV 97   MCH 33.3 (H)   MCHC 34.5   RDW 11.8   Diff Method Automated Method   % Neutrophils 69.0   % Lymphocytes 19.3   % Monocytes 8.1   % Eosinophils 2.6   % Basophils 0.8   % Immature Granulocytes 0.2   Nucleated RBCs 0   Absolute Neutrophil 3.4   Absolute Lymphocytes 1.0   Absolute Monocytes 0.4   Absolute Eosinophils 0.1   Absolute Basophils 0.0   Abs Immature Granulocytes 0.0   Absolute Nucleated RBC 0.0   Platelet Estimate Confirming automated cell count     CT CAP dated 11/8/19  IMPRESSION: No metastatic disease demonstrated.    NM Bone Scan dated 11/8/19  IMPRESSION: No scintigraphic evidence  of osseous metastatic disease.     Impression and Plan  This is a 64-year-old gentleman with a serologic relapse of a [place] 4+3 = 7 with a tertiary pattern of [place] 5.  He has a doubling time that is under 9 months based on the most recent 2 measurements.  He is also status post salvage radiation therapy.    Prostate Cancer  -enrolled in AFT19 study with apalutamide and degarelix. Apalutamide is 240 mg daily. Will give loading dose of degarelix today (240 mg) and then continue with 80 mg monthly.   -fairly asymptomatic currently. PSA slightly higher today   -reviewed side effects and overall plan  -will follow-up per trial protocol    Hypothyroidism  -TSH is elevated today though T4 is normal. Will continue to monitor at this point. Would start synthroid if T4 was low or TSH > 10.     Brandi Soto PA-C  [facility]  [address], [place] [id number] [phone]

## 2019-11-14 NOTE — PROGRESS NOTES
[id number]: Study Visit Note   Subject name: Lucio Hernandez     Visit: Cycle 1 Day 1    Did the study visit occur within the appropriate window allowed by the protocol? yes    Since the last study visit, He has been doing well.     I have personally interviewed Lucio Hernandez and reviewed his medical record for adverse events and concomitant medications and these have been recorded on the corresponding logs in Lucio Hernandez's research file.     Lucio Hernandez was given the opportunity to ask any trial related questions.  Please see provider progress note for physical exam and other clinical information. Labs were reviewed - any significant lab values were addressed and reviewed.    HRQOL Assessments completed by patient.        [id number]: Medication Count/IDS Note      Lucio Hernandez is enrolled on the trial number listed above. The patient presented for his Cycle 1 Day 1 day visit.   IDS number: [id number]  Drug name: Apalutamide     Number of Bottles dispensed: 1     60 mg study drug Dispensed  Lot number([id number]  Bottle numbers: [id number]  Number of Pills dispensed: 120     Drug Diary dispensed to study subject: yes     Lissa Butt  Clinical Research Coordinator-SG, RN, CPN  Clinical Trials Office/[facility]  Desk Phone: [phone]   Pager: [phone]

## 2019-11-14 NOTE — LETTER
11/14/2019      RE: Lucio Hernandez  [address] [facility] [address]       Followup Visit Note  Nov 14, 2019    CC: Prostate cancer     HPI: Lucio Hernandez is a 64 year old male who has prostate cancer. He had a radical prostatectomy in 2006 demonstrating [place] 7 disease with a positive margin.  He had salvage radiation therapy in 2006 when his PSA was around 0.4 per the patient's recollection.  He notes his PSA was undetectable for 4 years, but then began coming back and recently has noted to have PSAs in the 1.2-1.4 range in May of this year.  He underwent an Axumin PET scan recently that shows no evidence of metastatic disease though there was uptake in the prostate bed.      Had Prior history of urethral stricture. This may have been due to the prior radiation therapy and a Olsen Catheter complication postop.    4/11/18 PSA = 0.56  10/30/18 PSA =0.48  5/14/19 PSA =1.2  5/29/19 PSA=1.4  7/29/19 PSA=1.45  9/5/19  PSA =1.84  11/4/19 PSA=2.19    INTERIM HISTORY:  Lucio presents today with his wife Yarelis prior to starting on clinical trial.     He has been trying to lose weight, doing so by improving diet (limiting calories) and exericse (8-10 hours per week). He is trying to get down to his college weight.     His urinary stream continues to be decreased. He still straight caths every other day (has been doing this for 4 years now). Otherwise feeling well and no problems. Denies any pain. Eating and drinking normal. No f/c or night sweats.     ROS: 10 point ROS neg other than the symptoms noted above in the HPI.    PAST MEDICAL HISTORY:  Otherwise significant for the prostate cancer,  history of pulmonary embolism and rib fractures from coughing fits and appendicitis.      PAST SURGICAL HISTORY:  Includes the RRP, a lumbar fusion, a thoracotomy for the history of rib fractures and the pulmonic hernia, shoulder surgery x3, appendectomy and a cervical spine surgery.      MEDICATIONS:     1.  Zyrtec.   2.   Lisinopril.      ALLERGIES:  NO KNOWN DRUG ALLERGIES.      FAMILY HISTORY:  Noncontributory.      SOCIAL HISTORY:  Negative for tobacco.  Alcohol 1 drink a day. Pt is a retired Otolaryngologist (ENT surgeon).      REVIEW OF SYSTEMS:  Negative for fevers, chills, sweats, nausea, vomiting, unexplained weight changes.      Physical Exam  /76   Pulse 60   Temp 98.1  F (36.7  C) (Oral)   Wt 90.7 kg (199 lb 15.3 oz)   SpO2 98%   BMI 29.51 kg/m     Wt Readings from Last 4 Encounters:   11/14/19 90.7 kg (199 lb 15.3 oz)   11/04/19 90.7 kg (199 lb 14.4 oz)   10/15/19 94.1 kg (207 lb 6.4 oz)   07/29/19 90.7 kg (200 lb)     General: Pleasant male in no acute distress  HEENT: EOMI, PERRLA, no scleral icterus, mucus membranes moist and no lesions or thrush  Lymph: Neck is supple and shows no nodes in cervical or supraclavicular   Heart:  RRR, no murmur, gallop or rub  Lungs: CTA-B, no wheezes, rhonchi or rales  Abdomen: Normal bowel sounds, soft, non tender, not distended, no rebound or guarding, no palpable masses  Extremities: No pitting edema  Skin: No significant rashes or lesions  Neuro: CN II-XII are intact    Laboratory Data   11/14/2019 10:44   Albumin 3.8   Protein Total 6.8   Bilirubin Total 0.6   Alkaline Phosphatase 58   ALT 32   AST 21   Bilirubin Direct 0.2   PSA 2.27   T4 Free 1.01   TSH 5.42 (H)   WBC 4.9   Hemoglobin 16.0   Hematocrit 46.4   Platelet Count 249   RBC Count 4.80   MCV 97   MCH 33.3 (H)   MCHC 34.5   RDW 11.8   Diff Method Automated Method   % Neutrophils 69.0   % Lymphocytes 19.3   % Monocytes 8.1   % Eosinophils 2.6   % Basophils 0.8   % Immature Granulocytes 0.2   Nucleated RBCs 0   Absolute Neutrophil 3.4   Absolute Lymphocytes 1.0   Absolute Monocytes 0.4   Absolute Eosinophils 0.1   Absolute Basophils 0.0   Abs Immature Granulocytes 0.0   Absolute Nucleated RBC 0.0   Platelet Estimate Confirming automated cell count     CT CAP dated 11/8/19  IMPRESSION: No metastatic disease  demonstrated.    NM Bone Scan dated 11/8/19  IMPRESSION: No scintigraphic evidence of osseous metastatic disease.     Impression and Plan  This is a 64-year-old gentleman with a serologic relapse of a [place] 4+3 = 7 with a tertiary pattern of [place] 5.  He has a doubling time that is under 9 months based on the most recent 2 measurements.  He is also status post salvage radiation therapy.    Prostate Cancer  -enrolled in AFT19 study with apalutamide and degarelix. Apalutamide is 240 mg daily. Will give loading dose of degarelix today (240 mg) and then continue with 80 mg monthly.   -fairly asymptomatic currently. PSA slightly higher today   -reviewed side effects and overall plan  -will follow-up per trial protocol    Hypothyroidism  -TSH is elevated today though T4 is normal. Will continue to monitor at this point. Would start synthroid if T4 was low or TSH > 10.     Brandi Soto PA-C  [facility]  [address] [place] [id number]  [phone]          Brandi Soto PA-C

## 2019-11-15 LAB — TESTOST SERPL-MCNC: 386 NG/DL (ref 240–950)

## 2019-12-12 ENCOUNTER — ONCOLOGY VISIT (OUTPATIENT)
Dept: ONCOLOGY | Facility: CLINIC | Age: 64
End: 2019-12-12
Attending: INTERNAL MEDICINE
Payer: COMMERCIAL

## 2019-12-12 ENCOUNTER — APPOINTMENT (OUTPATIENT)
Dept: LAB | Facility: CLINIC | Age: 64
End: 2019-12-12
Attending: INTERNAL MEDICINE
Payer: COMMERCIAL

## 2019-12-12 ENCOUNTER — RESEARCH ENCOUNTER (OUTPATIENT)
Dept: ONCOLOGY | Facility: CLINIC | Age: 64
End: 2019-12-12

## 2019-12-12 VITALS
DIASTOLIC BLOOD PRESSURE: 77 MMHG | RESPIRATION RATE: 16 BRPM | BODY MASS INDEX: 28.07 KG/M2 | TEMPERATURE: 98.5 F | OXYGEN SATURATION: 97 % | SYSTOLIC BLOOD PRESSURE: 121 MMHG | HEART RATE: 63 BPM | WEIGHT: 190.2 LBS

## 2019-12-12 DIAGNOSIS — R97.20 ELEVATED PROSTATE SPECIFIC ANTIGEN (PSA): Primary | ICD-10-CM

## 2019-12-12 DIAGNOSIS — C61 PROSTATE CANCER (H): Primary | ICD-10-CM

## 2019-12-12 DIAGNOSIS — R97.20 ELEVATED PROSTATE SPECIFIC ANTIGEN (PSA): ICD-10-CM

## 2019-12-12 DIAGNOSIS — E03.2 HYPOTHYROIDISM DUE TO MEDICATION: ICD-10-CM

## 2019-12-12 DIAGNOSIS — C61 PROSTATE CANCER (H): ICD-10-CM

## 2019-12-12 PROCEDURE — 25000128 H RX IP 250 OP 636: Mod: ZF | Performed by: PHYSICIAN ASSISTANT

## 2019-12-12 PROCEDURE — G0463 HOSPITAL OUTPT CLINIC VISIT: HCPCS | Mod: ZF

## 2019-12-12 PROCEDURE — 96402 CHEMO HORMON ANTINEOPL SQ/IM: CPT

## 2019-12-12 PROCEDURE — 99214 OFFICE O/P EST MOD 30 MIN: CPT | Mod: ZP | Performed by: PHYSICIAN ASSISTANT

## 2019-12-12 PROCEDURE — 36415 COLL VENOUS BLD VENIPUNCTURE: CPT

## 2019-12-12 RX ORDER — LEVOTHYROXINE SODIUM 50 UG/1
50 TABLET ORAL DAILY
Qty: 30 TABLET | Refills: 0 | Status: SHIPPED | OUTPATIENT
Start: 2019-12-12 | End: 2020-01-09

## 2019-12-12 RX ADMIN — DEGARELIX 80 MG: KIT at 12:12

## 2019-12-12 ASSESSMENT — PAIN SCALES - GENERAL: PAINLEVEL: NO PAIN (0)

## 2019-12-12 NOTE — LETTER
"    RE: Lucio Hernandez  [address] [facility] [address]       Followup Visit Note  Dec 12, 2019    CC: Prostate cancer     HPI: Lucio Hernandez is a 64 year old male who has prostate cancer. He had a radical prostatectomy in 2006 demonstrating [place] 7 disease with a positive margin.  He had salvage radiation therapy in 2006 when his PSA was around 0.4 per the patient's recollection.  He notes his PSA was undetectable for 4 years, but then began coming back and recently has noted to have PSAs in the 1.2-1.4 range in May of this year.  He underwent an Axumin PET scan recently that shows no evidence of metastatic disease though there was uptake in the prostate bed.      Had Prior history of urethral stricture. This may have been due to the prior radiation therapy and a Olsen Catheter complication postop.    4/11/18 PSA = 0.56  10/30/18 PSA =0.48  5/14/19 PSA =1.2  5/29/19 PSA=1.4  7/29/19 PSA=1.45  9/5/19  PSA =1.84  11/4/19 PSA=2.19  11/14/19 PSA= 5.42    INTERIM HISTORY:  Lucio presents today with his wife Yarelis for cycle 2 clinical trial.     Has had some taste changes, especially with carbonated beverages and milk. He is also having more difficulties with memory and rapid recall. He is having more fatigue, worse the first week and now better; overall mild. Still doing cardio and strength training about 4x/week. Still doing all his regular activities. No myalgias. Having \"warm\" flashes for about a minute a couple times a day. Emotions are the same. No N/V.  Stools are normal. He states that he will have restlessness at night which he has had for a long time though it is worse. It is getting a little better now. Going to sleep helps the feelings resolve. Has some anal itching as well.     ROS: 10 point ROS neg other than the symptoms noted above in the HPI.    PAST MEDICAL HISTORY:  Otherwise significant for the prostate cancer,  history of pulmonary embolism and rib fractures from coughing fits and " appendicitis.      PAST SURGICAL HISTORY:  Includes the RRP, a lumbar fusion, a thoracotomy for the history of rib fractures and the pulmonic hernia, shoulder surgery x3, appendectomy and a cervical spine surgery.      MEDICATIONS:     1.  Zyrtec.   2.  Lisinopril.      ALLERGIES:  NO KNOWN DRUG ALLERGIES.      FAMILY HISTORY:  Noncontributory.      SOCIAL HISTORY:  Negative for tobacco.  Alcohol 1 drink a day. Pt is a retired Otolaryngologist (ENT surgeon).      REVIEW OF SYSTEMS:  Negative for fevers, chills, sweats, nausea, vomiting, unexplained weight changes.      Physical Exam  /77   Pulse 63   Temp 98.5  F (36.9  C) (Oral)   Resp 16   Wt 86.3 kg (190 lb 3.2 oz)   SpO2 97%   BMI 28.07 kg/m     Wt Readings from Last 4 Encounters:   12/12/19 86.3 kg (190 lb 3.2 oz)   11/14/19 90.7 kg (199 lb 15.3 oz)   11/04/19 90.7 kg (199 lb 14.4 oz)   10/15/19 94.1 kg (207 lb 6.4 oz)     General: Pleasant male in no acute distress  HEENT: EOMI, PERRLA, no scleral icterus, mucus membranes moist and no lesions or thrush  Lymph: Neck is supple and shows no nodes in cervical or supraclavicular   Heart:  RRR, no murmur, gallop or rub  Lungs: CTA-B, no wheezes, rhonchi or rales  Abdomen: Normal bowel sounds, soft, non tender, not distended, no rebound or guarding, no palpable masses  Extremities: No pitting edema  Skin: No significant rashes or lesions  Neuro: CN II-XII are intact    Laboratory Data   12/12/2019 10:54   PSA 0.04   T4 Free 0.84   TSH 11.20 (H)      11/14/2019 10:44 12/12/2019 10:54   TSH 5.42 (H) 11.20 (H)     Impression and Plan  This is a 64-year-old gentleman with a serologic relapse of a [place] 4+3 = 7 with a tertiary pattern of [place] 5.  He has a doubling time that is under 9 months based on the most recent 2 measurements.  He is also status post salvage radiation therapy.    Prostate Cancer  -enrolled in AFT19 study with apalutamide and degarelix. Apalutamide is 240 mg daily. -tolerating overall  well with some taste changes, mild fatigue and memory fog.   -will continue with treatment today. Will give degarelix 80 mg. Continue monthly   -will follow-up per trial protocol    Restlessness Leg with Anal Itching   -should look externally make there is no anatomical abnormalities  -could test for pin worms if it doesn't resolve     Urinary Retention  -self cathing every other day. Symptoms may be a little better     Hypothyroidism  -TSH > 10 today (11.20). Will start synthroid 50 mcg daily. Will adjust the dose in 6 weeks if needed (~1/23)    Brandi Soto PA-C  [facility]  [address] [place] [address] [phone]

## 2019-12-12 NOTE — NURSING NOTE
"Oncology Rooming Note    December 12, 2019 11:09 AM   Lucio Hernandez is a 64 year old male who presents for:    Chief Complaint   Patient presents with     Lab Only     labs drawn via , vitals completed     Initial Vitals: /77   Pulse 63   Temp 98.5  F (36.9  C) (Oral)   Resp 16   Wt 86.3 kg (190 lb 3.2 oz)   SpO2 97%   BMI 28.07 kg/m   Estimated body mass index is 28.07 kg/m  as calculated from the following:    Height as of 11/4/19: 1.753 m (5' 9.02\").    Weight as of this encounter: 86.3 kg (190 lb 3.2 oz). Body surface area is 2.05 meters squared.  No Pain (0) Comment: Data Unavailable   No LMP for male patient.  Allergies reviewed: Yes  Medications reviewed: Yes    Medications: Medication refills not needed today.  Pharmacy name entered into Gl[employer] App: [facility] [id number] [place] [phone]    Clinical concerns: Lab Results and questions about the study drug and side affects.       Clifton Augustin, EMT              "

## 2019-12-12 NOTE — PROGRESS NOTES
[id number]: Study Visit Note   Subject name: Lucio Hernandez     Visit: Cycle 2 Day 1    Did the study visit occur within the appropriate window allowed by the protocol? yes    Since the last study visit, He has been doing well.     I have personally interviewed Lucio Hernandez and reviewed his medical record for adverse events and concomitant medications and these have been recorded on the corresponding logs in Lucio Hernandez's research file.     Lucio Hernandez was given the opportunity to ask any trial related questions.  Please see provider progress note for physical exam and other clinical information. Labs were reviewed - any significant lab values were addressed and reviewed.      [id number]: Medication Count/IDS Note      Lucio Hernandez is enrolled on the trial number listed above. The patient presented for his Cycle 2 Day 1 visit.   IDS number: [id number]  Drug name: Apalutamide    Number of Bottles dispensed: 1     60 mg study drug Dispensed  Lot number(s):[id number]  Bottle numbers: [id number]  Number of Pills dispensed: 120    Number of Bottles returned: 1     60 mg study drug Returned  Lot number(s):[id number]  Bottle numbers: [id number]  Number of Pills returned: 8     Medication Compliance: Subject diary reported taking 27 days worth of study medication.  Calendar days is 29 of study medication. Return would indicate taking 28 days worth of medication.  Re-educated subject on importance of precise daily medication diary adherence and taking medication as prescribed.     Drug Diary dispensed to study subject: yes     Drug diary returned: yes     Are there any discrepancies between the amount of medication the patient was instructed to take and the amount recorded as taken in the patient s drug diary? yes, see above     Are there any discrepancies between the amount of medication the patient has recorded as taken in the drug diary and the amount that would be expected to be returned based on the amount recorded as  taken? yes, see above    Lissa Butt  Clinical Research Coordinator-VADIMN, RN, CPN  Clinical Trials Office/[facility]  Desk Phone: [phone]   Pager: [phone]

## 2019-12-12 NOTE — NURSING NOTE
C2 Firmagon administered by RN in clinic. Given by deep subcutaneous route in Left Upper abdominal tissue. Patient tolerated injection well. Ice pack applied. Patient left clinic by foot accompanied by spouse.       Charlee Salas, BSN, RN, PHN  Clinic RN

## 2019-12-12 NOTE — PROGRESS NOTES
"Followup Visit Note  Dec 12, 2019    CC: Prostate cancer     HPI: Lucio Hernandez is a 64 year old male who has prostate cancer. He had a radical prostatectomy in 2006 demonstrating [place] 7 disease with a positive margin.  He had salvage radiation therapy in 2006 when his PSA was around 0.4 per the patient's recollection.  He notes his PSA was undetectable for 4 years, but then began coming back and recently has noted to have PSAs in the 1.2-1.4 range in May of this year.  He underwent an Axumin PET scan recently that shows no evidence of metastatic disease though there was uptake in the prostate bed.      Had Prior history of urethral stricture. This may have been due to the prior radiation therapy and a Olsen Catheter complication postop.    4/11/18 PSA = 0.56  10/30/18 PSA =0.48  5/14/19 PSA =1.2  5/29/19 PSA=1.4  7/29/19 PSA=1.45  9/5/19  PSA =1.84  11/4/19 PSA=2.19  11/14/19 PSA= 5.42    INTERIM HISTORY:  Lucio presents today with his wife Yarelis for cycle 2 clinical trial.     Has had some taste changes, especially with carbonated beverages and milk. He is also having more difficulties with memory and rapid recall. He is having more fatigue, worse the first week and now better; overall mild. Still doing cardio and strength training about 4x/week. Still doing all his regular activities. No myalgias. Having \"warm\" flashes for about a minute a couple times a day. Emotions are the same. No N/V.  Stools are normal. He states that he will have restlessness at night which he has had for a long time though it is worse. It is getting a little better now. Going to sleep helps the feelings resolve. Has some anal itching as well.     ROS: 10 point ROS neg other than the symptoms noted above in the HPI.    PAST MEDICAL HISTORY:  Otherwise significant for the prostate cancer,  history of pulmonary embolism and rib fractures from coughing fits and appendicitis.      PAST SURGICAL HISTORY:  Includes the RRP, a lumbar fusion, a " thoracotomy for the history of rib fractures and the pulmonic hernia, shoulder surgery x3, appendectomy and a cervical spine surgery.      MEDICATIONS:     1.  Zyrtec.   2.  Lisinopril.      ALLERGIES:  NO KNOWN DRUG ALLERGIES.      FAMILY HISTORY:  Noncontributory.      SOCIAL HISTORY:  Negative for tobacco.  Alcohol 1 drink a day. Pt is a retired Otolaryngologist (ENT surgeon).      REVIEW OF SYSTEMS:  Negative for fevers, chills, sweats, nausea, vomiting, unexplained weight changes.      Physical Exam  /77   Pulse 63   Temp 98.5  F (36.9  C) (Oral)   Resp 16   Wt 86.3 kg (190 lb 3.2 oz)   SpO2 97%   BMI 28.07 kg/m    Wt Readings from Last 4 Encounters:   12/12/19 86.3 kg (190 lb 3.2 oz)   11/14/19 90.7 kg (199 lb 15.3 oz)   11/04/19 90.7 kg (199 lb 14.4 oz)   10/15/19 94.1 kg (207 lb 6.4 oz)     General: Pleasant male in no acute distress  HEENT: EOMI, PERRLA, no scleral icterus, mucus membranes moist and no lesions or thrush  Lymph: Neck is supple and shows no nodes in cervical or supraclavicular   Heart:  RRR, no murmur, gallop or rub  Lungs: CTA-B, no wheezes, rhonchi or rales  Abdomen: Normal bowel sounds, soft, non tender, not distended, no rebound or guarding, no palpable masses  Extremities: No pitting edema  Skin: No significant rashes or lesions  Neuro: CN II-XII are intact    Laboratory Data   12/12/2019 10:54   PSA 0.04   T4 Free 0.84   TSH 11.20 (H)      11/14/2019 10:44 12/12/2019 10:54   TSH 5.42 (H) 11.20 (H)     Impression and Plan  This is a 64-year-old gentleman with a serologic relapse of a Jg 4+3 = 7 with a tertiary pattern of [place] 5.  He has a doubling time that is under 9 months based on the most recent 2 measurements.  He is also status post salvage radiation therapy.    Prostate Cancer  -enrolled in AFT19 study with apalutamide and degarelix. Apalutamide is 240 mg daily. -tolerating overall well with some taste changes, mild fatigue and memory fog.   -will continue with  treatment today. Will give degarelix 80 mg. Continue monthly   -will follow-up per trial protocol    Restlessness Leg with Anal Itching   -should look externally make there is no anatomical abnormalities  -could test for pin worms if it doesn't resolve     Urinary Retention  -self cathing every other day. Symptoms may be a little better     Hypothyroidism  -TSH > 10 today (11.20). Will start synthroid 50 mcg daily. Will adjust the dose in 6 weeks if needed (~1/23)    Brandi Soto PA-C  [facility]  [address] [place] [address] [id number] [phone]

## 2019-12-15 ENCOUNTER — HEALTH MAINTENANCE LETTER (OUTPATIENT)
Age: 64
End: 2019-12-15

## 2020-01-04 DIAGNOSIS — C61 PROSTATE CANCER (H): ICD-10-CM

## 2020-01-04 DIAGNOSIS — E03.2 HYPOTHYROIDISM DUE TO MEDICATION: ICD-10-CM

## 2020-01-06 RX ORDER — LEVOTHYROXINE SODIUM 50 UG/1
TABLET ORAL
Qty: 30 TABLET | Refills: 0 | OUTPATIENT
Start: 2020-01-06

## 2020-01-06 NOTE — TELEPHONE ENCOUNTER
Seeing patient this week. Before refilling, want to see TSH prior to  make sure dose is appropriate.

## 2020-01-09 ENCOUNTER — ONCOLOGY VISIT (OUTPATIENT)
Dept: ONCOLOGY | Facility: CLINIC | Age: 65
End: 2020-01-09
Attending: INTERNAL MEDICINE
Payer: MEDICARE

## 2020-01-09 ENCOUNTER — RESEARCH ENCOUNTER (OUTPATIENT)
Dept: ONCOLOGY | Facility: CLINIC | Age: 65
End: 2020-01-09

## 2020-01-09 ENCOUNTER — APPOINTMENT (OUTPATIENT)
Dept: LAB | Facility: CLINIC | Age: 65
End: 2020-01-09
Attending: INTERNAL MEDICINE
Payer: MEDICARE

## 2020-01-09 VITALS
BODY MASS INDEX: 27.9 KG/M2 | SYSTOLIC BLOOD PRESSURE: 130 MMHG | TEMPERATURE: 99.3 F | OXYGEN SATURATION: 97 % | RESPIRATION RATE: 16 BRPM | DIASTOLIC BLOOD PRESSURE: 75 MMHG | WEIGHT: 189 LBS | HEART RATE: 65 BPM

## 2020-01-09 DIAGNOSIS — C61 PROSTATE CANCER (H): Primary | ICD-10-CM

## 2020-01-09 DIAGNOSIS — R97.20 ELEVATED PROSTATE SPECIFIC ANTIGEN (PSA): ICD-10-CM

## 2020-01-09 DIAGNOSIS — E03.2 HYPOTHYROIDISM DUE TO MEDICATION: ICD-10-CM

## 2020-01-09 DIAGNOSIS — A09 TRAVELER'S DIARRHEA: ICD-10-CM

## 2020-01-09 LAB
ALT SERPL W P-5'-P-CCNC: 35 U/L (ref 0–70)
AST SERPL W P-5'-P-CCNC: 21 U/L (ref 0–45)
BILIRUB SERPL-MCNC: 0.5 MG/DL (ref 0.2–1.3)
CREAT BLD-MCNC: 0.8 MG/DL (ref 0.66–1.25)
GFR SERPL CREATININE-BSD FRML MDRD: >90 ML/MIN/{1.73_M2}
PSA SERPL-MCNC: <0.01 UG/L (ref 0–4)
T4 FREE SERPL-MCNC: 0.92 NG/DL (ref 0.76–1.46)
TSH SERPL DL<=0.005 MIU/L-ACNC: 6.93 MU/L (ref 0.4–4)

## 2020-01-09 PROCEDURE — 36415 COLL VENOUS BLD VENIPUNCTURE: CPT

## 2020-01-09 PROCEDURE — 84403 ASSAY OF TOTAL TESTOSTERONE: CPT | Performed by: PHYSICIAN ASSISTANT

## 2020-01-09 PROCEDURE — 25000128 H RX IP 250 OP 636: Mod: ZF | Performed by: PHYSICIAN ASSISTANT

## 2020-01-09 PROCEDURE — 84439 ASSAY OF FREE THYROXINE: CPT | Performed by: PHYSICIAN ASSISTANT

## 2020-01-09 PROCEDURE — 99214 OFFICE O/P EST MOD 30 MIN: CPT | Mod: ZP | Performed by: PHYSICIAN ASSISTANT

## 2020-01-09 PROCEDURE — 84443 ASSAY THYROID STIM HORMONE: CPT | Performed by: PHYSICIAN ASSISTANT

## 2020-01-09 PROCEDURE — 96402 CHEMO HORMON ANTINEOPL SQ/IM: CPT

## 2020-01-09 PROCEDURE — 84153 ASSAY OF PSA TOTAL: CPT | Performed by: PHYSICIAN ASSISTANT

## 2020-01-09 RX ORDER — LEVOTHYROXINE SODIUM 50 UG/1
50 TABLET ORAL DAILY
Qty: 30 TABLET | Refills: 3 | Status: SHIPPED | OUTPATIENT
Start: 2020-01-09 | End: 2020-05-26 | Stop reason: DRUGHIGH

## 2020-01-09 RX ORDER — CIPROFLOXACIN 500 MG/1
500 TABLET, FILM COATED ORAL 2 TIMES DAILY
Qty: 20 TABLET | Refills: 0 | Status: SHIPPED | OUTPATIENT
Start: 2020-01-09 | End: 2020-02-26

## 2020-01-09 RX ADMIN — DEGARELIX 80 MG: KIT at 12:12

## 2020-01-09 ASSESSMENT — PAIN SCALES - GENERAL: PAINLEVEL: NO PAIN (0)

## 2020-01-09 NOTE — LETTER
RE: Lucio Hernandez  [address] [facility] [address]       Followup Visit Note  Jan 9, 2020    CC: Prostate cancer     HPI: Lucio Hernandez is a 64 year old male who has prostate cancer. He had a radical prostatectomy in 2006 demonstrating Jg 7 disease with a positive margin.  He had salvage radiation therapy in 2006 when his PSA was around 0.4 per the patient's recollection.  He notes his PSA was undetectable for 4 years, but then began coming back and recently has noted to have PSAs in the 1.2-1.4 range in May of this year.  He underwent an Axumin PET scan recently that shows no evidence of metastatic disease though there was uptake in the prostate bed.      Had Prior history of urethral stricture. This may have been due to the prior radiation therapy and a Olsen Catheter complication postop.    4/11/18 PSA = 0.56  10/30/18 PSA =0.48  5/14/19 PSA =1.2  5/29/19 PSA=1.4  7/29/19 PSA=1.45  9/5/19  PSA =1.84  11/4/19 PSA=2.19  11/14/19 PSA= 2.27  12/12/19 PSA= 0.04  1/9/2020 PSA= <0.01    INTERIM HISTORY:  Lucio presents today with his wife Yarelis for cycle 3 clinical trial.     He developed a URI which started around [place]. He had laryngitis and lost his voice, had sinus congestion/RN and a cough with mild SOB. Though his biggest symptoms have been that he has been very tired and has had head fuzziness. The fatigue is improving though slowly. He is unsure if it is due to the medications or the URI. He is still exercising though he has not been able to do as much due to the fatigue.     He is also having more hot flashes. He does not perspire or soak through the sheets or clothes. Feeling light headed when he gets them. They last about 15 seconds and come and go. He notes that he feels like he has had more breast development, though his wife has not noticed it.     Going to [place] next week for vacation.     ROS: 10 point ROS neg other than the symptoms noted above in the HPI.    PAST MEDICAL  HISTORY:  Otherwise significant for the prostate cancer,  history of pulmonary embolism and rib fractures from coughing fits and appendicitis.      PAST SURGICAL HISTORY:  Includes the RRP, a lumbar fusion, a thoracotomy for the history of rib fractures and the pulmonic hernia, shoulder surgery x3, appendectomy and a cervical spine surgery.      MEDICATIONS:     1.  Zyrtec.   2.  Lisinopril.      ALLERGIES:  NO KNOWN DRUG ALLERGIES.      FAMILY HISTORY:  Noncontributory.      SOCIAL HISTORY:  Negative for tobacco.  Alcohol 1 drink a day. Pt is a retired Otolaryngologist (ENT surgeon).      REVIEW OF SYSTEMS:  Negative for fevers, chills, sweats, nausea, vomiting, unexplained weight changes.      Physical Exam  /75 (BP Location: Right arm, Patient Position: Sitting, Cuff Size: Adult Regular)   Pulse 65   Temp 99.3  F (37.4  C) (Oral)   Resp 16   Wt 85.7 kg (189 lb)   SpO2 97%   BMI 27.90 kg/m     Wt Readings from Last 4 Encounters:   01/09/20 85.7 kg (189 lb)   12/12/19 86.3 kg (190 lb 3.2 oz)   11/14/19 90.7 kg (199 lb 15.3 oz)   11/04/19 90.7 kg (199 lb 14.4 oz)     General: Pleasant male in no acute distress  HEENT: EOMI, PERRLA, no scleral icterus, mucus membranes moist and no lesions or thrush  Lymph: Neck is supple and shows no nodes in cervical or supraclavicular   Heart:  RRR, no murmur, gallop or rub  Lungs: CTA-B, no wheezes, rhonchi or rales  Abdomen: Normal bowel sounds, soft, non tender, not distended, no rebound or guarding, no palpable masses  Extremities: No pitting edema  Skin: No significant rashes or lesions  Neuro: CN II-XII are intact  : No gynecomastia on exam today    Laboratory Data  Results for orders placed or performed in visit on 01/09/20 (from the past 24 hour(s))   TSH with free T4 reflex   Result Value Ref Range    TSH 6.93 (H) 0.40 - 4.00 mU/L   PSA tumor marker   Result Value Ref Range    PSA <0.01 0 - 4 ug/L   T4 free   Result Value Ref Range    T4 Free 0.92 0.76 - 1.46  "ng/dL      1/9/2020 10:42   Creatinine 0.8   GFR Estimate >90   GFR Estimate If Black >90   Bilirubin Total 0.5   ALT 35   AST 21      11/14/2019 10:44 12/12/2019 10:54 1/9/2020 10:45   PSA 2.27 0.04 <0.01   Testosterone Total 386 15 (L) 10 (L)     Impression and Plan  This is a 64-year-old gentleman with a 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  He has a doubling time that is under 9 months based on the most recent 2 measurements.  He is also status post salvage radiation therapy.    Prostate Cancer  -enrolled in AFT19 study with apalutamide and degarelix. Apalutamide is 240 mg daily.   -He has had worsening fatigue and head \"fog\". Feel like that this is more due to his recent URI than the medication as typically symptoms don't worsen as quickly. Will continue to monitor. Will keep dose the same for now.   -he does note some breast development, which could be a side effect. Did not notice anything significant on exam today. Will continue to monitor   -Continue monthly degarelix 80 mg. Will give today. Due 2/6  -will follow-up per trial protocol    URI  -overall symptoms are improving though still having fatigue and memory/brain fog. This has seemed typically of URIs this year and they seem to last longer. Will continue to monitor. Symptoms should continue to improve. If he were to have fevers or symptoms worsen, he should call    Hot Flashes  -worsening, though still mild as they do not last long and he has no perspiration. Could consider gabapentin in the future if needed    Urinary Retention  -self cathing every other day. No new symptoms or concerns     Hypothyroidism  -TSH > 10 today (11.20). Started synthroid 50 mcg daily on 12/12/19. TSH improving today (11.20 -> 6.93). Will adjust the dose next visit if needed, though likely will be better     Brandi Soto PA-C  [facility]  [address], [place] [id number] [phone]        "

## 2020-01-09 NOTE — NURSING NOTE
Chief Complaint   Patient presents with     Blood Draw     labs drawn by venipuncture by rn in lab.  vital signs taken.     Labs drawn by venipuncture by RN in lab, including research labs.  Vital signs taken.  Pt checked in to next appointment.    Keisha Alcala RN

## 2020-01-09 NOTE — PROGRESS NOTES
[id number]: Study Visit Note   Subject name: Lucio Hernandez     Visit: Cycle 3 Day 1    Did the study visit occur within the appropriate window allowed by the protocol? yes    Since the last study visit, He has been doing well.     I have personally interviewed Lucio Hernandez and reviewed his medical record for adverse events and concomitant medications and these have been recorded on the corresponding logs in Lucio Hernandez's research file.     Lucio Hernandez was given the opportunity to ask any trial related questions.  Please see provider progress note for physical exam and other clinical information. Labs were reviewed - any significant lab values were addressed and reviewed.    [id number]: Medication Count/IDS Note      Lucio Hernandez is enrolled on the trial number listed above. The patient presented for his Cycle 3 Day 1 visit.   IDS number: [id number]    Drug name: Apalutamide     Number of Bottles dispensed: 1     60 mg study drug Dispensed  Lot number(s):[id number]  Bottle numbers:[id number]  Number of Pills dispensed: 120     Number of Bottles returned: 1     60 mg study drug Returned  Lot number(s):[id number]  Bottle numbers:  [id number]  Number of Pills returned: 16     Medication Compliance: patient verbalized that he may have missed taking the study medication for 1-2 days but couldn't remember and recorded it on his diary anyway.  Study medication returned does indicate that patient did miss 2 days of dosing with unknown days.  Re-educated to the patient the importance of taking the study medication daily as prescribed and not filling in the study diary time taken until study medication actually taken.  Patient verbalized understanding.     Drug Diary dispensed to study subject: yes     Drug diary returned: yes     Are there any discrepancies between the amount of medication the patient was instructed to take and the amount recorded as taken in the patient s drug diary? yes, see comments above     Are there any  discrepancies between the amount of medication the patient has recorded as taken in the drug diary and the amount that would be expected to be returned based on the amount recorded as taken? yes, see comments above     Lissa Butt  Clinical Research Coordinator-SG, RN, CPN  Clinical Trials Office/[facility]  Desk Phone: [phone]      Pager: [phone]

## 2020-01-09 NOTE — PROGRESS NOTES
Followup Visit Note  Jan 9, 2020    CC: Prostate cancer     HPI: Lucio Hernandez is a 64 year old male who has prostate cancer. He had a radical prostatectomy in 2006 demonstrating [place] 7 disease with a positive margin.  He had salvage radiation therapy in 2006 when his PSA was around 0.4 per the patient's recollection.  He notes his PSA was undetectable for 4 years, but then began coming back and recently has noted to have PSAs in the 1.2-1.4 range in May of this year.  He underwent an Axumin PET scan recently that shows no evidence of metastatic disease though there was uptake in the prostate bed.      Had Prior history of urethral stricture. This may have been due to the prior radiation therapy and a Olsen Catheter complication postop.    4/11/18 PSA = 0.56  10/30/18 PSA =0.48  5/14/19 PSA =1.2  5/29/19 PSA=1.4  7/29/19 PSA=1.45  9/5/19  PSA =1.84  11/4/19 PSA=2.19  11/14/19 PSA= 2.27  12/12/19 PSA= 0.04  1/9/2020 PSA= <0.01    INTERIM HISTORY:  Lucio presents today with his wife Yarelis for cycle 3 clinical trial.     He developed a URI which started around [place]. He had laryngitis and lost his voice, had sinus congestion/RN and a cough with mild SOB. Though his biggest symptoms have been that he has been very tired and has had head fuzziness. The fatigue is improving though slowly. He is unsure if it is due to the medications or the URI. He is still exercising though he has not been able to do as much due to the fatigue.     He is also having more hot flashes. He does not perspire or soak through the sheets or clothes. Feeling light headed when he gets them. They last about 15 seconds and come and go. He notes that he feels like he has had more breast development, though his wife has not noticed it.     Going to [place] next week for vacation.     ROS: 10 point ROS neg other than the symptoms noted above in the HPI.    PAST MEDICAL HISTORY:  Otherwise significant for the prostate cancer,  history of pulmonary  embolism and rib fractures from coughing fits and appendicitis.      PAST SURGICAL HISTORY:  Includes the RRP, a lumbar fusion, a thoracotomy for the history of rib fractures and the pulmonic hernia, shoulder surgery x3, appendectomy and a cervical spine surgery.      MEDICATIONS:     1.  Zyrtec.   2.  Lisinopril.      ALLERGIES:  NO KNOWN DRUG ALLERGIES.      FAMILY HISTORY:  Noncontributory.      SOCIAL HISTORY:  Negative for tobacco.  Alcohol 1 drink a day. Pt is a retired Otolaryngologist (ENT surgeon).      REVIEW OF SYSTEMS:  Negative for fevers, chills, sweats, nausea, vomiting, unexplained weight changes.      Physical Exam  /75 (BP Location: Right arm, Patient Position: Sitting, Cuff Size: Adult Regular)   Pulse 65   Temp 99.3  F (37.4  C) (Oral)   Resp 16   Wt 85.7 kg (189 lb)   SpO2 97%   BMI 27.90 kg/m    Wt Readings from Last 4 Encounters:   01/09/20 85.7 kg (189 lb)   12/12/19 86.3 kg (190 lb 3.2 oz)   11/14/19 90.7 kg (199 lb 15.3 oz)   11/04/19 90.7 kg (199 lb 14.4 oz)     General: Pleasant male in no acute distress  HEENT: EOMI, PERRLA, no scleral icterus, mucus membranes moist and no lesions or thrush  Lymph: Neck is supple and shows no nodes in cervical or supraclavicular   Heart:  RRR, no murmur, gallop or rub  Lungs: CTA-B, no wheezes, rhonchi or rales  Abdomen: Normal bowel sounds, soft, non tender, not distended, no rebound or guarding, no palpable masses  Extremities: No pitting edema  Skin: No significant rashes or lesions  Neuro: CN II-XII are intact  : No gynecomastia on exam today    Laboratory Data  Results for orders placed or performed in visit on 01/09/20 (from the past 24 hour(s))   TSH with free T4 reflex   Result Value Ref Range    TSH 6.93 (H) 0.40 - 4.00 mU/L   PSA tumor marker   Result Value Ref Range    PSA <0.01 0 - 4 ug/L   T4 free   Result Value Ref Range    T4 Free 0.92 0.76 - 1.46 ng/dL      1/9/2020 10:42   Creatinine 0.8   GFR Estimate >90   GFR Estimate If  "Black >90   Bilirubin Total 0.5   ALT 35   AST 21      11/14/2019 10:44 12/12/2019 10:54 1/9/2020 10:45   PSA 2.27 0.04 <0.01   Testosterone Total 386 15 (L) 10 (L)     Impression and Plan  This is a 64-year-old gentleman with a serologic relapse of a [place] 4+3 = 7 with a tertiary pattern of Jg 5.  He has a doubling time that is under 9 months based on the most recent 2 measurements.  He is also status post salvage radiation therapy.    Prostate Cancer  -enrolled in AFT19 study with apalutamide and degarelix. Apalutamide is 240 mg daily.   -He has had worsening fatigue and head \"fog\". Feel like that this is more due to his recent URI than the medication as typically symptoms don't worsen as quickly. Will continue to monitor. Will keep dose the same for now.   -he does note some breast development, which could be a side effect. Did not notice anything significant on exam today. Will continue to monitor   -Continue monthly degarelix 80 mg. Will give today. Due 2/6  -will follow-up per trial protocol    URI  -overall symptoms are improving though still having fatigue and memory/brain fog. This has seemed typically of URIs this year and they seem to last longer. Will continue to monitor. Symptoms should continue to improve. If he were to have fevers or symptoms worsen, he should call    Hot Flashes  -worsening, though still mild as they do not last long and he has no perspiration. Could consider gabapentin in the future if needed    Urinary Retention  -self cathing every other day. No new symptoms or concerns     Hypothyroidism  -TSH > 10 today (11.20). Started synthroid 50 mcg daily on 12/12/19. TSH improving today (11.20 -> 6.93). Will adjust the dose next visit if needed, though likely will be better     Brandi Soto PA-C  [facility]  [address] [place] [address] [phone]        "

## 2020-01-09 NOTE — PATIENT INSTRUCTIONS
Contact Numbers  [facility]: [phone]    After Hours:  [phone]  Triage: [phone]    Please call the [facility] Triage line if you experience a temperature greater than or equal to 100.5, shaking chills, have uncontrolled nausea, vomiting and/or diarrhea, dizziness, shortness of breath, chest pain, bleeding, unexplained bruising, or if you have any other new/concerning symptoms, questions or concerns.     If it is after hours, weekends, or holidays, please call the main hospital  at  [phone] and ask to speak to the Oncology doctor on call.     If you are having any concerning symptoms or wish to speak to a provider before your next infusion visit, please call your care coordinator or triage to notify them so we can adequately serve you.     If you need a refill on a narcotic prescription or other medication, please call triage before your infusion appointment.         January 2020 Sunday Monday Tuesday Wednesday Thursday Friday Saturday                  1     2     3     4       5     6     7     8     9    [facility] DRAW  10:30 AM   (15 min.)    [facility] DRAW   [facility] Lab Draw    [facility] RETURN  10:45 AM   (50 min.)   Brandi Soto PA-C   [facility] ONC INFUSION 60  12:00 PM   (60 min.)    ONCOLOGY INFUSION   [facility] 10     11       12     13     14     15     16     17     18       19     20     21     22     23     24     25       26     27     28     29     30     31                      February 2020 Sunday Monday Tuesday Wednesday Thursday Friday Saturday                                 1       2     3     4     5    [facility] DRAW  11:00 AM   (15 min.)    [facility] DRAW   [facility] Lab Draw    [facility] RETURN  11:15 AM   (50 min.)   Brandi Soto PA-C   [facility]   1:00 PM   (60 min.)    ONCOLOGY INFUSION   [facility]  [facility] 6     7     8       9     10     11     12     13     14     15       16     17     18     19     20     21     22       23     24     25     26    [facility] DRAW  11:00 AM   (15 min.)   [facility] LAB DRAW   [facility] Lab Draw    [facility] RETURN  11:15 AM   (50 min.)   Brandi Soto PA-C   [facility] ONC INFUSION 60   1:00 PM   (60 min.)    ONCOLOGY INFUSION   [facility] 27     28     29                     Lab Results:  Recent Results (from the past 12 hour(s))   ALT    Collection Time: 01/09/20 10:42 AM   Result Value Ref Range    ALT 35 0 - 70 U/L   AST    Collection Time: 01/09/20 10:42 AM   Result Value Ref Range    AST 21 0 - 45 U/L   Bilirubin  total    Collection Time: 01/09/20 10:42 AM   Result Value Ref Range    Bilirubin Total 0.5 0.2 - 1.3 mg/dL   TSH with free T4 reflex    Collection Time: 01/09/20 10:45 AM   Result Value Ref Range    TSH 6.93 (H) 0.40 - 4.00 mU/L   PSA tumor marker    Collection Time: 01/09/20 10:45 AM   Result Value Ref Range    PSA <0.01 0 - 4 ug/L   T4 free    Collection Time: 01/09/20 10:45 AM   Result Value Ref Range    T4 Free 0.92 0.76 - 1.46 ng/dL   Creatinine POCT    Collection Time: 01/09/20 11:00 AM   Result Value Ref Range    Creatinine 0.8 0.66 - 1.25 mg/dL    GFR Estimate >90 >60 mL/min/[1.73_m2]    GFR Estimate If Black >90 >60 mL/min/[1.73_m2]

## 2020-01-09 NOTE — PROGRESS NOTES
Infusion Nursing Note:  Lucio Hernandez presents today for C3D1 Firmagon.    Patient seen by provider today: Yes: Brandi HALE   present during visit today: Not Applicable.    Note: Patient feels well. No complaints made. Otherwise well.     Intravenous Access:  No Intravenous access/labs at this visit.    Treatment Conditions:  Lab Results   Component Value Date    HGB 16.0 11/14/2019     Lab Results   Component Value Date    WBC 4.9 11/14/2019      Lab Results   Component Value Date    ANEU 3.4 11/14/2019     Lab Results   Component Value Date     11/14/2019      Lab Results   Component Value Date     11/04/2019                   Lab Results   Component Value Date    POTASSIUM 4.1 11/04/2019           No results found for: MAG         Lab Results   Component Value Date    CR 0.81 11/04/2019                   Lab Results   Component Value Date    GIANA 9.1 11/04/2019                Lab Results   Component Value Date    BILITOTAL 0.5 01/09/2020           Lab Results   Component Value Date    ALBUMIN 3.8 11/14/2019                    Lab Results   Component Value Date    ALT 35 01/09/2020           Lab Results   Component Value Date    AST 21 01/09/2020       Results reviewed, labs MET treatment parameters, ok to proceed with treatment.      Post Infusion Assessment:  Patient tolerated Firmagon injection on left lower abdomen without incident.  Site patent and intact, free from redness, edema or discomfort.  No evidence of extravasations.       Discharge Plan:   Patient declined prescription refills.  Discharge instructions reviewed with: Patient.  Patient and/or family verbalized understanding of discharge instructions and all questions answered.  AVS to patient via [facility]  Patient will return 2/5/20 for next appointment.   Patient discharged in stable condition accompanied by: self.  Departure Mode: Ambulatory.    QUIQUE MATOS RN

## 2020-01-11 LAB — TESTOST SERPL-MCNC: 10 NG/DL (ref 240–950)

## 2020-02-05 ENCOUNTER — RESEARCH ENCOUNTER (OUTPATIENT)
Dept: ONCOLOGY | Facility: CLINIC | Age: 65
End: 2020-02-05

## 2020-02-05 ENCOUNTER — APPOINTMENT (OUTPATIENT)
Dept: LAB | Facility: CLINIC | Age: 65
End: 2020-02-05
Attending: INTERNAL MEDICINE
Payer: MEDICARE

## 2020-02-05 ENCOUNTER — ONCOLOGY VISIT (OUTPATIENT)
Dept: ONCOLOGY | Facility: CLINIC | Age: 65
End: 2020-02-05
Attending: INTERNAL MEDICINE
Payer: MEDICARE

## 2020-02-05 VITALS
HEART RATE: 71 BPM | WEIGHT: 190.5 LBS | DIASTOLIC BLOOD PRESSURE: 70 MMHG | RESPIRATION RATE: 16 BRPM | TEMPERATURE: 98.2 F | SYSTOLIC BLOOD PRESSURE: 121 MMHG | OXYGEN SATURATION: 97 % | BODY MASS INDEX: 28.12 KG/M2

## 2020-02-05 DIAGNOSIS — Z53.9 ERRONEOUS ENCOUNTER--DISREGARD: ICD-10-CM

## 2020-02-05 DIAGNOSIS — R97.20 ELEVATED PROSTATE SPECIFIC ANTIGEN (PSA): ICD-10-CM

## 2020-02-05 DIAGNOSIS — C61 PROSTATE CANCER (H): Primary | ICD-10-CM

## 2020-02-05 LAB
ALBUMIN SERPL-MCNC: 3.8 G/DL (ref 3.4–5)
ALP SERPL-CCNC: 72 U/L (ref 40–150)
ALT SERPL W P-5'-P-CCNC: 38 U/L (ref 0–70)
AST SERPL W P-5'-P-CCNC: 28 U/L (ref 0–45)
BASOPHILS # BLD AUTO: 0 10E9/L (ref 0–0.2)
BASOPHILS NFR BLD AUTO: 0.4 %
BILIRUB DIRECT SERPL-MCNC: <0.1 MG/DL (ref 0–0.2)
BILIRUB SERPL-MCNC: 0.3 MG/DL (ref 0.2–1.3)
DIFFERENTIAL METHOD BLD: NORMAL
EOSINOPHIL # BLD AUTO: 0.1 10E9/L (ref 0–0.7)
EOSINOPHIL NFR BLD AUTO: 1.5 %
ERYTHROCYTE [DISTWIDTH] IN BLOOD BY AUTOMATED COUNT: 13.2 % (ref 10–15)
HCT VFR BLD AUTO: 44.2 % (ref 40–53)
HGB BLD-MCNC: 15 G/DL (ref 13.3–17.7)
IMM GRANULOCYTES # BLD: 0.1 10E9/L (ref 0–0.4)
IMM GRANULOCYTES NFR BLD: 0.6 %
LYMPHOCYTES # BLD AUTO: 1 10E9/L (ref 0.8–5.3)
LYMPHOCYTES NFR BLD AUTO: 12.5 %
MCH RBC QN AUTO: 32.8 PG (ref 26.5–33)
MCHC RBC AUTO-ENTMCNC: 33.9 G/DL (ref 31.5–36.5)
MCV RBC AUTO: 97 FL (ref 78–100)
MONOCYTES # BLD AUTO: 0.6 10E9/L (ref 0–1.3)
MONOCYTES NFR BLD AUTO: 6.7 %
NEUTROPHILS # BLD AUTO: 6.4 10E9/L (ref 1.6–8.3)
NEUTROPHILS NFR BLD AUTO: 78.3 %
NRBC # BLD AUTO: 0 10*3/UL
NRBC BLD AUTO-RTO: 0 /100
PLATELET # BLD AUTO: 186 10E9/L (ref 150–450)
PROT SERPL-MCNC: 6.6 G/DL (ref 6.8–8.8)
PSA SERPL-MCNC: <0.01 UG/L (ref 0–4)
RBC # BLD AUTO: 4.58 10E12/L (ref 4.4–5.9)
T4 FREE SERPL-MCNC: 0.81 NG/DL (ref 0.76–1.46)
TSH SERPL DL<=0.005 MIU/L-ACNC: 8.39 MU/L (ref 0.4–4)
WBC # BLD AUTO: 8.2 10E9/L (ref 4–11)

## 2020-02-05 PROCEDURE — 84403 ASSAY OF TOTAL TESTOSTERONE: CPT | Performed by: PHYSICIAN ASSISTANT

## 2020-02-05 PROCEDURE — 80076 HEPATIC FUNCTION PANEL: CPT | Performed by: PHYSICIAN ASSISTANT

## 2020-02-05 PROCEDURE — 84439 ASSAY OF FREE THYROXINE: CPT | Performed by: PHYSICIAN ASSISTANT

## 2020-02-05 PROCEDURE — 84443 ASSAY THYROID STIM HORMONE: CPT | Performed by: PHYSICIAN ASSISTANT

## 2020-02-05 PROCEDURE — 84153 ASSAY OF PSA TOTAL: CPT | Performed by: PHYSICIAN ASSISTANT

## 2020-02-05 PROCEDURE — 85025 COMPLETE CBC W/AUTO DIFF WBC: CPT | Performed by: PHYSICIAN ASSISTANT

## 2020-02-05 PROCEDURE — 99214 OFFICE O/P EST MOD 30 MIN: CPT | Mod: ZP | Performed by: PHYSICIAN ASSISTANT

## 2020-02-05 PROCEDURE — 25000128 H RX IP 250 OP 636: Mod: ZF | Performed by: PHYSICIAN ASSISTANT

## 2020-02-05 PROCEDURE — 96402 CHEMO HORMON ANTINEOPL SQ/IM: CPT

## 2020-02-05 RX ORDER — GABAPENTIN 300 MG/1
300 CAPSULE ORAL AT BEDTIME
Qty: 30 CAPSULE | Refills: 1 | Status: SHIPPED | OUTPATIENT
Start: 2020-02-05 | End: 2020-02-05

## 2020-02-05 RX ORDER — GABAPENTIN 300 MG/1
300 CAPSULE ORAL AT BEDTIME
Qty: 30 CAPSULE | Refills: 1 | Status: SHIPPED | OUTPATIENT
Start: 2020-02-05 | End: 2020-02-26

## 2020-02-05 RX ADMIN — DEGARELIX 80 MG: KIT at 14:17

## 2020-02-05 ASSESSMENT — PAIN SCALES - GENERAL: PAINLEVEL: NO PAIN (0)

## 2020-02-05 NOTE — PROGRESS NOTES
Followup Visit Note  Feb 5, 2020    CC: Prostate cancer     HPI: Lucio Hernandez is a 64 year old male who has prostate cancer. He had a radical prostatectomy in 2006 demonstrating [place] 7 disease with a positive margin.  He had salvage radiation therapy in 2006 when his PSA was around 0.4 per the patient's recollection.  He notes his PSA was undetectable for 4 years, but then began coming back and recently has noted to have PSAs in the 1.2-1.4 range in May of this year.  He underwent an Axumin PET scan recently that shows no evidence of metastatic disease though there was uptake in the prostate bed.      Had Prior history of urethral stricture. This may have been due to the prior radiation therapy and a Olsen Catheter complication postop.    4/11/18 PSA = 0.56  10/30/18 PSA =0.48  5/14/19 PSA =1.2  5/29/19 PSA=1.4  7/29/19 PSA=1.45  9/5/19  PSA =1.84  11/4/19 PSA=2.19  11/14/19 PSA= 2.27  12/12/19 PSA= 0.04  1/9/2020 PSA= <0.01  2/5/2020 PSA = <0.01    INTERIM HISTORY:  Lucio presents today unaccompanied for cycle 4 clinical trial.     Overall he continues with fatigue, head fogginess and hot flashes.  The hot flashes have become more frequent and bothersome to him.  He is now experiencing them about once an hour though still not drenching.  He is waking up at night with these as well, about once an hour or so.  He denies any fevers, chills or night sweats.  He states that his short-term memory or head fog is about the same though it is still very bothersome to him.  He is having a harder time remembering names and numbers which he was 1 time very good.  He continues to exercise and has increased his level again.  He has some fatigue with this though overall able to do it.  He also notes that his motivation has not been as good and he just does not feel like himself, but again he is still doing a lot of activities and yesterday he cleaned out all his medical journals.  He continues to have a nagging cough after his  URI though denies any shortness of breath or chest pain.  Also having some postnasal drainage.  Otherwise no new or different symptoms to report.    ROS: 10 point ROS neg other than the symptoms noted above in the HPI.    PAST MEDICAL HISTORY:  Otherwise significant for the prostate cancer,  history of pulmonary embolism and rib fractures from coughing fits and appendicitis.      PAST SURGICAL HISTORY:  Includes the RRP, a lumbar fusion, a thoracotomy for the history of rib fractures and the pulmonic hernia, shoulder surgery x3, appendectomy and a cervical spine surgery.      MEDICATIONS:     1.  Zyrtec.   2.  Lisinopril.      ALLERGIES:  NO KNOWN DRUG ALLERGIES.      FAMILY HISTORY:  Noncontributory.      SOCIAL HISTORY:  Negative for tobacco.  Alcohol 1 drink a day. Pt is a retired Otolaryngologist (ENT surgeon).      REVIEW OF SYSTEMS:  Negative for fevers, chills, sweats, nausea, vomiting, unexplained weight changes.      Physical Exam  /70 (BP Location: Right arm, Patient Position: Sitting, Cuff Size: Adult Large)   Pulse 71   Temp 98.2  F (36.8  C) (Oral)   Resp 16   Wt 86.4 kg (190 lb 8 oz)   SpO2 97%   BMI 28.12 kg/m    Wt Readings from Last 4 Encounters:   02/05/20 86.4 kg (190 lb 8 oz)   01/09/20 85.7 kg (189 lb)   12/12/19 86.3 kg (190 lb 3.2 oz)   11/14/19 90.7 kg (199 lb 15.3 oz)     General: Pleasant male in no acute distress  HEENT: EOMI, PERRLA, no scleral icterus, mucus membranes moist and no lesions or thrush  Lymph: Neck is supple and shows no nodes in cervical or supraclavicular   Heart:  RRR, no murmur, gallop or rub  Lungs: CTA-B, no wheezes, rhonchi or rales  Abdomen: Normal bowel sounds, soft, non tender, not distended, no rebound or guarding, no palpable masses  Extremities: No pitting edema  Skin: No significant rashes or lesions  Neuro: CN II-XII are intact  : No gynecomastia on exam today    Laboratory Data  Results for orders placed or performed in visit on 02/05/20 (from  "the past 24 hour(s))   CBC with platelets differential   Result Value Ref Range    WBC 8.2 4.0 - 11.0 10e9/L    RBC Count 4.58 4.4 - 5.9 10e12/L    Hemoglobin 15.0 13.3 - 17.7 g/dL    Hematocrit 44.2 40.0 - 53.0 %    MCV 97 78 - 100 fl    MCH 32.8 26.5 - 33.0 pg    MCHC 33.9 31.5 - 36.5 g/dL    RDW 13.2 10.0 - 15.0 %    Platelet Count 186 150 - 450 10e9/L    Diff Method Automated Method     % Neutrophils 78.3 %    % Lymphocytes 12.5 %    % Monocytes 6.7 %    % Eosinophils 1.5 %    % Basophils 0.4 %    % Immature Granulocytes 0.6 %    Nucleated RBCs 0 0 /100    Absolute Neutrophil 6.4 1.6 - 8.3 10e9/L    Absolute Lymphocytes 1.0 0.8 - 5.3 10e9/L    Absolute Monocytes 0.6 0.0 - 1.3 10e9/L    Absolute Eosinophils 0.1 0.0 - 0.7 10e9/L    Absolute Basophils 0.0 0.0 - 0.2 10e9/L    Abs Immature Granulocytes 0.1 0 - 0.4 10e9/L    Absolute Nucleated RBC 0.0    Hepatic panel   Result Value Ref Range    Bilirubin Direct <0.1 0.0 - 0.2 mg/dL    Bilirubin Total 0.3 0.2 - 1.3 mg/dL    Albumin 3.8 3.4 - 5.0 g/dL    Protein Total 6.6 (L) 6.8 - 8.8 g/dL    Alkaline Phosphatase 72 40 - 150 U/L    ALT 38 0 - 70 U/L    AST 28 0 - 45 U/L   TSH with free T4 reflex   Result Value Ref Range    TSH 8.39 (H) 0.40 - 4.00 mU/L   PSA tumor marker   Result Value Ref Range    PSA <0.01 0 - 4 ug/L   T4 free   Result Value Ref Range    T4 Free 0.81 0.76 - 1.46 ng/dL        12/12/2019 10:54 1/9/2020 10:45 2/5/2020 13:08   PSA 0.04 <0.01 <0.01   T4 Free 0.84 0.92 0.81   Testosterone Total 15 (L) 10 (L)    TSH 11.20 (H) 6.93 (H) 8.39 (H)       Impression and Plan  This is a 64-year-old gentleman with a serologic relapse of a Jg 4+3 = 7 with a tertiary pattern of [place] 5.  He has a doubling time that is under 9 months based on the most recent 2 measurements.  He is also status post salvage radiation therapy.    Prostate Cancer  -enrolled in AFT19 study with apalutamide and degarelix.   -tolerating still with some fatigue, more emotional, \"brain " "fog\" and hot flashes  -Continue Apalutamide is 240 mg daily. Continue monthly degarelix 80 mg. Will give today.   -will follow-up per trial protocol    URI  -overall improved. Still has some nagging cough, improving. No interventions needed. Could do nasal saline and Flonase for post nasal drainage if he wanted    Hot Flashes  -Continues to worsen. Now occurring every hour. Wakes him up at night and is affecting sleep. Will start him on a trial of gabapentin 300 mg at bedtime to see if this helps with hot flashes and sleep    Emotional/Mood  -struggling with mortality, lack of motivation and more feelings. These are normal and expected from treatment. I do not think he is at a point where medication is necessary though discussed this and urged him to tell us if he ever felt like he was unable to cope with these emotions     Urinary Retention  -self cathing every other day. No new symptoms or concerns     Hypothyroidism  -Started synthroid 50 mcg daily on 12/12/19. TSH initially improved though today is slightly higher again. T4 still normal. Since TSH still not above 10, will continue at same dose for now     Brandi Soto PA-C  [facility]  [address] [place] [id number] [phone]        "

## 2020-02-05 NOTE — NURSING NOTE
Chief Complaint   Patient presents with     Blood Draw     labs drawn with vpt by rn.  vs taken     Labs drawn with vpt by rn.  Pt tolerated well.  VS taken.  Pt checked in for next appt.    Aislinn Zuñiga RN

## 2020-02-05 NOTE — PROGRESS NOTES
[id number]: Study Visit Note   Subject name: Lucio Hernandez     Visit: Cycle 4 Day 1    Did the study visit occur within the appropriate window allowed by the protocol? yes    Since the last study visit, He has been doing well.     I have personally interviewed Lucio Hernandez and reviewed his medical record for adverse events and concomitant medications and these have been recorded on the corresponding logs in Lucio Hernandez's research file.     Lucio Hernandez was given the opportunity to ask any trial related questions.  Please see provider progress note for physical exam and other clinical information. Labs were reviewed - any significant lab values were addressed and reviewed.      [id number]: Medication Count/IDS Note      Lucio Hernandez is enrolled on the trial number listed above. The patient presented for his Cycle 4 Day 1  IDS number: [id number]  Drug name: Apalutamide    Number of Bottles dispensed: 1     60 mg study drug Dispensed  Lot number(s):[id number]  Bottle numbers: [id number]  Number of Pills dispensed: 120    Number of Bottles returned: 0     Patient forgot that he had a clinic visit appointment today.  When [facility] called the patient he came right to clinic and did not have time to stop at home and get his medication and medication diary.  Patient will bring back all medication and medication diary to next clinic visit.     Drug Diary dispensed to study subject: yes     Lissa Butt  Clinical Research Coordinator-VADIMN, RN, CPN  Clinical Trials Office/[facility]  Desk Phone: [phone]   Pager: [phone]

## 2020-02-05 NOTE — NURSING NOTE
Firmagon given in the RLQ abdomen via deep subcutaneous injection by RN without difficulty. Ice pack provided

## 2020-02-05 NOTE — LETTER
2/5/2020      RE: Lucio Hernandez  [address] [place] [address]       Followup Visit Note  Feb 5, 2020    CC: Prostate cancer     HPI: Lucio Hernandez is a 64 year old male who has prostate cancer. He had a radical prostatectomy in 2006 demonstrating [place] 7 disease with a positive margin.  He had salvage radiation therapy in 2006 when his PSA was around 0.4 per the patient's recollection.  He notes his PSA was undetectable for 4 years, but then began coming back and recently has noted to have PSAs in the 1.2-1.4 range in May of this year.  He underwent an Axumin PET scan recently that shows no evidence of metastatic disease though there was uptake in the prostate bed.      Had Prior history of urethral stricture. This may have been due to the prior radiation therapy and a Olsen Catheter complication postop.    4/11/18 PSA = 0.56  10/30/18 PSA =0.48  5/14/19 PSA =1.2  5/29/19 PSA=1.4  7/29/19 PSA=1.45  9/5/19  PSA =1.84  11/4/19 PSA=2.19  11/14/19 PSA= 2.27  12/12/19 PSA= 0.04  1/9/2020 PSA= <0.01  2/5/2020 PSA = <0.01    INTERIM HISTORY:  Lucio presents today unaccompanied for cycle 4 clinical trial.     Overall he continues with fatigue, head fogginess and hot flashes.  The hot flashes have become more frequent and bothersome to him.  He is now experiencing them about once an hour though still not drenching.  He is waking up at night with these as well, about once an hour or so.  He denies any fevers, chills or night sweats.  He states that his short-term memory or head fog is about the same though it is still very bothersome to him.  He is having a harder time remembering names and numbers which he was 1 time very good.  He continues to exercise and has increased his level again.  He has some fatigue with this though overall able to do it.  He also notes that his motivation has not been as good and he just does not feel like himself, but again he is still doing a lot of activities and yesterday he  cleaned out all his medical journals.  He continues to have a nagging cough after his URI though denies any shortness of breath or chest pain.  Also having some postnasal drainage.  Otherwise no new or different symptoms to report.    ROS: 10 point ROS neg other than the symptoms noted above in the HPI.    PAST MEDICAL HISTORY:  Otherwise significant for the prostate cancer,  history of pulmonary embolism and rib fractures from coughing fits and appendicitis.      PAST SURGICAL HISTORY:  Includes the RRP, a lumbar fusion, a thoracotomy for the history of rib fractures and the pulmonic hernia, shoulder surgery x3, appendectomy and a cervical spine surgery.      MEDICATIONS:     1.  Zyrtec.   2.  Lisinopril.      ALLERGIES:  NO KNOWN DRUG ALLERGIES.      FAMILY HISTORY:  Noncontributory.      SOCIAL HISTORY:  Negative for tobacco.  Alcohol 1 drink a day. Pt is a retired Otolaryngologist (ENT surgeon).      REVIEW OF SYSTEMS:  Negative for fevers, chills, sweats, nausea, vomiting, unexplained weight changes.      Physical Exam  /70 (BP Location: Right arm, Patient Position: Sitting, Cuff Size: Adult Large)   Pulse 71   Temp 98.2  F (36.8  C) (Oral)   Resp 16   Wt 86.4 kg (190 lb 8 oz)   SpO2 97%   BMI 28.12 kg/m     Wt Readings from Last 4 Encounters:   02/05/20 86.4 kg (190 lb 8 oz)   01/09/20 85.7 kg (189 lb)   12/12/19 86.3 kg (190 lb 3.2 oz)   11/14/19 90.7 kg (199 lb 15.3 oz)     General: Pleasant male in no acute distress  HEENT: EOMI, PERRLA, no scleral icterus, mucus membranes moist and no lesions or thrush  Lymph: Neck is supple and shows no nodes in cervical or supraclavicular   Heart:  RRR, no murmur, gallop or rub  Lungs: CTA-B, no wheezes, rhonchi or rales  Abdomen: Normal bowel sounds, soft, non tender, not distended, no rebound or guarding, no palpable masses  Extremities: No pitting edema  Skin: No significant rashes or lesions  Neuro: CN II-XII are intact  : No gynecomastia on exam  today    Laboratory Data  Results for orders placed or performed in visit on 02/05/20 (from the past 24 hour(s))   CBC with platelets differential   Result Value Ref Range    WBC 8.2 4.0 - 11.0 10e9/L    RBC Count 4.58 4.4 - 5.9 10e12/L    Hemoglobin 15.0 13.3 - 17.7 g/dL    Hematocrit 44.2 40.0 - 53.0 %    MCV 97 78 - 100 fl    MCH 32.8 26.5 - 33.0 pg    MCHC 33.9 31.5 - 36.5 g/dL    RDW 13.2 10.0 - 15.0 %    Platelet Count 186 150 - 450 10e9/L    Diff Method Automated Method     % Neutrophils 78.3 %    % Lymphocytes 12.5 %    % Monocytes 6.7 %    % Eosinophils 1.5 %    % Basophils 0.4 %    % Immature Granulocytes 0.6 %    Nucleated RBCs 0 0 /100    Absolute Neutrophil 6.4 1.6 - 8.3 10e9/L    Absolute Lymphocytes 1.0 0.8 - 5.3 10e9/L    Absolute Monocytes 0.6 0.0 - 1.3 10e9/L    Absolute Eosinophils 0.1 0.0 - 0.7 10e9/L    Absolute Basophils 0.0 0.0 - 0.2 10e9/L    Abs Immature Granulocytes 0.1 0 - 0.4 10e9/L    Absolute Nucleated RBC 0.0    Hepatic panel   Result Value Ref Range    Bilirubin Direct <0.1 0.0 - 0.2 mg/dL    Bilirubin Total 0.3 0.2 - 1.3 mg/dL    Albumin 3.8 3.4 - 5.0 g/dL    Protein Total 6.6 (L) 6.8 - 8.8 g/dL    Alkaline Phosphatase 72 40 - 150 U/L    ALT 38 0 - 70 U/L    AST 28 0 - 45 U/L   TSH with free T4 reflex   Result Value Ref Range    TSH 8.39 (H) 0.40 - 4.00 mU/L   PSA tumor marker   Result Value Ref Range    PSA <0.01 0 - 4 ug/L   T4 free   Result Value Ref Range    T4 Free 0.81 0.76 - 1.46 ng/dL        12/12/2019 10:54 1/9/2020 10:45 2/5/2020 13:08   PSA 0.04 <0.01 <0.01   T4 Free 0.84 0.92 0.81   Testosterone Total 15 (L) 10 (L)    TSH 11.20 (H) 6.93 (H) 8.39 (H)       Impression and Plan  This is a 64-year-old gentleman with a serologic relapse of a [place] 4+3 = 7 with a tertiary pattern of Jg 5.  He has a doubling time that is under 9 months based on the most recent 2 measurements.  He is also status post salvage radiation therapy.    Prostate Cancer  -enrolled in AFT19 study  "with apalutamide and degarelix.   -tolerating still with some fatigue, more emotional, \"brain fog\" and hot flashes  -Continue Apalutamide is 240 mg daily. Continue monthly degarelix 80 mg. Will give today.   -will follow-up per trial protocol    URI  -overall improved. Still has some nagging cough, improving. No interventions needed. Could do nasal saline and Flonase for post nasal drainage if he wanted    Hot Flashes  -Continues to worsen. Now occurring every hour. Wakes him up at night and is affecting sleep. Will start him on a trial of gabapentin 300 mg at bedtime to see if this helps with hot flashes and sleep    Emotional/Mood  -struggling with mortality, lack of motivation and more feelings. These are normal and expected from treatment. I do not think he is at a point where medication is necessary though discussed this and urged him to tell us if he ever felt like he was unable to cope with these emotions     Urinary Retention  -self cathing every other day. No new symptoms or concerns     Hypothyroidism  -Started synthroid 50 mcg daily on 12/12/19. TSH initially improved though today is slightly higher again. T4 still normal. Since TSH still not above 10, will continue at same dose for now     Brandi Soto PA-C  [facility]  [address] [place] [address] [phone]        "

## 2020-02-07 LAB — TESTOST SERPL-MCNC: 7 NG/DL (ref 240–950)

## 2020-02-12 ENCOUNTER — TELEPHONE (OUTPATIENT)
Dept: UROLOGY | Facility: CLINIC | Age: 65
End: 2020-02-12

## 2020-02-12 NOTE — TELEPHONE ENCOUNTER
Called patient and spoke at great length about his supplies called [facility]  [phone]  Will be sending orders to us to be signed and needs to get these catheters by next week Princess Lennon LPN Staff Nurse

## 2020-02-12 NOTE — TELEPHONE ENCOUNTER
[facility] Call Center    Phone Message    May a detailed message be left on voicemail: yes     Reason for Call: Other: Lucio calling to request a call back. He would like to talk to a nurse in regards to his catheter refils. Please call him back as soon as possible to discuss.      Action Taken: Message routed to:  [facility] nina uro     Travel Screening: Not Applicable

## 2020-02-18 ENCOUNTER — TELEPHONE (OUTPATIENT)
Dept: UROLOGY | Facility: CLINIC | Age: 65
End: 2020-02-18

## 2020-02-18 NOTE — TELEPHONE ENCOUNTER
[facility] Call Center    Phone Message    May a detailed message be left on voicemail: yes     Reason for Call: Other: Pt requests JILL call him back to discuss a catheter order     Action Taken: Message routed to:  [facility] urology    Travel Screening: Not Applicable

## 2020-02-20 NOTE — TELEPHONE ENCOUNTER
Spoke to patient his order is coming out to his house no later than tomorrow night.  Spoke to company again to send us the form to sign it from dr franko Lennon LPN Staff Nurse

## 2020-02-26 ENCOUNTER — RESEARCH ENCOUNTER (OUTPATIENT)
Dept: ONCOLOGY | Facility: CLINIC | Age: 65
End: 2020-02-26

## 2020-02-26 ENCOUNTER — APPOINTMENT (OUTPATIENT)
Dept: LAB | Facility: CLINIC | Age: 65
End: 2020-02-26
Attending: PHYSICIAN ASSISTANT
Payer: MEDICARE

## 2020-02-26 ENCOUNTER — ONCOLOGY VISIT (OUTPATIENT)
Dept: ONCOLOGY | Facility: CLINIC | Age: 65
End: 2020-02-26
Attending: PHYSICIAN ASSISTANT
Payer: MEDICARE

## 2020-02-26 VITALS
SYSTOLIC BLOOD PRESSURE: 123 MMHG | WEIGHT: 186.7 LBS | OXYGEN SATURATION: 99 % | RESPIRATION RATE: 16 BRPM | DIASTOLIC BLOOD PRESSURE: 74 MMHG | HEART RATE: 68 BPM | TEMPERATURE: 97.9 F | BODY MASS INDEX: 27.56 KG/M2

## 2020-02-26 DIAGNOSIS — C61 PROSTATE CANCER (H): ICD-10-CM

## 2020-02-26 DIAGNOSIS — R97.20 ELEVATED PROSTATE SPECIFIC ANTIGEN (PSA): ICD-10-CM

## 2020-02-26 DIAGNOSIS — C61 PROSTATE CANCER (H): Primary | ICD-10-CM

## 2020-02-26 DIAGNOSIS — R97.20 ELEVATED PROSTATE SPECIFIC ANTIGEN (PSA): Primary | ICD-10-CM

## 2020-02-26 PROCEDURE — G0463 HOSPITAL OUTPT CLINIC VISIT: HCPCS | Mod: ZF

## 2020-02-26 PROCEDURE — 96402 CHEMO HORMON ANTINEOPL SQ/IM: CPT

## 2020-02-26 PROCEDURE — 25000128 H RX IP 250 OP 636: Mod: ZF | Performed by: PHYSICIAN ASSISTANT

## 2020-02-26 PROCEDURE — 99214 OFFICE O/P EST MOD 30 MIN: CPT | Mod: ZP | Performed by: PHYSICIAN ASSISTANT

## 2020-02-26 PROCEDURE — 36415 COLL VENOUS BLD VENIPUNCTURE: CPT

## 2020-02-26 RX ORDER — GABAPENTIN 300 MG/1
300 CAPSULE ORAL AT BEDTIME
Qty: 90 CAPSULE | Refills: 3 | Status: SHIPPED | OUTPATIENT
Start: 2020-02-26 | End: 2020-04-28

## 2020-02-26 RX ADMIN — DEGARELIX 80 MG: KIT at 12:22

## 2020-02-26 ASSESSMENT — PAIN SCALES - GENERAL: PAINLEVEL: NO PAIN (0)

## 2020-02-26 NOTE — NURSING NOTE
"Oncology Rooming Note    February 26, 2020 11:37 AM   Lucio Hernandez is a 64 year old male who presents for:    Chief Complaint   Patient presents with     Blood Draw     Labs drawn via  by RN in lab. VS taken. Patient checked in for next appt.     Oncology Clinic Visit     Patient with Prostate cancer here for provider visit and lab review      Initial Vitals: /74 (BP Location: Left arm, Patient Position: Sitting, Cuff Size: Adult Regular)   Pulse 68   Temp 97.9  F (36.6  C) (Oral)   Resp 16   Wt 84.7 kg (186 lb 11.2 oz)   SpO2 99%   BMI 27.56 kg/m   Estimated body mass index is 27.56 kg/m  as calculated from the following:    Height as of 11/4/19: 1.753 m (5' 9.02\").    Weight as of this encounter: 84.7 kg (186 lb 11.2 oz). Body surface area is 2.03 meters squared.  No Pain (0) Comment: Data Unavailable   No LMP for male patient.  Allergies reviewed: Yes  Medications reviewed: Yes    Medications: Medication refills not needed today.  Pharmacy name entered into [facility]: [id number] [place] [facility] [address]    Clinical concerns:      Saida Rowell CMA              "

## 2020-02-26 NOTE — PROGRESS NOTES
[id number]: Study Visit Note   Subject name: Lucio Hernandez   Subject ID: [id number]    Visit: Cycle 5 Day 1    Did the study visit occur within the appropriate window allowed by the protocol? no    If no, why? Visit is 1 week early due to that [place] is going to be gone on vacation for the next 41 days.    Since the last study visit, He has been doing well.     I have personally interviewed Lucio Hernandez and reviewed his medical record for adverse events and concomitant medications and these have been recorded on the corresponding logs in Lucio Hernandez's research file.     Lucio Hernandez was given the opportunity to ask any trial related questions.  Please see provider progress note for physical exam and other clinical information. Labs were reviewed - any significant lab values were addressed and reviewed.      [id number]: Medication Count/IDS Note      Lucio Hernandez is enrolled on the trial number listed above. The patient presented for his Cycle 5  Day 1 visit.   IDS number: [id number]  Drug name: Apalutamide    Number of Bottles dispensed: 2     60 mg study drug Dispensed  Lot number(s):[id number]  Bottle numbers: [id number]  Number of Pills dispensed: 240    Number of Bottles returned: 2     60 mg study drug Returned  Lot number(s):[id number]  Bottle numbers: [id number]  Number of Pills returned: 12  Bottle numbers: [id number]  Number of Pills returned: 36     Medication Compliance: 100%     Drug Diary dispensed to study subject: yes     Drug diary returned: yes     Are there any discrepancies between the amount of medication the patient was instructed to take and the amount recorded as taken in the patient s drug diary? no     Are there any discrepancies between the amount of medication the patient has recorded as taken in the drug diary and the amount that would be expected to be returned based on the amount recorded as taken? no    Lissa Butt  Clinical Research Coordinator-BSN, RN,  ARMANDO  Clinical Trials Office/[facility]  Desk Phone: [phone]   Pager: [phone]

## 2020-02-26 NOTE — NURSING NOTE
Firmagon given deep subcutaneous in ULQ abdomen by RN in clinic without difficulty. Ice pack given to pt.

## 2020-02-26 NOTE — LETTER
2/26/2020      RE: Lucio Hernandez  [address] [facility] [address]       Followup Visit Note  Feb 26, 2020    CC: Prostate cancer     HPI: Lucio Hernandez is a 64 year old male who has prostate cancer. He had a radical prostatectomy in 2006 demonstrating Jg 7 disease with a positive margin.  He had salvage radiation therapy in 2006 when his PSA was around 0.4 per the patient's recollection.  He notes his PSA was undetectable for 4 years, but then began coming back and recently has noted to have PSAs in the 1.2-1.4 range in May of this year.  He underwent an Axumin PET scan recently that shows no evidence of metastatic disease though there was uptake in the prostate bed.      Had Prior history of urethral stricture. This may have been due to the prior radiation therapy and a Olsen Catheter complication postop.    He was enrolled in a clinical trial AFT19 with apalutamide and degarelix which started on 11/14/19.       4/11/18 PSA = 0.56  10/30/18 PSA =0.48  5/14/19 PSA =1.2  5/29/19 PSA=1.4  7/29/19 PSA=1.45  9/5/19  PSA =1.84  11/4/19 PSA=2.19  11/14/19 PSA= 2.27  12/12/19 PSA= 0.04  1/9/2020 PSA= <0.01  2/5/2020 PSA = <0.01  2/26/2020 PSA= <0.01    INTERIM HISTORY:  Lucio presents today unaccompanied for cycle 5 clinical trial (early due to vacation).     He continues to do fairly well.  He does notice some further changes.  He states he has noticed that his skin is softer and his hair is not estranged.  He also feels like his muscles are getting smaller even though he continues to work out.  He did start the gabapentin at night and this is been helpful for his sleep and has decreased the amount of hot flashes.  Hot flashes are still worse at night and with exercise.  He notes that his head fog has improved.  For example he went to the  last week and was able to understand what was going on and processed the information better.  Otherwise he is feeling great and has no new or different  complaints.    ROS: 10 point ROS neg other than the symptoms noted above in the HPI.    PAST MEDICAL HISTORY:  Otherwise significant for the prostate cancer,  history of pulmonary embolism and rib fractures from coughing fits and appendicitis.      PAST SURGICAL HISTORY:  Includes the RRP, a lumbar fusion, a thoracotomy for the history of rib fractures and the pulmonic hernia, shoulder surgery x3, appendectomy and a cervical spine surgery.      MEDICATIONS:     1.  Zyrtec.   2.  Lisinopril.      ALLERGIES:  NO KNOWN DRUG ALLERGIES.      FAMILY HISTORY:  Noncontributory.      SOCIAL HISTORY:  Negative for tobacco.  Alcohol 1 drink a day. Pt is a retired Otolaryngologist (ENT surgeon).      REVIEW OF SYSTEMS:  Negative for fevers, chills, sweats, nausea, vomiting, unexplained weight changes.      Physical Exam  /74 (BP Location: Left arm, Patient Position: Sitting, Cuff Size: Adult Regular)   Pulse 68   Temp 97.9  F (36.6  C) (Oral)   Resp 16   Wt 84.7 kg (186 lb 11.2 oz)   SpO2 99%   BMI 27.56 kg/m     Wt Readings from Last 4 Encounters:   02/26/20 84.7 kg (186 lb 11.2 oz)   02/05/20 86.4 kg (190 lb 8 oz)   01/09/20 85.7 kg (189 lb)   12/12/19 86.3 kg (190 lb 3.2 oz)     General: Pleasant male in no acute distress  HEENT: EOMI, PERRLA, no scleral icterus, mucus membranes moist and no lesions or thrush  Lymph: Neck is supple and shows no nodes in cervical or supraclavicular   Heart:  RRR, no murmur, gallop or rub  Lungs: CTA-B, no wheezes, rhonchi or rales  Abdomen: Normal bowel sounds, soft, non tender, not distended, no rebound or guarding, no palpable masses  Extremities: No pitting edema  Skin: No significant rashes or lesions  Neuro: CN II-XII are intact    Laboratory Data   1/9/2020 10:45 2/5/2020 13:08 2/26/2020 11:02   PSA <0.01 <0.01 <0.01   T4 Free 0.92 0.81 0.84   Testosterone Total 10 (L) 7 (L)    TSH 6.93 (H) 8.39 (H) 6.36 (H)       Impression and Plan  This is a 64 year old gentleman with a  "serologic relapse of a Jg 4+3 = 7 with a tertiary pattern of [place] 5.  He has a doubling time that is under 9 months based on the most recent 2 measurements.  He is also status post salvage radiation therapy.    Prostate Cancer  -enrolled in AFT19 study with apalutamide and degarelix.   -symptoms are improving; hot flashes improved with gabapentin and his brain fog is less. Still very active and overall tolerating very well  -Continue Apalutamide is 240 mg daily (gave 40 days). Continue monthly degarelix 80 mg; giving today which is a week early due to upcoming vacation, this was approved by sponsor and IDS.  -will follow-up per trial protocol    Hot Flashes  -Started him on a gabapentin 300 mg at bedtime which has been beneficial for him. He feels that he sleeps better and that the hot flashes have improved. Will refill script for him today     Emotional/Mood  -has been struggling with mortality, lack of motivation and more emotions. I do not think he is at a point where medication is necessary though he previously agreed to let me know if he feels he needs help  -his mental \"fog\" has improved and this has been helpful     Urinary Retention  -self cathing every other day. No new symptoms or concerns     Hypothyroidism  -Started synthroid 50 mcg daily on 12/12/19. TSH still elevated though improved and T4 remains normal. No dose adjustment needed. Will not increase dose unless TSH >10 or T4 low     Brandi Soto PA-C  [facility]  [address], [place] [address] [phone]        "

## 2020-02-26 NOTE — NURSING NOTE
Chief Complaint   Patient presents with     Blood Draw     Labs drawn via  by RN in lab. VS taken. Patient checked in for next appt.     Labs collected from venipuncture by RN. Vitals taken. Checked in for appointment.    Eveline Moy RN

## 2020-04-06 ENCOUNTER — APPOINTMENT (OUTPATIENT)
Dept: LAB | Facility: CLINIC | Age: 65
End: 2020-04-06
Attending: INTERNAL MEDICINE
Payer: MEDICARE

## 2020-04-06 ENCOUNTER — INFUSION THERAPY VISIT (OUTPATIENT)
Dept: ONCOLOGY | Facility: CLINIC | Age: 65
End: 2020-04-06
Attending: INTERNAL MEDICINE
Payer: MEDICARE

## 2020-04-06 ENCOUNTER — RESEARCH ENCOUNTER (OUTPATIENT)
Dept: ONCOLOGY | Facility: CLINIC | Age: 65
End: 2020-04-06

## 2020-04-06 VITALS
RESPIRATION RATE: 16 BRPM | HEART RATE: 61 BPM | OXYGEN SATURATION: 97 % | HEIGHT: 69 IN | WEIGHT: 196.3 LBS | BODY MASS INDEX: 29.07 KG/M2 | SYSTOLIC BLOOD PRESSURE: 126 MMHG | DIASTOLIC BLOOD PRESSURE: 77 MMHG | TEMPERATURE: 97.4 F

## 2020-04-06 DIAGNOSIS — R97.20 ELEVATED PROSTATE SPECIFIC ANTIGEN (PSA): Primary | ICD-10-CM

## 2020-04-06 DIAGNOSIS — C61 PROSTATE CANCER (H): Primary | ICD-10-CM

## 2020-04-06 DIAGNOSIS — C61 PROSTATE CANCER (H): ICD-10-CM

## 2020-04-06 DIAGNOSIS — R97.20 ELEVATED PROSTATE SPECIFIC ANTIGEN (PSA): ICD-10-CM

## 2020-04-06 LAB
PSA SERPL-MCNC: <0.01 UG/L (ref 0–4)
T4 FREE SERPL-MCNC: 0.79 NG/DL (ref 0.76–1.46)
TSH SERPL DL<=0.005 MIU/L-ACNC: 7.58 MU/L (ref 0.4–4)

## 2020-04-06 PROCEDURE — 84403 ASSAY OF TOTAL TESTOSTERONE: CPT | Performed by: INTERNAL MEDICINE

## 2020-04-06 PROCEDURE — 25000128 H RX IP 250 OP 636: Mod: ZF | Performed by: INTERNAL MEDICINE

## 2020-04-06 PROCEDURE — 84153 ASSAY OF PSA TOTAL: CPT | Performed by: INTERNAL MEDICINE

## 2020-04-06 PROCEDURE — 96402 CHEMO HORMON ANTINEOPL SQ/IM: CPT

## 2020-04-06 PROCEDURE — 84439 ASSAY OF FREE THYROXINE: CPT | Performed by: INTERNAL MEDICINE

## 2020-04-06 PROCEDURE — 84443 ASSAY THYROID STIM HORMONE: CPT | Performed by: INTERNAL MEDICINE

## 2020-04-06 PROCEDURE — 36415 COLL VENOUS BLD VENIPUNCTURE: CPT

## 2020-04-06 PROCEDURE — 99214 OFFICE O/P EST MOD 30 MIN: CPT | Mod: ZP | Performed by: INTERNAL MEDICINE

## 2020-04-06 PROCEDURE — G0463 HOSPITAL OUTPT CLINIC VISIT: HCPCS | Mod: ZF

## 2020-04-06 RX ADMIN — DEGARELIX 80 MG: KIT at 11:37

## 2020-04-06 ASSESSMENT — MIFFLIN-ST. JEOR: SCORE: 1661.1

## 2020-04-06 ASSESSMENT — PAIN SCALES - GENERAL: PAINLEVEL: NO PAIN (0)

## 2020-04-06 NOTE — PROGRESS NOTES
[id number]: Study Visit Note   Subject name: Lucio Hernandez   Subject ID: [id number]    Visit: Cycle 6 Day 1    Did the study visit occur within the appropriate window allowed by the protocol? no    If no, why? Visit is 9 days out of schedule of events in research protocol due to planned vacation    Since the last study visit, He has been doing well.     I have personally interviewed Lucio Hernandez and reviewed his medical record for adverse events and concomitant medications and these have been recorded on the corresponding logs in Lucio Hernandez's research file.     Lucio Hernandez was given the opportunity to ask any trial related questions.  Please see provider progress note for physical exam and other clinical information. Labs were reviewed - any significant lab values were addressed and reviewed.       [id number]: Medication Count/IDS Note      Lucio Hernandez is enrolled on the trial number listed above. The patient presented for his Cycle 6 Day 1 visit.     IDS number: [id number]  Drug name: Apalutamide     Number of Bottles dispensed: 1     60 mg study drug Dispensed  Lot number(s):[id number]  Bottle numbers: [id number]  Number of Pills dispensed: 120     Number of Bottles returned: 2     60 mg study drug Returned  Lot number(s):[id number] and [id number]  Bottle numbers: did not return the bottle  Number of Pills returned: 0  Bottle numbers: [id number]  Number of Pills returned: 90      Medication Compliance: patient recorded in medication diary took 40 days which should equal 80 pills returned.  Patient returned 90 pills instead.  RE-educated to the patient the importance of only recording in the medication diary immediately following study medication administration.  Patient verbalized understanding.  Patient only returned 1 of the 2 dispensed study medication bottles.  RE-educated the patient on the importance of bringing all study medication bottles dispensed and un- taken pills back to every study  visit.     Drug Diary dispensed to study subject: yes     Drug diary returned: yes    Are there any discrepancies between the amount of medication the patient was instructed to take and the amount recorded as taken in the patient s drug diary? yes, see comments above     Are there any discrepancies between the amount of medication the patient has recorded as taken in the drug diary and the amount that would be expected to be returned based on the amount recorded as taken? yes, see comments above     Lissa Butt  Clinical Research Coordinator-VADIMN, RN, CPN  Clinical Trials Office/[facility]  Desk Phone: [phone]      Pager: [phone]

## 2020-04-06 NOTE — PROGRESS NOTES
Infusion Nursing Note:  Lucio Hernandez presents today for Cycle 6 Day 1 [place].    Patient seen by provider today: Yes: Dr. Palomino      Note: No CBC, CMP needed today per Study RN/Dr. Palomino .      Post Infusion Assessment:  Patient tolerated deep SQ injection to RUQ abdomen without incident.  Ice applied to site after injection.      Discharge Plan:   Patient declined prescription refills.  Discharge instructions reviewed with: Patient.  Patient and/or family verbalized understanding of discharge instructions and all questions answered.  Copy of AVS reviewed with patient and/or family.  Patient will return 5/5/20 for next appointment.  Patient discharged in stable condition accompanied by: self.  Departure Mode: Ambulatory.  Face to Face time: 0.    Mary Foreman RN

## 2020-04-06 NOTE — LETTER
4/6/2020       RE: Lucio Hernandez  [address] [facility] [address]     Dear Colleague,    Thank you for referring your patient, Lucio Hernandez, to the [facility]. Please see a copy of my visit note below.      [facility] Oncology Followup   Apr 6, 2020    CC: Prostate cancer     HPI: Lucio Hernandez is a 64 year old male who has prostate cancer. He had a radical prostatectomy in 2006 demonstrating Jg 7 disease with a positive margin.  He had salvage radiation therapy in 2006 when his PSA was around 0.4 per the patient's recollection.  He notes his PSA was undetectable for 4 years, but then began coming back and recently has noted to have PSAs in the 1.2-1.4 range in May of this year.  He underwent an Axumin PET scan recently that shows no evidence of metastatic disease though there was uptake in the prostate bed.      Had Prior history of urethral stricture. This may have been due to the prior radiation therapy and a Olsen Catheter complication postop.    He was enrolled in a clinical trial AFT19 with apalutamide and degarelix which started on 11/14/19.       4/11/18 PSA = 0.56  10/30/18 PSA =0.48  5/14/19 PSA =1.2  5/29/19 PSA=1.4  7/29/19 PSA=1.45  9/5/19  PSA =1.84  11/4/19 PSA=2.19  11/14/19 PSA= 2.27-- START AFT-19  12/12/19 PSA= 0.04  1/9/2020 PSA= <0.01  2/5/2020 PSA = <0.01  2/26/2020 PSA= <0.01  4/6/20  PSA<0.01    INTERIM HISTORY:  Lucio presents today unaccompanied for cycle 5 clinical trial (early due to vacation).     He continues to do fairly well.  He does notice some further changes.  He states he has noticed that his skin is softer and his hair is not estranged.  He also feels like his muscles are getting smaller even though he continues to work out.  He did start the gabapentin at night and this is been helpful for his sleep and has decreased the amount of hot flashes.  Hot flashes are still worse at night and with exercise.  He notes that his head fog has  "improved.  For example he went to the  last week and was able to understand what was going on and processed the information better.  Otherwise he is feeling great and has no new or different complaints.Concerned about COVID but no symptoms to suggest infection  Wearing a mask in the clinic.       ROS: 10 point ROS neg other than the symptoms noted above in the HPI.    PAST MEDICAL HISTORY:  Otherwise significant for the prostate cancer,  history of pulmonary embolism and rib fractures from coughing fits and appendicitis.      PAST SURGICAL HISTORY:  Includes the RRP, a lumbar fusion, a thoracotomy for the history of rib fractures and the pulmonic hernia, shoulder surgery x3, appendectomy and a cervical spine surgery.      MEDICATIONS:     1.  Zyrtec.   2.  Lisinopril.      ALLERGIES:  NO KNOWN DRUG ALLERGIES.      FAMILY HISTORY:  Noncontributory.      SOCIAL HISTORY:  Negative for tobacco.  Alcohol 1 drink a day. Pt is a retired Otolaryngologist (ENT surgeon).      REVIEW OF SYSTEMS:  Negative for fevers, chills, sweats, nausea, vomiting, unexplained weight changes.      Physical Exam  /77 (BP Location: Right arm, Patient Position: Sitting, Cuff Size: Adult Regular)   Pulse 61   Temp 97.4  F (36.3  C) (Tympanic)   Resp 16   Ht 1.753 m (5' 9.02\")   Wt 89 kg (196 lb 4.8 oz)   SpO2 97%   BMI 28.97 kg/m    Wt Readings from Last 4 Encounters:   04/06/20 89 kg (196 lb 4.8 oz)   02/26/20 84.7 kg (186 lb 11.2 oz)   02/05/20 86.4 kg (190 lb 8 oz)   01/09/20 85.7 kg (189 lb)     General: Pleasant male in no acute distress  HEENT: EOMI, PERRLA, no scleral icterus, mucus membranes moist and no lesions or thrush  Lymph: Neck is supple and shows no nodes in cervical or supraclavicular   Heart:  RRR, no murmur, gallop or rub  Lungs: CTA-B, no wheezes, rhonchi or rales  Abdomen: Normal bowel sounds, soft, non tender, not distended, no rebound or guarding, no palpable masses  Extremities: No pitting " "edema  Skin: No significant rashes or lesions  Neuro: CN II-XII are intact    Laboratory Data  4/6/2020 PSA = <0.01  TSH = 7.58    Impression and Plan  This is a 64 year old gentleman with a serologic relapse of a [place] 4+3 = 7 with a tertiary pattern of [place] 5.  He has a doubling time that is under 9 months based on the most recent 2 measurements.  He is also status post salvage radiation therapy.    Prostate Cancer  -enrolled in AFT19 study with apalutamide and degarelix.   -symptoms are improving; hot flashes improved with gabapentin and his brain fog is less. Still very active and overall tolerating very well  -Continue Apalutamide is 240 mg daily (gave 40 days). Continue monthly degarelix 80 mg; giving today  -will follow-up per trial protocol    Hot Flashes  -Started him on a gabapentin 300 mg at bedtime which has been beneficial for him. He feels that he sleeps better and that the hot flashes have improved. OK to take 300 am 600/night    Emotional/Mood  -has been struggling with mortality, lack of motivation and more emotions. I do not think he is at a point where medication is necessary though he previously agreed to let me know if he feels he needs help  -his mental \"fog\" has improved and this has been helpful     Urinary Retention  -self cathing every other day. No new symptoms or concerns     Hypothyroidism  -Started synthroid 50 mcg daily on 12/12/19. TSH still elevated though improved and T4 remains normal. Increase to 75 mcg. He will split the pills.     Again, thank you for allowing me to participate in the care of your patient.      Sincerely,    Alejandro Palomino MD    "

## 2020-04-06 NOTE — PROGRESS NOTES
[facility] Oncology Followup   Apr 6, 2020    CC: Prostate cancer     HPI: Lucio Hernandez is a 64 year old male who has prostate cancer. He had a radical prostatectomy in 2006 demonstrating Jg 7 disease with a positive margin.  He had salvage radiation therapy in 2006 when his PSA was around 0.4 per the patient's recollection.  He notes his PSA was undetectable for 4 years, but then began coming back and recently has noted to have PSAs in the 1.2-1.4 range in May of this year.  He underwent an Axumin PET scan recently that shows no evidence of metastatic disease though there was uptake in the prostate bed.      Had Prior history of urethral stricture. This may have been due to the prior radiation therapy and a Olsen Catheter complication postop.    He was enrolled in a clinical trial AFT19 with apalutamide and degarelix which started on 11/14/19.       4/11/18 PSA = 0.56  10/30/18 PSA =0.48  5/14/19 PSA =1.2  5/29/19 PSA=1.4  7/29/19 PSA=1.45  9/5/19  PSA =1.84  11/4/19 PSA=2.19  11/14/19 PSA= 2.27-- START AFT-19  12/12/19 PSA= 0.04  1/9/2020 PSA= <0.01  2/5/2020 PSA = <0.01  2/26/2020 PSA= <0.01  4/6/20  PSA<0.01    INTERIM HISTORY:  Lucio presents today unaccompanied for cycle 5 clinical trial (early due to vacation).     He continues to do fairly well.  He does notice some further changes.  He states he has noticed that his skin is softer and his hair is not estranged.  He also feels like his muscles are getting smaller even though he continues to work out.  He did start the gabapentin at night and this is been helpful for his sleep and has decreased the amount of hot flashes.  Hot flashes are still worse at night and with exercise.  He notes that his head fog has improved.  For example he went to the  last week and was able to understand what was going on and processed the information better.  Otherwise he is feeling great and has no new or different complaints.Concerned about COVID but  "no symptoms to suggest infection  Wearing a mask in the clinic.       ROS: 10 point ROS neg other than the symptoms noted above in the HPI.    PAST MEDICAL HISTORY:  Otherwise significant for the prostate cancer,  history of pulmonary embolism and rib fractures from coughing fits and appendicitis.      PAST SURGICAL HISTORY:  Includes the RRP, a lumbar fusion, a thoracotomy for the history of rib fractures and the pulmonic hernia, shoulder surgery x3, appendectomy and a cervical spine surgery.      MEDICATIONS:     1.  Zyrtec.   2.  Lisinopril.      ALLERGIES:  NO KNOWN DRUG ALLERGIES.      FAMILY HISTORY:  Noncontributory.      SOCIAL HISTORY:  Negative for tobacco.  Alcohol 1 drink a day. Pt is a retired Otolaryngologist (ENT surgeon).      REVIEW OF SYSTEMS:  Negative for fevers, chills, sweats, nausea, vomiting, unexplained weight changes.      Physical Exam  /77 (BP Location: Right arm, Patient Position: Sitting, Cuff Size: Adult Regular)   Pulse 61   Temp 97.4  F (36.3  C) (Tympanic)   Resp 16   Ht 1.753 m (5' 9.02\")   Wt 89 kg (196 lb 4.8 oz)   SpO2 97%   BMI 28.97 kg/m    Wt Readings from Last 4 Encounters:   04/06/20 89 kg (196 lb 4.8 oz)   02/26/20 84.7 kg (186 lb 11.2 oz)   02/05/20 86.4 kg (190 lb 8 oz)   01/09/20 85.7 kg (189 lb)     General: Pleasant male in no acute distress  HEENT: EOMI, PERRLA, no scleral icterus, mucus membranes moist and no lesions or thrush  Lymph: Neck is supple and shows no nodes in cervical or supraclavicular   Heart:  RRR, no murmur, gallop or rub  Lungs: CTA-B, no wheezes, rhonchi or rales  Abdomen: Normal bowel sounds, soft, non tender, not distended, no rebound or guarding, no palpable masses  Extremities: No pitting edema  Skin: No significant rashes or lesions  Neuro: CN II-XII are intact    Laboratory Data  4/6/2020 PSA = <0.01  TSH = 7.58    Impression and Plan  This is a 64 year old gentleman with a serologic relapse of a Jg 4+3 = 7 with a tertiary " "pattern of [place] 5.  He has a doubling time that is under 9 months based on the most recent 2 measurements.  He is also status post salvage radiation therapy.    Prostate Cancer  -enrolled in AFT19 study with apalutamide and degarelix.   -symptoms are improving; hot flashes improved with gabapentin and his brain fog is less. Still very active and overall tolerating very well  -Continue Apalutamide is 240 mg daily (gave 40 days). Continue monthly degarelix 80 mg; giving today  -will follow-up per trial protocol    Hot Flashes  -Started him on a gabapentin 300 mg at bedtime which has been beneficial for him. He feels that he sleeps better and that the hot flashes have improved. OK to take 300 am 600/night    Emotional/Mood  -has been struggling with mortality, lack of motivation and more emotions. I do not think he is at a point where medication is necessary though he previously agreed to let me know if he feels he needs help  -his mental \"fog\" has improved and this has been helpful     Urinary Retention  -self cathing every other day. No new symptoms or concerns     Hypothyroidism  -Started synthroid 50 mcg daily on 12/12/19. TSH still elevated though improved and T4 remains normal. Increase to 75 mcg. He will split the pills.         "

## 2020-04-06 NOTE — NURSING NOTE
"Oncology Rooming Note    April 6, 2020 10:15 AM   Lucio Hernandez is a 64 year old male who presents for:    Chief Complaint   Patient presents with     Oncology Clinic Visit     RETURN VISIT; PROSTATE CANCER      Blood Draw     Labs drawn via  by RN in lab. VS taken. Patient checked in for next appt.     Initial Vitals: /77 (BP Location: Right arm, Patient Position: Sitting, Cuff Size: Adult Regular)   Pulse 61   Temp 97.4  F (36.3  C) (Tympanic)   Resp 16   Ht 1.753 m (5' 9.02\")   Wt 89 kg (196 lb 4.8 oz)   SpO2 97%   BMI 28.97 kg/m   Estimated body mass index is 28.97 kg/m  as calculated from the following:    Height as of this encounter: 1.753 m (5' 9.02\").    Weight as of this encounter: 89 kg (196 lb 4.8 oz). Body surface area is 2.08 meters squared.  No Pain (0) Comment: Data Unavailable   No LMP for male patient.  Allergies reviewed: Yes  Medications reviewed: Yes    Medications: Medication refills not needed today.  Pharmacy name entered into [facility]: [id number] [id number] [employer]    Clinical concerns: No new concerns today  Dr Palomino  was notified.      Eunice Marks              "

## 2020-04-06 NOTE — NURSING NOTE
Chief Complaint   Patient presents with     Oncology Clinic Visit     RETURN VISIT; PROSTATE CANCER      Blood Draw     Labs drawn via  by RN in lab. VS taken. Patient checked in for next appt.     Labs collected from venipuncture by RN. Vitals taken. Checked in for appointment.    Eveline Moy RN

## 2020-04-08 LAB — TESTOST SERPL-MCNC: 10 NG/DL (ref 240–950)

## 2020-04-28 DIAGNOSIS — C61 PROSTATE CANCER (H): ICD-10-CM

## 2020-04-28 RX ORDER — GABAPENTIN 300 MG/1
300 CAPSULE ORAL 3 TIMES DAILY
Qty: 90 CAPSULE | Refills: 3 | Status: SHIPPED | OUTPATIENT
Start: 2020-04-28 | End: 2020-08-23

## 2020-05-02 DIAGNOSIS — C61 PROSTATE CANCER (H): ICD-10-CM

## 2020-05-02 DIAGNOSIS — E03.2 HYPOTHYROIDISM DUE TO MEDICATION: ICD-10-CM

## 2020-05-04 RX ORDER — LEVOTHYROXINE SODIUM 50 UG/1
TABLET ORAL
Qty: 90 TABLET | Refills: 1 | OUTPATIENT
Start: 2020-05-04

## 2020-05-04 NOTE — TELEPHONE ENCOUNTER
It looks like there was a recent dosage change. Do you want his script changed to 75mcg daily or are we waiting to see if the increase is effective?    -Pina

## 2020-05-05 ENCOUNTER — APPOINTMENT (OUTPATIENT)
Dept: ONCOLOGY | Facility: CLINIC | Age: 65
End: 2020-05-05
Attending: INTERNAL MEDICINE
Payer: MEDICARE

## 2020-05-05 ENCOUNTER — VIRTUAL VISIT (OUTPATIENT)
Dept: ONCOLOGY | Facility: CLINIC | Age: 65
End: 2020-05-05
Attending: PHYSICIAN ASSISTANT
Payer: MEDICARE

## 2020-05-05 ENCOUNTER — RESEARCH ENCOUNTER (OUTPATIENT)
Dept: ONCOLOGY | Facility: CLINIC | Age: 65
End: 2020-05-05

## 2020-05-05 ENCOUNTER — APPOINTMENT (OUTPATIENT)
Dept: LAB | Facility: CLINIC | Age: 65
End: 2020-05-05
Attending: PHYSICIAN ASSISTANT
Payer: MEDICARE

## 2020-05-05 VITALS
RESPIRATION RATE: 16 BRPM | WEIGHT: 199 LBS | TEMPERATURE: 97.8 F | DIASTOLIC BLOOD PRESSURE: 76 MMHG | HEART RATE: 65 BPM | SYSTOLIC BLOOD PRESSURE: 142 MMHG | OXYGEN SATURATION: 98 % | BODY MASS INDEX: 29.37 KG/M2

## 2020-05-05 DIAGNOSIS — C61 PROSTATE CANCER (H): ICD-10-CM

## 2020-05-05 DIAGNOSIS — C61 PROSTATE CANCER (H): Primary | ICD-10-CM

## 2020-05-05 DIAGNOSIS — R97.20 ELEVATED PROSTATE SPECIFIC ANTIGEN (PSA): Primary | ICD-10-CM

## 2020-05-05 DIAGNOSIS — R97.20 ELEVATED PROSTATE SPECIFIC ANTIGEN (PSA): ICD-10-CM

## 2020-05-05 DIAGNOSIS — E03.2 HYPOTHYROIDISM DUE TO MEDICATION: ICD-10-CM

## 2020-05-05 LAB
ALBUMIN SERPL-MCNC: 3.5 G/DL (ref 3.4–5)
ALP SERPL-CCNC: 75 U/L (ref 40–150)
ALT SERPL W P-5'-P-CCNC: 33 U/L (ref 0–70)
AST SERPL W P-5'-P-CCNC: 24 U/L (ref 0–45)
BASOPHILS # BLD AUTO: 0 10E9/L (ref 0–0.2)
BASOPHILS NFR BLD AUTO: 0.9 %
BILIRUB DIRECT SERPL-MCNC: <0.1 MG/DL (ref 0–0.2)
BILIRUB SERPL-MCNC: 0.3 MG/DL (ref 0.2–1.3)
DIFFERENTIAL METHOD BLD: ABNORMAL
EOSINOPHIL # BLD AUTO: 0.3 10E9/L (ref 0–0.7)
EOSINOPHIL NFR BLD AUTO: 6.6 %
ERYTHROCYTE [DISTWIDTH] IN BLOOD BY AUTOMATED COUNT: 11.4 % (ref 10–15)
HCT VFR BLD AUTO: 44.4 % (ref 40–53)
HGB BLD-MCNC: 15.7 G/DL (ref 13.3–17.7)
IMM GRANULOCYTES # BLD: 0 10E9/L (ref 0–0.4)
IMM GRANULOCYTES NFR BLD: 0.6 %
LYMPHOCYTES # BLD AUTO: 1.1 10E9/L (ref 0.8–5.3)
LYMPHOCYTES NFR BLD AUTO: 22.4 %
MCH RBC QN AUTO: 34 PG (ref 26.5–33)
MCHC RBC AUTO-ENTMCNC: 35.4 G/DL (ref 31.5–36.5)
MCV RBC AUTO: 96 FL (ref 78–100)
MONOCYTES # BLD AUTO: 0.5 10E9/L (ref 0–1.3)
MONOCYTES NFR BLD AUTO: 11.1 %
NEUTROPHILS # BLD AUTO: 2.7 10E9/L (ref 1.6–8.3)
NEUTROPHILS NFR BLD AUTO: 58.4 %
NRBC # BLD AUTO: 0 10*3/UL
NRBC BLD AUTO-RTO: 0 /100
PLATELET # BLD AUTO: 211 10E9/L (ref 150–450)
PROT SERPL-MCNC: 6.5 G/DL (ref 6.8–8.8)
PSA SERPL-MCNC: <0.01 UG/L (ref 0–4)
RBC # BLD AUTO: 4.62 10E12/L (ref 4.4–5.9)
T4 FREE SERPL-MCNC: 0.93 NG/DL (ref 0.76–1.46)
TSH SERPL DL<=0.005 MIU/L-ACNC: 6.71 MU/L (ref 0.4–4)
WBC # BLD AUTO: 4.7 10E9/L (ref 4–11)

## 2020-05-05 PROCEDURE — 84153 ASSAY OF PSA TOTAL: CPT | Mod: TEL | Performed by: PHYSICIAN ASSISTANT

## 2020-05-05 PROCEDURE — 36415 COLL VENOUS BLD VENIPUNCTURE: CPT

## 2020-05-05 PROCEDURE — 84443 ASSAY THYROID STIM HORMONE: CPT | Mod: TEL | Performed by: PHYSICIAN ASSISTANT

## 2020-05-05 PROCEDURE — 96402 CHEMO HORMON ANTINEOPL SQ/IM: CPT

## 2020-05-05 PROCEDURE — 85025 COMPLETE CBC W/AUTO DIFF WBC: CPT | Mod: TEL | Performed by: PHYSICIAN ASSISTANT

## 2020-05-05 PROCEDURE — 84403 ASSAY OF TOTAL TESTOSTERONE: CPT | Mod: TEL | Performed by: PHYSICIAN ASSISTANT

## 2020-05-05 PROCEDURE — 99443 ZZC PHYSICIAN TELEPHONE EVALUATION 21-30 MIN: CPT | Performed by: PHYSICIAN ASSISTANT

## 2020-05-05 PROCEDURE — 84439 ASSAY OF FREE THYROXINE: CPT | Mod: TEL | Performed by: PHYSICIAN ASSISTANT

## 2020-05-05 PROCEDURE — 25000128 H RX IP 250 OP 636: Mod: ZF | Performed by: PHYSICIAN ASSISTANT

## 2020-05-05 PROCEDURE — 80076 HEPATIC FUNCTION PANEL: CPT | Mod: TEL | Performed by: PHYSICIAN ASSISTANT

## 2020-05-05 RX ORDER — LEVOTHYROXINE SODIUM 75 UG/1
75 TABLET ORAL DAILY
Qty: 30 TABLET | Refills: 3 | Status: SHIPPED | OUTPATIENT
Start: 2020-05-05 | End: 2020-08-21

## 2020-05-05 RX ORDER — CIPROFLOXACIN 500 MG/1
TABLET, FILM COATED ORAL
COMMUNITY
Start: 2020-01-09 | End: 2020-08-24

## 2020-05-05 RX ORDER — CLONAZEPAM 0.5 MG/1
0.5 TABLET ORAL
COMMUNITY
Start: 2020-02-22

## 2020-05-05 RX ORDER — LISINOPRIL 20 MG/1
20 TABLET ORAL DAILY
COMMUNITY
Start: 2020-02-07

## 2020-05-05 RX ADMIN — DEGARELIX 80 MG: KIT at 11:25

## 2020-05-05 ASSESSMENT — PAIN SCALES - GENERAL: PAINLEVEL: NO PAIN (0)

## 2020-05-05 NOTE — PROGRESS NOTES
[id number]: Study Visit Note   Subject name: Lucio Hernandez     Visit: Cycle 7 Day 1    Did the study visit occur within the appropriate window allowed by the protocol? yes    Since the last study visit, He has been doing well.  Having increased Anxiety Grade 1     I have personally interviewed Lucio Hernandez and reviewed his medical record for adverse events and concomitant medications and these have been recorded on the corresponding logs in Lucio Hernandez's research file.     Lucio Hernandez was given the opportunity to ask any trial related questions.  Please see provider progress note for physical exam and other clinical information. Labs were reviewed - any significant lab values were addressed and reviewed.    ALEXIA Cox conducted a telephone visit so no actual physical exam was completed.  Via telephone call ALEXIA Cox verbalized that ok to continue all study treatments today.      [id number]: Medication Count/IDS Note      Lucio Hernandez is enrolled on the trial number listed above. The patient presented for his Cycle 7 Day 1 visit.   IDS number: [id number]  Drug name: Apalutamide    Number of Bottles dispensed: 1     60 mg study drug Dispensed  Lot number(s):[id number]   Bottle numbers: [id number]  Number of Pills dispensed: 120    Number of Bottles returned: 0 as subject forgot the study medication and medication diary at home.  Subject will bring to next clinic visit.  Subject educated to always bring back study medication and diary to every clinic visit.  Subject verbalized understanding. Patient did report that there are 14 pills left at home.     Medication Compliance: unknown, see above note     Drug Diary dispensed to study subject: yes     Drug diary returned: no     Are there any discrepancies between the amount of medication the patient was instructed to take and the amount recorded as taken in the patient s drug diary? yes, see above note     Are there any discrepancies between the amount of  medication the patient has recorded as taken in the drug diary and the amount that would be expected to be returned based on the amount recorded as taken? yes, see above note    Lissa Butt  Clinical Research Coordinator-SG, RN, CPN  Clinical Trials Office/[facility]  Desk Phone: [phone]   Pager: [phone]

## 2020-05-05 NOTE — PATIENT INSTRUCTIONS
Contact Numbers    [facility] [place]: [phone]  [facility] [place] and [place] hours / weekends / holidays: [phone]      Please call the triage or after hours line if you experience a temperature greater than or equal to 100.5, shaking chills, have uncontrolled nausea, vomiting and/or diarrhea, dizziness, shortness of breath, chest pain, bleeding, unexplained bruising, or if you have any other new/concerning symptoms, questions or concerns.      If you are having any concerning symptoms or wish to speak to a provider before your next infusion visit, please call your care coordinator or triage to notify them so we can adequately serve you.     If you need a refill on a narcotic prescription or other medication, please call before your infusion appointment.       May 2020      Omi Monday Tuesday Wednesday Thursday Friday Saturday                            1     2       3     4     5    RESEARCH COORD 60 W/EXAM RM   9:30 AM   (60 min.)   Coordinator,  Oncology [facility] DRAW   9:30 AM   (15 min.)    [facility] LAB DRAW   [facility] Lab Draw    TELEPHONE VISIT RETURN  10:00 AM   (50 min.)   Brandi Soto PA-C   [facility] ONC INFUSION 60  11:30 AM   (60 min.)    ONCOLOGY INFUSION   [facility] 6     7     8     9       10     11     12     13     14     15     16       17     18     19     20     21     22     23       24     25     26     27     28     29     30       31                                               June 2020 Sunday Monday Tuesday Wednesday Thursday Friday Saturday        1     2    RESEARCH COORD 60 W/EXAM RM   9:30 AM   (60 min.)   Coordinator,  Oncology [facility] DRAW   9:30 AM   (15 min.)    [facility] LAB DRAW   [facility] Lab Draw    TELEPHONE VISIT RETURN  10:00 AM   (50 min.)   Brandi Soto PA-C   [facility]  Clinic 3     4     5     6       7     8     9     10     11     12     13       14     15     16     17     18     19     20       21     22     23     24     25     26     27       28     29     30    RESEARCH COORD 60 W/EXAM RM   9:30 AM   (60 min.)   Coordinator, [facility] Oncology Research   [facility] DRAW   9:30 AM   (15 min.)   [facility] LAB DRAW   [facility] Lab Draw    TELEPHONE VISIT RETURN  10:00 AM   (50 min.)   Brandi Soto PA-C   [facility] ONC INFUSION 60  11:00 AM   (60 min.)    ONCOLOGY INFUSION   [facility]                                    Recent Results (from the past 24 hour(s))   CBC with platelets differential    Collection Time: 05/05/20  9:55 AM   Result Value Ref Range    WBC 4.7 4.0 - 11.0 10e9/L    RBC Count 4.62 4.4 - 5.9 10e12/L    Hemoglobin 15.7 13.3 - 17.7 g/dL    Hematocrit 44.4 40.0 - 53.0 %    MCV 96 78 - 100 fl    MCH 34.0 (H) 26.5 - 33.0 pg    MCHC 35.4 31.5 - 36.5 g/dL    RDW 11.4 10.0 - 15.0 %    Platelet Count 211 150 - 450 10e9/L    Diff Method Automated Method     % Neutrophils 58.4 %    % Lymphocytes 22.4 %    % Monocytes 11.1 %    % Eosinophils 6.6 %    % Basophils 0.9 %    % Immature Granulocytes 0.6 %    Nucleated RBCs 0 0 /100    Absolute Neutrophil 2.7 1.6 - 8.3 10e9/L    Absolute Lymphocytes 1.1 0.8 - 5.3 10e9/L    Absolute Monocytes 0.5 0.0 - 1.3 10e9/L    Absolute Eosinophils 0.3 0.0 - 0.7 10e9/L    Absolute Basophils 0.0 0.0 - 0.2 10e9/L    Abs Immature Granulocytes 0.0 0 - 0.4 10e9/L    Absolute Nucleated RBC 0.0    Hepatic panel    Collection Time: 05/05/20  9:55 AM   Result Value Ref Range    Bilirubin Direct <0.1 0.0 - 0.2 mg/dL    Bilirubin Total 0.3 0.2 - 1.3 mg/dL    Albumin 3.5 3.4 - 5.0 g/dL    Protein Total 6.5 (L) 6.8 - 8.8 g/dL    Alkaline Phosphatase 75 40 - 150 U/L    ALT 33 0 - 70 U/L    AST 24 0 - 45 U/L   PSA tumor marker    Collection Time: 05/05/20   9:55 AM   Result Value Ref Range    PSA <0.01 0 - 4 ug/L   TSH with free T4 reflex    Collection Time: 05/05/20  9:55 AM   Result Value Ref Range    TSH 6.71 (H) 0.40 - 4.00 mU/L   T4 free    Collection Time: 05/05/20  9:55 AM   Result Value Ref Range    T4 Free 0.93 0.76 - 1.46 ng/dL

## 2020-05-05 NOTE — NURSING NOTE
Chief Complaint   Patient presents with     Blood Draw     Labs drawn via  by RN in lab. VS taken.      Jenifer Carrizales RN

## 2020-05-05 NOTE — LETTER
"5/5/2020       RE: Lucio Hernandez  [address] [facility] [address]     Dear Colleague,    Thank you for referring your patient, Lucio Hernandez, to the [facility]. Please see a copy of my visit note below.    Lucio Hernandez is a 64 year old male who is being evaluated via a billable telephone visit.      The patient has been notified of following:     \"This telephone visit will be conducted via a call between you and your physician/provider. We have found that certain health care needs can be provided without the need for a physical exam.  This service lets us provide the care you need with a short phone conversation.  If a prescription is necessary we can send it directly to your pharmacy.  If lab work is needed we can place an order for that and you can then stop by our lab to have the test done at a later time.    Telephone visits are billed at different rates depending on your insurance coverage. During this emergency period, for some insurers they may be billed the same as an in-person visit.  Please reach out to your insurance provider with any questions.    If during the course of the call the physician/provider feels a telephone visit is not appropriate, you will not be charged for this service.\"    Patient has given verbal consent for Telephone visit?  Yes    What phone number would you like to be contacted at? [phone]    How would you like to obtain your AVS? Zeke    I have reviewed and updated the patient's allergies and medication list.     Concerns: nothing new  Refills: No    Kristina Nunez LPN    Provider Note:    May 5, 2020    CC: Prostate cancer     HPI: Lucio Hernandez is a 64 year old male who has prostate cancer. He had a radical prostatectomy in 2006 demonstrating Jg 7 disease with a positive margin.  He had salvage radiation therapy in 2006 when his PSA was around 0.4 per the patient's recollection.  He notes his PSA was undetectable for 4 years, but then " began coming back and recently has noted to have PSAs in the 1.2-1.4 range in May of this year.  He underwent an Axumin PET scan recently that shows no evidence of metastatic disease though there was uptake in the prostate bed.      Had Prior history of urethral stricture. This may have been due to the prior radiation therapy and a Olsen Catheter complication postop.    He was enrolled in a clinical trial AFT19 with apalutamide and degarelix which started on 11/14/19.       4/11/18 PSA = 0.56  10/30/18 PSA =0.48  5/14/19 PSA =1.2  5/29/19 PSA=1.4  7/29/19 PSA=1.45  9/5/19  PSA =1.84  11/4/19 PSA=2.19  11/14/19 PSA= 2.27-- START AFT-19 12/12/19 PSA= 0.04  1/9/2020 PSA= <0.01  2/5/2020 PSA = <0.01  2/26/2020 PSA= <0.01  4/6/20  PSA= <0.01  5/5/20  PSA= <0.01     INTERIM HISTORY:  Lucio presents today unaccompanied for cycle 7.    Doesn't feel as much as head fog, though feels that his recall is not as quick. Has had more sadness recently, which is in part due to [place] and also the COVID pandemic. He wants to fix everything but doesn't have the energy to do it. Not doing as much vigorous exercises though walking and doing yoga regularly. Yoga has been very helpful.     Continues to have hot flashes which vary. Worse at night, especially right when he goes to bed. Has overall been better recently. Doesn't sweat much. Has more of an autonomic rush which is the worst part for him.    ROS: 10 point ROS neg other than the symptoms noted above in the HPI.    PAST MEDICAL HISTORY:  Otherwise significant for the prostate cancer,  history of pulmonary embolism and rib fractures from coughing fits and appendicitis.      PAST SURGICAL HISTORY:  Includes the RRP, a lumbar fusion, a thoracotomy for the history of rib fractures and the pulmonic hernia, shoulder surgery x3, appendectomy and a cervical spine surgery.      MEDICATIONS:     Current Outpatient Medications   Medication     apalutamide (IDS# 5330) tablet     apalutamide  (IDS# 5330) tablet     cetirizine (ZYRTEC) 10 MG tablet     ciprofloxacin (CIPRO) 500 MG tablet     clonazePAM (KLONOPIN) 0.5 MG tablet     fish oil-omega-3 fatty acids 1000 MG capsule     fluticasone (FLONASE) 50 MCG/ACT nasal spray     gabapentin (NEURONTIN) 300 MG capsule     levothyroxine (SYNTHROID/LEVOTHROID) 50 MCG tablet     levothyroxine (SYNTHROID/LEVOTHROID) 75 MCG tablet     lisinopril (ZESTRIL) 20 MG tablet     LISINOPRIL PO     LORazepam (ATIVAN) 0.5 MG tablet     apalutamide (IDS# 5330) tablet     No current facility-administered medications for this visit.         ALLERGIES:  NO KNOWN DRUG ALLERGIES.      SOCIAL HISTORY:  Negative for tobacco.  Alcohol 1 drink a day. Pt is a retired Otolaryngologist (ENT surgeon).      REVIEW OF SYSTEMS:  Negative for fevers, chills, sweats, nausea, vomiting, unexplained weight changes.        Vital Signs 2020   Systolic 142   Diastolic 76   Pulse 65   Temperature 97.8   Respirations 16   Weight (LB) 199 lb   O2 98     Wt Readings from Last 4 Encounters:   20 89 kg (196 lb 4.8 oz)   20 84.7 kg (186 lb 11.2 oz)   20 86.4 kg (190 lb 8 oz)   20 85.7 kg (189 lb)     Objective:  General: patient sounds in no audible acute distress, alert and oriented, speech clear and fluid  Resp: Speaking in full sentences, no audible respiratory distress, no cough, no audible wheeze  Psych: able to articulate logical thoughts, able to abstract reason, no tangential thoughts, no hallucinations or delusions  His affect is normal    ECO    Laboratory Data  PSA= <0.01  TSH=6.71  T4=0.93  Testosterone=pending    Impression and Plan  This is a 64 year old gentleman with a 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  He has a doubling time that is under 9 months based on the most recent 2 measurements.  He is also status post salvage radiation therapy.    Prostate Cancer  -enrolled in AFT19 study with apalutamide and degarelix.    -symptoms did initially improve though now have come to a plateau. Still have difficulties with his new normal, though all symptoms are expected with this regiment   -PSA still undetectable which is wonderful!  -Continue Apalutamide is 240 mg daily (gave 40 days). Continue monthly degarelix 80 mg; giving today  -will follow-up per trial protocol    Hot Flashes  -Started him on a gabapentin 300 mg at bedtime which was initially beneficial for him. Increased to 300 mg AM and 600 mg night. Unsure if this increase was helpful. Do not think further increase would bring him benefit at this time     Emotional/Mood  -has been struggling with mortality, lack of motivation and more emotions. I do not think he is at a point where medication is necessary though he previously agreed to let me know if he feels he needs help  -this has been exacerbated by the COVID pandemic though still think he is coping appropriately. Provided active listening and support. Continue to monitor      Urinary Retention  -self cathing every other day. No changes    Hypothyroidism  -Started synthroid 50 mcg daily on 12/12/19. TSH was persistently elevated and so dose was increased to 75 mcg at last visit (4/6). TSH still elevated though T4 normal. Will keep at the same dose. Would not increase again unless TSH >10 or T4 low    Phone call duration: 25 minutes    Brandi Soto PA-C          Again, thank you for allowing me to participate in the care of your patient.      Sincerely,    Brandi Soto PA-C

## 2020-05-05 NOTE — PROGRESS NOTES
"Lucio Hernandez is a 64 year old male who is being evaluated via a billable telephone visit.      The patient has been notified of following:     \"This telephone visit will be conducted via a call between you and your physician/provider. We have found that certain health care needs can be provided without the need for a physical exam.  This service lets us provide the care you need with a short phone conversation.  If a prescription is necessary we can send it directly to your pharmacy.  If lab work is needed we can place an order for that and you can then stop by our lab to have the test done at a later time.    Telephone visits are billed at different rates depending on your insurance coverage. During this emergency period, for some insurers they may be billed the same as an in-person visit.  Please reach out to your insurance provider with any questions.    If during the course of the call the physician/provider feels a telephone visit is not appropriate, you will not be charged for this service.\"    Patient has given verbal consent for Telephone visit?  Yes    What phone number would you like to be contacted at? [phone]    How would you like to obtain your AVS? Sajihart    I have reviewed and updated the patient's allergies and medication list.     Concerns: nothing new  Refills: No    Kristina Nunez LPN    Provider Note:    May 5, 2020    CC: Prostate cancer     HPI: Lucio Hernandez is a 64 year old male who has prostate cancer. He had a radical prostatectomy in 2006 demonstrating Jg 7 disease with a positive margin.  He had salvage radiation therapy in 2006 when his PSA was around 0.4 per the patient's recollection.  He notes his PSA was undetectable for 4 years, but then began coming back and recently has noted to have PSAs in the 1.2-1.4 range in May of this year.  He underwent an Axumin PET scan recently that shows no evidence of metastatic disease though there was uptake in the prostate bed.      Had " Prior history of urethral stricture. This may have been due to the prior radiation therapy and a Olsen Catheter complication postop.    He was enrolled in a clinical trial AFT19 with apalutamide and degarelix which started on 11/14/19.       4/11/18 PSA = 0.56  10/30/18 PSA =0.48  5/14/19 PSA =1.2  5/29/19 PSA=1.4  7/29/19 PSA=1.45  9/5/19  PSA =1.84  11/4/19 PSA=2.19  11/14/19 PSA= 2.27-- START AFT-19 12/12/19 PSA= 0.04  1/9/2020 PSA= <0.01  2/5/2020 PSA = <0.01  2/26/2020 PSA= <0.01  4/6/20  PSA= <0.01  5/5/20  PSA= <0.01     INTERIM HISTORY:  Lucio presents today unaccompanied for cycle 7.    Doesn't feel as much as head fog, though feels that his recall is not as quick. Has had more sadness recently, which is in part due to [place] and also the COVID pandemic. He wants to fix everything but doesn't have the energy to do it. Not doing as much vigorous exercises though walking and doing yoga regularly. Yoga has been very helpful.     Continues to have hot flashes which vary. Worse at night, especially right when he goes to bed. Has overall been better recently. Doesn't sweat much. Has more of an autonomic rush which is the worst part for him.    ROS: 10 point ROS neg other than the symptoms noted above in the HPI.    PAST MEDICAL HISTORY:  Otherwise significant for the prostate cancer,  history of pulmonary embolism and rib fractures from coughing fits and appendicitis.      PAST SURGICAL HISTORY:  Includes the RRP, a lumbar fusion, a thoracotomy for the history of rib fractures and the pulmonic hernia, shoulder surgery x3, appendectomy and a cervical spine surgery.      MEDICATIONS:     Current Outpatient Medications   Medication     apalutamide (IDS# 5330) tablet     apalutamide (IDS# 5330) tablet     cetirizine (ZYRTEC) 10 MG tablet     ciprofloxacin (CIPRO) 500 MG tablet     clonazePAM (KLONOPIN) 0.5 MG tablet     fish oil-omega-3 fatty acids 1000 MG capsule     fluticasone (FLONASE) 50 MCG/ACT nasal spray      gabapentin (NEURONTIN) 300 MG capsule     levothyroxine (SYNTHROID/LEVOTHROID) 50 MCG tablet     levothyroxine (SYNTHROID/LEVOTHROID) 75 MCG tablet     lisinopril (ZESTRIL) 20 MG tablet     LISINOPRIL PO     LORazepam (ATIVAN) 0.5 MG tablet     apalutamide (IDS# 5330) tablet     No current facility-administered medications for this visit.         ALLERGIES:  NO KNOWN DRUG ALLERGIES.      SOCIAL HISTORY:  Negative for tobacco.  Alcohol 1 drink a day. Pt is a retired Otolaryngologist (ENT surgeon).      REVIEW OF SYSTEMS:  Negative for fevers, chills, sweats, nausea, vomiting, unexplained weight changes.        Vital Signs 2020   Systolic 142   Diastolic 76   Pulse 65   Temperature 97.8   Respirations 16   Weight (LB) 199 lb   O2 98     Wt Readings from Last 4 Encounters:   20 89 kg (196 lb 4.8 oz)   20 84.7 kg (186 lb 11.2 oz)   20 86.4 kg (190 lb 8 oz)   20 85.7 kg (189 lb)     Objective:  General: patient sounds in no audible acute distress, alert and oriented, speech clear and fluid  Resp: Speaking in full sentences, no audible respiratory distress, no cough, no audible wheeze  Psych: able to articulate logical thoughts, able to abstract reason, no tangential thoughts, no hallucinations or delusions  His affect is normal    ECO    Laboratory Data  PSA= <0.01  TSH=6.71  T4=0.93  Testosterone=pending    Impression and Plan  This is a 64 year old gentleman with a serologic relapse of a [place] 4+3 = 7 with a tertiary pattern of Jg 5.  He has a doubling time that is under 9 months based on the most recent 2 measurements.  He is also status post salvage radiation therapy.    Prostate Cancer  -enrolled in AFT19 study with apalutamide and degarelix.   -symptoms did initially improve though now have come to a plateau. Still have difficulties with his new normal, though all symptoms are expected with this regiment   -PSA still undetectable which is wonderful!  -Continue Apalutamide is  240 mg daily (gave 40 days). Continue monthly degarelix 80 mg; giving today  -will follow-up per trial protocol    Hot Flashes  -Started him on a gabapentin 300 mg at bedtime which was initially beneficial for him. Increased to 300 mg AM and 600 mg night. Unsure if this increase was helpful. Do not think further increase would bring him benefit at this time     Emotional/Mood  -has been struggling with mortality, lack of motivation and more emotions. I do not think he is at a point where medication is necessary though he previously agreed to let me know if he feels he needs help  -this has been exacerbated by the COVID pandemic though still think he is coping appropriately. Provided active listening and support. Continue to monitor      Urinary Retention  -self cathing every other day. No changes    Hypothyroidism  -Started synthroid 50 mcg daily on 12/12/19. TSH was persistently elevated and so dose was increased to 75 mcg at last visit (4/6). TSH still elevated though T4 normal. Will keep at the same dose. Would not increase again unless TSH >10 or T4 low    Phone call duration: 25 minutes    Brandi Soto PA-C

## 2020-05-05 NOTE — PROGRESS NOTES
Infusion Nursing Note:  Lucio Hernandez presents today for Cycle 7 Day 1 [place].    Patient seen by provider today: Yes: ALEXIA Cox, via telephone visit   present during visit today: Not Applicable.    Note: N/A.    Pain: denies    Intravenous Access:  Labs drawn without difficulty.    Treatment Conditions:  Lab Results   Component Value Date    HGB 15.7 05/05/2020     Lab Results   Component Value Date    WBC 4.7 05/05/2020      Lab Results   Component Value Date    ANEU 2.7 05/05/2020     Lab Results   Component Value Date     05/05/2020      Lab Results   Component Value Date             No results found for: MAG         Lab Results   Component Value Date                  Lab Results   Component Value Date    BILITOTAL 0.3 05/05/2020           Lab Results   Component Value Date    ALBUMIN 3.5 05/05/2020                    Lab Results   Component Value Date    ALT 33 05/05/2020           Lab Results   Component Value Date    AST 24 05/05/2020       Results reviewed, labs MET treatment parameters, ok to proceed with treatment.      Post Infusion Assessment:  Patient tolerated deep SQ injection to LUQ abdomen without incident. Pt applied ice to site after injection.       Discharge Plan:   Prescription refills given for study drug.  AVS to patient via [employer]. Next infusion not scheduled at time of discharge today. Pt due for next infusion on 5/28.  Pt instructed to follow-up with schedulers or triage if he/she has not been notified of next appointment date and time.  Pt provided with triage phone number ([phone]).   Pt stated understanding of plan.  Patient discharged in stable condition accompanied by: self.  Departure Mode: Ambulatory.    SANCHEZ SULLIVAN RN

## 2020-05-06 LAB — TESTOST SERPL-MCNC: 9 NG/DL (ref 240–950)

## 2020-05-25 DIAGNOSIS — C61 PROSTATE CANCER (H): ICD-10-CM

## 2020-05-25 DIAGNOSIS — E03.2 HYPOTHYROIDISM DUE TO MEDICATION: ICD-10-CM

## 2020-05-26 DIAGNOSIS — E03.2 HYPOTHYROIDISM DUE TO MEDICATION: ICD-10-CM

## 2020-05-26 DIAGNOSIS — C61 PROSTATE CANCER (H): ICD-10-CM

## 2020-05-26 RX ORDER — LEVOTHYROXINE SODIUM 50 UG/1
TABLET ORAL
Qty: 90 TABLET | Refills: 1 | OUTPATIENT
Start: 2020-05-26

## 2020-06-02 ENCOUNTER — INFUSION THERAPY VISIT (OUTPATIENT)
Dept: ONCOLOGY | Facility: CLINIC | Age: 65
End: 2020-06-02
Attending: PHYSICIAN ASSISTANT
Payer: MEDICARE

## 2020-06-02 ENCOUNTER — APPOINTMENT (OUTPATIENT)
Dept: LAB | Facility: CLINIC | Age: 65
End: 2020-06-02
Attending: PHYSICIAN ASSISTANT
Payer: MEDICARE

## 2020-06-02 ENCOUNTER — APPOINTMENT (OUTPATIENT)
Dept: ONCOLOGY | Facility: CLINIC | Age: 65
End: 2020-06-02
Attending: INTERNAL MEDICINE
Payer: MEDICARE

## 2020-06-02 VITALS
OXYGEN SATURATION: 95 % | SYSTOLIC BLOOD PRESSURE: 99 MMHG | TEMPERATURE: 99 F | WEIGHT: 197.6 LBS | RESPIRATION RATE: 16 BRPM | DIASTOLIC BLOOD PRESSURE: 71 MMHG | BODY MASS INDEX: 29.16 KG/M2 | HEART RATE: 77 BPM

## 2020-06-02 DIAGNOSIS — R97.20 ELEVATED PROSTATE SPECIFIC ANTIGEN (PSA): Primary | ICD-10-CM

## 2020-06-02 DIAGNOSIS — C61 PROSTATE CANCER (H): ICD-10-CM

## 2020-06-02 DIAGNOSIS — C61 PROSTATE CANCER (H): Primary | ICD-10-CM

## 2020-06-02 DIAGNOSIS — R97.20 ELEVATED PROSTATE SPECIFIC ANTIGEN (PSA): ICD-10-CM

## 2020-06-02 LAB
PSA SERPL-MCNC: <0.01 UG/L (ref 0–4)
T4 FREE SERPL-MCNC: 1.04 NG/DL (ref 0.76–1.46)
TSH SERPL DL<=0.005 MIU/L-ACNC: 6.13 MU/L (ref 0.4–4)

## 2020-06-02 PROCEDURE — 84439 ASSAY OF FREE THYROXINE: CPT | Performed by: PHYSICIAN ASSISTANT

## 2020-06-02 PROCEDURE — 84403 ASSAY OF TOTAL TESTOSTERONE: CPT | Performed by: PHYSICIAN ASSISTANT

## 2020-06-02 PROCEDURE — 25000128 H RX IP 250 OP 636: Mod: ZF | Performed by: PHYSICIAN ASSISTANT

## 2020-06-02 PROCEDURE — 99441 ZZC PHYSICIAN TELEPHONE EVALUATION 5-10 MIN: CPT | Performed by: PHYSICIAN ASSISTANT

## 2020-06-02 PROCEDURE — 84443 ASSAY THYROID STIM HORMONE: CPT | Performed by: PHYSICIAN ASSISTANT

## 2020-06-02 PROCEDURE — 96402 CHEMO HORMON ANTINEOPL SQ/IM: CPT

## 2020-06-02 PROCEDURE — 84153 ASSAY OF PSA TOTAL: CPT | Performed by: PHYSICIAN ASSISTANT

## 2020-06-02 PROCEDURE — 36415 COLL VENOUS BLD VENIPUNCTURE: CPT

## 2020-06-02 RX ADMIN — DEGARELIX 80 MG: KIT at 11:01

## 2020-06-02 ASSESSMENT — PAIN SCALES - GENERAL: PAINLEVEL: NO PAIN (0)

## 2020-06-02 NOTE — PROGRESS NOTES
"Lucio Hernandez is a 64 year old male who is being evaluated via a billable telephone visit.      The patient has been notified of following:     \"This telephone visit will be conducted via a call between you and your physician/provider. We have found that certain health care needs can be provided without the need for a physical exam.  This service lets us provide the care you need with a short phone conversation.  If a prescription is necessary we can send it directly to your pharmacy.  If lab work is needed we can place an order for that and you can then stop by our lab to have the test done at a later time.    Telephone visits are billed at different rates depending on your insurance coverage. During this emergency period, for some insurers they may be billed the same as an in-person visit.  Please reach out to your insurance provider with any questions.    If during the course of the call the physician/provider feels a telephone visit is not appropriate, you will not be charged for this service.\"    Patient has given verbal consent for Telephone visit?  Yes    What phone number would you like to be contacted at? [phone]    How would you like to obtain your AVS? CHARO Mosqueda    "

## 2020-06-02 NOTE — PROGRESS NOTES
Provider Note:    Jun 2, 2020    CC: Prostate cancer     HPI: Lucio Hernandez is a 64 year old male who has prostate cancer. He had a radical prostatectomy in 2006 demonstrating [place] 7 disease with a positive margin.  He had salvage radiation therapy in 2006 when his PSA was around 0.4 per the patient's recollection.  He notes his PSA was undetectable for 4 years, but then began coming back and recently has noted to have PSAs in the 1.2-1.4 range in May of this year.  He underwent an Axumin PET scan recently that shows no evidence of metastatic disease though there was uptake in the prostate bed.      Had Prior history of urethral stricture. This may have been due to the prior radiation therapy and a Olsen Catheter complication postop.    He was enrolled in a clinical trial AFT19 with apalutamide and degarelix which started on 11/14/19.       4/11/18 PSA = 0.56  10/30/18 PSA =0.48  5/14/19 PSA =1.2  5/29/19 PSA=1.4  7/29/19 PSA=1.45  9/5/19  PSA =1.84  11/4/19 PSA=2.19  11/14/19 PSA= 2.27-- START AFT-19  12/12/19 PSA= 0.04  1/9/2020 PSA= <0.01  2/5/2020 PSA = <0.01  2/26/2020 PSA= <0.01  4/6/20  PSA= <0.01  5/5/20  PSA= <0.01   6/2/20  PSA= <0.01    INTERIM HISTORY:  Lucio presents today unaccompanied for cycle 7.    Good and bad days with hf. Worse in summer. Fatigue and ambitious is lower though still very active. Continues to skin changes and hair loss. No GI complaints. Eating and drinking well.     Has been sleeping better with gabapentin and clonazepam.     ROS: 10 point ROS neg other than the symptoms noted above in the HPI.    PAST MEDICAL HISTORY:  Otherwise significant for the prostate cancer,  history of pulmonary embolism and rib fractures from coughing fits and appendicitis.      PAST SURGICAL HISTORY:  Includes the RRP, a lumbar fusion, a thoracotomy for the history of rib fractures and the pulmonic hernia, shoulder surgery x3, appendectomy and a cervical spine surgery.      MEDICATIONS:     Current  Outpatient Medications   Medication     apalutamide (IDS# 5330) tablet     cetirizine (ZYRTEC) 10 MG tablet     ciprofloxacin (CIPRO) 500 MG tablet     clonazePAM (KLONOPIN) 0.5 MG tablet     fish oil-omega-3 fatty acids 1000 MG capsule     fluticasone (FLONASE) 50 MCG/ACT nasal spray     levothyroxine (SYNTHROID/LEVOTHROID) 75 MCG tablet     lisinopril (ZESTRIL) 20 MG tablet     LISINOPRIL PO     LORazepam (ATIVAN) 0.5 MG tablet     gabapentin (NEURONTIN) 300 MG capsule     No current facility-administered medications for this visit.         ALLERGIES:  NO KNOWN DRUG ALLERGIES.      SOCIAL HISTORY:  Negative for tobacco.  Alcohol 1 drink a day. Pt is a retired Otolaryngologist (ENT surgeon).      REVIEW OF SYSTEMS:  Negative for fevers, chills, sweats, nausea, vomiting, unexplained weight changes.      Vital Signs 2020   Systolic 99   Diastolic 71   Pulse 77   Temperature 99   Respirations 16   Weight (LB) 197 lb 9.6 oz   O2 95       Wt Readings from Last 4 Encounters:   20 90.3 kg (199 lb)   20 89 kg (196 lb 4.8 oz)   20 84.7 kg (186 lb 11.2 oz)   20 86.4 kg (190 lb 8 oz)     Objective:  General: patient sounds in no audible acute distress, alert and oriented, speech clear and fluid  Resp: Speaking in full sentences, no audible respiratory distress, no cough, no audible wheeze  Psych: able to articulate logical thoughts, able to abstract reason, no tangential thoughts, no hallucinations or delusions  His affect is normal    ECO    Laboratory Data     2020 09:55   PSA <0.01   T4 Free 1.04   Testosterone Total 8 (L)   TSH 6.13 (H)       Impression and Plan  This is a 64 year old gentleman with a 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  He has a doubling time that is under 9 months based on the most recent 2 measurements.  He is also status post salvage radiation therapy.    Prostate Cancer  -enrolled in AFT19 study with apalutamide and  degarelix.   -continues to have expected SE of ADT  -PSA still undetectable which is wonderful!  -Continue Apalutamide is 240 mg daily and monthly degarelix 80 mg; giving today  -will follow-up per trial protocol    Hot Flashes  -Started him on a gabapentin 300 mg at bedtime which was initially beneficial for him. Increased to 300 mg AM and 600 mg night. Unsure if helpful. Will keep at this dose. Does help him sleep  -of note he has been taking clonazepam at night that he got from his PCP. Told him I do not prescribe this and so he would need to follow-up with PCP if he wanted a refill    Emotional/Mood  -has been struggling with mortality, lack of motivation and more emotions. I do not think he is at a point where medication is necessary though he previously agreed to let me know if he feels he needs help  -this has been exacerbated by the COVID pandemic though still think he is coping appropriately. Will continue to provide active listening and support. Doing a little better today    Urinary Retention  -self cathing every other day. No changes    Hypothyroidism  -Started synthroid 50 mcg daily on 12/12/19. TSH was persistently elevated and so dose was increased to 75 mcg at last visit (4/6). TSH still elevated though T4 normal. No dose change. Would not increase again unless TSH >10 or T4 low    Phone call duration: 9 minutes    Brandi Soto PA-C

## 2020-06-02 NOTE — PROGRESS NOTES
Infusion Nursing Note:  Lucio Hernandez presents today for Cycle 8 Day 1 [place].    Patient seen by provider today: Yes: Brandi HALE      Note: Per yee Aguilar RN, no CBC and CMP needed today.        Post Infusion Assessment:  Patient tolerated deep subcutaneous injection to RUQ abdomen without incident.  Pt applied ice to site after injection.     Discharge Plan:   Prescription for Study Apalutamide (IDS #5330) filled and given to pt by yee Aguilar RN.  Discharge instructions reviewed with: Patient.  Patient and/or family verbalized understanding of discharge instructions and all questions answered.  AVS to patient via [employer].  Patient will return 6/30/20 for next appointment.   Patient discharged in stable condition accompanied by: self.  Departure Mode: Ambulatory.  Face to Face time: 0.    Mary Foreman RN

## 2020-06-02 NOTE — LETTER
"  6/2/2020     RE: Lucio Hernandez  [address] [place] [facility] [address]      Dear Colleague,    Thank you for referring your patient, Lucio Hernandez, to the [facility]. Please see a copy of my visit note below.    Lucio Hernandez is a 64 year old male who is being evaluated via a billable telephone visit.      The patient has been notified of following:     \"This telephone visit will be conducted via a call between you and your physician/provider. We have found that certain health care needs can be provided without the need for a physical exam.  This service lets us provide the care you need with a short phone conversation.  If a prescription is necessary we can send it directly to your pharmacy.  If lab work is needed we can place an order for that and you can then stop by our lab to have the test done at a later time.    Telephone visits are billed at different rates depending on your insurance coverage. During this emergency period, for some insurers they may be billed the same as an in-person visit.  Please reach out to your insurance provider with any questions.    If during the course of the call the physician/provider feels a telephone visit is not appropriate, you will not be charged for this service.\"    Patient has given verbal consent for Telephone visit?  Yes    What phone number would you like to be contacted at? [phone]    How would you like to obtain your AVS? CHARO Mosqueda      Provider Note:    Jun 2, 2020    CC: Prostate cancer     HPI: Lucio Hernandez is a 64 year old male who has prostate cancer. He had a radical prostatectomy in 2006 demonstrating Jg 7 disease with a positive margin.  He had salvage radiation therapy in 2006 when his PSA was around 0.4 per the patient's recollection.  He notes his PSA was undetectable for 4 years, but then began coming back and recently has noted to have PSAs in the 1.2-1.4 range in May of this year.  He underwent an " Axumin PET scan recently that shows no evidence of metastatic disease though there was uptake in the prostate bed.      Had Prior history of urethral stricture. This may have been due to the prior radiation therapy and a Olsen Catheter complication postop.    He was enrolled in a clinical trial AFT19 with apalutamide and degarelix which started on 11/14/19.       4/11/18 PSA = 0.56  10/30/18 PSA =0.48  5/14/19 PSA =1.2  5/29/19 PSA=1.4  7/29/19 PSA=1.45  9/5/19  PSA =1.84  11/4/19 PSA=2.19  11/14/19 PSA= 2.27-- START AFT-19 12/12/19 PSA= 0.04  1/9/2020 PSA= <0.01  2/5/2020 PSA = <0.01  2/26/2020 PSA= <0.01  4/6/20  PSA= <0.01  5/5/20  PSA= <0.01   6/2/20  PSA= <0.01    INTERIM HISTORY:  Lucio presents today unaccompanied for cycle 7.    Good and bad days with hf. Worse in summer. Fatigue and ambitious is lower though still very active. Continues to skin changes and hair loss. No GI complaints. Eating and drinking well.     Has been sleeping better with gabapentin and clonazepam.     ROS: 10 point ROS neg other than the symptoms noted above in the HPI.    PAST MEDICAL HISTORY:  Otherwise significant for the prostate cancer,  history of pulmonary embolism and rib fractures from coughing fits and appendicitis.      PAST SURGICAL HISTORY:  Includes the RRP, a lumbar fusion, a thoracotomy for the history of rib fractures and the pulmonic hernia, shoulder surgery x3, appendectomy and a cervical spine surgery.      MEDICATIONS:     Current Outpatient Medications   Medication     apalutamide (IDS# 5330) tablet     cetirizine (ZYRTEC) 10 MG tablet     ciprofloxacin (CIPRO) 500 MG tablet     clonazePAM (KLONOPIN) 0.5 MG tablet     fish oil-omega-3 fatty acids 1000 MG capsule     fluticasone (FLONASE) 50 MCG/ACT nasal spray     levothyroxine (SYNTHROID/LEVOTHROID) 75 MCG tablet     lisinopril (ZESTRIL) 20 MG tablet     LISINOPRIL PO     LORazepam (ATIVAN) 0.5 MG tablet     gabapentin (NEURONTIN) 300 MG capsule     No current  facility-administered medications for this visit.         ALLERGIES:  NO KNOWN DRUG ALLERGIES.      SOCIAL HISTORY:  Negative for tobacco.  Alcohol 1 drink a day. Pt is a retired Otolaryngologist (ENT surgeon).      REVIEW OF SYSTEMS:  Negative for fevers, chills, sweats, nausea, vomiting, unexplained weight changes.      Vital Signs 2020   Systolic 99   Diastolic 71   Pulse 77   Temperature 99   Respirations 16   Weight (LB) 197 lb 9.6 oz   O2 95       Wt Readings from Last 4 Encounters:   20 90.3 kg (199 lb)   20 89 kg (196 lb 4.8 oz)   20 84.7 kg (186 lb 11.2 oz)   20 86.4 kg (190 lb 8 oz)     Objective:  General: patient sounds in no audible acute distress, alert and oriented, speech clear and fluid  Resp: Speaking in full sentences, no audible respiratory distress, no cough, no audible wheeze  Psych: able to articulate logical thoughts, able to abstract reason, no tangential thoughts, no hallucinations or delusions  His affect is normal    ECO    Laboratory Data     2020 09:55   PSA <0.01   T4 Free 1.04   Testosterone Total 8 (L)   TSH 6.13 (H)       Impression and Plan  This is a 64 year old gentleman with a serologic relapse of a [place] 4+3 = 7 with a tertiary pattern of Jg 5.  He has a doubling time that is under 9 months based on the most recent 2 measurements.  He is also status post salvage radiation therapy.    Prostate Cancer  -enrolled in AFT19 study with apalutamide and degarelix.   -continues to have expected SE of ADT  -PSA still undetectable which is wonderful!  -Continue Apalutamide is 240 mg daily and monthly degarelix 80 mg; giving today  -will follow-up per trial protocol    Hot Flashes  -Started him on a gabapentin 300 mg at bedtime which was initially beneficial for him. Increased to 300 mg AM and 600 mg night. Unsure if helpful. Will keep at this dose. Does help him sleep  -of note he has been taking clonazepam at night that he got from his PCP. Told  him I do not prescribe this and so he would need to follow-up with PCP if he wanted a refill    Emotional/Mood  -has been struggling with mortality, lack of motivation and more emotions. I do not think he is at a point where medication is necessary though he previously agreed to let me know if he feels he needs help  -this has been exacerbated by the COVID pandemic though still think he is coping appropriately. Will continue to provide active listening and support. Doing a little better today    Urinary Retention  -self cathing every other day. No changes    Hypothyroidism  -Started synthroid 50 mcg daily on 12/12/19. TSH was persistently elevated and so dose was increased to 75 mcg at last visit (4/6). TSH still elevated though T4 normal. No dose change. Would not increase again unless TSH >10 or T4 low    Phone call duration: 9 minutes    Brandi Soto PA-C

## 2020-06-02 NOTE — NURSING NOTE
Chief Complaint   Patient presents with     Blood Draw     Labs drawn via  by RN in lab. VS Taken.     Rebecca Wallace RN

## 2020-06-03 ENCOUNTER — RESEARCH ENCOUNTER (OUTPATIENT)
Dept: ONCOLOGY | Facility: CLINIC | Age: 65
End: 2020-06-03

## 2020-06-03 NOTE — PROGRESS NOTES
[id number]: Study Visit Note   Subject name: Lucio Hernandez     Visit: Cycle 8, Day 1.   Date of visit: 6/2/2020      Since the last study visit, He has been doing great.     I have personally interviewed Lucio Hernandez and reviewed his medical record for adverse events and concomitant medications and these have been recorded on the corresponding logs in Lucio Hernandez's research file.     Lucio Hernandez was given the opportunity to ask any trial related questions.  Please see provider progress note for physical exam and other clinical information. Labs were reviewed - any significant lab values were addressed and reviewed.        Medication Count/IDS Note       IDS number: 5330  Drug name: Apalutamide  Number of bottles returned: 2. (1 from April and 1 from May)  Lot number(s): [id number] x 2. Each bottle contained 14 pills)      Number of bottles dispensed:2    Drug diary returned? Yes, 2. 1 for April and 1 for May.  New diary was dispensed.     Are there any discrepancies between the amount of medication the patient was instructed to take and the amount recorded as taken in the patient s drug diary?  No    Are there any discrepancies between the amount of medication the patient has recorded as taken in the drug diary and the amount that would be expected to be returned based on the amount recorded as taken?  no    Klaus Gillespie RN    Form 504.00.01 (Version 1)     Effective date: 01JUL2018     Next Review Date: 01JUL2020              Klaus Gillespie RN

## 2020-06-04 LAB — TESTOST SERPL-MCNC: 8 NG/DL (ref 240–950)

## 2020-06-30 ENCOUNTER — APPOINTMENT (OUTPATIENT)
Dept: LAB | Facility: CLINIC | Age: 65
End: 2020-06-30
Attending: PHYSICIAN ASSISTANT
Payer: MEDICARE

## 2020-06-30 ENCOUNTER — VIRTUAL VISIT (OUTPATIENT)
Dept: ONCOLOGY | Facility: CLINIC | Age: 65
End: 2020-06-30
Attending: PHYSICIAN ASSISTANT
Payer: MEDICARE

## 2020-06-30 ENCOUNTER — RESEARCH ENCOUNTER (OUTPATIENT)
Dept: ONCOLOGY | Facility: CLINIC | Age: 65
End: 2020-06-30

## 2020-06-30 VITALS
DIASTOLIC BLOOD PRESSURE: 78 MMHG | BODY MASS INDEX: 29.49 KG/M2 | HEART RATE: 79 BPM | TEMPERATURE: 98.8 F | WEIGHT: 199.8 LBS | SYSTOLIC BLOOD PRESSURE: 134 MMHG | OXYGEN SATURATION: 96 % | RESPIRATION RATE: 16 BRPM

## 2020-06-30 DIAGNOSIS — R97.20 ELEVATED PROSTATE SPECIFIC ANTIGEN (PSA): ICD-10-CM

## 2020-06-30 DIAGNOSIS — C61 PROSTATE CANCER (H): ICD-10-CM

## 2020-06-30 DIAGNOSIS — C61 PROSTATE CANCER (H): Primary | ICD-10-CM

## 2020-06-30 DIAGNOSIS — R97.20 ELEVATED PROSTATE SPECIFIC ANTIGEN (PSA): Primary | ICD-10-CM

## 2020-06-30 LAB
PSA SERPL-MCNC: <0.01 UG/L (ref 0–4)
T4 FREE SERPL-MCNC: 0.96 NG/DL (ref 0.76–1.46)
TSH SERPL DL<=0.005 MIU/L-ACNC: 5.83 MU/L (ref 0.4–4)

## 2020-06-30 PROCEDURE — 96402 CHEMO HORMON ANTINEOPL SQ/IM: CPT

## 2020-06-30 PROCEDURE — 84443 ASSAY THYROID STIM HORMONE: CPT | Mod: TEL | Performed by: PHYSICIAN ASSISTANT

## 2020-06-30 PROCEDURE — 36415 COLL VENOUS BLD VENIPUNCTURE: CPT

## 2020-06-30 PROCEDURE — 25000128 H RX IP 250 OP 636: Mod: ZF | Performed by: PHYSICIAN ASSISTANT

## 2020-06-30 PROCEDURE — 84403 ASSAY OF TOTAL TESTOSTERONE: CPT | Performed by: PHYSICIAN ASSISTANT

## 2020-06-30 PROCEDURE — 99443 ZZC PHYSICIAN TELEPHONE EVALUATION 21-30 MIN: CPT | Performed by: PHYSICIAN ASSISTANT

## 2020-06-30 PROCEDURE — 84153 ASSAY OF PSA TOTAL: CPT | Performed by: PHYSICIAN ASSISTANT

## 2020-06-30 PROCEDURE — 84439 ASSAY OF FREE THYROXINE: CPT | Mod: TEL | Performed by: PHYSICIAN ASSISTANT

## 2020-06-30 RX ADMIN — DEGARELIX 80 MG: KIT at 11:09

## 2020-06-30 ASSESSMENT — PAIN SCALES - GENERAL: PAINLEVEL: NO PAIN (0)

## 2020-06-30 NOTE — PROGRESS NOTES
Provider Note:    Jun 30, 2020    CC: Prostate cancer     HPI: Lucio Hernandez is a 64 year old male who has prostate cancer. He had a radical prostatectomy in 2006 demonstrating Jg 7 disease with a positive margin.  He had salvage radiation therapy in 2006 when his PSA was around 0.4 per the patient's recollection.  He notes his PSA was undetectable for 4 years, but then began coming back and recently has noted to have PSAs in the 1.2-1.4 range in May of this year.  He underwent an Axumin PET scan recently that shows no evidence of metastatic disease though there was uptake in the prostate bed.      Had Prior history of urethral stricture. This may have been due to the prior radiation therapy and a Olsen Catheter complication postop.    He was enrolled in a clinical trial AFT19 with apalutamide and degarelix which started on 11/14/19.       4/11/18 PSA = 0.56  10/30/18 PSA =0.48  5/14/19 PSA =1.2  5/29/19 PSA=1.4  7/29/19 PSA=1.45  9/5/19  PSA =1.84  11/4/19 PSA=2.19  11/14/19 PSA= 2.27-- START AFT-19  12/12/19 PSA= 0.04  1/9/2020 PSA= <0.01  2/5/2020 PSA = <0.01  2/26/2020 PSA= <0.01  4/6/20  PSA= <0.01  5/5/20  PSA= <0.01   6/2/20  PSA= <0.01  6/30/20 PSA= <0.01    INTERIM HISTORY:  Lucio presents today unaccompanied for cycle 9.    -More tired.   -has days where his hot flashes are very bad. He does feel gabapentin is helpful as the days he forgets he has about 20 hf at night that wake him up. Hf are worse at night than the day  -reduced memory recall, though not as foggy  -feels worse about his body image   -really struggling with just not feeling like himself.   -feeling weaker and wants to start working out again though not sure if he should go back to his gym   -drinking 2 cocktails at night     -had a small retinal tear recently. Had one last year too. This occurred due to trauma (from dog). Was on weight restrictions though these have now been lifted    ROS: 10 point ROS neg other than the symptoms noted  above in the HPI.    PAST MEDICAL HISTORY:  Otherwise significant for the prostate cancer,  history of pulmonary embolism and rib fractures from coughing fits and appendicitis.      PAST SURGICAL HISTORY:  Includes the RRP, a lumbar fusion, a thoracotomy for the history of rib fractures and the pulmonic hernia, shoulder surgery x3, appendectomy and a cervical spine surgery.      MEDICATIONS:     Current Outpatient Medications   Medication     apalutamide (IDS# 5330) tablet     apalutamide (IDS# 5330) tablet     cetirizine (ZYRTEC) 10 MG tablet     ciprofloxacin (CIPRO) 500 MG tablet     clonazePAM (KLONOPIN) 0.5 MG tablet     fish oil-omega-3 fatty acids 1000 MG capsule     fluticasone (FLONASE) 50 MCG/ACT nasal spray     levothyroxine (SYNTHROID/LEVOTHROID) 75 MCG tablet     lisinopril (ZESTRIL) 20 MG tablet     LISINOPRIL PO     LORazepam (ATIVAN) 0.5 MG tablet     gabapentin (NEURONTIN) 300 MG capsule     No current facility-administered medications for this visit.         ALLERGIES:  NO KNOWN DRUG ALLERGIES.      SOCIAL HISTORY:  Negative for tobacco.  Alcohol 2 drink a day. Pt is a retired Otolaryngologist (ENT surgeon).      REVIEW OF SYSTEMS:  Negative for fevers, chills, sweats, nausea, vomiting, unexplained weight changes.      Vital Signs 2020   Systolic 134   Diastolic 78   Pulse 79   Temperature 98.8   Respirations 16   Weight (LB) 199 lb 12.8 oz   O2 96       Wt Readings from Last 4 Encounters:   20 89.6 kg (197 lb 9.6 oz)   20 90.3 kg (199 lb)   20 89 kg (196 lb 4.8 oz)   20 84.7 kg (186 lb 11.2 oz)     Objective:  General: patient sounds in no audible acute distress, alert and oriented, speech clear and fluid  Resp: Speaking in full sentences, no audible respiratory distress, no cough, no audible wheeze  Psych: able to articulate logical thoughts, able to abstract reason, no tangential thoughts, no hallucinations or delusions  His affect is normal    ECO    Laboratory  "Data     6/30/2020 09:53 6/30/2020 09:54   PSA  <0.01   T4 Free 0.96    TSH 5.83 (H)        Impression and Plan  This is a 64 year old gentleman with a serologic relapse of a [place] 4+3 = 7 with a tertiary pattern of Jg 5.  He has a doubling time that is under 9 months based on the most recent 2 measurements.  He is also status post salvage radiation therapy.    Prostate Cancer  -enrolled in AFT19 study with apalutamide and degarelix.   -continues to have expected SE of ADT, though becoming too much for him to handle and has considering stopping treatment. Feel that mood is playing a big role in this. Will start zoloft as below  -PSA still undetectable which is great  -Continue Apalutamide is 240 mg daily and monthly degarelix 80 mg; giving today  -will follow-up per trial protocol    Hot Flashes  -He is on gabapentin 300 mg AM and 600 mg night. This is helpful as when he misses a dose, symptoms are significant. He wants to increase the dose to see if this helps more. Will increase to 600 mg BID. Max dose 3600 mg   -of note he has been taking clonazepam at night that he got from his PCP. Told him I do not prescribe this and so he would need to follow-up with PCP if he wanted a refill    Emotional/Mood  -has been struggling with mortality, lack of motivation and more emotions. Also really struggling with his new body image and \"new\" normal.   -he is at the point, he almost wants to give up. I think this is the time to add medical management. He is open to this. Will start him on Zoloft 50 mg qAM     Urinary Retention  -self cathing every other day. No changes    Hypothyroidism  -Started synthroid 50 mcg daily on 12/12/19. TSH was persistently elevated and so dose was increased to 75 mcg at last visit (4/6). TSH still mildly elevated (though improved) though T4 normal. No dose change. Would not increase again unless TSH >10 or T4 low    Retinal Tear  -a couple weeks ago due to trauma. Was on lifting restriction " restriction for 2 weeks, which has just been lifted. Continue to follow-up with optho    He requested the COVID antibody testing. No known exposure or past symptoms concerning for COVID. Placed order. Should be called by central scheduling.     Phone call duration: 22 minutes    Brandi Soto PA-C

## 2020-06-30 NOTE — PROGRESS NOTES
[id number]: Study Visit Note   Subject name: Lucio Hernandez     Visit: Cycle 9 Day 1    Did the study visit occur within the appropriate window allowed by the protocol? yes    Since the last study visit, He has been doing ok but not great.   New Grade 2 Anxiety increased from Grade 1  New Grade 2 Depression  New Grade 2 Hot Flashes increased from Grade 1  New Grade 2 Left eye retinal tear    I have personally interviewed Lucio Hernandez and reviewed his medical record for adverse events and concomitant medications and these have been recorded on the corresponding logs in Lucio Hernandez's research file.     Lucio Hernandez was given the opportunity to ask any trial related questions.  Please see provider progress note for physical exam and other clinical information. Labs were reviewed - any significant lab values were addressed and reviewed.      [id number]: Medication Count/IDS Note      Lucio Hernandez is enrolled on the trial number listed above. The patient presented for his Cycle 9 Day 1 visit.   IDS number: 5330  Drug name: Apalutamide    Number of Bottles dispensed: 1     60 mg study drug Dispensed  Lot number(s):[id number]   Bottle numbers: [id number]  Number of Pills dispensed: 120    Number of Bottles returned: 1     60 mg study drug Returned  Lot number(s):[id number]   Bottle numbers: [id number]  Number of Pills returned: 23     Medication Compliance: Juventino recorded taking 28 days of medication and should have returned 8 pills.  Juventino was re-educated on how to fill out the medication diary as soon as he takes the study medication and if he doesn't take the medication on any given day then to record this as well on his diary.     Drug Diary dispensed to study subject: yes     Drug diary returned: yes     Are there any discrepancies between the amount of medication the patient was instructed to take and the amount recorded as taken in the patient s drug diary? yes, see above note     Are there any discrepancies between  the amount of medication the patient has recorded as taken in the drug diary and the amount that would be expected to be returned based on the amount recorded as taken? yes, see above note    Lissa Butt  Clinical Research Coordinator-SG, RN, CPN  Clinical Trials Office/[facility]  Desk Phone: [phone]   Pager: [phone]

## 2020-06-30 NOTE — LETTER
6/30/2020         RE: Lucio Hernandez  [address] [facility] [address]        Dear Colleague,    Thank you for referring your patient, Lucio Hernandez, to the [facility]. Please see a copy of my visit note below.    Lucio Hernandez is a 64 year old male who is being evaluated via a billable telephone visit.            Pt has no new needs.  Pt states he is feeling a lot more tired that usual and has not a lot of drive to do things anymore.     Faby Barboza CMA on 6/30/2020 at 9:43 AM        Provider Note:    Jun 30, 2020    CC: Prostate cancer     HPI: Lucio Hernandez is a 64 year old male who has prostate cancer. He had a radical prostatectomy in 2006 demonstrating Jg 7 disease with a positive margin.  He had salvage radiation therapy in 2006 when his PSA was around 0.4 per the patient's recollection.  He notes his PSA was undetectable for 4 years, but then began coming back and recently has noted to have PSAs in the 1.2-1.4 range in May of this year.  He underwent an Axumin PET scan recently that shows no evidence of metastatic disease though there was uptake in the prostate bed.      Had Prior history of urethral stricture. This may have been due to the prior radiation therapy and a Olsen Catheter complication postop.    He was enrolled in a clinical trial AFT19 with apalutamide and degarelix which started on 11/14/19.       4/11/18 PSA = 0.56  10/30/18 PSA =0.48  5/14/19 PSA =1.2  5/29/19 PSA=1.4  7/29/19 PSA=1.45  9/5/19  PSA =1.84  11/4/19 PSA=2.19  11/14/19 PSA= 2.27-- START AFT-19  12/12/19 PSA= 0.04  1/9/2020 PSA= <0.01  2/5/2020 PSA = <0.01  2/26/2020 PSA= <0.01  4/6/20  PSA= <0.01  5/5/20  PSA= <0.01   6/2/20  PSA= <0.01  6/30/20 PSA= <0.01    INTERIM HISTORY:  Lucio presents today unaccompanied for cycle 9.    -More tired.   -has days where his hot flashes are very bad. He does feel gabapentin is helpful as the days he forgets he has about 20 hf at night that wake him up.  Hf are worse at night than the day  -reduced memory recall, though not as foggy  -feels worse about his body image   -really struggling with just not feeling like himself.   -feeling weaker and wants to start working out again though not sure if he should go back to his gym   -drinking 2 cocktails at night     -had a small retinal tear recently. Had one last year too. This occurred due to trauma (from dog). Was on weight restrictions though these have now been lifted    ROS: 10 point ROS neg other than the symptoms noted above in the HPI.    PAST MEDICAL HISTORY:  Otherwise significant for the prostate cancer,  history of pulmonary embolism and rib fractures from coughing fits and appendicitis.      PAST SURGICAL HISTORY:  Includes the RRP, a lumbar fusion, a thoracotomy for the history of rib fractures and the pulmonic hernia, shoulder surgery x3, appendectomy and a cervical spine surgery.      MEDICATIONS:     Current Outpatient Medications   Medication     apalutamide (IDS# 5330) tablet     apalutamide (IDS# 5330) tablet     cetirizine (ZYRTEC) 10 MG tablet     ciprofloxacin (CIPRO) 500 MG tablet     clonazePAM (KLONOPIN) 0.5 MG tablet     fish oil-omega-3 fatty acids 1000 MG capsule     fluticasone (FLONASE) 50 MCG/ACT nasal spray     levothyroxine (SYNTHROID/LEVOTHROID) 75 MCG tablet     lisinopril (ZESTRIL) 20 MG tablet     LISINOPRIL PO     LORazepam (ATIVAN) 0.5 MG tablet     gabapentin (NEURONTIN) 300 MG capsule     No current facility-administered medications for this visit.         ALLERGIES:  NO KNOWN DRUG ALLERGIES.      SOCIAL HISTORY:  Negative for tobacco.  Alcohol 2 drink a day. Pt is a retired Otolaryngologist (ENT surgeon).      REVIEW OF SYSTEMS:  Negative for fevers, chills, sweats, nausea, vomiting, unexplained weight changes.      Vital Signs 6/30/2020   Systolic 134   Diastolic 78   Pulse 79   Temperature 98.8   Respirations 16   Weight (LB) 199 lb 12.8 oz   O2 96       Wt Readings from Last  "4 Encounters:   20 89.6 kg (197 lb 9.6 oz)   20 90.3 kg (199 lb)   20 89 kg (196 lb 4.8 oz)   20 84.7 kg (186 lb 11.2 oz)     Objective:  General: patient sounds in no audible acute distress, alert and oriented, speech clear and fluid  Resp: Speaking in full sentences, no audible respiratory distress, no cough, no audible wheeze  Psych: able to articulate logical thoughts, able to abstract reason, no tangential thoughts, no hallucinations or delusions  His affect is normal    ECO    Laboratory Data     2020 09:53 2020 09:54   PSA  <0.01   T4 Free 0.96    TSH 5.83 (H)        Impression and Plan  This is a 64 year old gentleman with a serologic relapse of a [place] 4+3 = 7 with a tertiary pattern of Jg 5.  He has a doubling time that is under 9 months based on the most recent 2 measurements.  He is also status post salvage radiation therapy.    Prostate Cancer  -enrolled in AFT19 study with apalutamide and degarelix.   -continues to have expected SE of ADT, though becoming too much for him to handle and has considering stopping treatment. Feel that mood is playing a big role in this. Will start zoloft as below  -PSA still undetectable which is great  -Continue Apalutamide is 240 mg daily and monthly degarelix 80 mg; giving today  -will follow-up per trial protocol    Hot Flashes  -He is on gabapentin 300 mg AM and 600 mg night. This is helpful as when he misses a dose, symptoms are significant. He wants to increase the dose to see if this helps more. Will increase to 600 mg BID. Max dose 3600 mg   -of note he has been taking clonazepam at night that he got from his PCP. Told him I do not prescribe this and so he would need to follow-up with PCP if he wanted a refill    Emotional/Mood  -has been struggling with mortality, lack of motivation and more emotions. Also really struggling with his new body image and \"new\" normal.   -he is at the point, he almost wants to give up. I " think this is the time to add medical management. He is open to this. Will start him on Zoloft 50 mg qAM     Urinary Retention  -self cathing every other day. No changes    Hypothyroidism  -Started synthroid 50 mcg daily on 12/12/19. TSH was persistently elevated and so dose was increased to 75 mcg at last visit (4/6). TSH still mildly elevated (though improved) though T4 normal. No dose change. Would not increase again unless TSH >10 or T4 low    Retinal Tear  -a couple weeks ago due to trauma. Was on lifting restriction restriction for 2 weeks, which has just been lifted. Continue to follow-up with optho    He requested the COVID antibody testing. No known exposure or past symptoms concerning for COVID. Placed order. Should be called by central scheduling.     Phone call duration: 22 minutes    Brandi Soto PA-C

## 2020-06-30 NOTE — PATIENT INSTRUCTIONS
Contact Numbers    [facility] [place]: [phone]  [facility] Triage and after hours / weekends / holidays: [phone]      Please call the triage or after hours line if you experience a temperature greater than or equal to 100.5, shaking chills, have uncontrolled nausea, vomiting and/or diarrhea, dizziness, shortness of breath, chest pain, bleeding, unexplained bruising, or if you have any other new/concerning symptoms, questions or concerns.      If you are having any concerning symptoms or wish to speak to a provider before your next infusion visit, please call your care coordinator or triage to notify them so we can adequately serve you.     If you need a refill on a narcotic prescription or other medication, please call before your infusion appointment.       June 2020 Sunday Monday Tuesday Wednesday Thursday Friday Saturday        1     2    RESEARCH COORD 60 W/EXAM RM   9:30 AM   (60 min.)   Coordinator,  Oncology Research   [facility] DRAW   9:30 AM   (15 min.)   [facility] DRAW   [facility] Lab Draw    TELEPHONE VISIT RETURN  10:00 AM   (50 min.)   Brandi Soto PA-C   [facility] ONC INFUSION 60  11:00 AM   (60 min.)    ONCOLOGY INFUSION   [facility] 3     4     5     6       7     8     9     10     11     12     13       14     15     16     17     18     19     20       21     22     23     24     25     26     27       28     29     30    RESEARCH COORD 60 W/EXAM RM   9:30 AM   (60 min.)   Coordinator,  Oncology Research   [facility] DRAW   9:30 AM   (15 min.)    [facility] DRAW   [facility] Lab Draw    TELEPHONE VISIT RETURN  10:00 AM   (50 min.)   Brandi Soto PA-C   [facility] ONC INFUSION 60  11:00 AM   (60 min.)    ONCOLOGY INFUSION   [facility]                                 July 2020 Sunday  Monday Tuesday Wednesday Thursday Friday Saturday                  1    COVID SEROLOGY TEST LAB VISIT   1:15 PM   (15 min.)   [facility] LAB   [facility] 2     3     4       5     6     7     8     9     10     11       12     13     14     15     16     17     18       19     20     21     22     23     24     25       26     27     28    RESEARCH COORD 60 W/EXAM RM   9:30 AM   (60 min.)   Coordinator,  Oncology Research   [facility] DRAW   9:30 AM   (15 min.)   [facility] LAB DRAW   [facility] Lab Draw    [facility] RETURN   9:45 AM   (50 min.)   Brandi Soto PA-C   [facility] ONC INFUSION 60  11:00 AM   (60 min.)    ONCOLOGY INFUSION   [facility] 29     30     31                         Recent Results (from the past 24 hour(s))   TSH with free T4 reflex    Collection Time: 06/30/20  9:53 AM   Result Value Ref Range    TSH 5.83 (H) 0.40 - 4.00 mU/L   T4 free    Collection Time: 06/30/20  9:53 AM   Result Value Ref Range    T4 Free 0.96 0.76 - 1.46 ng/dL   PSA tumor marker    Collection Time: 06/30/20  9:54 AM   Result Value Ref Range    PSA <0.01 0 - 4 ug/L

## 2020-06-30 NOTE — PROGRESS NOTES
"Lucio Hernandez is a 64 year old male who is being evaluated via a billable telephone visit.      The patient has been notified of following:     \"This telephone visit will be conducted via a call between you and your physician/provider. We have found that certain health care needs can be provided without the need for a physical exam.  This service lets us provide the care you need with a short phone conversation.  If a prescription is necessary we can send it directly to your pharmacy.  If lab work is needed we can place an order for that and you can then stop by our lab to have the test done at a later time.    Telephone visits are billed at different rates depending on your insurance coverage. During this emergency period, for some insurers they may be billed the same as an in-person visit.  Please reach out to your insurance provider with any questions.    If during the course of the call the physician/provider feels a telephone visit is not appropriate, you will not be charged for this service.\"    Patient has given verbal consent for Telephone visit?  Yes    What phone number would you like to be contacted at? [phone]    How would you like to obtain your AVS? Zeke      Pt has no new needs.  Pt states he is feeling a lot more tired that usual and has not a lot of drive to do things anymore.     Faby Barboza CMA on 6/30/2020 at 9:43 AM      "

## 2020-06-30 NOTE — PROGRESS NOTES
Infusion Nursing Note:  Lucio Hernandez presents today for Cycle 9 Day 1 [place].    Patient seen by provider today: Yes: ALEXIA Cox   present during visit today: Not Applicable.    Note: N/A.    Pain: denies    Intravenous Access:  Labs drawn without difficulty.    Treatment Conditions:  Not Applicable.      Post Infusion Assessment:  Patient tolerated deep subcutaneous injection to LUQ abdomen without incident. Pt iced site post injection.      Discharge Plan:   Prescription refills given for study drug (will be supplied by study RN).  AVS to patient via [employer].  Patient will return 7/28 for next appointment.   Patient discharged in stable condition accompanied by: self.  Departure Mode: Ambulatory.    SANCHEZ SULLIVAN RN

## 2020-07-01 DIAGNOSIS — C61 PROSTATE CANCER (H): ICD-10-CM

## 2020-07-01 LAB — TESTOST SERPL-MCNC: 11 NG/DL (ref 240–950)

## 2020-07-01 PROCEDURE — 86769 SARS-COV-2 COVID-19 ANTIBODY: CPT | Performed by: PHYSICIAN ASSISTANT

## 2020-07-01 PROCEDURE — 36415 COLL VENOUS BLD VENIPUNCTURE: CPT | Performed by: PHYSICIAN ASSISTANT

## 2020-07-01 NOTE — LETTER
July 2, 2020        Lucio Hernandez  [address] [place] [address]      COVID-19 Antibody, IgG   Date Value Ref Range Status   07/01/2020 Negative NEG^Negative Final     Comment:     Negative results do not rule out SARS-CoV-2 infection, particularly in those   who have been in contact with the virus.  Follow-up testing with a molecular   diagnostic should be considered to rule out infection in these individuals.  Results from antibody testing should not be used as the sole basis to diagnose   or exclude SARS-CoV-2 infection or to inform infection status.           You have tested NEGATIVE for COVID-19 antibodies. This suggests you have not had or been exposed to COVID-19. But it does not mean that for sure.     The test finds antibodies in most people 10 days after they get sick. For some people, it takes longer than 10 days for antibodies to show up. Others may never show antibodies against COVID-19, especially if they have weak immune systems.    If you have COVID-19 symptoms now, please stay home and away from others.     What is antibody testing?    This is a kind of blood test. We take a small sample of your blood, and then test it for something called  antibodies.      Your body makes antibodies to fight infection. If your blood has antibodies for a certain germ, it means you ve been infected with that germ in the past.     Sometimes, antibodies stay in your body for years after you ve had the infection. They can be there even if the germ didn t make you sick. They are a sign that your body fought off the infection.    Will this test find antibodies in everyone who s had COVID-19?    No. The test finds antibodies in most people 10 days after they get sick. For some people, it takes longer than 10 days for antibodies to show up. Others may never show antibodies against COVID-19, especially if they have weak immune systems.    What does it mean if the test finds COVID-19 antibodies?    If we find  these antibodies, it suggests:     This person has had the virus.     Their body s immune system fought the virus.     We don t know if this will help protect someone from getting COVID-19 again. Scientists are still learning about this.    What are the signs of COVID-19?    Signs of COVID-19 can appear from 2 to 14 days (up to 2 weeks) after you re infected. Some people have no symptoms or only mild symptoms. Others get very sick. The most common symptoms are:          Cough    Shortness of breath or trouble breathing  Or at least 2 of these symptoms:    Fever    Chills    Repeated shaking with chills    Muscle pain    Headache    Sore throat    Losing your sense of taste or smell    You may have other symptoms. Please contact your doctor or clinic for any symptoms that worry you.    Where can I get more information?     To learn the [facility] guidelines for staying home, please visit the [place] Department of Health website at [email or web address]/diseases/coronavirus/basics.html    To learn more about COVID-19 and how to care for yourself at home, please visit the CDC website at https://www.cdc.gov/coronavirus/2019-ncov/about/steps-when-sick.html    For more options for care at [facility], please visit our website at [email or web address]    [facility] COVID-19 Hotline:  [phone]

## 2020-07-02 LAB
COVID-19 ANTIBODY IGG: NEGATIVE
LAB TEST METHOD: NORMAL

## 2020-07-08 ENCOUNTER — TELEPHONE (OUTPATIENT)
Dept: UROLOGY | Facility: CLINIC | Age: 65
End: 2020-07-08

## 2020-07-08 NOTE — TELEPHONE ENCOUNTER
Patient will have his catheter company send us an order to [phone] letting us know what additional information the patient needs to receive his catheter supplies.    Leigh Marcano MA

## 2020-07-08 NOTE — TELEPHONE ENCOUNTER
[facility] Call Center    Phone Message    May a detailed message be left on voicemail: yes     Reason for Call: Other: Per call from PT called catheter company, but they declined to provide the appropriate diagnosis.  Per PT per Medicare the clinic needs to call them to obtain the appropriate diagnosis and he prefers to get the supply from [facility]. Please advise.      Action Taken: Message routed to:  [facility] ([facility] Urology    Travel Screening: Not Applicable

## 2020-07-08 NOTE — TELEPHONE ENCOUNTER
[facility] Call Center    Phone Message    May a detailed message be left on voicemail: yes     Reason for Call: Medication Question or concern regarding medication   Prescription Clarification  Name of Medication: catheters  Prescribing Provider: Phoebe   Pharmacy: ?   What on the order needs clarification? The pt is having a hard time with Medicare approving his catheters. They don't approve of the diagnosis code-dilation. But if he doesn't dilate, then he gets obstructed. Please call the pt to discuss. Thanks.          Action Taken: Message routed to:  [facility] nina uro    Travel Screening: Not Applicable

## 2020-07-14 NOTE — TELEPHONE ENCOUNTER
Dusty Ragsdale,     Do you know if Dr. Srinath Sandhu provided the diagnosis correction on the forms that I requested for the patient to have his medical supply company fax over?     Leigh Marcano MA

## 2020-07-14 NOTE — TELEPHONE ENCOUNTER
[facility] Call Center    Phone Message    May a detailed message be left on voicemail: yes     Reason for Call: Other: Juventino is following up on last weeks request as he has not heard back from anyone yet. He states that Medicare is not approving his catheter supplies and he is requesting for Dr. Sandhu to call Medicare and find out what Dr. Sandhu needs to include as diagnosis and in his dictation for them to approve. Juventino is very concerned as his catheter supplies is low. Please give him a call back as soon as possible to discuss. Thank you     Action Taken: Message routed to:  [facility] Urology    Travel Screening: Not Applicable

## 2020-07-14 NOTE — TELEPHONE ENCOUNTER
Patient was notified that Dr Srinath Sandhu provide the diagnosis and in the dictation for them to approve his catheter supply order.    Thanks, Leigh Marcano MA

## 2020-07-22 ENCOUNTER — PATIENT OUTREACH (OUTPATIENT)
Dept: UROLOGY | Facility: CLINIC | Age: 65
End: 2020-07-22

## 2020-07-22 NOTE — TELEPHONE ENCOUNTER
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 Patient uses a coude tipped catheter due to failed straight tip and inability to pass straight tipped catheter.   Chronic urinary retention.            Expected to last more than 3 months.             Lifetime need for catheterization.             Catheterize, indefinitely due to urinary retention Secondary Bladder Neck Obstruction .

## 2020-07-23 ENCOUNTER — TELEPHONE (OUTPATIENT)
Dept: ONCOLOGY | Facility: CLINIC | Age: 65
End: 2020-07-23

## 2020-07-23 NOTE — TELEPHONE ENCOUNTER
----- Message from Suyapa Jackson RN sent at 7/23/2020  7:11 AM CDT -----  I started to construct a letter to this supply company and there is not information about how many times the patient is cathing or if he has silvestre. Since I have to do this do you guys know how many times he caths or is it a silvestre? Diagnoses is in the system already.   Suyapa

## 2020-07-23 NOTE — TELEPHONE ENCOUNTER
Patient was notified that we faxed his catheter prescription to [employer]: turn off the lights @ [phone].    Leigh Marcano MA

## 2020-07-23 NOTE — TELEPHONE ENCOUNTER
Dusty Dale,      Patient states that he self cath once every other day with a 14 fr coude catheter -[facility] -Ref#[id number]. Patient states that we should already have the catheter supply company info.      Thanks, Leigh Marcano MA

## 2020-07-23 NOTE — TELEPHONE ENCOUNTER
"----- Message from Suyapa Jackson RN sent at 7/23/2020 10:57 AM CDT -----  Regarding: RE: Dr. Candelario Menezes patient: in need of medical statement for insurance to obtain silvestre catheters  Faxed   ----- Message -----  From: Leigh Marcano CMA  Sent: 7/21/2020   4:14 PM CDT  To: Srinath Sandhu MD, #  Subject: FW: Dr. Candelario Menezes patient: in need of me#    Hi all,    Melyssa advised me to tell the patient that this was previous done. (?)-please advise    Thanks, Leigh Marcano MA   ----- Message -----  From: Lissa Butt  Sent: 7/21/2020   3:59 PM CDT  To: Srinath Sandhu MD, #  Subject: Dr. Candelario Menezes patient: in need of medica#    Hello,  I am the CRC-RN for a research study that Juventino participates in.  Juventino has asked me to reach out to the urology team because he is not getting a response regarding an issue with an insurance requirement.  Juventino has requested several times from the urology team to address such issue but has yet to receive a response even up to today.    Please complete this task ASAP as Juventino will run out of Silvestre catheters by the end of this week.  Juventino requires Dr. Sandhu to send his insurance company a specific diagnosis to receive coverage for his silvestre catheters.  The diagnosis must be \"Urinary Retention\" with \"Secondary Bladder Neck Obstruction\"  Please fax such statement to [facility] at [phone] as soon as possible. If a clinic visit with a urologist is required please contact Juventino to arrange.    Thank you,  Lissa Butt  Clinical Research Coordinator-BSN, RN, CPN  Clinical Trials Office/[facility]  Desk Phone: [phone]   Pager: [phone]          "

## 2020-07-24 ENCOUNTER — RESEARCH ENCOUNTER (OUTPATIENT)
Dept: ONCOLOGY | Facility: CLINIC | Age: 65
End: 2020-07-24

## 2020-07-24 NOTE — PROGRESS NOTES
This author contacted the patient via telephone to review the Research Participant Information Sheet. The document was read word-for-word to the patient. All questions were answered. A copy of the document will be provided to the patient at the next visit.     OVPR & HRPP released a Research Participant Information sheet on 7/16/2020 requiring researchers to provide patients with this information sheet within 24-hours of the first planned in-person research activity after this date.     Research participant Lucio Hernandez was provided the information on the Research Participant Information Sheet by Klaus Gillespie RN on July 24, 2020. This document contains information regarding the risks of participating in a research study during the COVID-19 pandemic. Receipt of the information sheet was confirmed by study personnel prior to the visit.    *Note that the OVPR and HRPP only require this information sheet be provided to the participant once as the information it contains is the same regardless of what study(s) the patient is participating in.

## 2020-07-25 NOTE — TELEPHONE ENCOUNTER
"Received call and spoke with Lucio Hernandez who participates in research clinical trial [id number] Arm B.  Juventino reported that for about 1 week now he has had joint pain all over that is worse at night and interrupts his sleep.  His hands become so swollen and tender to the touch that in the AM he can hardly make a fist.  He also has remarked tenderness and swelling in his ankles and achilles. He reports that by 2 AM each morning that the pain is \"debilitating\"  He reported that after 1 hour of yoga each day he has better function and that 800 mg of Ibuprofen helps as well.  Reported to Dr. Alejandro Palomino who agrees that this is a Grade 2 adverse event and does not warrant stopping the PO Apalutamide at this time.  Juventino agrees with the care plan and will have an previously scheduled research appointment with Dr. Palomino on Monday 07/27/2020.    No further clinical research trial related questions or concerns at this time.    Lissa Butt  Clinical Research Coordinator-VADIMN, RN, CPN  Clinical Trials Office/[facility]  Desk Phone: [phone]   Pager: [phone]    "

## 2020-07-27 ENCOUNTER — ONCOLOGY VISIT (OUTPATIENT)
Dept: ONCOLOGY | Facility: CLINIC | Age: 65
End: 2020-07-27
Attending: INTERNAL MEDICINE
Payer: MEDICARE

## 2020-07-27 ENCOUNTER — RESEARCH ENCOUNTER (OUTPATIENT)
Dept: ONCOLOGY | Facility: CLINIC | Age: 65
End: 2020-07-27

## 2020-07-27 ENCOUNTER — APPOINTMENT (OUTPATIENT)
Dept: LAB | Facility: CLINIC | Age: 65
End: 2020-07-27
Attending: INTERNAL MEDICINE
Payer: MEDICARE

## 2020-07-27 VITALS
HEART RATE: 74 BPM | DIASTOLIC BLOOD PRESSURE: 78 MMHG | OXYGEN SATURATION: 98 % | SYSTOLIC BLOOD PRESSURE: 134 MMHG | WEIGHT: 205.9 LBS | RESPIRATION RATE: 16 BRPM | TEMPERATURE: 98.4 F | BODY MASS INDEX: 30.39 KG/M2

## 2020-07-27 DIAGNOSIS — C61 PROSTATE CANCER (H): ICD-10-CM

## 2020-07-27 DIAGNOSIS — C61 PROSTATE CANCER (H): Primary | ICD-10-CM

## 2020-07-27 DIAGNOSIS — R97.20 ELEVATED PROSTATE SPECIFIC ANTIGEN (PSA): ICD-10-CM

## 2020-07-27 DIAGNOSIS — R60.9 EDEMA, UNSPECIFIED TYPE: Primary | ICD-10-CM

## 2020-07-27 LAB
ALBUMIN SERPL-MCNC: 3.4 G/DL (ref 3.4–5)
ALP SERPL-CCNC: 85 U/L (ref 40–150)
ALT SERPL W P-5'-P-CCNC: 26 U/L (ref 0–70)
AST SERPL W P-5'-P-CCNC: 16 U/L (ref 0–45)
BASOPHILS # BLD AUTO: 0 10E9/L (ref 0–0.2)
BASOPHILS NFR BLD AUTO: 0.3 %
BILIRUB DIRECT SERPL-MCNC: <0.1 MG/DL (ref 0–0.2)
BILIRUB SERPL-MCNC: 0.3 MG/DL (ref 0.2–1.3)
DIFFERENTIAL METHOD BLD: ABNORMAL
EOSINOPHIL # BLD AUTO: 0.2 10E9/L (ref 0–0.7)
EOSINOPHIL NFR BLD AUTO: 2.9 %
ERYTHROCYTE [DISTWIDTH] IN BLOOD BY AUTOMATED COUNT: 11.4 % (ref 10–15)
HCT VFR BLD AUTO: 40.3 % (ref 40–53)
HGB BLD-MCNC: 14.1 G/DL (ref 13.3–17.7)
IMM GRANULOCYTES # BLD: 0.1 10E9/L (ref 0–0.4)
IMM GRANULOCYTES NFR BLD: 1.1 %
LYMPHOCYTES # BLD AUTO: 0.8 10E9/L (ref 0.8–5.3)
LYMPHOCYTES NFR BLD AUTO: 11.8 %
MCH RBC QN AUTO: 33.3 PG (ref 26.5–33)
MCHC RBC AUTO-ENTMCNC: 35 G/DL (ref 31.5–36.5)
MCV RBC AUTO: 95 FL (ref 78–100)
MONOCYTES # BLD AUTO: 0.5 10E9/L (ref 0–1.3)
MONOCYTES NFR BLD AUTO: 7.7 %
NEUTROPHILS # BLD AUTO: 5 10E9/L (ref 1.6–8.3)
NEUTROPHILS NFR BLD AUTO: 76.2 %
NRBC # BLD AUTO: 0 10*3/UL
NRBC BLD AUTO-RTO: 0 /100
PLATELET # BLD AUTO: 226 10E9/L (ref 150–450)
PROT SERPL-MCNC: 6.7 G/DL (ref 6.8–8.8)
PSA SERPL-MCNC: <0.01 UG/L (ref 0–4)
RBC # BLD AUTO: 4.24 10E12/L (ref 4.4–5.9)
T4 FREE SERPL-MCNC: 0.99 NG/DL (ref 0.76–1.46)
TSH SERPL DL<=0.005 MIU/L-ACNC: 6.14 MU/L (ref 0.4–4)
WBC # BLD AUTO: 6.5 10E9/L (ref 4–11)

## 2020-07-27 PROCEDURE — 80076 HEPATIC FUNCTION PANEL: CPT | Performed by: INTERNAL MEDICINE

## 2020-07-27 PROCEDURE — 36415 COLL VENOUS BLD VENIPUNCTURE: CPT

## 2020-07-27 PROCEDURE — 25000128 H RX IP 250 OP 636: Mod: ZF | Performed by: INTERNAL MEDICINE

## 2020-07-27 PROCEDURE — 96402 CHEMO HORMON ANTINEOPL SQ/IM: CPT

## 2020-07-27 PROCEDURE — 85025 COMPLETE CBC W/AUTO DIFF WBC: CPT | Performed by: INTERNAL MEDICINE

## 2020-07-27 PROCEDURE — 84443 ASSAY THYROID STIM HORMONE: CPT | Performed by: INTERNAL MEDICINE

## 2020-07-27 PROCEDURE — 84403 ASSAY OF TOTAL TESTOSTERONE: CPT | Performed by: INTERNAL MEDICINE

## 2020-07-27 PROCEDURE — 84439 ASSAY OF FREE THYROXINE: CPT | Performed by: INTERNAL MEDICINE

## 2020-07-27 PROCEDURE — 84153 ASSAY OF PSA TOTAL: CPT | Performed by: INTERNAL MEDICINE

## 2020-07-27 PROCEDURE — 99214 OFFICE O/P EST MOD 30 MIN: CPT | Mod: ZP | Performed by: INTERNAL MEDICINE

## 2020-07-27 PROCEDURE — G0463 HOSPITAL OUTPT CLINIC VISIT: HCPCS | Mod: 25

## 2020-07-27 RX ORDER — FUROSEMIDE 20 MG
20 TABLET ORAL DAILY
Qty: 30 TABLET | Refills: 1 | Status: SHIPPED | OUTPATIENT
Start: 2020-07-27 | End: 2020-08-27

## 2020-07-27 RX ADMIN — DEGARELIX 80 MG: KIT at 14:16

## 2020-07-27 ASSESSMENT — PAIN SCALES - GENERAL: PAINLEVEL: NO PAIN (0)

## 2020-07-27 NOTE — PROGRESS NOTES
CC: Prostate cancer      HPI: Lucio Hernandez is a 64 year old male who has prostate cancer. He had a radical prostatectomy in 2006 demonstrating [place] 7 disease with a positive margin.  He had salvage radiation therapy in 2006 when his PSA was around 0.4 per the patient's recollection.  He notes his PSA was undetectable for 4 years, but then began coming back and recently has noted to have PSAs in the 1.2-1.4 range in May of this year.  He underwent an Axumin PET scan recently that shows no evidence of metastatic disease though there was uptake in the prostate bed.       Had Prior history of urethral stricture. This may have been due to the prior radiation therapy and a Olsen Catheter complication postop.     He was enrolled in a clinical trial AFT19 with apalutamide and degarelix which started on 11/14/19.         4/11/18            PSA = 0.56  10/30/18          PSA =0.48  5/14/19            PSA =1.2  5/29/19            PSA=1.4  7/29/19            PSA=1.45  9/5/19              PSA =1.84  11/4/19            PSA=2.19  11/14/19          PSA= 2.27-- START AFT-19  12/12/19          PSA= 0.04  1/9/2020          PSA= <0.01  2/5/2020          PSA = <0.01  2/26/2020        PSA= <0.01  4/6/20              PSA  <0.01  6/30/20 PSA   <0.01 Testosterone =11  7/27/20 PSA   < 0.01     INTERIM HISTORY:  Lucio presents today unaccompanied for cycle 8 on the AFT-19 clinical trial.      He continues to do fairly well.   Hot flashes - interfering w sleep. Taking 600 mg bid with some effect.   Joint pain and edema which he feels is worse since starting the ADT.       ROS: 10 point ROS neg other than the symptoms noted above in the HPI.     PAST MEDICAL HISTORY:  Otherwise significant for the prostate cancer,  history of pulmonary embolism and rib fractures from coughing fits and appendicitis.      PAST SURGICAL HISTORY:  Includes the RRP, a lumbar fusion, a thoracotomy for the history of rib fractures and the pulmonic hernia, shoulder  "surgery x3, appendectomy and a cervical spine surgery.      MEDICATIONS:     1.  Zyrtec.   2.  Lisinopril.   3.  Gabapentin     ALLERGIES:  NO KNOWN DRUG ALLERGIES.      FAMILY HISTORY:  Noncontributory.      SOCIAL HISTORY:  Negative for tobacco.  Alcohol 1 drink a day. Pt is a retired Otolaryngologist (ENT surgeon).      REVIEW OF SYSTEMS:  Negative for fevers, chills, sweats, nausea, vomiting, unexplained weight changes.       Physical Exam  ECOG Performance Status = 0    /77 (BP Location: Right arm, Patient Position: Sitting, Cuff Size: Adult Regular)   Pulse 61   Temp 97.4  F (36.3  C) (Tympanic)   Resp 16   Ht 1.753 m (5' 9.02\")   Wt 89 kg (196 lb 4.8 oz)   SpO2 97%   BMI 28.97 kg/m        Wt Readings from Last 4 Encounters:   04/06/20 89 kg (196 lb 4.8 oz)   02/26/20 84.7 kg (186 lb 11.2 oz)   02/05/20 86.4 kg (190 lb 8 oz)   01/09/20 85.7 kg (189 lb)     General: Pleasant male in no acute distress  HEENT: EOMI, PERRLA, no scleral icterus, mucus membranes moist and no lesions or thrush  Lymph: Neck is supple and shows no nodes in cervical or supraclavicular   Heart:  RRR, no murmur, gallop or rub  Lungs: CTA-B, no wheezes, rhonchi or rales  Abdomen: Normal bowel sounds, soft, non tender, not distended, no rebound or guarding, no palpable masses  Extremities: 1+ edema  Skin: No significant rashes or lesions  Neuro: CN II-XII are intact     Laboratory Data    TSH = 6.14     Impression and Plan  This is a 64 year old gentleman with a serologic relapse of a Jg 4+3 = 7 with a tertiary pattern of [place] 5.  He has a doubling time that is under 9 months based on the most recent 2 measurements.  He is also status post salvage radiation therapy.     Prostate Cancer  -enrolled in AFT19 study with apalutamide and degarelix.   -symptoms are improving; hot flashes improved with gabapentin and his brain fog is less. Still very active and overall tolerating very well  -Continue Apalutamide is 240 mg daily " "(gave 40 days). Continue monthly degarelix 80 mg; giving today  -will follow-up per trial protocol     Hot Flashes  -Started him on a gabapentin 300 mg at bedtime which has been beneficial for him. He feels that he sleeps better and that the hot flashes have improved. OK to take 300 am 600/night     Emotional/Mood  -has been struggling with mortality, lack of motivation and more emotions. I do not think he is at a point where medication is necessary though he previously agreed to let me know if he feels he needs help  -his mental \"fog\" has improved and this has been helpful   -was on Sertraline for two weeks but stopped because didn't feel well on it.      Urinary Retention  -self cathing every other day. No new symptoms or concerns      Hypothyroidism  -Started synthroid 50 mcg daily on 12/12/19. TSH still elevated though improved and T4 remains normal. Increase to 75 mcg. He will split the pills. T4 normal and relatively asymptomatic.     Edema and Joint pain  Relatively new problem.   Will give three day rx of Lasix 20 mg po. If resolves stop and monitor. May retreat as needed.   "

## 2020-07-27 NOTE — LETTER
7/27/2020         RE: Lucio Hernandez  [address] [facility] [address]        Dear Colleague,    Thank you for referring your patient, Lucio Hernandez, to the [facility]. Please see a copy of my visit note below.    CC: Prostate cancer      HPI: Lucio Hernandez is a 64 year old male who has prostate cancer. He had a radical prostatectomy in 2006 demonstrating Jg 7 disease with a positive margin.  He had salvage radiation therapy in 2006 when his PSA was around 0.4 per the patient's recollection.  He notes his PSA was undetectable for 4 years, but then began coming back and recently has noted to have PSAs in the 1.2-1.4 range in May of this year.  He underwent an Axumin PET scan recently that shows no evidence of metastatic disease though there was uptake in the prostate bed.       Had Prior history of urethral stricture. This may have been due to the prior radiation therapy and a Olsen Catheter complication postop.     He was enrolled in a clinical trial AFT19 with apalutamide and degarelix which started on 11/14/19.         4/11/18            PSA = 0.56  10/30/18          PSA =0.48  5/14/19            PSA =1.2  5/29/19            PSA=1.4  7/29/19            PSA=1.45  9/5/19              PSA =1.84  11/4/19            PSA=2.19  11/14/19          PSA= 2.27-- START AFT-19  12/12/19          PSA= 0.04  1/9/2020          PSA= <0.01  2/5/2020          PSA = <0.01  2/26/2020        PSA= <0.01  4/6/20              PSA  <0.01  6/30/20 PSA   <0.01 Testosterone =11  7/27/20 PSA   < 0.01     INTERIM HISTORY:  Lucio presents today unaccompanied for cycle 8 on the AFT-19 clinical trial.      He continues to do fairly well.   Hot flashes - interfering w sleep. Taking 600 mg bid with some effect.   Joint pain and edema which he feels is worse since starting the ADT.       ROS: 10 point ROS neg other than the symptoms noted above in the HPI.     PAST MEDICAL HISTORY:  Otherwise significant for the  "prostate cancer,  history of pulmonary embolism and rib fractures from coughing fits and appendicitis.      PAST SURGICAL HISTORY:  Includes the RRP, a lumbar fusion, a thoracotomy for the history of rib fractures and the pulmonic hernia, shoulder surgery x3, appendectomy and a cervical spine surgery.      MEDICATIONS:     1.  Zyrtec.   2.  Lisinopril.   3.  Gabapentin     ALLERGIES:  NO KNOWN DRUG ALLERGIES.      FAMILY HISTORY:  Noncontributory.      SOCIAL HISTORY:  Negative for tobacco.  Alcohol 1 drink a day. Pt is a retired Otolaryngologist (ENT surgeon).      REVIEW OF SYSTEMS:  Negative for fevers, chills, sweats, nausea, vomiting, unexplained weight changes.       Physical Exam  /77 (BP Location: Right arm, Patient Position: Sitting, Cuff Size: Adult Regular)   Pulse 61   Temp 97.4  F (36.3  C) (Tympanic)   Resp 16   Ht 1.753 m (5' 9.02\")   Wt 89 kg (196 lb 4.8 oz)   SpO2 97%   BMI 28.97 kg/m        Wt Readings from Last 4 Encounters:   04/06/20 89 kg (196 lb 4.8 oz)   02/26/20 84.7 kg (186 lb 11.2 oz)   02/05/20 86.4 kg (190 lb 8 oz)   01/09/20 85.7 kg (189 lb)     General: Pleasant male in no acute distress  HEENT: EOMI, PERRLA, no scleral icterus, mucus membranes moist and no lesions or thrush  Lymph: Neck is supple and shows no nodes in cervical or supraclavicular   Heart:  RRR, no murmur, gallop or rub  Lungs: CTA-B, no wheezes, rhonchi or rales  Abdomen: Normal bowel sounds, soft, non tender, not distended, no rebound or guarding, no palpable masses  Extremities: 1+ edema  Skin: No significant rashes or lesions  Neuro: CN II-XII are intact     Laboratory Data    TSH = 6.14     Impression and Plan  This is a 64 year old gentleman with a serologic relapse of a [place] 4+3 = 7 with a tertiary pattern of Jg 5.  He has a doubling time that is under 9 months based on the most recent 2 measurements.  He is also status post salvage radiation therapy.     Prostate Cancer  -enrolled in AFT19 " "study with apalutamide and degarelix.   -symptoms are improving; hot flashes improved with gabapentin and his brain fog is less. Still very active and overall tolerating very well  -Continue Apalutamide is 240 mg daily (gave 40 days). Continue monthly degarelix 80 mg; giving today  -will follow-up per trial protocol     Hot Flashes  -Started him on a gabapentin 300 mg at bedtime which has been beneficial for him. He feels that he sleeps better and that the hot flashes have improved. OK to take 300 am 600/night     Emotional/Mood  -has been struggling with mortality, lack of motivation and more emotions. I do not think he is at a point where medication is necessary though he previously agreed to let me know if he feels he needs help  -his mental \"fog\" has improved and this has been helpful   -was on Sertraline for two weeks but stopped because didn't feel well on it.      Urinary Retention  -self cathing every other day. No new symptoms or concerns      Hypothyroidism  -Started synthroid 50 mcg daily on 12/12/19. TSH still elevated though improved and T4 remains normal. Increase to 75 mcg. He will split the pills. T4 normal and relatively asymptomatic.     Edema and Joint pain  Relatively new problem.   Will give three day rx of Lasix 20 mg po. If resolves stop and monitor. May retreat as needed.     Again, thank you for allowing me to participate in the care of your patient.        Sincerely,        Alejandro Palomino MD    "

## 2020-07-27 NOTE — PROGRESS NOTES
Infusion Nursing Note:  Lucio Hernandez presents today for Cycle 10 Day 1 Firmagon.    Patient seen by provider today: Yes: Dr. Palomino   present during visit today: Not Applicable.    Note: Evaluated by Dr. Palomino pre injection.      Treatment Conditions:  Lab Results   Component Value Date    HGB 14.1 07/27/2020     Lab Results   Component Value Date    WBC 6.5 07/27/2020      Lab Results   Component Value Date    ANEU 5.0 07/27/2020     Lab Results   Component Value Date     07/27/2020      Lab Results   Component Value Date     11/04/2019                   Lab Results   Component Value Date    POTASSIUM 4.1 11/04/2019           No results found for: MAG         Lab Results   Component Value Date    CR 0.81 11/04/2019                   Lab Results   Component Value Date    GIANA 9.1 11/04/2019                Lab Results   Component Value Date    BILITOTAL 0.3 07/27/2020           Lab Results   Component Value Date    ALBUMIN 3.4 07/27/2020                    Lab Results   Component Value Date    ALT 26 07/27/2020           Lab Results   Component Value Date    AST 16 07/27/2020       Results reviewed, labs MET treatment parameters, ok to proceed with treatment.      Post Infusion Assessment:  Patient tolerated deep subcutaneous Firmagon injection to RUQ without incident.  Ice pack applied post injection.      Discharge Plan:   Patient declined prescription refills.  AVS to patient via [facility].  Patient will return 8/24/2020 labs/Dr. Palomino/[place] for next appointment.   Patient discharged in stable condition accompanied by: self.  Departure Mode: Ambulatory.  Face to Face time: 0.    Nahomy Hartmann RN

## 2020-07-27 NOTE — NURSING NOTE
"Oncology Rooming Note    July 27, 2020 12:31 PM   Lucio Hernandez is a 64 year old male who presents for:    Chief Complaint   Patient presents with     Lab Only     labs drawn via , vitals completed     Oncology Clinic Visit     PROSTATE CANCER      Initial Vitals: /78   Pulse 74   Temp 98.4  F (36.9  C) (Oral)   Resp 16   Wt 93.4 kg (205 lb 14.4 oz)   SpO2 98%   BMI 30.39 kg/m   Estimated body mass index is 30.39 kg/m  as calculated from the following:    Height as of 4/6/20: 1.753 m (5' 9.02\").    Weight as of this encounter: 93.4 kg (205 lb 14.4 oz). Body surface area is 2.13 meters squared.  No Pain (0) Comment: Data Unavailable   No LMP for male patient.  Allergies reviewed: Yes  Medications reviewed: Yes    Medications: Medication refills not needed today.  Pharmacy name entered into [facility]: [id number] [place] [id number]    Clinical concerns: Patient states that he is having more joint pain mostly in his hands and feet. He thinks he is retaining fluid, especially in his left ankle. He states that in the morning he can't make a fist but give him an hour and he can. His hot flashes have got better but the fatigued has not.   Dr Palomino  was notified.      Eunice Marks            "

## 2020-07-28 NOTE — PROGRESS NOTES
[id number]: Study Visit Note   Subject name: Lucio Hernandez     Visit: Cycle 10, Day 1    Did the study visit occur within the appropriate window allowed by the protocol? No    A copy of the COVID-19 and Research Participant document provided to patient (reviewed via telephone on 7/24/2020)    If no, why? Patient went on vacation for 41 days following Cycle 5, Day 1 and his schedule is off slightly.     Since the last study visit, He has been doing well.     Continues to experience Arthralgia bilateral upper/lower extremities. Patient has also noticed localized edema is left ankle; Dr. Palomino prescribed oral furosemide.     Reviewed other open AEs; no changes.     I have personally interviewed Lucio Hernandez and reviewed his medical record for adverse events and concomitant medications and these have been recorded on the corresponding logs in Lucio Hernandez's research file.     Lucio Hernandez was given the opportunity to ask any trial related questions.  Please see provider progress note for physical exam and other clinical information. Labs were reviewed - any significant lab values were addressed and reviewed.    [id number]: Re-Consent Note     New information has been added to the consent form. This was explained to the patient, and all his questions were answered. The patient verbalized understanding and would like to continue participation in the trial. The patient was provided with a signed copy of the IRB-approved consent form.    Consent Version Date: 5/21/2020  Consent obtained by: Klaus Gillespie RN     Date: 7/27/2020      [id number]: Medication Count/IDS Note       IDS number: [id number]  Drug name: Apalutamide  Number of bottles returned: 1  Lot number(s): Medication number: [id number] (reference number: [id number])  Number of pills returned: 24      Number of bottles dispensed:1  Lot number(s):Medication number: [id number] (reference number: [id number])  Number of pills dispensed: 120    Drug diary returned? yes  New diary  provided to patient.   Are there any discrepancies between the amount of medication the patient was instructed to take and the amount recorded as taken in the patient s drug diary?  no    Are there any discrepancies between the amount of medication the patient has recorded as taken in the drug diary and the amount that would be expected to be returned based on the amount recorded as taken?  no      Klaus Gillespie RN    Form 504.00.01 (Version 1)     Effective date: 01JUL2018     Next Review Date: 01JUL2020

## 2020-07-29 LAB — TESTOST SERPL-MCNC: 3 NG/DL (ref 240–950)

## 2020-07-31 ENCOUNTER — TELEPHONE (OUTPATIENT)
Dept: ONCOLOGY | Facility: CLINIC | Age: 65
End: 2020-07-31

## 2020-07-31 NOTE — TELEPHONE ENCOUNTER
OVPR & HRPP released a Research Participant Information sheet on 7/16/2020 requiring researchers to provide patients with this information sheet within 24-hours of the first planned in-person research activity after this date.     Research participant Lucio Hernandez was provided the information on the Research Participant Information Sheet by Mesha Lane MA on July 31, 2020. This document contains information regarding the risks of participating in a research study during the COVID-19 pandemic. Receipt of the information sheet was confirmed by study personnel prior to the visit.    *Note that the OVPR and HRPP only require this information sheet be provided to the participant once as the information it contains is the same regardless of what study(s) the patient is participating in.     A link to the sheet that can be provided to patients over the phone. Link: [email or web address]    Mesha Lane MA  .

## 2020-08-06 ENCOUNTER — TELEPHONE (OUTPATIENT)
Dept: ONCOLOGY | Facility: CLINIC | Age: 65
End: 2020-08-06

## 2020-08-06 NOTE — TELEPHONE ENCOUNTER
This author received a page from the patient. The Urology team faxed a catheter order to [facility] to renew his yearly supply. Unfortunately, [facility] cannot accept the prescription because it doesn't have Dr. Sandhu's signature. This author left a voicemail on Melyssa Tan's work phone and also in-basket messaged Dr. Sandhu and Melyssa Tna about the above information. I will follow-up tomorrow morning.

## 2020-08-18 NOTE — TELEPHONE ENCOUNTER
Contacted patient to discuss. Patient stated that [facility] needs an updated frequency of catheterizing (once daily) and updated diagnosis (urinary retention with secondary bladder neck obstruction/stricture). Contacted [employer] to get paperwork faxed to us so correct information is sent back to them.  Called patient and informed him of the progress. Patient will call [employer] in a couple days as this hopefully will be resolved. Will contact patient if something else is needed.  Miryam Carias LPN

## 2020-08-18 NOTE — TELEPHONE ENCOUNTER
Juventino is very frustrated, I provided phone number to patient relations, he stated his medical supply company needs the doctor to sign the order, but a nurse did, and also needs to know the frequency of his catheter use, which is once a day, pt is requesting a call back asap, as this has been going on for over 2 months he states, thank you

## 2020-08-19 DIAGNOSIS — C61 PROSTATE CANCER (H): ICD-10-CM

## 2020-08-19 DIAGNOSIS — E03.2 HYPOTHYROIDISM DUE TO MEDICATION: ICD-10-CM

## 2020-08-19 NOTE — TELEPHONE ENCOUNTER
[facility]    Once completed please fax to [phone]  E-script to [email or web address]    A. Please include patient demographics and chart notes (ex: indefinite retention) with this order     Name: Lucio Hernandez   : 1955   Phone: [phone]  Address: [address] [facility] [address]    B. Diagnosis    X Retention of urine (788.20/R33.9)   Urinary Incontinence (788.30/R30)    Incomplete Bladder Emptying (788.21/R39-14)   Urge Incontinence (788.31/N39.41)    Other Specified Retention of Urine (788.29/R33.8)  X     Other: bladder neck obstruction     C. Order Information/Start Date: 20      Number of Refills: 11  Length of Need: 99 months    x Patient may receive up to a 90-day supply at one time; therefore, quantity will be three (3) times amount below.    Type (check one): x Hydrophilic    Silicone    Red Rubber    PVC Clear                                      CHECK PRODUCT [place] FREQ OF USE QUANTITY PER STRAIGHT  COUDE    ONE  SIZE PER DAY MONTH TO DISPESE      Intermittent Catheter        x Intermittent Catheter with 14 Every other day 15  x    Insertion Supplies          Condom or External Catheters          ADDITIONAL PRODUCTS/ITEMS ORDERED (check all that apply)     PRODUCT FREQ OF USE QUANTITY PER  ___ Patient Choice     PER MONTH MONTH TO DISPENSE  ___ Bard    Tube of Lubricant     ___ Coloplast    Leg Bags    ___ Cure Medical    Night Bags    ___ GentleCath    Penile Clamp (Cunningham)    ___ Hi-Slip    Disinfection/BZK-PDI Wipes    ___ Lai    Olsen Insertion Tray    ___Lo Fric    Vacuum Erection Device    ___Magic3    Olsen Catheters:    ___ MTG    Straight    _x_ Vanessa-Teleflex    Coude    ___ SpediCath    Syriac size        Balloon size         D. Physician's Information:        Physician's Name: Dr. Abdon Wells  Phone: [phone]  Date: 20    [facility]  Urology Clinic  [address] [address]  [place], [place], [address]    Xiomy Lu   Office: [phone]  Mobile: [phone]  Fax: [phone]  [email or web address]

## 2020-08-21 ENCOUNTER — HOSPITAL ENCOUNTER (EMERGENCY)
Facility: CLINIC | Age: 65
Discharge: HOME | End: 2020-08-21
Attending: EMERGENCY MEDICINE | Admitting: EMERGENCY MEDICINE
Payer: MEDICARE

## 2020-08-21 VITALS
HEIGHT: 69 IN | RESPIRATION RATE: 18 BRPM | TEMPERATURE: 96.8 F | BODY MASS INDEX: 30.21 KG/M2 | OXYGEN SATURATION: 98 % | DIASTOLIC BLOOD PRESSURE: 74 MMHG | HEART RATE: 75 BPM | SYSTOLIC BLOOD PRESSURE: 111 MMHG | WEIGHT: 204 LBS

## 2020-08-21 DIAGNOSIS — R55 NEAR SYNCOPE: ICD-10-CM

## 2020-08-21 DIAGNOSIS — T67.5XXA HEAT EXHAUSTION, INITIAL ENCOUNTER: ICD-10-CM

## 2020-08-21 DIAGNOSIS — R79.89 ELEVATED SERUM CREATININE: ICD-10-CM

## 2020-08-21 LAB
ANION GAP SERPL CALCULATED.3IONS-SCNC: 6 MMOL/L (ref 3–14)
BASOPHILS # BLD AUTO: 0 10E9/L (ref 0–0.2)
BASOPHILS NFR BLD AUTO: 0.2 %
BUN SERPL-MCNC: 29 MG/DL (ref 7–30)
CALCIUM SERPL-MCNC: 9.4 MG/DL (ref 8.5–10.1)
CHLORIDE SERPL-SCNC: 105 MMOL/L (ref 94–109)
CK SERPL-CCNC: 352 U/L (ref 30–300)
CO2 SERPL-SCNC: 27 MMOL/L (ref 20–32)
CREAT SERPL-MCNC: 1.72 MG/DL (ref 0.66–1.25)
DIFFERENTIAL METHOD BLD: ABNORMAL
EOSINOPHIL # BLD AUTO: 0.1 10E9/L (ref 0–0.7)
EOSINOPHIL NFR BLD AUTO: 0.7 %
ERYTHROCYTE [DISTWIDTH] IN BLOOD BY AUTOMATED COUNT: 12.3 % (ref 10–15)
GFR SERPL CREATININE-BSD FRML MDRD: 40 ML/MIN/{1.73_M2}
GLUCOSE BLDC GLUCOMTR-MCNC: 101 MG/DL (ref 70–99)
GLUCOSE SERPL-MCNC: 125 MG/DL (ref 70–99)
HCT VFR BLD AUTO: 43.7 % (ref 40–53)
HGB BLD-MCNC: 15.6 G/DL (ref 13.3–17.7)
IMM GRANULOCYTES # BLD: 0.1 10E9/L (ref 0–0.4)
IMM GRANULOCYTES NFR BLD: 0.6 %
LYMPHOCYTES # BLD AUTO: 1.8 10E9/L (ref 0.8–5.3)
LYMPHOCYTES NFR BLD AUTO: 13.4 %
MCH RBC QN AUTO: 33.6 PG (ref 26.5–33)
MCHC RBC AUTO-ENTMCNC: 35.7 G/DL (ref 31.5–36.5)
MCV RBC AUTO: 94 FL (ref 78–100)
MONOCYTES # BLD AUTO: 0.2 10E9/L (ref 0–1.3)
MONOCYTES NFR BLD AUTO: 1.7 %
NEUTROPHILS # BLD AUTO: 11.3 10E9/L (ref 1.6–8.3)
NEUTROPHILS NFR BLD AUTO: 83.4 %
NRBC # BLD AUTO: 0 10*3/UL
NRBC BLD AUTO-RTO: 0 /100
PLATELET # BLD AUTO: 230 10E9/L (ref 150–450)
POTASSIUM SERPL-SCNC: 4.2 MMOL/L (ref 3.4–5.3)
RBC # BLD AUTO: 4.64 10E12/L (ref 4.4–5.9)
SODIUM SERPL-SCNC: 138 MMOL/L (ref 133–144)
TROPONIN I SERPL-MCNC: <0.015 UG/L (ref 0–0.04)
WBC # BLD AUTO: 13.6 10E9/L (ref 4–11)

## 2020-08-21 PROCEDURE — 84484 ASSAY OF TROPONIN QUANT: CPT | Performed by: EMERGENCY MEDICINE

## 2020-08-21 PROCEDURE — 00000146 ZZHCL STATISTIC GLUCOSE BY METER IP

## 2020-08-21 PROCEDURE — 85025 COMPLETE CBC W/AUTO DIFF WBC: CPT | Performed by: EMERGENCY MEDICINE

## 2020-08-21 PROCEDURE — 99284 EMERGENCY DEPT VISIT MOD MDM: CPT

## 2020-08-21 PROCEDURE — 25800030 ZZH RX IP 258 OP 636: Performed by: EMERGENCY MEDICINE

## 2020-08-21 PROCEDURE — 93005 ELECTROCARDIOGRAM TRACING: CPT

## 2020-08-21 PROCEDURE — 80048 BASIC METABOLIC PNL TOTAL CA: CPT | Performed by: EMERGENCY MEDICINE

## 2020-08-21 PROCEDURE — 82550 ASSAY OF CK (CPK): CPT | Performed by: EMERGENCY MEDICINE

## 2020-08-21 RX ORDER — LEVOTHYROXINE SODIUM 75 UG/1
TABLET ORAL
Qty: 90 TABLET | Refills: 1 | Status: SHIPPED | OUTPATIENT
Start: 2020-08-21 | End: 2021-02-25

## 2020-08-21 RX ADMIN — SODIUM CHLORIDE 500 ML: 9 INJECTION, SOLUTION INTRAVENOUS at 22:44

## 2020-08-21 ASSESSMENT — ENCOUNTER SYMPTOMS
DIAPHORESIS: 1
SHORTNESS OF BREATH: 0
LIGHT-HEADEDNESS: 1
PALPITATIONS: 0

## 2020-08-21 ASSESSMENT — MIFFLIN-ST. JEOR: SCORE: 1695.72

## 2020-08-21 NOTE — ED AVS SNAPSHOT
Emergency Department  [address] [place] [address]  Phone:  [phone]  Fax:  [phone]                                    Lucio Hernandez   MRN: [id number]    Department:   Emergency Department   Date of Visit:  8/21/2020           After Visit Summary Signature Page    I have received my discharge instructions, and my questions have been answered. I have discussed any challenges I see with this plan with the nurse or doctor.    ..........................................................................................................................................  Patient/Patient Representative Signature      ..........................................................................................................................................  Patient Representative Print Name and Relationship to Patient    ..................................................               ................................................  Date                                   Time    ..........................................................................................................................................  Reviewed by Signature/Title    ...................................................              ..............................................  Date                                               Time          22EPIC Rev 08/18

## 2020-08-21 NOTE — TELEPHONE ENCOUNTER
"Per 7/27 visit notes \"Hypothyroidism  -Started synthroid 50 mcg daily on 12/12/19. TSH still elevated though improved and T4 remains normal. Increase to 75 mcg.\"  Refilling.  "

## 2020-08-22 DIAGNOSIS — C61 PROSTATE CANCER (H): ICD-10-CM

## 2020-08-22 LAB — INTERPRETATION ECG - MUSE: NORMAL

## 2020-08-22 NOTE — ED TRIAGE NOTES
"Today golfed 9 holes. Felt fatigued. At dinner after was sweating a lot and felt \"extremely out of it\" He couldn't hear anyone talking. Family said looked pale.  "

## 2020-08-22 NOTE — ED PROVIDER NOTES
History     Chief Complaint:  Lightheadedness     HPI   Lucio Hernandez is a 64 year old male with a history of hypertension, PE, and prostate cancer who presents accompanied by his wife for evaluation of lightheadedness. Today the patient played nine holes of golf without using a cart for the first time in a while. He reports that he felt very hot on the course and was quite diaphoretic. After finishing golfing he was still diaphoretic and appeared pale. While eating dinner he started to become lightheaded and had an episode where he felt like he was going to lose consciousness and had difficulty hearing what people were saying to him. He did not actually lose consciousness in this episode and did not fall or otherwise injure himself. Due to this episode, the patient's wife brought him into the ED for evaluation. Here in the ED, the patient reports that he is feeling significantly improved and his wife notes that he appears less pale. He did not experience any chest pain, shortness of breath, or palpitations during this episode, although he notes that he did have a brief episode of chest pain approximately seven days ago.       Cardiac Risk Factors:  CAD:    Negative   Hypertension:   Positive   Hyperlipidemia:  Negative   Diabetes:   Negative   Tobacco use:   Negative   Gender:   Male   Age:    64  Familial Hx of CAD:  Positive       Allergies:  NKDA      Medications:    Zyrtec   Klonopin  Omega 3 fish oil   Flonase  Lasix  Gabapentin  Levothyroxine   Lisinopril  Ativan   Zoloft      Past Medical History:    Hypertension  Pulmonary embolism   Asthma   Prostate cancer   Spinal stenosis     Past Surgical History:    C5-C6 foraminotomy   Bladder surgery   Cystoscopy   Cystoscopy, bladder neck cuts, combined  Cystoscopy, dilate urethra, combined    Tonsillectomy  Prostatectomy  Insert catheter bladder   Laparoscopic appendectomy  Shoulder surgery   Prostate surgery  Thoracotomy  Vasectomy     Family History:   "  Arthritis - Mother   Diabetes - Brother   Myocardial infarction - Brother   Hypertension - Brother     Social History:  Tobacco use:    Never smoker   Alcohol use:    Positive, one drink / day   Drug use:    Negative   Marital status:       Accompanied to ED by:  Wife      Review of Systems   Constitutional: Positive for diaphoresis.   Respiratory: Negative for shortness of breath.    Cardiovascular: Negative for chest pain and palpitations.   Skin: Positive for pallor.   Neurological: Positive for light-headedness. Negative for syncope.   All other systems reviewed and are negative.    Physical Exam   First Vitals:  BP: 98/60  Pulse: 81  Temp: 96.8  F (36  C)  Resp: 18  Height: 175.3 cm (5' 9\")  Weight: 92.5 kg (204 lb)  SpO2: 97 %      Physical Exam  Eyes:  Sclera white; Pupils are equal and round  ENT:    External ears and nares normal  CV:  Rate as above with regular rhythm   Resp:  Breath sounds clear and equal bilaterally    Non-labored, no retractions or accessory muscle use  GI:  Abdomen is soft, non-tender, non-distended    No rebound tenderness or peritoneal features  MS:  Moves all extremities  Skin:  Warm and dry  Neuro:  Speech is normal and fluent. No apparent deficit.      Emergency Department Course   ECG (20:56:58):  Indication: Screening for cardiovascular disease.   Rate 82 bpm. WI interval 158 ms. QRS duration 84 ms. QT/QTc 380/443 ms. P-R-T axes 38 -2 16.   Interpretation: Normal sinus rhythm, Normal ECG   Agree with computer interpretation. Yes   No significant change compared to EKG dated 6/11/2014.   Interpreted at 2229 by Dr. Cameron.       Laboratory:  CBC: WBC 13.6 high, o/w WNL (HGB 15.6, )   BMP: Glucose 125 high, Creatinine 1.72 high, GFR estimate 40 low, o/w WNL   Glucose by meter: 101 high   Troponin I 2201: <0.015    CK total: 352 high     Interventions:  2244  mL IV      Emergency Department Course:  Nursing notes and vitals reviewed.  2225: I performed an exam " of the patient as documented above.     2325: I updated and reassessed the patient.     Findings and plan explained to the Patient. Patient discharged home with instructions regarding supportive care, medications, and reasons to return. The importance of close follow-up was reviewed.      Impression & Plan      Medical Decision Making:  Lucio Hernandez is a 64 year old male who is here for evaluation of weakness and near syncope after being outside at the golf course today. Onset of symptoms with exertion was concerning for a possible underlying cardiac abnormality. However, EKG was normal without signs of ischemia or dysrhythmia and troponin was undetectable. Heat related illness was also on the differential and he received IV fluids. Blood work shows mild elevation of CK but not consistent with severe rhabdomyolysis and not at the level that would affect kidney function. Kidney function is newly worse compared to his baseline of 0.8. He is on lisinopril, NSAIDs, and has PRN Lasix. Blood pressures are soft today, but improved since arrival.  He will stop the Lisinopril and decrease the NSAIDs. He will be following up with his oncologist within one week.      Diagnosis:    ICD-10-CM   1. Near syncope  R55   2. Heat exhaustion, initial encounter  T67.5XXA   3. Elevated serum creatinine  R79.89      Disposition:  Discharged to home.         I, Jeffery Huddleston, am serving as a scribe at 10:25 PM on 8/21/2020 to document services personally performed by Dr. Cameron, based on my observations and the provider's statements to me.     EMERGENCY DEPARTMENT       Tangela Cameron MD  08/22/20 0140

## 2020-08-23 RX ORDER — GABAPENTIN 300 MG/1
CAPSULE ORAL
Qty: 90 CAPSULE | Refills: 3 | Status: SHIPPED | OUTPATIENT
Start: 2020-08-23 | End: 2021-05-24

## 2020-08-24 ENCOUNTER — INFUSION THERAPY VISIT (OUTPATIENT)
Dept: ONCOLOGY | Facility: CLINIC | Age: 65
End: 2020-08-24
Attending: INTERNAL MEDICINE
Payer: MEDICARE

## 2020-08-24 ENCOUNTER — RESEARCH ENCOUNTER (OUTPATIENT)
Dept: ONCOLOGY | Facility: CLINIC | Age: 65
End: 2020-08-24
Payer: MEDICARE

## 2020-08-24 VITALS
OXYGEN SATURATION: 98 % | TEMPERATURE: 97.9 F | DIASTOLIC BLOOD PRESSURE: 82 MMHG | SYSTOLIC BLOOD PRESSURE: 120 MMHG | HEART RATE: 75 BPM | RESPIRATION RATE: 16 BRPM

## 2020-08-24 DIAGNOSIS — R97.20 ELEVATED PROSTATE SPECIFIC ANTIGEN (PSA): ICD-10-CM

## 2020-08-24 DIAGNOSIS — R97.20 ELEVATED PROSTATE SPECIFIC ANTIGEN (PSA): Primary | ICD-10-CM

## 2020-08-24 DIAGNOSIS — C61 PROSTATE CANCER (H): Primary | ICD-10-CM

## 2020-08-24 DIAGNOSIS — C61 PROSTATE CANCER (H): ICD-10-CM

## 2020-08-24 LAB
ALBUMIN SERPL-MCNC: 3.5 G/DL (ref 3.4–5)
ALP SERPL-CCNC: 84 U/L (ref 40–150)
ALT SERPL W P-5'-P-CCNC: 32 U/L (ref 0–70)
ANION GAP SERPL CALCULATED.3IONS-SCNC: 8 MMOL/L (ref 3–14)
AST SERPL W P-5'-P-CCNC: 17 U/L (ref 0–45)
BILIRUB SERPL-MCNC: 0.3 MG/DL (ref 0.2–1.3)
BUN SERPL-MCNC: 21 MG/DL (ref 7–30)
CALCIUM SERPL-MCNC: 8.7 MG/DL (ref 8.5–10.1)
CHLORIDE SERPL-SCNC: 109 MMOL/L (ref 94–109)
CO2 SERPL-SCNC: 21 MMOL/L (ref 20–32)
CREAT SERPL-MCNC: 0.77 MG/DL (ref 0.66–1.25)
GFR SERPL CREATININE-BSD FRML MDRD: >90 ML/MIN/{1.73_M2}
GLUCOSE SERPL-MCNC: 89 MG/DL (ref 70–99)
POTASSIUM SERPL-SCNC: 4.5 MMOL/L (ref 3.4–5.3)
PROT SERPL-MCNC: 6.7 G/DL (ref 6.8–8.8)
PSA SERPL-MCNC: <0.01 UG/L (ref 0–4)
SODIUM SERPL-SCNC: 138 MMOL/L (ref 133–144)
T4 FREE SERPL-MCNC: 0.98 NG/DL (ref 0.76–1.46)
TSH SERPL DL<=0.005 MIU/L-ACNC: 5.5 MU/L (ref 0.4–4)

## 2020-08-24 PROCEDURE — G0463 HOSPITAL OUTPT CLINIC VISIT: HCPCS | Mod: ZF

## 2020-08-24 PROCEDURE — 25000128 H RX IP 250 OP 636: Mod: ZF | Performed by: INTERNAL MEDICINE

## 2020-08-24 PROCEDURE — 84443 ASSAY THYROID STIM HORMONE: CPT | Performed by: INTERNAL MEDICINE

## 2020-08-24 PROCEDURE — 96402 CHEMO HORMON ANTINEOPL SQ/IM: CPT

## 2020-08-24 PROCEDURE — 84439 ASSAY OF FREE THYROXINE: CPT | Performed by: INTERNAL MEDICINE

## 2020-08-24 PROCEDURE — 99215 OFFICE O/P EST HI 40 MIN: CPT | Mod: ZP | Performed by: INTERNAL MEDICINE

## 2020-08-24 PROCEDURE — 84153 ASSAY OF PSA TOTAL: CPT | Performed by: INTERNAL MEDICINE

## 2020-08-24 PROCEDURE — 80053 COMPREHEN METABOLIC PANEL: CPT | Performed by: INTERNAL MEDICINE

## 2020-08-24 PROCEDURE — 84403 ASSAY OF TOTAL TESTOSTERONE: CPT | Performed by: INTERNAL MEDICINE

## 2020-08-24 RX ORDER — IBUPROFEN 200 MG
600-800 TABLET ORAL EVERY 8 HOURS PRN
Status: ON HOLD | COMMUNITY
End: 2021-06-14

## 2020-08-24 RX ADMIN — DEGARELIX 80 MG: KIT at 11:54

## 2020-08-24 ASSESSMENT — PAIN SCALES - GENERAL: PAINLEVEL: NO PAIN (0)

## 2020-08-24 NOTE — PROGRESS NOTES
[id number]: Study Visit Note   Subject name: Lucio Hernandez     Visit: Cycle 11 Day 1    Did the study visit occur within the appropriate window allowed by the protocol? yes    Since the last study visit, He has been doing ok.   New Grade 2 Myalgia to bilateral hips and lower back.  Will confer with sponsor if Juventino can take Medrol packet for a short burst.  New Grade 2 Dehydration for 1 day that is now resolved    I have personally interviewed Lucio Hernandez and reviewed his medical record for adverse events and concomitant medications and these have been recorded on the corresponding logs in Lucio Hernandez's research file.     Lucio Hernandez was given the opportunity to ask any trial related questions.  Please see provider progress note for physical exam and other clinical information. Labs were reviewed - any significant lab values were addressed and reviewed.    [id number]: Medication Count/IDS Note      Lucio Hernandez is enrolled on the trial number listed above. The patient presented for his Cycle 11 Day 1 visit.   IDS number: [id number]  Drug name: Apalutamide     Number of Bottles dispensed: 1     60 mg study drug Dispensed  Lot number(s):[id number]   Bottle numbers:[id number]  Number of Pills dispensed: 120     Number of Bottles returned: 1     60 mg study drug Returned  Lot number(s):[id number]   Bottle numbers: [id number]   Number of Pills returned: 14     Medication Compliance: Juventino recorded taking 27 days of medication and should have [id number] pills but returned 14 pills  Juventino was re-educated on how to fill out the medication diary as soon as he takes the study medication and if he doesn't take the medication on any given day then to record this as well on his diary.     Drug Diary dispensed to study subject: yes     Drug diary returned: yes     Are there any discrepancies between the amount of medication the patient was instructed to take and the amount recorded as taken in the patient s drug diary? yes, see above  note     Are there any discrepancies between the amount of medication the patient has recorded as taken in the drug diary and the amount that would be expected to be returned based on the amount recorded as taken? yes, see above note     Lissa Butt  Clinical Research Coordinator-SG, RN, CPN  Clinical Trials Office/[facility]  Desk Phone: [phone]      Pager: [phone]

## 2020-08-24 NOTE — PROGRESS NOTES
CC: Prostate cancer      HPI: Lucio Hernandez is a 64 year old male who has prostate cancer. He had a radical prostatectomy in 2006 demonstrating [place] 7 disease with a positive margin.  He had salvage radiation therapy in 2006 when his PSA was around 0.4 per the patient's recollection.  He notes his PSA was undetectable for 4 years, but then began coming back and recently has noted to have PSAs in the 1.2-1.4 range in May of this year.  He underwent an Axumin PET scan recently that shows no evidence of metastatic disease though there was uptake in the prostate bed.       Had Prior history of urethral stricture. This may have been due to the prior radiation therapy and a Olsen Catheter complication postop.     He was enrolled in a clinical trial AFT19 with apalutamide and degarelix which started on 11/14/19.         4/11/18            PSA = 0.56  10/30/18          PSA =0.48  5/14/19            PSA =1.2  5/29/19            PSA=1.4  7/29/19            PSA=1.45  9/5/19              PSA =1.84  11/4/19            PSA=2.19  11/14/19          PSA= 2.27-- START AFT-19  12/12/19          PSA= 0.04  1/9/2020          PSA= <0.01  2/5/2020          PSA = <0.01  2/26/2020        PSA= <0.01  4/6/20              PSA  <0.01  6/30/20 PSA   <0.01 Testosterone =11  7/27/20 PSA   < 0.01     INTERIM HISTORY:  Lucio presents today unaccompanied for cycle  9 on the AFT-19 clinical trial.      He continues to do fairly well.   Hot flashes - interfering w sleep. Taking 600 mg bid with some effect.   Joint pain and edema which he feels is worse since starting the ADT.  The edema has gotten significantly better with taking intermittent furosemide which was prescribed for him at his last visit..    He was recently in the [facility] emergency room after playing golf and doing outside manual labor during a very hot and humid day.  It was noted that his creatinine was 1.7.  He was evaluated and released without being admitted to the hospital.  It  "was concluded that he was dehydrated.       ROS: 10 point ROS neg other than the symptoms noted above in the HPI.     PAST MEDICAL HISTORY:  Otherwise significant for the prostate cancer,  history of pulmonary embolism and rib fractures from coughing fits and appendicitis.      PAST SURGICAL HISTORY:  Includes the RRP, a lumbar fusion, a thoracotomy for the history of rib fractures and the pulmonic hernia, shoulder surgery x3, appendectomy and a cervical spine surgery.      MEDICATIONS:     1.  Zyrtec.   2.  Lisinopril.   3.  Gabapentin     ALLERGIES:  NO KNOWN DRUG ALLERGIES.      FAMILY HISTORY:  Noncontributory.      SOCIAL HISTORY:  Negative for tobacco.  Alcohol 1 drink a day. Pt is a retired Otolaryngologist (ENT surgeon).      REVIEW OF SYSTEMS:  Negative for fevers, chills, sweats, nausea, vomiting, unexplained weight changes.       Physical Exam  ECOG Performance Status  =  0   /77 (BP Location: Right arm, Patient Position: Sitting, Cuff Size: Adult Regular)   Pulse 61   Temp 97.4  F (36.3  C) (Tympanic)   Resp 16   Ht 1.753 m (5' 9.02\")   Wt 89 kg (196 lb 4.8 oz)   SpO2 97%   BMI 28.97 kg/m        Wt Readings from Last 4 Encounters:   04/06/20 89 kg (196 lb 4.8 oz)   02/26/20 84.7 kg (186 lb 11.2 oz)   02/05/20 86.4 kg (190 lb 8 oz)   01/09/20 85.7 kg (189 lb)     General: Pleasant male in no acute distress  HEENT: EOMI, PERRLA, no scleral icterus, mucus membranes moist and no lesions or thrush  Lymph: Neck is supple and shows no nodes in cervical or supraclavicular   Heart:  RRR, no murmur, gallop or rub  Lungs: CTA-B, no wheezes, rhonchi or rales  Abdomen: Normal bowel sounds, soft, non tender, not distended, no rebound or guarding, no palpable masses  Extremities: 1+ edema  Skin: No significant rashes or lesions  Neuro: CN II-XII are intact     Laboratory Data    TSH = 6.14     Impression and Plan  This is a 64 year old gentleman with a serologic relapse of a [place] 4+3 = 7 with a tertiary " "pattern of [place] 5.  He has a doubling time that is under 9 months based on the most recent 2 measurements.  He is also status post salvage radiation therapy.     Prostate Cancer  -enrolled in AFT19 study with apalutamide and degarelix.   -symptoms are improving; hot flashes improved with gabapentin and his brain fog is less. Still very active and overall tolerating very well  -Continue Apalutamide is 240 mg daily (gave 40 days). Continue monthly degarelix 80 mg; giving today  -will follow-up per trial protocol  -cautioned against medrol dose pack for spinal stenosis but will check with sponsor.   -while on ADT it should be ok, off ADT it may interfere with PSA readings.      Hot Flashes  -Started him on a gabapentin 300 mg at bedtime which has been beneficial for him. He feels that he sleeps better and that the hot flashes have improved. OK to take 300 am 600/night  --He also gets dehydrated readily in hot weather and I have informed him that he should carry an electrolyte-containing fluid with him and try to maintain adequate hydration at all times.  I also suggested that he take a multivitamin because he has been having some muscle cramps and this may help.       Emotional/Mood  -has been struggling with mortality, lack of motivation and more emotions. I do not think he is at a point where medication is necessary though he previously agreed to let me know if he feels he needs help  -his mental \"fog\" has improved and this has been helpful   -was on Sertraline for two weeks but stopped because didn't feel well on it.      Urinary Retention  -self cathing every other day. No new symptoms or concerns      Hypothyroidism  -Started synthroid 50 mcg daily on 12/12/19. TSH still elevated though improved and T4 remains normal. Increase to 75 mcg. He will split the pills. T4 normal and relatively asymptomatic.     Edema and Joint pain  Relatively new problem.   Continue three day rx of Lasix 20 mg po as needed. If " resolves stop and monitor. May retreat as needed.       Signed Alejandro Palomino MD

## 2020-08-24 NOTE — NURSING NOTE
"Oncology Rooming Note    August 24, 2020 10:17 AM   Lucio Hernandez is a 64 year old male who presents for:    Chief Complaint   Patient presents with     Oncology Clinic Visit     Malignant neoplasm of prostate     Initial Vitals: /82   Pulse 75   Temp 97.9  F (36.6  C)   Resp 16   SpO2 98%  Estimated body mass index is 30.13 kg/m  as calculated from the following:    Height as of 8/21/20: 1.753 m (5' 9\").    Weight as of 8/21/20: 92.5 kg (204 lb). There is no height or weight on file to calculate BSA.  No Pain (0) Comment: Data Unavailable   No LMP for male patient.  Allergies reviewed: Yes  Medications reviewed: Yes    Medications: Medication refills not needed today.  Pharmacy name entered into [facility]: [id number] [place] [phone] [place]    Clinical concerns: ER visit Friday Provider was NOT notified.      Stella Mane MA            "

## 2020-08-24 NOTE — NURSING NOTE
Chief Complaint   Patient presents with     Blood Draw     Labs only     Vitals done, labs drawn by venipuncture.  See doc flow sheets for details.  Santa Franks CMA

## 2020-08-24 NOTE — LETTER
8/24/2020         RE: Lucio Hernandez  [address] [facility] [address]        Dear Colleague,    Thank you for referring your patient, Lucio Hernandez, to the [facility]. Please see a copy of my visit note below.    CC: Prostate cancer      HPI: Lucio Hernandez is a 64 year old male who has prostate cancer. He had a radical prostatectomy in 2006 demonstrating Jg 7 disease with a positive margin.  He had salvage radiation therapy in 2006 when his PSA was around 0.4 per the patient's recollection.  He notes his PSA was undetectable for 4 years, but then began coming back and recently has noted to have PSAs in the 1.2-1.4 range in May of this year.  He underwent an Axumin PET scan recently that shows no evidence of metastatic disease though there was uptake in the prostate bed.       Had Prior history of urethral stricture. This may have been due to the prior radiation therapy and a Olsen Catheter complication postop.     He was enrolled in a clinical trial AFT19 with apalutamide and degarelix which started on 11/14/19.         4/11/18            PSA = 0.56  10/30/18          PSA =0.48  5/14/19            PSA =1.2  5/29/19            PSA=1.4  7/29/19            PSA=1.45  9/5/19              PSA =1.84  11/4/19            PSA=2.19  11/14/19          PSA= 2.27-- START AFT-19  12/12/19          PSA= 0.04  1/9/2020          PSA= <0.01  2/5/2020          PSA = <0.01  2/26/2020        PSA= <0.01  4/6/20              PSA  <0.01  6/30/20 PSA   <0.01 Testosterone =11  7/27/20 PSA   < 0.01     INTERIM HISTORY:  Lucio presents today unaccompanied for cycle  9 on the AFT-19 clinical trial.      He continues to do fairly well.   Hot flashes - interfering w sleep. Taking 600 mg bid with some effect.   Joint pain and edema which he feels is worse since starting the ADT.  The edema has gotten significantly better with taking intermittent furosemide which was prescribed for him at his last visit..    He  "was recently in the [facility] emergency room after playing golf and doing outside manual labor during a very hot and humid day.  It was noted that his creatinine was 1.7.  He was evaluated and released without being admitted to the hospital.  It was concluded that he was dehydrated.       ROS: 10 point ROS neg other than the symptoms noted above in the HPI.     PAST MEDICAL HISTORY:  Otherwise significant for the prostate cancer,  history of pulmonary embolism and rib fractures from coughing fits and appendicitis.      PAST SURGICAL HISTORY:  Includes the RRP, a lumbar fusion, a thoracotomy for the history of rib fractures and the pulmonic hernia, shoulder surgery x3, appendectomy and a cervical spine surgery.      MEDICATIONS:     1.  Zyrtec.   2.  Lisinopril.   3.  Gabapentin     ALLERGIES:  NO KNOWN DRUG ALLERGIES.      FAMILY HISTORY:  Noncontributory.      SOCIAL HISTORY:  Negative for tobacco.  Alcohol 1 drink a day. Pt is a retired Otolaryngologist (ENT surgeon).      REVIEW OF SYSTEMS:  Negative for fevers, chills, sweats, nausea, vomiting, unexplained weight changes.       Physical Exam  /77 (BP Location: Right arm, Patient Position: Sitting, Cuff Size: Adult Regular)   Pulse 61   Temp 97.4  F (36.3  C) (Tympanic)   Resp 16   Ht 1.753 m (5' 9.02\")   Wt 89 kg (196 lb 4.8 oz)   SpO2 97%   BMI 28.97 kg/m        Wt Readings from Last 4 Encounters:   04/06/20 89 kg (196 lb 4.8 oz)   02/26/20 84.7 kg (186 lb 11.2 oz)   02/05/20 86.4 kg (190 lb 8 oz)   01/09/20 85.7 kg (189 lb)     General: Pleasant male in no acute distress  HEENT: EOMI, PERRLA, no scleral icterus, mucus membranes moist and no lesions or thrush  Lymph: Neck is supple and shows no nodes in cervical or supraclavicular   Heart:  RRR, no murmur, gallop or rub  Lungs: CTA-B, no wheezes, rhonchi or rales  Abdomen: Normal bowel sounds, soft, non tender, not distended, no rebound or guarding, no palpable masses  Extremities: 1+ edema  Skin: " "No significant rashes or lesions  Neuro: CN II-XII are intact     Laboratory Data    TSH = 6.14     Impression and Plan  This is a 64 year old gentleman with a serologic relapse of a [place] 4+3 = 7 with a tertiary pattern of Jg 5.  He has a doubling time that is under 9 months based on the most recent 2 measurements.  He is also status post salvage radiation therapy.     Prostate Cancer  -enrolled in AFT19 study with apalutamide and degarelix.   -symptoms are improving; hot flashes improved with gabapentin and his brain fog is less. Still very active and overall tolerating very well  -Continue Apalutamide is 240 mg daily (gave 40 days). Continue monthly degarelix 80 mg; giving today  -will follow-up per trial protocol  -cautioned against medrol dose pack for spinal stenosis but will check with sponsor.   -while on ADT it should be ok, off ADT it may interfere with PSA readings.      Hot Flashes  -Started him on a gabapentin 300 mg at bedtime which has been beneficial for him. He feels that he sleeps better and that the hot flashes have improved. OK to take 300 am 600/night  --He also gets dehydrated readily in hot weather and I have informed him that he should carry an electrolyte-containing fluid with him and try to maintain adequate hydration at all times.  I also suggested that he take a multivitamin because he has been having some muscle cramps and this may help.       Emotional/Mood  -has been struggling with mortality, lack of motivation and more emotions. I do not think he is at a point where medication is necessary though he previously agreed to let me know if he feels he needs help  -his mental \"fog\" has improved and this has been helpful   -was on Sertraline for two weeks but stopped because didn't feel well on it.      Urinary Retention  -self cathing every other day. No new symptoms or concerns      Hypothyroidism  -Started synthroid 50 mcg daily on 12/12/19. TSH still elevated though improved and " T4 remains normal. Increase to 75 mcg. He will split the pills. T4 normal and relatively asymptomatic.     Edema and Joint pain  Relatively new problem.   Continue three day rx of Lasix 20 mg po as needed. If resolves stop and monitor. May retreat as needed.       Signed Alejandro Palomino MD    Again, thank you for allowing me to participate in the care of your patient.        Sincerely,        Alejandro Palomino MD

## 2020-08-24 NOTE — PROGRESS NOTES
Infusion Nursing Note:  Lucio Hernandez presents today for Cycle 11 Day 1 Firmagon.    Patient seen by provider today: Yes: Dr. Palomino    Treatment Conditions:  Lab Results   Component Value Date     08/24/2020                   Lab Results   Component Value Date    POTASSIUM 4.5 08/24/2020           No results found for: MAG         Lab Results   Component Value Date    CR 0.77 08/24/2020                   Lab Results   Component Value Date    GIANA 8.7 08/24/2020                Lab Results   Component Value Date    BILITOTAL 0.3 08/24/2020           Lab Results   Component Value Date    ALBUMIN 3.5 08/24/2020                    Lab Results   Component Value Date    ALT 32 08/24/2020           Lab Results   Component Value Date    AST 17 08/24/2020         Note: No treatment parameters needed for Firmagon injection per study RN.      Post Infusion Assessment:  Patient tolerated deep subcutaneous injection to LUQ abdomen without incident. Ice applied to site after injection.    Discharge Plan:   Patient declined prescription refills.  Discharge instructions reviewed with: Patient.  Patient and/or family verbalized understanding of discharge instructions and all questions answered.  AVS to patient via [employer].  Patient will return 9/21/20 for next appointment.   Patient discharged in stable condition accompanied by: self.  Departure Mode: Ambulatory.  Face to Face time: 0.    Mary Foreman, RN

## 2020-08-25 DIAGNOSIS — R60.9 EDEMA, UNSPECIFIED TYPE: ICD-10-CM

## 2020-08-26 LAB — TESTOST SERPL-MCNC: 7 NG/DL (ref 240–950)

## 2020-08-27 RX ORDER — FUROSEMIDE 20 MG
20 TABLET ORAL DAILY
Qty: 30 TABLET | Refills: 1 | Status: SHIPPED | OUTPATIENT
Start: 2020-08-27 | End: 2020-10-22

## 2020-09-21 ENCOUNTER — ONCOLOGY VISIT (OUTPATIENT)
Dept: ONCOLOGY | Facility: CLINIC | Age: 65
End: 2020-09-21
Attending: INTERNAL MEDICINE
Payer: MEDICARE

## 2020-09-21 ENCOUNTER — RESEARCH ENCOUNTER (OUTPATIENT)
Dept: ONCOLOGY | Facility: CLINIC | Age: 65
End: 2020-09-21

## 2020-09-21 ENCOUNTER — APPOINTMENT (OUTPATIENT)
Dept: LAB | Facility: CLINIC | Age: 65
End: 2020-09-21
Attending: INTERNAL MEDICINE
Payer: MEDICARE

## 2020-09-21 VITALS
RESPIRATION RATE: 18 BRPM | OXYGEN SATURATION: 98 % | TEMPERATURE: 99.1 F | BODY MASS INDEX: 30.54 KG/M2 | HEART RATE: 82 BPM | DIASTOLIC BLOOD PRESSURE: 69 MMHG | SYSTOLIC BLOOD PRESSURE: 119 MMHG | WEIGHT: 206.8 LBS

## 2020-09-21 DIAGNOSIS — C61 PROSTATE CANCER (H): Primary | ICD-10-CM

## 2020-09-21 DIAGNOSIS — C61 PROSTATE CANCER (H): ICD-10-CM

## 2020-09-21 DIAGNOSIS — R97.20 ELEVATED PROSTATE SPECIFIC ANTIGEN (PSA): ICD-10-CM

## 2020-09-21 DIAGNOSIS — R97.20 ELEVATED PROSTATE SPECIFIC ANTIGEN (PSA): Primary | ICD-10-CM

## 2020-09-21 LAB
ANION GAP SERPL CALCULATED.3IONS-SCNC: 10 MMOL/L (ref 3–14)
BUN SERPL-MCNC: 17 MG/DL (ref 7–30)
CALCIUM SERPL-MCNC: 9 MG/DL (ref 8.5–10.1)
CHLORIDE SERPL-SCNC: 106 MMOL/L (ref 94–109)
CO2 SERPL-SCNC: 24 MMOL/L (ref 20–32)
CREAT SERPL-MCNC: 0.76 MG/DL (ref 0.66–1.25)
GFR SERPL CREATININE-BSD FRML MDRD: >90 ML/MIN/{1.73_M2}
GLUCOSE SERPL-MCNC: 97 MG/DL (ref 70–99)
POTASSIUM SERPL-SCNC: 4.4 MMOL/L (ref 3.4–5.3)
PSA SERPL-MCNC: <0.01 UG/L (ref 0–4)
SODIUM SERPL-SCNC: 139 MMOL/L (ref 133–144)
T4 FREE SERPL-MCNC: 0.96 NG/DL (ref 0.76–1.46)
TSH SERPL DL<=0.005 MIU/L-ACNC: 7.41 MU/L (ref 0.4–4)

## 2020-09-21 PROCEDURE — 99214 OFFICE O/P EST MOD 30 MIN: CPT | Mod: GC | Performed by: INTERNAL MEDICINE

## 2020-09-21 PROCEDURE — 25000128 H RX IP 250 OP 636: Mod: ZF | Performed by: INTERNAL MEDICINE

## 2020-09-21 PROCEDURE — 96402 CHEMO HORMON ANTINEOPL SQ/IM: CPT

## 2020-09-21 PROCEDURE — 84153 ASSAY OF PSA TOTAL: CPT | Performed by: INTERNAL MEDICINE

## 2020-09-21 PROCEDURE — 36415 COLL VENOUS BLD VENIPUNCTURE: CPT

## 2020-09-21 PROCEDURE — 84443 ASSAY THYROID STIM HORMONE: CPT | Performed by: INTERNAL MEDICINE

## 2020-09-21 PROCEDURE — G0463 HOSPITAL OUTPT CLINIC VISIT: HCPCS | Mod: ZF

## 2020-09-21 PROCEDURE — 84439 ASSAY OF FREE THYROXINE: CPT | Performed by: INTERNAL MEDICINE

## 2020-09-21 PROCEDURE — 80048 BASIC METABOLIC PNL TOTAL CA: CPT | Performed by: INTERNAL MEDICINE

## 2020-09-21 PROCEDURE — 84403 ASSAY OF TOTAL TESTOSTERONE: CPT | Performed by: INTERNAL MEDICINE

## 2020-09-21 RX ADMIN — DEGARELIX 80 MG: KIT at 12:04

## 2020-09-21 ASSESSMENT — PAIN SCALES - GENERAL: PAINLEVEL: MILD PAIN (3)

## 2020-09-21 NOTE — PROGRESS NOTES
[id number]: Study Visit Note   Subject name: Lucio Hernandez   Subject Number: [id number]    Visit: Cycle 12 Day 1    Did the study visit occur within the appropriate window allowed by the protocol? yes    Since the last study visit, He has been doing well.     I have personally interviewed Lucio Hernandez and reviewed his medical record for adverse events and concomitant medications and these have been recorded on the corresponding logs in Lucio Hernandez's research file.     Lucio Hernandez was given the opportunity to ask any trial related questions.  Please see provider progress note for physical exam and other clinical information. Labs were reviewed - any significant lab values were addressed and reviewed.      [id number]: Medication Count/IDS Note      Lucio Hernandez is enrolled on the trial number listed above. The patient presented for his Cycle 12 Day 1 visit.   IDS number: [id number]  Drug name: Apalutamide    Number of Bottles dispensed: 1     60 mg study drug Dispensed  Lot number(s):[id number]  Bottle numbers: [id number]  Number of Pills dispensed: 120    Number of Bottles returned: 1     60 mg study drug Returned  Lot number(s):[id number]  Bottle numbers: [id number]  Number of Pills returned: 24     Medication Compliance: Juventino returned 24 pills however his diary states med was taken each day which would have meant he should have returned only 8 pills.  Email sent for clarification.     Drug Diary dispensed to study subject: yes     Drug diary returned: yes     Are there any discrepancies between the amount of medication the patient was instructed to take and the amount recorded as taken in the patient s drug diary? yes     Are there any discrepancies between the amount of medication the patient has recorded as taken in the drug diary and the amount that would be expected to be returned based on the amount recorded as taken? yes, see above note          Dyan Mendez  Clinical Research Coordinator-RN  Clinical Trials  Office/[facility]  [facility]  Desk Phone:  [phone]  Pager:  [phone]

## 2020-09-21 NOTE — PROGRESS NOTES
Infusion Nursing Note:  Lucio Hernandez presents today for Cycle 12, Day 1 Firmagon injection.    Patient seen by provider today: Yes, Dr. Palomino    present during visit today: Not Applicable.    Note: Pt assessed prior to infusion appointment. Okay to proceed with injection today.    Treatment Conditions:  Lab Results   Component Value Date     09/21/2020                   Lab Results   Component Value Date    POTASSIUM 4.4 09/21/2020           No results found for: MAG         Lab Results   Component Value Date    CR 0.76 09/21/2020                   Lab Results   Component Value Date    GIANA 9.0 09/21/2020                Lab Results   Component Value Date    BILITOTAL 0.3 08/24/2020           Lab Results   Component Value Date    ALBUMIN 3.5 08/24/2020                    Lab Results   Component Value Date    ALT 32 08/24/2020           Lab Results   Component Value Date    AST 17 08/24/2020     Post Infusion Assessment:  Patient tolerated deep subcutaneous injection to right lower abdomen w/o issue.    Discharge Plan:   Patient declined prescription refills.  AVS to patient via [facility].  Patient will return 10/19/20 for next appointment.   Patient discharged in stable condition accompanied by: self.  Departure Mode: Ambulatory.    Nayla Apodaca RN

## 2020-09-21 NOTE — NURSING NOTE
"Oncology Rooming Note    September 21, 2020 10:04 AM   Lucio Hernandez is a 64 year old male who presents for:    Chief Complaint   Patient presents with     Blood Draw     VPT blood draw and vitals     Oncology Clinic Visit     Return;  Prostate Ca     Initial Vitals: /69   Pulse 82   Temp 99.1  F (37.3  C) (Oral)   Resp 18   Wt 93.8 kg (206 lb 12.8 oz)   SpO2 98%   BMI 30.54 kg/m   Estimated body mass index is 30.54 kg/m  as calculated from the following:    Height as of 8/21/20: 1.753 m (5' 9\").    Weight as of this encounter: 93.8 kg (206 lb 12.8 oz). Body surface area is 2.14 meters squared.  Mild Pain (3) Comment: Data Unavailable   No LMP for male patient.  Allergies reviewed: Yes  Medications reviewed: Yes    Medications: Medication refills not needed today.  Pharmacy name entered into [employer]: [id number] [place] [address]    Clinical concerns: Pt states he's been having hot flashes, fatigue. Dr Palomino was notified.      Roxanna Dang CMA              "

## 2020-09-21 NOTE — PROGRESS NOTES
CC: Prostate cancer     HPI: Lucio Hernandez is a 64 year old male who has prostate cancer. He had a radical prostatectomy in 2006 demonstrating Jg 7 disease with a positive margin.  He had salvage radiation therapy in 2006 when his PSA was around 0.4 per the patient's recollection.  He notes his PSA was undetectable for 4 years, but then began coming back and recently has noted to have PSAs in the 1.2-1.4 range in May of this year.  He underwent an Axumin PET scan recently that shows no evidence of metastatic disease though there was uptake in the prostate bed.       Had Prior history of urethral stricture. This may have been due to the prior radiation therapy and a Olsen Catheter complication postop.     He was enrolled in a clinical trial AFT19 with apalutamide and degarelix which started on 11/14/19.         4/11/18            PSA = 0.56  10/30/18          PSA =0.48  5/14/19            PSA =1.2  5/29/19            PSA=1.4  7/29/19            PSA=1.45  9/5/19              PSA =1.84  11/4/19            PSA=2.19  11/14/19          PSA= 2.27-- START AFT-19  12/12/19          PSA= 0.04  1/9/2020          PSA= <0.01  2/5/2020          PSA = <0.01  2/26/2020        PSA= <0.01  4/6/20              PSA  <0.01  6/30/20 PSA   <0.01 Testosterone =11  7/27/20 PSA   < 0.01  9/21/20 PSA     <0.01    INTERIM HISTORY:  Lucio presents today unaccompanied for cycle 11 on the AFT-19 clinical trial.      He continues to do fairly well.   Hot flashes -fluctuate.Taking 600 mg bid, unsure of benefit but does not want to stop   Joint pain and edema which he feels is worse since starting the ADT.  The edema has gotten significantly better with taking intermittent furosemide, 3 days in a row prn. Staying active with yoga, weights, pilates. Has struggled more with emotional changes than he anticipated but denies depression     ROS: 10 point ROS neg other than the symptoms noted above in the HPI.     PAST MEDICAL HISTORY:  Otherwise  significant for the prostate cancer,  history of pulmonary embolism and rib fractures from coughing fits and appendicitis.      PAST SURGICAL HISTORY:  Includes the RRP, a lumbar fusion, a thoracotomy for the history of rib fractures and the pulmonic hernia, shoulder surgery x3, appendectomy and a cervical spine surgery.      MEDICATIONS:     1.  Zyrtec.   2.  Lisinopril.   3.  Gabapentin     ALLERGIES:  NO KNOWN DRUG ALLERGIES.      FAMILY HISTORY:  Noncontributory.      SOCIAL HISTORY:  Negative for tobacco.  Alcohol 1 drink a day. Pt is a retired Otolaryngologist (ENT surgeon).      REVIEW OF SYSTEMS:  Negative for fevers, chills, sweats, nausea, vomiting, unexplained weight changes.       Physical Exam  /69   Pulse 82   Temp 99.1  F (37.3  C) (Oral)   Resp 18   Wt 93.8 kg (206 lb 12.8 oz)   SpO2 98%   BMI 30.54 kg/m      Wt Readings from Last 4 Encounters:   09/21/20 93.8 kg (206 lb 12.8 oz)   08/21/20 92.5 kg (204 lb)   07/27/20 93.4 kg (205 lb 14.4 oz)   06/30/20 90.6 kg (199 lb 12.8 oz)     General: Pleasant male in no acute distress  HEENT: EOMI, PERRLA, no scleral icterus, mucus membranes moist and no lesions or thrush  Lymph: Neck is supple and shows no nodes in cervical or supraclavicular   Heart:  RRR, no murmur, gallop or rub  Lungs: CTA-B, no wheezes, rhonchi or rales  Abdomen: Normal bowel sounds, soft, non tender, not distended, no rebound or guarding, no palpable masses  Extremities: trace LE edema L >R  Skin: No significant rashes or lesions  Neuro: CN II-XII are intact     ECOG 1    Laboratory Data   9/21/2020 09:58   Sodium 139   Potassium 4.4   Chloride 106   Carbon Dioxide 24   Urea Nitrogen 17   Creatinine 0.76   GFR Estimate >90   GFR Estimate If Black >90   Calcium 9.0   Anion Gap 10   PSA <0.01   T4 Free 0.96   TSH 7.41 (H)   Glucose 97     Impression and Plan  This is a 64 year old gentleman with a 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  He  has a doubling time that is under 9 months based on the most recent 2 measurements.  He is also status post salvage radiation therapy.     Prostate Cancer  -enrolled in AFT19 study with apalutamide and degarelix.   -symptoms are improving; hot flashes improved with gabapentin and his brain fog is less. Still very active and overall tolerating very well  -Continue Apalutamide is 240 mg daily. Continue monthly degarelix 80 mg; giving today  -will follow-up per trial protocol     Hot Flashes  -Started him on a gabapentin 300 mg at bedtime which has been beneficial for him. He feels that he sleeps better and that the hot flashes have improved. OK to take 300/morning and 600/night  --staying hydrated. No further acute episodes of dehydration.     Emotional/Mood  -has been struggling with mortality, lack of motivation and more emotions. He is processing appropriately and at an okay place. Helps him to know he will be off hormone therapy for a bit in a couple months.   -was on Sertraline for two weeks but stopped because didn't feel well on it.      Urinary Retention  -self cathing every other day. No new symptoms or concerns      Hypothyroidism  -Started synthroid 50 mcg daily on 12/12/19. Increased to 75 mcg last visit. TSH still elevated though T4 remains normal and he is relatively asymptomatic. Continue current dose    Edema and Joint pain  Relatively new problem, self limiting.  Continue three day rx of Lasix 20 mg po as needed. If resolves stop and monitor. May retreat as needed.     Merary Garcia, CNP on 9/21/2020 at 1:23 PM    Physician Attestation   I, Alejandro Palomino MD, saw this patient and agree with the findings and plan of care as documented in the note.      Alejandro Palomino MD

## 2020-09-23 LAB — TESTOST SERPL-MCNC: 9 NG/DL (ref 240–950)

## 2020-10-18 ENCOUNTER — AMBULATORY - HEALTHEAST (OUTPATIENT)
Dept: FAMILY MEDICINE | Facility: CLINIC | Age: 65
End: 2020-10-18

## 2020-10-18 ENCOUNTER — NURSE TRIAGE (OUTPATIENT)
Dept: NURSING | Facility: CLINIC | Age: 65
End: 2020-10-18

## 2020-10-18 DIAGNOSIS — Z20.822 EXPOSURE TO 2019 NOVEL CORONAVIRUS: Primary | ICD-10-CM

## 2020-10-18 DIAGNOSIS — Z20.822 EXPOSURE TO 2019 NOVEL CORONAVIRUS: ICD-10-CM

## 2020-10-18 NOTE — TELEPHONE ENCOUNTER
Pt states on 10/11 he was outside, not wearing a mask, within 6 feet of an asymptomatic person who ended up being tested for COVID on 10/13.  Test came back positive.      Pt is presently asymptomatic, questions if he should be tested and if he can still proceed to his clinic appointment tomorrow.      10:25AM:  [facility] answering service contacted, a page ws sent to on-call NP Dahlia Santiago to call this RN back at [phone].     10:35AM:  NP Watson called, advised COVID PCR test and to call the clinic tomorrow to reschedule his appt.      10:38AM: RN called pt back and provided the info above.  He verbalized understanding and had no further questions.  Call transferred to COVID scheduling.     COVID 19 Nurse Triage Plan/Patient Instructions    Home care recommended. Follow home care protocol based instructions.    Thank you for taking steps to prevent the spread of this virus.  o Limit your contact with others.  o Wear a simple mask to cover your cough.  o Wash your hands well and often.    Resources    [facility]: About COVID-19: [email or web address]    CDC: What to Do If You're Sick: www.cdc.gov/coronavirus/2019-ncov/about/steps-when-sick.html    CDC: Ending Home Isolation: www.cdc.gov/coronavirus/2019-ncov/hcp/disposition-in-home-patients.html     CDC: Caring for Someone: www.cdc.gov/coronavirus/2019-ncov/if-you-are-sick/care-for-someone.html     [facility]: Interim Guidance for Hospital Discharge to Home: www.health.[facility].[place]/diseases/coronavirus/hcp/hospdischarge.pdf    [facility] clinical trials (COVID-19 research studies): [email or web address]     Below are the COVID-19 hotlines at the [place] Department of Health ([facility]). Interpreters are available.   o For health questions: Call 256-117-8159 or 1-115.477.5155 (7 a.m. to 7 p.m.)  o For questions about schools and childcare: Call 367-569-8660 or 1-281.575.3609 (7 a.m. to 7 p.m.)            Polina Shah RN/FNA              Reason for Disposition    [1] COVID-19 EXPOSURE (Close Contact) AND [2] within last 14 days BUT [3] NO symptoms    Additional Information    Negative: [1] COVID-19 EXPOSURE (Close Contact) within last 14 days AND [2] needs COVID-19 lab test to return to work AND [3] NO symptoms    Negative: [1] COVID-19 EXPOSURE (Close Contact) within last 14 days AND [2] exposed person is a healthcare worker who was NOT using all recommended personal protective equipment (i.e., a respirator-N95 mask, eye protection, gloves, and gown) AND [3] NO symptoms    Protocols used: CORONAVIRUS (COVID-19) EXPOSURE-A- 8.4.20

## 2020-10-19 ENCOUNTER — TELEPHONE (OUTPATIENT)
Dept: ONCOLOGY | Facility: CLINIC | Age: 65
End: 2020-10-19

## 2020-10-19 ENCOUNTER — AMBULATORY - HEALTHEAST (OUTPATIENT)
Dept: FAMILY MEDICINE | Facility: CLINIC | Age: 65
End: 2020-10-19

## 2020-10-19 DIAGNOSIS — Z20.822 EXPOSURE TO 2019 NOVEL CORONAVIRUS: ICD-10-CM

## 2020-10-19 NOTE — TELEPHONE ENCOUNTER
Called and spoke with Lucio Hernandez who participates in research clinical trial [id number].  Juventino would like to reschedule the appointment that he had to cancel today.  Juventino got a covid-19 test today and will receive results in 24 to 48 hours.    No further clinical research trial related questions or concerns at this time.    Lissa Butt  Clinical Research Coordinator-BSN, RN, CPN  Clinical Trials Office/[facility]  Desk Phone: [phone]   Pager: [phone];;

## 2020-10-21 ENCOUNTER — COMMUNICATION - HEALTHEAST (OUTPATIENT)
Dept: SCHEDULING | Facility: CLINIC | Age: 65
End: 2020-10-21

## 2020-10-22 ENCOUNTER — ONCOLOGY VISIT (OUTPATIENT)
Dept: ONCOLOGY | Facility: CLINIC | Age: 65
End: 2020-10-22
Attending: NURSE PRACTITIONER
Payer: MEDICARE

## 2020-10-22 ENCOUNTER — APPOINTMENT (OUTPATIENT)
Dept: LAB | Facility: CLINIC | Age: 65
End: 2020-10-22
Attending: INTERNAL MEDICINE
Payer: MEDICARE

## 2020-10-22 ENCOUNTER — INFUSION THERAPY VISIT (OUTPATIENT)
Dept: ONCOLOGY | Facility: CLINIC | Age: 65
End: 2020-10-22
Attending: INTERNAL MEDICINE
Payer: MEDICARE

## 2020-10-22 ENCOUNTER — RESEARCH ENCOUNTER (OUTPATIENT)
Dept: ONCOLOGY | Facility: CLINIC | Age: 65
End: 2020-10-22

## 2020-10-22 VITALS
WEIGHT: 209 LBS | RESPIRATION RATE: 16 BRPM | SYSTOLIC BLOOD PRESSURE: 117 MMHG | HEIGHT: 69 IN | HEART RATE: 80 BPM | BODY MASS INDEX: 30.96 KG/M2 | OXYGEN SATURATION: 98 % | TEMPERATURE: 98.5 F | DIASTOLIC BLOOD PRESSURE: 76 MMHG

## 2020-10-22 DIAGNOSIS — R97.20 ELEVATED PROSTATE SPECIFIC ANTIGEN (PSA): ICD-10-CM

## 2020-10-22 DIAGNOSIS — C61 PROSTATE CANCER (H): Primary | ICD-10-CM

## 2020-10-22 DIAGNOSIS — R60.9 EDEMA, UNSPECIFIED TYPE: ICD-10-CM

## 2020-10-22 DIAGNOSIS — E03.2 HYPOTHYROIDISM DUE TO MEDICATION: ICD-10-CM

## 2020-10-22 DIAGNOSIS — C61 PROSTATE CANCER (H): ICD-10-CM

## 2020-10-22 LAB
ALBUMIN SERPL-MCNC: 3.9 G/DL (ref 3.4–5)
ALP SERPL-CCNC: 96 U/L (ref 40–150)
ALT SERPL W P-5'-P-CCNC: 32 U/L (ref 0–70)
ANION GAP SERPL CALCULATED.3IONS-SCNC: 8 MMOL/L (ref 3–14)
AST SERPL W P-5'-P-CCNC: 23 U/L (ref 0–45)
BASOPHILS # BLD AUTO: 0 10E9/L (ref 0–0.2)
BASOPHILS NFR BLD AUTO: 0.7 %
BILIRUB SERPL-MCNC: 0.4 MG/DL (ref 0.2–1.3)
BUN SERPL-MCNC: 19 MG/DL (ref 7–30)
CALCIUM SERPL-MCNC: 9.1 MG/DL (ref 8.5–10.1)
CHLORIDE SERPL-SCNC: 105 MMOL/L (ref 94–109)
CO2 SERPL-SCNC: 25 MMOL/L (ref 20–32)
CREAT SERPL-MCNC: 0.87 MG/DL (ref 0.66–1.25)
DIFFERENTIAL METHOD BLD: NORMAL
EOSINOPHIL # BLD AUTO: 0.3 10E9/L (ref 0–0.7)
EOSINOPHIL NFR BLD AUTO: 4.5 %
ERYTHROCYTE [DISTWIDTH] IN BLOOD BY AUTOMATED COUNT: 11.9 % (ref 10–15)
GFR SERPL CREATININE-BSD FRML MDRD: 89 ML/MIN/{1.73_M2}
GLUCOSE SERPL-MCNC: 82 MG/DL (ref 70–99)
HCT VFR BLD AUTO: 44.1 % (ref 40–53)
HGB BLD-MCNC: 15.3 G/DL (ref 13.3–17.7)
IMM GRANULOCYTES # BLD: 0 10E9/L (ref 0–0.4)
IMM GRANULOCYTES NFR BLD: 0.5 %
LYMPHOCYTES # BLD AUTO: 0.9 10E9/L (ref 0.8–5.3)
LYMPHOCYTES NFR BLD AUTO: 14.5 %
MCH RBC QN AUTO: 33 PG (ref 26.5–33)
MCHC RBC AUTO-ENTMCNC: 34.7 G/DL (ref 31.5–36.5)
MCV RBC AUTO: 95 FL (ref 78–100)
MONOCYTES # BLD AUTO: 0.6 10E9/L (ref 0–1.3)
MONOCYTES NFR BLD AUTO: 9.6 %
NEUTROPHILS # BLD AUTO: 4.3 10E9/L (ref 1.6–8.3)
NEUTROPHILS NFR BLD AUTO: 70.2 %
NRBC # BLD AUTO: 0 10*3/UL
NRBC BLD AUTO-RTO: 0 /100
PLATELET # BLD AUTO: 226 10E9/L (ref 150–450)
POTASSIUM SERPL-SCNC: 3.6 MMOL/L (ref 3.4–5.3)
PROT SERPL-MCNC: 7.1 G/DL (ref 6.8–8.8)
PSA SERPL-MCNC: <0.01 UG/L (ref 0–4)
RBC # BLD AUTO: 4.63 10E12/L (ref 4.4–5.9)
SODIUM SERPL-SCNC: 138 MMOL/L (ref 133–144)
T4 FREE SERPL-MCNC: 0.86 NG/DL (ref 0.76–1.46)
TSH SERPL DL<=0.005 MIU/L-ACNC: 6.17 MU/L (ref 0.4–4)
WBC # BLD AUTO: 6.1 10E9/L (ref 4–11)

## 2020-10-22 PROCEDURE — 250N000011 HC RX IP 250 OP 636: Performed by: NURSE PRACTITIONER

## 2020-10-22 PROCEDURE — 99214 OFFICE O/P EST MOD 30 MIN: CPT | Performed by: NURSE PRACTITIONER

## 2020-10-22 PROCEDURE — 84153 ASSAY OF PSA TOTAL: CPT | Performed by: INTERNAL MEDICINE

## 2020-10-22 PROCEDURE — 99207 PR NO BILLABLE SERVICE THIS VISIT: CPT

## 2020-10-22 PROCEDURE — 85025 COMPLETE CBC W/AUTO DIFF WBC: CPT | Performed by: NURSE PRACTITIONER

## 2020-10-22 PROCEDURE — 36415 COLL VENOUS BLD VENIPUNCTURE: CPT

## 2020-10-22 PROCEDURE — G0463 HOSPITAL OUTPT CLINIC VISIT: HCPCS

## 2020-10-22 PROCEDURE — 80053 COMPREHEN METABOLIC PANEL: CPT | Performed by: NURSE PRACTITIONER

## 2020-10-22 PROCEDURE — 84439 ASSAY OF FREE THYROXINE: CPT | Performed by: NURSE PRACTITIONER

## 2020-10-22 PROCEDURE — 96402 CHEMO HORMON ANTINEOPL SQ/IM: CPT

## 2020-10-22 PROCEDURE — 84443 ASSAY THYROID STIM HORMONE: CPT | Performed by: NURSE PRACTITIONER

## 2020-10-22 PROCEDURE — 84403 ASSAY OF TOTAL TESTOSTERONE: CPT | Performed by: INTERNAL MEDICINE

## 2020-10-22 RX ORDER — FUROSEMIDE 20 MG
20 TABLET ORAL DAILY
Qty: 30 TABLET | Refills: 1 | Status: SHIPPED | OUTPATIENT
Start: 2020-10-22 | End: 2021-01-18

## 2020-10-22 RX ADMIN — DEGARELIX 80 MG: KIT at 14:11

## 2020-10-22 ASSESSMENT — PAIN SCALES - GENERAL: PAINLEVEL: NO PAIN (0)

## 2020-10-22 ASSESSMENT — MIFFLIN-ST. JEOR: SCORE: 1718.4

## 2020-10-22 NOTE — PROGRESS NOTES
[id number]: Study Visit Note   Subject name: Lucio Hernandez     Visit: Cycle 13, Day 1.    This visit was performed remotely by author.     Did the study visit occur within the appropriate window allowed by the protocol? yes    This author spoke with the patient on the telephone at 12:15 PM.     Patient still experiencing fatigue and hot flashes.     Denies any new changes in physical condition.     No changes on concomitant medications.    Lab results reviewed with Denise Santiago.       I have personally interviewed Lucio Hernandez and reviewed his medical record for adverse events and concomitant medications and these have been recorded on the corresponding logs in Lucio Hernandez's research file.     Lucio Hernandez was given the opportunity to ask any trial related questions.  Please see provider progress note for physical exam and other clinical information. Labs were reviewed - any significant lab values were addressed and reviewed.    Klaus Gillespie RN      Medication Count/IDS Note      Lucio Hernandez is enrolled on the trial number listed above. The patient presented for his Cycle 13, Day 1. day visit.   IDS number: [id number]  Drug name: Apalutamide  Number of bottles returned: 1  Number of pills returned: 0  Lot number(s): [id number]  Bottle number: [id number]      Number of bottles dispensed:1  Lot number(s):[id number]  Number of pills dispensed: 120    Drug diary returned? Yes  New drug diary provided? Yes    Are there any discrepancies between the amount of medication the patient was instructed to take and the amount recorded as taken in the patient s drug diary?  NO    Are there any discrepancies between the amount of medication the patient has recorded as taken in the drug diary and the amount that would be expected to be returned based on the amount recorded as taken?  yes    If yes, can the discrepancy be reconciled with the patient? yes  If yes, provide details. Drug diary indicates 4 pills were taken  each day from 9/22/2020-10/22/2020; this equals 31 days. Apalutamide bottle contains 120 pills, enough for 30 days. This author called patient to discuss. Patient states that he missed a Sunday but cannot remember the exact date. I reminded patient that the entries on the drug diary must match what he self-administers.          Klaus Gillespie RN    Form 504.00.01 (Version 1)     Effective date: 01JUL2018     Next Review Date: 01JUL2020

## 2020-10-22 NOTE — LETTER
10/22/2020         RE: Lucio Hernandez  [address] [facility] [address]        Dear Colleague,    Thank you for referring your patient, Lucio Hernandez, to the [facility]. Please see a copy of my visit note below.    Reason for Visit: seen in f/u of prostate cncer    Oncology HPI:   Lucio Hernandez is a 64 year old male who has prostate cancer. He had a radical prostatectomy in 2006 demonstrating Jg 7 disease with a positive margin.  He had salvage radiation therapy in 2006 when his PSA was around 0.4 per the patient's recollection.  He notes his PSA was undetectable for 4 years, but then began coming back and recently has noted to have PSAs in the 1.2-1.4 range in May of this year.  He underwent an Axumin PET scan recently that shows no evidence of metastatic disease though there was uptake in the prostate bed.       Had Prior history of urethral stricture. This may have been due to the prior radiation therapy and a Olsen Catheter complication postop.     He was enrolled in a clinical trial AFT19 with apalutamide and degarelix which started on 11/14/19.     Interval history: Juventino returns for routine evaluation. He is feeling much the same as he has for the past several months. Continues to have low energy and decreased stamina compared to his basline. Appetite is great, has gained about 20 lb since starting anti-androgen therapy. Has some forgetfulness that is unchanged. No change to hot flashes, does not have regular diaphoresis unless exercising, then can have extensive sweats. Bowels are regular. Urination is stable. Self-cathing every 3-4 days. No fevers/chills, cough, sob, cp, palpitation, wheezing. Had a covid test done recently as a friend with whom he had some exposure was tested positive.    Current Outpatient Medications   Medication Sig Dispense Refill     apalutamide (IDS# 5330) tablet Take 4 tablets (240 mg) by mouth daily Keep in original bottle. 120 tablet 0      cetirizine (ZYRTEC) 10 MG tablet Take 10 mg by mouth daily       clonazePAM (KLONOPIN) 0.5 MG tablet        fish oil-omega-3 fatty acids 1000 MG capsule Take 2 g by mouth daily       fluticasone (FLONASE) 50 MCG/ACT nasal spray Spray 1 spray into both nostrils daily       furosemide (LASIX) 20 MG tablet Take 1 tablet (20 mg) by mouth daily 30 tablet 1     gabapentin (NEURONTIN) 300 MG capsule TAKE 1 CAPSULE BY MOUTH THREE TIMES A DAY 90 capsule 3     ibuprofen (ADVIL/MOTRIN) 200 MG tablet Take 200 mg by mouth every 6 hours as needed for pain       levothyroxine (SYNTHROID/LEVOTHROID) 75 MCG tablet TAKE 1 TABLET BY MOUTH EVERY DAY 90 tablet 1     lisinopril (ZESTRIL) 20 MG tablet        LISINOPRIL PO Take 20 mg by mouth daily       LORazepam (ATIVAN) 0.5 MG tablet Take 1 tablet (0.5 mg) by mouth every 4 hours as needed (Anxiety, Nausea/Vomiting or Sleep) 30 tablet 0        No Known Allergies      Exam: alert, appears well. There were no vitals taken for this visit.  Wt Readings from Last 4 Encounters:   09/21/20 93.8 kg (206 lb 12.8 oz)   08/21/20 92.5 kg (204 lb)   07/27/20 93.4 kg (205 lb 14.4 oz)   06/30/20 90.6 kg (199 lb 12.8 oz)     Oropharynx is moist and without lesion. No icterus. Neck supple and without adenopathy. Lungs:CTA. Heart: RRR, no murmur or rub. Abdomen: soft, nontender, BS active, no masses or organomegaly.  Extremities: warm, no edema. Speech is clear. CN wnl. Gait/station wnl.    ECOG 1      Labs: Results for CAIT PINEDA (MRN [id number]) as of 10/22/2020 14:18   Ref. Range 10/22/2020 12:31 10/22/2020 12:41   Sodium Latest Ref Range: 133 - 144 mmol/L 138    Potassium Latest Ref Range: 3.4 - 5.3 mmol/L 3.6    Chloride Latest Ref Range: 94 - 109 mmol/L 105    Carbon Dioxide Latest Ref Range: 20 - 32 mmol/L 25    Urea Nitrogen Latest Ref Range: 7 - 30 mg/dL 19    Creatinine Latest Ref Range: 0.66 - 1.25 mg/dL 0.87    GFR Estimate Latest Ref Range: >60 mL/min/1.73_m2 89    GFR Estimate If Black  Latest Ref Range: >60 mL/min/1.73_m2 >90    Calcium Latest Ref Range: 8.5 - 10.1 mg/dL 9.1    Anion Gap Latest Ref Range: 3 - 14 mmol/L 8    Albumin Latest Ref Range: 3.4 - 5.0 g/dL 3.9    Protein Total Latest Ref Range: 6.8 - 8.8 g/dL 7.1    Bilirubin Total Latest Ref Range: 0.2 - 1.3 mg/dL 0.4    Alkaline Phosphatase Latest Ref Range: 40 - 150 U/L 96    ALT Latest Ref Range: 0 - 70 U/L 32    AST Latest Ref Range: 0 - 45 U/L 23    PSA Latest Ref Range: 0 - 4 ug/L  <0.01   T4 Free Latest Ref Range: 0.76 - 1.46 ng/dL 0.86    TSH Latest Ref Range: 0.40 - 4.00 mU/L 6.17 (H)    Glucose Latest Ref Range: 70 - 99 mg/dL 82    WBC Latest Ref Range: 4.0 - 11.0 10e9/L 6.1    Hemoglobin Latest Ref Range: 13.3 - 17.7 g/dL 15.3    Hematocrit Latest Ref Range: 40.0 - 53.0 % 44.1    Platelet Count Latest Ref Range: 150 - 450 10e9/L 226    RBC Count Latest Ref Range: 4.4 - 5.9 10e12/L 4.63    MCV Latest Ref Range: 78 - 100 fl 95    MCH Latest Ref Range: 26.5 - 33.0 pg 33.0    MCHC Latest Ref Range: 31.5 - 36.5 g/dL 34.7    RDW Latest Ref Range: 10.0 - 15.0 % 11.9    Diff Method Unknown Automated Method    % Neutrophils Latest Units: % 70.2    % Lymphocytes Latest Units: % 14.5    % Monocytes Latest Units: % 9.6    % Eosinophils Latest Units: % 4.5    % Basophils Latest Units: % 0.7    % Immature Granulocytes Latest Units: % 0.5    Nucleated RBCs Latest Ref Range: 0 /100 0    Absolute Neutrophil Latest Ref Range: 1.6 - 8.3 10e9/L 4.3    Absolute Lymphocytes Latest Ref Range: 0.8 - 5.3 10e9/L 0.9    Absolute Monocytes Latest Ref Range: 0.0 - 1.3 10e9/L 0.6    Absolute Eosinophils Latest Ref Range: 0.0 - 0.7 10e9/L 0.3    Absolute Basophils Latest Ref Range: 0.0 - 0.2 10e9/L 0.0    Abs Immature Granulocytes Latest Ref Range: 0 - 0.4 10e9/L 0.0    Absolute Nucleated RBC Unknown 0.0        Imaging: n/a    Impression/plan:     Prostate cancer, with serologic relapse of a [place] 4+3+7 with a tertiary pattern of Jg 5. Had a doubling  time under 9 months. S/P salvage radiation therapy  - enrolled in AFT19 study with apalutamide and degaralix, starting 11/14/19. PSA normalized with initiation of treatment  -due for cycle 13 today, this is the last planned cycle of the study. He will then start surveillance and f/u with  in November  -labs are stable. Ok to proceed    Hot flashes  -overall stable, on gabapentin 300 mg AM and 600 mg PM    Emotion/Mood  -stable, feels tired, has no sexual thought or desire.  -previously had tried sertraline, but didn't feel well on it.    Urinary retention  -self cathing every other 3 days    Hypothyroidism  -on synthroid 75 mg. TSH remains mildly elevated at 6.17, T4 wnl, no changes advised    Edema/joint pain  -better with prn use of furosemide. Refilled today          Again, thank you for allowing me to participate in the care of your patient.        Sincerely,        CLARE Morales CNP

## 2020-10-22 NOTE — NURSING NOTE
Chief Complaint   Patient presents with     Oncology Clinic Visit     PROSTATE CANCER     Blood Draw     Labs drawn with vpt by rn.  Pt tolerated well.  VS taken.  Pt checked in for next appt.    Aislinn Zuñiga RN

## 2020-10-22 NOTE — PROGRESS NOTES
Infusion Nursing Note:  Lucio Hernandez presents today for Travel Notes.    Patient seen by provider today: Yes: REVA Santiago DNP   present during visit today: Not Applicable.    Note: N/A.    Intravenous Access:  No Intravenous access/labs at this visit.    Treatment Conditions:  Not Applicable.      Post Infusion Assessment:  Patient tolerated injection without incident to LEFT abdomen.       Discharge Plan:   AVS to patient via MYCHART.  Patient will return as directed for next appointment.   Departure Mode: Ambulatory.    Ashley Vale RN

## 2020-10-22 NOTE — NURSING NOTE
"Oncology Rooming Note    October 22, 2020 12:48 PM   Lucio Hernandez is a 64 year old male who presents for:    Chief Complaint   Patient presents with     Oncology Clinic Visit     PROSTATE CANCER     Initial Vitals: /76 (BP Location: Right arm, Patient Position: Sitting, Cuff Size: Adult Large)   Pulse 80   Temp 98.5  F (36.9  C) (Tympanic)   Resp 16   Ht 1.753 m (5' 9\")   Wt 94.8 kg (209 lb)   SpO2 98%   BMI 30.86 kg/m   Estimated body mass index is 30.86 kg/m  as calculated from the following:    Height as of this encounter: 1.753 m (5' 9\").    Weight as of this encounter: 94.8 kg (209 lb). Body surface area is 2.15 meters squared.  No Pain (0) Comment: Data Unavailable   No LMP for male patient.  Allergies reviewed: Yes  Medications reviewed: Yes    Medications: Medication refills not needed today.  Pharmacy name entered into [employer]: [id number] [place] [facility] [address]    Clinical concerns: No new concerns today.     Melyssa Butts MA            "

## 2020-10-23 LAB — TESTOST SERPL-MCNC: 10 NG/DL (ref 240–950)

## 2020-11-16 ENCOUNTER — APPOINTMENT (OUTPATIENT)
Dept: LAB | Facility: CLINIC | Age: 65
End: 2020-11-16
Attending: INTERNAL MEDICINE
Payer: MEDICARE

## 2020-11-16 ENCOUNTER — ONCOLOGY VISIT (OUTPATIENT)
Dept: ONCOLOGY | Facility: CLINIC | Age: 65
End: 2020-11-16
Attending: INTERNAL MEDICINE
Payer: MEDICARE

## 2020-11-16 ENCOUNTER — RESEARCH ENCOUNTER (OUTPATIENT)
Dept: ONCOLOGY | Facility: CLINIC | Age: 65
End: 2020-11-16

## 2020-11-16 VITALS
HEIGHT: 69 IN | WEIGHT: 207.8 LBS | RESPIRATION RATE: 16 BRPM | BODY MASS INDEX: 30.78 KG/M2 | HEART RATE: 77 BPM | OXYGEN SATURATION: 98 % | DIASTOLIC BLOOD PRESSURE: 89 MMHG | SYSTOLIC BLOOD PRESSURE: 135 MMHG | TEMPERATURE: 98.9 F

## 2020-11-16 DIAGNOSIS — C61 PROSTATE CANCER (H): Primary | ICD-10-CM

## 2020-11-16 DIAGNOSIS — E03.2 HYPOTHYROIDISM DUE TO MEDICATION: ICD-10-CM

## 2020-11-16 DIAGNOSIS — R97.20 ELEVATED PROSTATE SPECIFIC ANTIGEN (PSA): ICD-10-CM

## 2020-11-16 LAB
ALBUMIN SERPL-MCNC: 3.7 G/DL (ref 3.4–5)
ALP SERPL-CCNC: 88 U/L (ref 40–150)
ALT SERPL W P-5'-P-CCNC: 35 U/L (ref 0–70)
ANION GAP SERPL CALCULATED.3IONS-SCNC: 7 MMOL/L (ref 3–14)
AST SERPL W P-5'-P-CCNC: 23 U/L (ref 0–45)
BASOPHILS # BLD AUTO: 0 10E9/L (ref 0–0.2)
BASOPHILS NFR BLD AUTO: 1 %
BILIRUB SERPL-MCNC: 0.4 MG/DL (ref 0.2–1.3)
BUN SERPL-MCNC: 18 MG/DL (ref 7–30)
CALCIUM SERPL-MCNC: 8.8 MG/DL (ref 8.5–10.1)
CHLORIDE SERPL-SCNC: 104 MMOL/L (ref 94–109)
CO2 SERPL-SCNC: 25 MMOL/L (ref 20–32)
CREAT SERPL-MCNC: 0.83 MG/DL (ref 0.66–1.25)
DIFFERENTIAL METHOD BLD: ABNORMAL
EOSINOPHIL # BLD AUTO: 0.2 10E9/L (ref 0–0.7)
EOSINOPHIL NFR BLD AUTO: 6 %
ERYTHROCYTE [DISTWIDTH] IN BLOOD BY AUTOMATED COUNT: 11.9 % (ref 10–15)
GFR SERPL CREATININE-BSD FRML MDRD: >90 ML/MIN/{1.73_M2}
GLUCOSE SERPL-MCNC: 95 MG/DL (ref 70–99)
HCT VFR BLD AUTO: 42.3 % (ref 40–53)
HGB BLD-MCNC: 14.6 G/DL (ref 13.3–17.7)
IMM GRANULOCYTES # BLD: 0 10E9/L (ref 0–0.4)
IMM GRANULOCYTES NFR BLD: 0.5 %
LYMPHOCYTES # BLD AUTO: 0.7 10E9/L (ref 0.8–5.3)
LYMPHOCYTES NFR BLD AUTO: 18.8 %
MCH RBC QN AUTO: 32.7 PG (ref 26.5–33)
MCHC RBC AUTO-ENTMCNC: 34.5 G/DL (ref 31.5–36.5)
MCV RBC AUTO: 95 FL (ref 78–100)
MONOCYTES # BLD AUTO: 0.5 10E9/L (ref 0–1.3)
MONOCYTES NFR BLD AUTO: 12.8 %
NEUTROPHILS # BLD AUTO: 2.3 10E9/L (ref 1.6–8.3)
NEUTROPHILS NFR BLD AUTO: 60.9 %
NRBC # BLD AUTO: 0 10*3/UL
NRBC BLD AUTO-RTO: 0 /100
PLATELET # BLD AUTO: 209 10E9/L (ref 150–450)
POTASSIUM SERPL-SCNC: 4.1 MMOL/L (ref 3.4–5.3)
PROT SERPL-MCNC: 6.7 G/DL (ref 6.8–8.8)
PSA SERPL-MCNC: <0.01 UG/L (ref 0–4)
RBC # BLD AUTO: 4.47 10E12/L (ref 4.4–5.9)
SODIUM SERPL-SCNC: 135 MMOL/L (ref 133–144)
T4 FREE SERPL-MCNC: 0.88 NG/DL (ref 0.76–1.46)
TSH SERPL DL<=0.005 MIU/L-ACNC: 5.87 MU/L (ref 0.4–4)
WBC # BLD AUTO: 3.8 10E9/L (ref 4–11)

## 2020-11-16 PROCEDURE — 85025 COMPLETE CBC W/AUTO DIFF WBC: CPT | Performed by: INTERNAL MEDICINE

## 2020-11-16 PROCEDURE — 80053 COMPREHEN METABOLIC PANEL: CPT | Performed by: INTERNAL MEDICINE

## 2020-11-16 PROCEDURE — 84153 ASSAY OF PSA TOTAL: CPT | Performed by: INTERNAL MEDICINE

## 2020-11-16 PROCEDURE — 84443 ASSAY THYROID STIM HORMONE: CPT | Performed by: INTERNAL MEDICINE

## 2020-11-16 PROCEDURE — G0463 HOSPITAL OUTPT CLINIC VISIT: HCPCS

## 2020-11-16 PROCEDURE — 84403 ASSAY OF TOTAL TESTOSTERONE: CPT | Performed by: INTERNAL MEDICINE

## 2020-11-16 PROCEDURE — 84439 ASSAY OF FREE THYROXINE: CPT | Performed by: INTERNAL MEDICINE

## 2020-11-16 PROCEDURE — 36415 COLL VENOUS BLD VENIPUNCTURE: CPT

## 2020-11-16 PROCEDURE — 99214 OFFICE O/P EST MOD 30 MIN: CPT | Performed by: INTERNAL MEDICINE

## 2020-11-16 ASSESSMENT — PAIN SCALES - GENERAL: PAINLEVEL: NO PAIN (0)

## 2020-11-16 ASSESSMENT — MIFFLIN-ST. JEOR: SCORE: 1707.95

## 2020-11-16 NOTE — PROGRESS NOTES
[facility] Oncology Followup  Date of visit 11-         CC: Prostate cancer     HPI: Lucio Hernandez is a 64 year old male who has prostate cancer. He had a radical prostatectomy in 2006 demonstrating [place] 7 disease with a positive margin.  He had salvage radiation therapy in 2006 when his PSA was around 0.4 per the patient's recollection.  He notes his PSA was undetectable for 4 years, but then began coming back and recently has noted to have PSAs in the 1.2-1.4 range in May of this year.  He underwent an Axumin PET scan recently that shows no evidence of metastatic disease though there was uptake in the prostate bed.       Had Prior history of urethral stricture. This may have been due to the prior radiation therapy and a Olsen Catheter complication postop.     He was enrolled in a clinical trial AFT19 with apalutamide and degarelix which started on 11/14/19.         4/11/18            PSA = 0.56  10/30/18          PSA =0.48  5/14/19            PSA =1.2  5/29/19            PSA=1.4  7/29/19            PSA=1.45  9/5/19              PSA =1.84  11/4/19            PSA=2.19  11/14/19          PSA= 2.27-- START AFT-19  12/12/19          PSA= 0.04  1/9/2020          PSA= <0.01  2/5/2020          PSA = <0.01  2/26/2020        PSA= <0.01  4/6/20              PSA  <0.01  6/30/20 PSA   <0.01 Testosterone =11  7/27/20 PSA   < 0.01  9/21/20 PSA     <0.01  11/16/20 PSA     <0.01    INTERIM HISTORY:  Lucio presents today  Treatment completion visit on the AFT-19 clinical trial.      Juventino is in for follow-up. He has now completed the full 12-month course of therapy with apalutamide and ADT for serologic recurrence of prostate cancer. He has had multiple grade 1 and 2 adverse events which we have noted. Most notably he feels physically sluggish and does endorse a depressed mood which she attributes fully to the hormonal treatment. He has also had loss of body hair and hot flashes particularly at night. Apart from this  "he has no significant complaints.      ROS: 10 point ROS neg other than the symptoms noted above in the HPI.     PAST MEDICAL HISTORY:  Otherwise significant for the prostate cancer,  history of pulmonary embolism and rib fractures from coughing fits and appendicitis.      PAST SURGICAL HISTORY:  Includes the RRP, a lumbar fusion, a thoracotomy for the history of rib fractures and the pulmonic hernia, shoulder surgery x3, appendectomy and a cervical spine surgery.      MEDICATIONS:     1.  Zyrtec.   2.  Lisinopril.   3.  Gabapentin     ALLERGIES:  NO KNOWN DRUG ALLERGIES.      FAMILY HISTORY:  Noncontributory.      SOCIAL HISTORY:  Negative for tobacco.  Alcohol 1 drink a day. Pt is a retired Otolaryngologist (ENT surgeon).      REVIEW OF SYSTEMS:  Negative for fevers, chills, sweats, nausea, vomiting, unexplained weight changes.       Physical Exam  /89   Pulse 77   Temp 98.9  F (37.2  C) (Oral)   Resp 16   Ht 1.753 m (5' 9\")   Wt 94.3 kg (207 lb 12.8 oz)   SpO2 98%   BMI 30.69 kg/m      Wt Readings from Last 4 Encounters:   11/16/20 94.3 kg (207 lb 12.8 oz)   10/22/20 94.8 kg (209 lb)   09/21/20 93.8 kg (206 lb 12.8 oz)   08/21/20 92.5 kg (204 lb)     ECOG performance status is 0  General: Pleasant male in no acute distress  HEENT: EOMI, PERRLA, no scleral icterus, mucus membranes moist and no lesions or thrush  Lymph: Neck is supple and shows no nodes in cervical or supraclavicular   Heart:  RRR, no murmur, gallop or rub  Lungs: CTA-B, no wheezes, rhonchi or rales  Abdomen: Normal bowel sounds, soft, non tender, not distended, no rebound or guarding, no palpable masses  Extremities: trace LE edema L >R  Skin: No significant rashes or lesions  Neuro: CN II-XII are intact       PSA per above  White blood cell count is 3.8 testosterone is 8 remainder of the blood work is normal.    Impression and Plan  This is a 64 year old gentleman with a serologic relapse of a Jg 4+3 = 7 with a tertiary pattern " of [place] 5.  He has a doubling time that is under 9 months based on the most recent 2 measurements.  He is also status post salvage radiation therapy.     Prostate Cancer  -We had a lengthy discussion today about coming off of therapy and what to expect. I suggested to him that over the course of the next 3 to 5 months that his testosterone would rise back into the normal range and that this would improve his mood and his energy and his libido. We also discussed that an unknown in the situation is whether or not the PSA will rise concurrent with the rise in testosterone. In most patients treated with ADT alone that is the natural course of the disease. Enrollment on the clinical trial has led to a more potent use of androgen deprivation therapy and they are targeting and there is a possibility that the rising PSA will be delayed. Although he is off study therapy he remains on study and we will follow him for these outcomes.    He has many questions today about prognosis and survival and I indicated to him that we still expect his prognosis to be several years as he continues to have hormone sensitive prostate cancer. I also reported to him that in my impression of this situation when men go back on hormonal therapy after an initial treatment the side effects tend to be less.      -was on Sertraline for two weeks but stopped because didn't feel well on it.      Urinary Retention  -self cathing every other day. No new symptoms or concerns      Hypothyroidism  -Started synthroid 50 mcg daily on 12/12/19. Increased to 75 mcg last visit. TSH still elevated though T4 remains normal and he is relatively asymptomatic. Continue current dose    Edema and Joint pain  Relatively new problem, self limiting.  Continue three day rx of Lasix 20 mg po as needed. If resolves stop and monitor. May retreat as needed.     I spent about 35 minutes with the patient and his wife discussing these various physical emotional and prognostic  issues.     Signed Alejandro Palomino MD.

## 2020-11-16 NOTE — NURSING NOTE
"Oncology Rooming Note    November 16, 2020 10:12 AM   Lucio Hernandez is a 65 year old male who presents for:    Chief Complaint   Patient presents with     Oncology Clinic Visit     PROSTATE CANCER      Blood Draw     Vitals and blood drawn via VPT by LPN. Pt checked into appt.      Initial Vitals: /89   Pulse 77   Temp 98.9  F (37.2  C) (Oral)   Resp 16   Ht 1.753 m (5' 9\")   Wt 94.3 kg (207 lb 12.8 oz)   SpO2 98%   BMI 30.69 kg/m   Estimated body mass index is 30.69 kg/m  as calculated from the following:    Height as of this encounter: 1.753 m (5' 9\").    Weight as of this encounter: 94.3 kg (207 lb 12.8 oz). Body surface area is 2.14 meters squared.  No Pain (0) Comment: Data Unavailable   No LMP for male patient.  Allergies reviewed: Yes  Medications reviewed: Yes    Medications: Medication refills not needed today.  Pharmacy name entered into [employer] [id number] [place] [id number]    Clinical concerns: No new concerns today  Dr Palomino  was notified.      Eunice Marks            "

## 2020-11-16 NOTE — LETTER
11/16/2020         RE: Lucio Hernandez  [address] [facility] [address]        Dear Colleague,    Thank you for referring your patient, Lucio Hernandez, to the [facility]. Please see a copy of my visit note below.    Chief Complaint   Patient presents with     Oncology Clinic Visit     PROSTATE CANCER      Blood Draw     Vitals and blood drawn via VPT by LPN. Pt checked into shannontPhong Carvajal LPN      [facility] Oncology Followup  Date of visit 11-         CC: Prostate cancer     HPI: Lucio Hernandez is a 64 year old male who has prostate cancer. He had a radical prostatectomy in 2006 demonstrating [place] 7 disease with a positive margin.  He had salvage radiation therapy in 2006 when his PSA was around 0.4 per the patient's recollection.  He notes his PSA was undetectable for 4 years, but then began coming back and recently has noted to have PSAs in the 1.2-1.4 range in May of this year.  He underwent an Axumin PET scan recently that shows no evidence of metastatic disease though there was uptake in the prostate bed.       Had Prior history of urethral stricture. This may have been due to the prior radiation therapy and a Olsen Catheter complication postop.     He was enrolled in a clinical trial AFT19 with apalutamide and degarelix which started on 11/14/19.         4/11/18            PSA = 0.56  10/30/18          PSA =0.48  5/14/19            PSA =1.2  5/29/19            PSA=1.4  7/29/19            PSA=1.45  9/5/19              PSA =1.84  11/4/19            PSA=2.19  11/14/19          PSA= 2.27-- START AFT-19  12/12/19          PSA= 0.04  1/9/2020          PSA= <0.01  2/5/2020          PSA = <0.01  2/26/2020        PSA= <0.01  4/6/20              PSA  <0.01  6/30/20 PSA   <0.01 Testosterone =11  7/27/20 PSA   < 0.01  9/21/20 PSA     <0.01  11/16/20 PSA     <0.01    INTERIM HISTORY:  Lucio presents today  Treatment completion visit on the AFT-19 clinical trial.  "     Juventino is in for follow-up. He has now completed the full 12-month course of therapy with apalutamide and ADT for serologic recurrence of prostate cancer. He has had multiple grade 1 and 2 adverse events which we have noted. Most notably he feels physically sluggish and does endorse a depressed mood which she attributes fully to the hormonal treatment. He has also had loss of body hair and hot flashes particularly at night. Apart from this he has no significant complaints.      ROS: 10 point ROS neg other than the symptoms noted above in the HPI.     PAST MEDICAL HISTORY:  Otherwise significant for the prostate cancer,  history of pulmonary embolism and rib fractures from coughing fits and appendicitis.      PAST SURGICAL HISTORY:  Includes the RRP, a lumbar fusion, a thoracotomy for the history of rib fractures and the pulmonic hernia, shoulder surgery x3, appendectomy and a cervical spine surgery.      MEDICATIONS:     1.  Zyrtec.   2.  Lisinopril.   3.  Gabapentin     ALLERGIES:  NO KNOWN DRUG ALLERGIES.      FAMILY HISTORY:  Noncontributory.      SOCIAL HISTORY:  Negative for tobacco.  Alcohol 1 drink a day. Pt is a retired Otolaryngologist (ENT surgeon).      REVIEW OF SYSTEMS:  Negative for fevers, chills, sweats, nausea, vomiting, unexplained weight changes.       Physical Exam  /89   Pulse 77   Temp 98.9  F (37.2  C) (Oral)   Resp 16   Ht 1.753 m (5' 9\")   Wt 94.3 kg (207 lb 12.8 oz)   SpO2 98%   BMI 30.69 kg/m      Wt Readings from Last 4 Encounters:   11/16/20 94.3 kg (207 lb 12.8 oz)   10/22/20 94.8 kg (209 lb)   09/21/20 93.8 kg (206 lb 12.8 oz)   08/21/20 92.5 kg (204 lb)     ECOG performance status is 0  General: Pleasant male in no acute distress  HEENT: EOMI, PERRLA, no scleral icterus, mucus membranes moist and no lesions or thrush  Lymph: Neck is supple and shows no nodes in cervical or supraclavicular   Heart:  RRR, no murmur, gallop or rub  Lungs: CTA-B, no wheezes, rhonchi or " rales  Abdomen: Normal bowel sounds, soft, non tender, not distended, no rebound or guarding, no palpable masses  Extremities: trace LE edema L >R  Skin: No significant rashes or lesions  Neuro: CN II-XII are intact       PSA per above  White blood cell count is 3.8 testosterone is 8 remainder of the blood work is normal.    Impression and Plan  This is a 64 year old gentleman with a 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  He has a doubling time that is under 9 months based on the most recent 2 measurements.  He is also status post salvage radiation therapy.     Prostate Cancer  -We had a lengthy discussion today about coming off of therapy and what to expect. I suggested to him that over the course of the next 3 to 5 months that his testosterone would rise back into the normal range and that this would improve his mood and his energy and his libido. We also discussed that an unknown in the situation is whether or not the PSA will rise concurrent with the rise in testosterone. In most patients treated with ADT alone that is the natural course of the disease. Enrollment on the clinical trial has led to a more potent use of androgen deprivation therapy and they are targeting and there is a possibility that the rising PSA will be delayed. Although he is off study therapy he remains on study and we will follow him for these outcomes.    He has many questions today about prognosis and survival and I indicated to him that we still expect his prognosis to be several years as he continues to have hormone sensitive prostate cancer. I also reported to him that in my impression of this situation when men go back on hormonal therapy after an initial treatment the side effects tend to be less.      -was on Sertraline for two weeks but stopped because didn't feel well on it.      Urinary Retention  -self cathing every other day. No new symptoms or concerns      Hypothyroidism  -Started  synthroid 50 mcg daily on 12/12/19. Increased to 75 mcg last visit. TSH still elevated though T4 remains normal and he is relatively asymptomatic. Continue current dose    Edema and Joint pain  Relatively new problem, self limiting.  Continue three day rx of Lasix 20 mg po as needed. If resolves stop and monitor. May retreat as needed.     I spent about 35 minutes with the patient and his wife discussing these various physical emotional and prognostic issues.     Signed Alejandro Palomino MD.

## 2020-11-16 NOTE — PROGRESS NOTES
Chief Complaint   Patient presents with     Oncology Clinic Visit     PROSTATE CANCER      Blood Draw     Vitals and blood drawn via VPT by LPN. Pt checked into appt.      YISEL Carvajal LPN

## 2020-11-17 LAB — TESTOST SERPL-MCNC: 8 NG/DL (ref 240–950)

## 2020-11-17 NOTE — PROGRESS NOTES
[id number]: Study Visit Note   Subject name: Lucio Hernandez   Subject Number: [id number]    Visit: EOT    Did the study visit occur within the appropriate window allowed by the protocol? yes    Since the last study visit, He has been doing well.    This is Juventino's last visit on treatment.  He will now move into the follow-up phase of the trial.  He will come in monthly for a Testosterone and PSA, standing orders placed in Epic.    Juventino did not want to sign the most recent consent form since he is now off treatment.  Confirmed with study sponsor that this was ok.     I have personally interviewed Lucio Hernandez and reviewed his medical record for adverse events and concomitant medications and these have been recorded on the corresponding logs in Lucio Hernandez's research file.     Lucio Hernandez was given the opportunity to ask any trial related questions.  Please see provider progress note for physical exam and other clinical information. Labs were reviewed - any significant lab values were addressed and reviewed.      [id number]: Medication Count/IDS Note      Lucio Hernandez is enrolled on the trial number listed above. The patient presented for his EOT visit.   IDS number: [id number]  Drug name: Apalutamide    Number of Bottles dispensed: None as he has completed treatment     Number of Bottles returned: 1   60 mg study drug Returned  Lot number(s):[id number]  Bottle numbers: [id number]  Number of Pills returned: 16     Drug Diary dispensed to study subject: no, EOT visit     Drug diary returned: yes     Are there any discrepancies between the amount of medication the patient was instructed to take and the amount recorded as taken in the patient s drug diary? no     Are there any discrepancies between the amount of medication the patient has recorded as taken in the drug diary and the amount that would be expected to be returned based on the amount recorded as taken? no      Dyan Mendez  Clinical Research Coordinator-RN  Clinical  Trials Office/[facility]  Desk Phone:  [phone]  Pager:  [phone]

## 2020-11-20 ENCOUNTER — VIRTUAL VISIT (OUTPATIENT)
Dept: FAMILY MEDICINE | Facility: OTHER | Age: 65
End: 2020-11-20

## 2020-11-21 ENCOUNTER — AMBULATORY - HEALTHEAST (OUTPATIENT)
Dept: FAMILY MEDICINE | Facility: CLINIC | Age: 65
End: 2020-11-21

## 2020-11-21 DIAGNOSIS — Z20.822 SUSPECTED COVID-19 VIRUS INFECTION: ICD-10-CM

## 2020-11-21 NOTE — PROGRESS NOTES
"Date: 2020 19:24:59  Clinician: Mariella Guzman  Clinician NPI: [id number]  Patient: Lucio Hernandez  Patient : 1955  Patient Address: [address] [place] [address]  Patient Phone: [phone]  Visit Protocol: URI  Patient Summary:  Lucio is a 65 year old ( : 1955 ) male who initiated a [facility] Visit for COVID-19 (Coronavirus) evaluation and screening. When asked the question \"Please sign me up to receive news, health information and promotions. \", Lucio responded \"No\".    When asked when his symptoms started, Lucio reported that he does not have any symptoms.   He denies taking antibiotic medication in the past month and having recent facial or sinus surgery in the past 60 days.    Pertinent COVID-19 (Coronavirus) information  Lucio does not work or volunteer as healthcare worker or a . In the past 14 days, Lucio has not worked or volunteered at a healthcare facility or group living setting.   In the past 14 days, he also has not lived in a congregate living setting.   Lucio has not had a close contact with a laboratory-confirmed COVID-19 patient within the last 14 days.    Since 2019, Lucio has been tested for COVID-19 and has not had upper respiratory infection or influenza-like illness.      Result of COVID-19 test: Negative     Date of his COVID-19 test: 10/20/2020      Pertinent medical history  Lucio does not need a return to work/school note.   Weight: 204 lbs   Lucio does not smoke or use smokeless tobacco.   Additional information as reported by the patient (free text): Exposure to Covid positive patient 9 days ago. Mask was worn   Weight: 204 lbs    MEDICATIONS: fluticasone propionate topical, Synthroid oral, lisinopril oral, ALLERGIES: NKDA  Clinician Response:  Dear Lucio,   Based on your exposure to COVID-19 (coronavirus), we would like to test you for this virus.  1. Please call [phone] to schedule your visit. Explain that you " were referred by [facility] to have a COVID-19 test. Be ready to [facility] number.  * If you need to schedule in [facility] please call [phone] or for [employer] employees please call [phone].   * If you need to schedule in the [place] area please call [phone]. [place] employees call [phone].   The following will serve as your written order for this COVID Test, ordered by me, for the indication of suspected COVID [Z20.828]: The test will be ordered in [employer], our electronic health record, after you are scheduled. It will show as ordered and authorized by García Hermosillo MD.  Order: COVID-19 (coronavirus) PCR for ASYMPTOMATIC EXPOSURE testing from [facility].   If you know you have had close contact with someone who tested positive, you should be quarantined for 14 days after this exposure. You should stay in quarantine for the14 days even if the covid test is negative, the optimal time to test after exposure is 5-7 days from the exposure  Quarantine means   What should I do?  For safety, it's very important to follow these rules. Do this for 14 days after the date you were last exposed to the virus..  Stay home and away from others. Don't go to school or anywhere else. Generally quarantine means staying home from work but there are some exceptions to this. Please contact your workplace.   No hugging, kissing or shaking hands.  Don't let anyone visit.  Cover your mouth and nose with a mask, tissue or washcloth to avoid spreading germs.  Wash your hands and face often. Use soap and water.  What are the symptoms of COVID-19?  The most common symptoms are cough, fever and trouble breathing. Less common symptoms include headache, body aches, fatigue (feeling very tired), chills, sore throat, stuffy or runny nose, diarrhea (loose poop), loss of taste or smell, belly pain, and nausea or vomiting (feeling sick to your stomach or throwing up).  After 14 days, if you have still don't have symptoms, you  likely don't have this virus.  If you develop symptoms, follow these guidelines.  If you're normally healthy: Please start another [facility] visit to report your symptoms. Go to [email or web address].  If you have a serious health problem (like cancer, heart failure, an organ transplant or kidney disease): Call your specialty clinic. Let them know that you might have COVID-19.  2. When it's time for your COVID test:  Stay at least 6 feet away from others. (If someone will drive you to your test, stay in the backseat, as far away from the  as you can.)  Cover your mouth and nose with a mask, tissue or washcloth.  Go straight to the testing site. Don't make any stops on the way there or back.  Please note  Caregivers in these groups are at risk for severe illness due to COVID-19:  o People 65 years and older  o People who live in a nursing home or long-term care facility  o People with chronic disease (lung, heart, cancer, diabetes, kidney, liver, immunologic)  o People who have a weakened immune system, including those who:  Are in cancer treatment  Take medicine that weakens the immune system, such as corticosteroids  Had a bone marrow or organ transplant  Have an immune deficiency  Have poorly controlled HIV or AIDS  Are obese (body mass index of 40 or higher)  Smoke regularly  Where can I get more information?  [facility] -- About COVID-19: [email or web address]  CDC -- What to Do If You're Sick: www.cdc.gov/coronavirus/2019-ncov/about/steps-when-sick.html  CDC -- Ending Home Isolation: www.cdc.gov/coronavirus/2019-ncov/hcp/disposition-in-home-patients.html  CDC -- Caring for Someone: www.cdc.gov/coronavirus/2019-ncov/if-you-are-sick/care-for-someone.html  [facility] -- Interim Guidance for Hospital Discharge to Home: www.health.[facility].[place].us/diseases/coronavirus/hcp/hospdischarge.pdf  [facility] clinical trials (COVID-19 research studies): clinicalaffairs.[facility]/n-clinical-trials  Below are the  COVID-19 hotlines at the [place] Department of Health ([facility]). Interpreters are available.  For health questions: Call 458-413-2699 or 1-737.955.4771 (7 a.m. to 7 p.m.)  For questions about schools and childcare: Call 088-466-5046 or 1-793.748.2147 (7 a.m. to 7 p.m.)    Diagnosis: Contact with and (suspected) exposure to other viral communicable diseases  Diagnosis ICD: Z20.828

## 2020-11-23 ENCOUNTER — COMMUNICATION - HEALTHEAST (OUTPATIENT)
Dept: SCHEDULING | Facility: CLINIC | Age: 65
End: 2020-11-23

## 2020-11-24 DIAGNOSIS — R60.9 EDEMA, UNSPECIFIED TYPE: ICD-10-CM

## 2020-11-24 RX ORDER — FUROSEMIDE 20 MG
20 TABLET ORAL DAILY
Qty: 30 TABLET | Refills: 1 | Status: CANCELLED | OUTPATIENT
Start: 2020-11-24

## 2020-12-15 ENCOUNTER — HOSPITAL ENCOUNTER (OUTPATIENT)
Dept: LAB | Facility: CLINIC | Age: 65
Discharge: HOME | End: 2020-12-15
Admitting: INTERNAL MEDICINE
Payer: MEDICARE

## 2020-12-15 DIAGNOSIS — C61 PROSTATE CANCER (H): ICD-10-CM

## 2020-12-15 DIAGNOSIS — E03.2 HYPOTHYROIDISM DUE TO MEDICATION: ICD-10-CM

## 2020-12-15 LAB
PSA SERPL-MCNC: <0.01 UG/L (ref 0–4)
TSH SERPL DL<=0.005 MIU/L-ACNC: 4.07 MU/L (ref 0.4–4)

## 2020-12-15 PROCEDURE — 84443 ASSAY THYROID STIM HORMONE: CPT | Performed by: INTERNAL MEDICINE

## 2020-12-15 PROCEDURE — 84403 ASSAY OF TOTAL TESTOSTERONE: CPT | Performed by: INTERNAL MEDICINE

## 2020-12-15 PROCEDURE — 84153 ASSAY OF PSA TOTAL: CPT | Performed by: INTERNAL MEDICINE

## 2020-12-18 LAB — TESTOST SERPL-MCNC: 4 NG/DL (ref 240–950)

## 2021-01-13 ENCOUNTER — HOSPITAL ENCOUNTER (OUTPATIENT)
Dept: LAB | Facility: CLINIC | Age: 66
Discharge: HOME | End: 2021-01-13
Attending: INTERNAL MEDICINE | Admitting: INTERNAL MEDICINE
Payer: MEDICARE

## 2021-01-13 DIAGNOSIS — C61 PROSTATE CANCER (H): ICD-10-CM

## 2021-01-13 LAB — PSA SERPL-MCNC: <0.01 UG/L (ref 0–4)

## 2021-01-13 PROCEDURE — 84153 ASSAY OF PSA TOTAL: CPT | Performed by: INTERNAL MEDICINE

## 2021-01-13 PROCEDURE — 84403 ASSAY OF TOTAL TESTOSTERONE: CPT | Performed by: INTERNAL MEDICINE

## 2021-01-15 ENCOUNTER — HEALTH MAINTENANCE LETTER (OUTPATIENT)
Age: 66
End: 2021-01-15

## 2021-01-15 LAB — TESTOST SERPL-MCNC: 24 NG/DL (ref 240–950)

## 2021-01-18 DIAGNOSIS — R60.9 EDEMA, UNSPECIFIED TYPE: ICD-10-CM

## 2021-01-18 RX ORDER — FUROSEMIDE 20 MG
20 TABLET ORAL DAILY
Qty: 30 TABLET | Refills: 1 | Status: SHIPPED | OUTPATIENT
Start: 2021-01-18 | End: 2021-05-24

## 2021-01-24 ENCOUNTER — MEDICAL CORRESPONDENCE (OUTPATIENT)
Dept: HEALTH INFORMATION MANAGEMENT | Facility: CLINIC | Age: 66
End: 2021-01-24
Payer: MEDICARE

## 2021-01-25 ENCOUNTER — MYC MEDICAL ADVICE (OUTPATIENT)
Dept: OTHER | Facility: CLINIC | Age: 66
End: 2021-01-25

## 2021-01-25 DIAGNOSIS — M79.661 PAIN OF RIGHT LOWER LEG: Primary | ICD-10-CM

## 2021-01-25 NOTE — TELEPHONE ENCOUNTER
Called Juventino to discuss his symptoms.    Left voice message asking that he return my message.    Miryam FIERRON  [facility] [place]  [phone]

## 2021-01-25 NOTE — TELEPHONE ENCOUNTER
Patient was scheduled for his Ultrasound on 01/26/21 and in person appointment with Linette on 01/27/21 by kevin.

## 2021-01-25 NOTE — TELEPHONE ENCOUNTER
No symptoms of illness.  Leg has no change in temperature.  Does have some pain at rest (behind knee, upper aspect of calf).  No swelling, no numbness tingling, no edema, no venous distention. No chest pain, no shortness of breath.    Per Dr. Olsen, patient needs RLE Venous US & in clinic visit this week.  Dr. Olsen has openings Tuesday at [facility].  If that doesn't work, please schedule with ALEXIA Breaux.    Miryam Alvarado RN BSN  [facility]  [phone]

## 2021-01-25 NOTE — TELEPHONE ENCOUNTER
He needs to be seen and examined. This issue can not be addressed over the phone. He needs to have a LE venous duplex done same day as being seen on the affected leg, with shannon with me face to face thereafter.

## 2021-01-26 ENCOUNTER — HOSPITAL ENCOUNTER (OUTPATIENT)
Dept: ULTRASOUND IMAGING | Facility: CLINIC | Age: 66
Discharge: HOME | End: 2021-01-26
Attending: INTERNAL MEDICINE | Admitting: INTERNAL MEDICINE
Payer: MEDICARE

## 2021-01-26 DIAGNOSIS — M79.661 PAIN OF RIGHT LOWER LEG: ICD-10-CM

## 2021-01-26 PROCEDURE — 93971 EXTREMITY STUDY: CPT | Mod: RT

## 2021-02-07 DIAGNOSIS — Z79.818 ANDROGEN DEPRIVATION THERAPY: ICD-10-CM

## 2021-02-07 DIAGNOSIS — C61 PROSTATE CANCER (H): Primary | ICD-10-CM

## 2021-02-10 ENCOUNTER — IMMUNIZATION (OUTPATIENT)
Dept: NURSING | Facility: CLINIC | Age: 66
End: 2021-02-10
Payer: MEDICARE

## 2021-02-10 PROCEDURE — 91300 PR COVID VAC PFIZER DIL RECON 30 MCG/0.3 ML IM: CPT

## 2021-02-10 PROCEDURE — 0001A PR COVID VAC PFIZER DIL RECON 30 MCG/0.3 ML IM: CPT

## 2021-02-11 ENCOUNTER — HOSPITAL ENCOUNTER (OUTPATIENT)
Dept: LAB | Facility: CLINIC | Age: 66
End: 2021-02-11
Attending: INTERNAL MEDICINE
Payer: MEDICARE

## 2021-02-11 ENCOUNTER — HOSPITAL ENCOUNTER (OUTPATIENT)
Dept: BONE DENSITY | Facility: CLINIC | Age: 66
End: 2021-02-11
Attending: INTERNAL MEDICINE
Payer: MEDICARE

## 2021-02-11 DIAGNOSIS — C61 PROSTATE CANCER (H): ICD-10-CM

## 2021-02-11 DIAGNOSIS — Z79.818 ANDROGEN DEPRIVATION THERAPY: ICD-10-CM

## 2021-02-11 LAB — PSA SERPL-MCNC: <0.01 UG/L (ref 0–4)

## 2021-02-11 PROCEDURE — 84403 ASSAY OF TOTAL TESTOSTERONE: CPT | Performed by: INTERNAL MEDICINE

## 2021-02-11 PROCEDURE — 77080 DXA BONE DENSITY AXIAL: CPT

## 2021-02-11 PROCEDURE — 84153 ASSAY OF PSA TOTAL: CPT | Performed by: INTERNAL MEDICINE

## 2021-02-11 PROCEDURE — 36415 COLL VENOUS BLD VENIPUNCTURE: CPT | Performed by: INTERNAL MEDICINE

## 2021-02-12 LAB — TESTOST SERPL-MCNC: 54 NG/DL (ref 240–950)

## 2021-02-15 ENCOUNTER — VIRTUAL VISIT (OUTPATIENT)
Dept: ONCOLOGY | Facility: CLINIC | Age: 66
End: 2021-02-15
Attending: INTERNAL MEDICINE
Payer: MEDICARE

## 2021-02-15 DIAGNOSIS — E03.2 HYPOTHYROIDISM DUE TO MEDICATION: ICD-10-CM

## 2021-02-15 DIAGNOSIS — C61 PROSTATE CANCER (H): Primary | ICD-10-CM

## 2021-02-15 DIAGNOSIS — E78.2 MIXED HYPERLIPIDEMIA: ICD-10-CM

## 2021-02-15 PROCEDURE — 99214 OFFICE O/P EST MOD 30 MIN: CPT | Mod: 95 | Performed by: INTERNAL MEDICINE

## 2021-02-15 PROCEDURE — 999N001193 HC VIDEO/TELEPHONE VISIT; NO CHARGE

## 2021-02-15 NOTE — LETTER
2/15/2021         RE: Lucio Hernandez  [address] [place] [facility] [place] [facility] [address]        Dear Colleague,    Thank you for referring your patient, Lucio Hernandez, to the [facility]. Please see a copy of my visit note below.    Juventino is a 65 year old who is being evaluated via a billable video visit.      How would you like to obtain your AVS? MyChart  If the video visit is dropped, the invitation should be resent by: Send to e-mail at: [email or web address]  Will anyone else be joining your video visit? No      Video-Visit Details    Type of service:  Video Visit      Originating Location (pt. Location): Home    Distant Location (provider location):  [facility]     Platform used for Video Visit: Kristian Barboza CMA on 2/15/2021 at 7:46 AM        [facility] Followup  Date of visit 02-         CC: Prostate cancer     HPI: Lucio Hernandez is a 65 year old male who has prostate cancer. He had a radical prostatectomy in 2006 demonstrating [place] 7 disease with a positive margin.  He had salvage radiation therapy in 2006 when his PSA was around 0.4 per the patient's recollection.  He notes his PSA was undetectable for 4 years, but then began coming back and recently has noted to have PSAs in the 1.2-1.4 range in May of this year.  He underwent an Axumin PET scan recently that shows no evidence of metastatic disease though there was uptake in the prostate bed.       Had Prior history of urethral stricture. This may have been due to the prior radiation therapy and a Olsen Catheter complication postop.     He was enrolled in a clinical trial AFT19 with apalutamide and degarelix which started on 11/14/19.         4/11/18            PSA = 0.56  10/30/18          PSA =0.48  5/14/19            PSA =1.2  5/29/19            PSA=1.4  7/29/19            PSA=1.45  9/5/19              PSA =1.84  11/4/19            PSA=2.19  11/14/19          PSA= 2.27--  START AFT-19  12/12/19          PSA= 0.04  1/9/2020          PSA= <0.01  2/5/2020          PSA = <0.01  2/26/2020        PSA= <0.01  4/6/20              PSA  <0.01  6/30/20 PSA   <0.01 Testosterone =11  7/27/20 PSA   < 0.01  9/21/20 PSA     <0.01  11/16/20 PSA     <0.01 - Discontinue Study therapy.   2/11/201 PSA <0.01 Testosterone 54    INTERIM HISTORY:  --prickly hot flashes, worse at night. 10 or more hot flashes per day.   --improving exercise tolerance -   --felt significant improvement in cognition with discontinuation of apalutamide  --right knee issues - had tibial edema which prompted DEXA.   -- gained about 23 lbs on study, has lost about 10 in recent months.     ROS: 10 point ROS neg other than the symptoms noted above in the HPI.     PAST MEDICAL HISTORY:  Otherwise significant for the prostate cancer,  history of pulmonary embolism and rib fractures from coughing fits and appendicitis.      PAST SURGICAL HISTORY:  Includes the RRP, a lumbar fusion, a thoracotomy for the history of rib fractures and the pulmonic hernia, shoulder surgery x3, appendectomy and a cervical spine surgery.      MEDICATIONS:     1.  Zyrtec.   2.  Lisinopril.   3.  Gabapentin     ALLERGIES:  NO KNOWN DRUG ALLERGIES.      FAMILY HISTORY:  Noncontributory.      SOCIAL HISTORY:  Negative for tobacco.  Alcohol 1 drink a day. Pt is a retired Otolaryngologist (ENT surgeon).      REVIEW OF SYSTEMS:  Negative for fevers, chills, sweats, nausea, vomiting, unexplained weight changes.       Physical Exam  There were no vitals taken for this visit.    Wt Readings from Last 4 Encounters:   11/16/20 94.3 kg (207 lb 12.8 oz)   10/22/20 94.8 kg (209 lb)   09/21/20 93.8 kg (206 lb 12.8 oz)   08/21/20 92.5 kg (204 lb)     ECOG performance status is 0  General: Pleasant male in no acute distress  Remainder of exam was deferred due to video visit        LABS        HDL 55    12/15/20 TSH = 4.07    PSA undetectable and testosterone  is 54.            PSA per above  White blood cell count is 3.8 testosterone is 8 remainder of the blood work is normal.      DX HIP/PELVIS/SPINE February 12, 2021 10:12 AM     HISTORY: Prostate cancer hormonal therapy, rule out osteoporosis.  Prostate cancer (H). Androgen deprivation therapy.     FINDINGS: This DEXA scan was performed using a Worlds scanner.  DEXA results are reported according to T-score.  The T-score is the  standard deviation from the peak bone mass in a normal young adult  population. In accordance with the ISCD (International Society of  Clinical Densitometry), the lower of the total proximal femur vs  femoral neck T-score is reported. Osteopenia is defined as a T-score  of -1.0 to -2.5. Osteoporosis is defined as a T-score of less than  -2.5.     T-SCORES: Lumbar spine imaging not performed because of hardware.     Left Hip mean T-score: -0.2.  Right Hip (Neck) T-score: -0.3.     Hip lowest neck BMD: 1.031 gm/cm2.     No prior studies for comparison.     FRAX 10-YEAR PROBABILITY OF FRACTURE*:  Major Osteoporotic: 7.7%  Hip: 0.6%     *All treatment decisions require clinical judgment and consideration  of individual patient factors which may not be captured in the FRAX  model and the risk of fracture may be over- or under-estimated by  FRAX.                                                                      IMPRESSION: Normal bone mineral density.    Impression and Plan  This is a 65 year old gentleman with a serologic relapse of a [place] 4+3 = 7 with a tertiary pattern of Jg 5.  He has a doubling time that is under 9 months based on the most recent 2 measurements.  He is also status post salvage radiation therapy.     Prostate Cancer  I spent about 35 minutes with the patient on video discussing his exercise regimen and its importance for prostate cancer outcome as well as nutritional interventions and their role in the setting of serologic relapse and long-term issues for prostate  cancer patients.  With regards to his thyroid function we will check a TSH at a subsequent visit and he will remain on the Synthroid for now.  His testosterone is climbing back to normal and the patient is improving and the PSA remains 0.    He is likely to need a knee replacement in the coming year and I am supportive of that decision and indicated to him that his recovery is likely to be more expedited since the hormone therapy has been discontinued.    We discussed osteoporosis and its risk from hormonal therapy.  Fortunately his bone mineral density is normal even though some edema have been seen on the plain x-ray suggested that there may be some underlying osteoporosis.  With the recovery of testosterone and his exercise regimen we can expect that his risk of osteoporosis will be close to that of a healthy man his age.  If he goes back on hormonal therapy we will need to be more vigilant perhaps about monitoring for osteoporosis as we are in all patients.    We discussed his rising cholesterol values and the fact that statins have been associated with good outcomes and prostate cancer patients.  I suggest that he talk to his primary care doctor about statin use and that he consider a low threshold for starting such therapy.      Signed Alejandro Palomino MD.

## 2021-02-19 ENCOUNTER — TRANSFERRED RECORDS (OUTPATIENT)
Dept: HEALTH INFORMATION MANAGEMENT | Facility: CLINIC | Age: 66
End: 2021-02-19

## 2021-02-19 DIAGNOSIS — C61 PROSTATE CANCER (H): ICD-10-CM

## 2021-02-19 DIAGNOSIS — E03.2 HYPOTHYROIDISM DUE TO MEDICATION: ICD-10-CM

## 2021-02-19 NOTE — TELEPHONE ENCOUNTER
Results for CAIT PINEDA (MRN [id number]) as of 2/19/2021 07:53   Ref. Range 12/15/2020 15:25 1/13/2021 12:00 1/26/2021 11:39 2/11/2021 09:37 2/11/2021 10:12   TSH Latest Ref Range: 0.40 - 4.00 mU/L 4.07 (H)         Last OV 2/15/21, follow-up apt not scheduled yet

## 2021-02-25 RX ORDER — LEVOTHYROXINE SODIUM 75 UG/1
TABLET ORAL
Qty: 90 TABLET | Refills: 1 | Status: SHIPPED | OUTPATIENT
Start: 2021-02-25 | End: 2021-09-20

## 2021-03-03 ENCOUNTER — IMMUNIZATION (OUTPATIENT)
Dept: NURSING | Facility: CLINIC | Age: 66
End: 2021-03-03
Attending: FAMILY MEDICINE
Payer: MEDICARE

## 2021-03-03 PROCEDURE — 91300 PR COVID VAC PFIZER DIL RECON 30 MCG/0.3 ML IM: CPT

## 2021-03-03 PROCEDURE — 0002A PR COVID VAC PFIZER DIL RECON 30 MCG/0.3 ML IM: CPT

## 2021-03-12 ENCOUNTER — HOSPITAL ENCOUNTER (OUTPATIENT)
Dept: LAB | Facility: CLINIC | Age: 66
Discharge: HOME | End: 2021-03-12
Attending: INTERNAL MEDICINE | Admitting: INTERNAL MEDICINE
Payer: MEDICARE

## 2021-03-12 DIAGNOSIS — C61 PROSTATE CANCER (H): ICD-10-CM

## 2021-03-12 LAB — PSA SERPL-MCNC: <0.01 UG/L (ref 0–4)

## 2021-03-12 PROCEDURE — 36415 COLL VENOUS BLD VENIPUNCTURE: CPT | Performed by: INTERNAL MEDICINE

## 2021-03-12 PROCEDURE — 84153 ASSAY OF PSA TOTAL: CPT | Performed by: INTERNAL MEDICINE

## 2021-03-12 PROCEDURE — 84403 ASSAY OF TOTAL TESTOSTERONE: CPT | Performed by: INTERNAL MEDICINE

## 2021-03-16 LAB — TESTOST SERPL-MCNC: 65 NG/DL (ref 240–950)

## 2021-04-13 DIAGNOSIS — C61 PROSTATE CANCER (H): Primary | ICD-10-CM

## 2021-04-14 DIAGNOSIS — E78.2 MIXED HYPERLIPIDEMIA: ICD-10-CM

## 2021-04-14 DIAGNOSIS — R97.21 INCREASING PSA LEVEL AFTER TREATMENT FOR PROSTATE CANCER: Primary | ICD-10-CM

## 2021-04-14 DIAGNOSIS — E03.2 HYPOTHYROIDISM DUE TO MEDICATION: Primary | ICD-10-CM

## 2021-04-16 ENCOUNTER — HOSPITAL ENCOUNTER (OUTPATIENT)
Dept: LAB | Facility: CLINIC | Age: 66
Discharge: HOME | End: 2021-04-16
Admitting: INTERNAL MEDICINE
Payer: MEDICARE

## 2021-04-16 DIAGNOSIS — E78.2 MIXED HYPERLIPIDEMIA: ICD-10-CM

## 2021-04-16 DIAGNOSIS — C61 PROSTATE CANCER (H): ICD-10-CM

## 2021-04-16 DIAGNOSIS — E03.2 HYPOTHYROIDISM DUE TO MEDICATION: ICD-10-CM

## 2021-04-16 LAB
CHOLEST SERPL-MCNC: 211 MG/DL
HDLC SERPL-MCNC: 54 MG/DL
LDLC SERPL CALC-MCNC: 122 MG/DL
NONHDLC SERPL-MCNC: 157 MG/DL
PSA SERPL-MCNC: <0.01 UG/L (ref 0–4)
TRIGL SERPL-MCNC: 173 MG/DL
TSH SERPL DL<=0.005 MIU/L-ACNC: 2.9 MU/L (ref 0.4–4)

## 2021-04-16 PROCEDURE — 84403 ASSAY OF TOTAL TESTOSTERONE: CPT | Performed by: INTERNAL MEDICINE

## 2021-04-16 PROCEDURE — 84443 ASSAY THYROID STIM HORMONE: CPT | Performed by: INTERNAL MEDICINE

## 2021-04-16 PROCEDURE — 84153 ASSAY OF PSA TOTAL: CPT | Performed by: INTERNAL MEDICINE

## 2021-04-16 PROCEDURE — 80061 LIPID PANEL: CPT | Performed by: INTERNAL MEDICINE

## 2021-04-16 PROCEDURE — 36415 COLL VENOUS BLD VENIPUNCTURE: CPT | Performed by: INTERNAL MEDICINE

## 2021-04-17 LAB — TESTOST SERPL-MCNC: 147 NG/DL (ref 240–950)

## 2021-04-28 ENCOUNTER — TRANSFERRED RECORDS (OUTPATIENT)
Dept: HEALTH INFORMATION MANAGEMENT | Facility: CLINIC | Age: 66
End: 2021-04-28

## 2021-05-07 ENCOUNTER — PRE VISIT (OUTPATIENT)
Dept: UROLOGY | Facility: CLINIC | Age: 66
End: 2021-05-07

## 2021-05-08 NOTE — TELEPHONE ENCOUNTER
Reason for visit: Self dilation follow up     Relevant information: bladder neck contracture    Records/imaging/labs/orders: all records available    Pt called: no need for a call

## 2021-05-10 ENCOUNTER — TELEPHONE (OUTPATIENT)
Dept: UROLOGY | Facility: CLINIC | Age: 66
End: 2021-05-10

## 2021-05-10 ENCOUNTER — VIRTUAL VISIT (OUTPATIENT)
Dept: UROLOGY | Facility: CLINIC | Age: 66
End: 2021-05-10
Payer: MEDICARE

## 2021-05-10 DIAGNOSIS — N32.0 BLADDER NECK CONTRACTURE: ICD-10-CM

## 2021-05-10 DIAGNOSIS — C61 PROSTATE CANCER (H): Primary | ICD-10-CM

## 2021-05-10 DIAGNOSIS — T66.XXXS RADIATION ADVERSE EFFECT, SEQUELA: ICD-10-CM

## 2021-05-10 PROCEDURE — 99213 OFFICE O/P EST LOW 20 MIN: CPT | Mod: 95 | Performed by: UROLOGY

## 2021-05-10 RX ORDER — ATORVASTATIN CALCIUM 20 MG/1
20 TABLET, FILM COATED ORAL AT BEDTIME
COMMUNITY
Start: 2021-04-29

## 2021-05-10 NOTE — NURSING NOTE
Chief Complaint   Patient presents with     Follow Up     Self dilation       Patient Active Problem List   Diagnosis     Appendicitis     Radiation adverse effect     Bladder neck contracture     Malignant neoplasm of prostate (H)     Postprocedural membranous urethral stricture     Rib fractures     Pulmonary embolism (H)     Essential hypertension     Pelvic floor dysfunction     Spinal stenosis     Urethral stricture     Retention of urine     Prostate cancer (H)     Elevated prostate specific antigen (PSA)       No Known Allergies    Current Outpatient Medications   Medication Sig Dispense Refill     atorvastatin (LIPITOR) 20 MG tablet Take 20 mg by mouth       cetirizine (ZYRTEC) 10 MG tablet Take 10 mg by mouth daily       fluticasone (FLONASE) 50 MCG/ACT nasal spray Spray 1 spray into both nostrils daily       levothyroxine (SYNTHROID/LEVOTHROID) 75 MCG tablet TAKE 1 TABLET BY MOUTH EVERY DAY 90 tablet 1     lisinopril (ZESTRIL) 20 MG tablet        apalutamide (IDS# 5330) tablet Take 4 tablets (240 mg) by mouth daily Keep in original bottle. (Patient not taking: Reported on 11/16/2020) 120 tablet 0     clonazePAM (KLONOPIN) 0.5 MG tablet        fish oil-omega-3 fatty acids 1000 MG capsule Take 2 g by mouth daily       furosemide (LASIX) 20 MG tablet Take 1 tablet (20 mg) by mouth daily 30 tablet 1     gabapentin (NEURONTIN) 300 MG capsule TAKE 1 CAPSULE BY MOUTH THREE TIMES A DAY 90 capsule 3     ibuprofen (ADVIL/MOTRIN) 200 MG tablet Take 200 mg by mouth every 6 hours as needed for pain       LORazepam (ATIVAN) 0.5 MG tablet Take 1 tablet (0.5 mg) by mouth every 4 hours as needed (Anxiety, Nausea/Vomiting or Sleep) (Patient not taking: Reported on 10/22/2020) 30 tablet 0       Social History     Tobacco Use     Smoking status: Never Smoker     Smokeless tobacco: Never Used   Substance Use Topics     Alcohol use: Yes     Comment: one drink a day      Drug use: No       Miryam Carias LPN  5/10/2021  10:19  AM

## 2021-05-10 NOTE — TELEPHONE ENCOUNTER
[facility]   Fax [phone]       A. Please include patient demographics and chart notes (ex: indefinite retention) with this order   Name: Lucio Hernandez   : 1955   Phone: [phone]   Address: [address] [facility] [address]   B. Diagnosis     X Retention of urine (788.20/R33.9)   Urinary Incontinence (788.30/R30)    Incomplete Bladder Emptying (788.21/R39-14)   Urge Incontinence (788.31/N39.41)    Other Specified Retention of Urine (788.29/R33.8)  X  Other: bladder neck obstruction       C. Order Information/Start Date: 05/10/21    Number of Refills: 11 Length of Need: 99 months   x Patient may receive up to a 90-day supply at one time; therefore, quantity will be three (3) times amount below.   Type (check one): x Hydrophilic    CHECK PRODUCT Icelandic FREQ OF USE QUANTITY PER STRAIGHT  COUDE    ONE  SIZE PER DAY MONTH TO DISPESE      Intermittent Catheter        x Intermittent Catheter with 14 Every other day 15  x    Insertion Supplies          Condom or External Catheters        ADDITIONAL PRODUCTS/ITEMS ORDERED (check all that apply)     ___ Patient Choice     ___ Bard     ___ Coloplast     ___ Cure Medical     ___ GentleCath     ___ Hi-Slip     ___ Melrose     ___Lo Fric     ___Magic3     ___ MTG     _x_ Vanessa-Teleflex     ___ SpediCath                 MICHAEL Physician's Information:         Physician's Name: Dr. Abdon Wells   Phone: [phone]   Date: 05/10/21  [facility]   [facility]   [address] [place] [address] [address] [place] [address], [address]     Xiomy Lu    Office: [phone]   Mobile: [phone]   Fax: [phone]   [email or web address]

## 2021-05-10 NOTE — LETTER
5/10/2021       RE: Lucio Hernandez  [address] [facility] [address]     Dear Colleague,    Thank you for referring your patient, Lucio Hernandez, to the [facility] UROLOGY [facility]IN[facility] [place] at [facility]. Please see a copy of my visit note below.    HPI:  Lucio Hernandez is a 65 year old male being seen for h/o VUAS after RP+EBRT. Had recurrence of CaP late 2019 for which he saw Daniel Palomnio. Placed on ADT. Has been having trouble with self catheterization. He caths q3d with 14F Vanessa coude catheter.    Duration of problem: 2 years    Reviewed previous notes from Dr. Palomino    Exam:  There were no vitals taken for this visit.  GENERAL: Healthy, alert and no distress  EYES: Eyes grossly normal to inspection.  No discharge or erythema, or obvious scleral/conjunctival abnormalities.  RESP: No audible wheeze, cough, or visible cyanosis.  No visible retractions or increased work of breathing.    SKIN: Visible skin clear. No significant rash, abnormal pigmentation or lesions.  NEURO: Cranial nerves grossly intact.  Mentation and speech appropriate for age.  PSYCH: Mentation appears normal, affect normal/bright, judgement and insight intact, normal speech and appearance well-groomed.    Review of Imaging:  The following imaging exams were independently viewed and interpreted by me and discussed with patient:  None recent    Review of Labs:  The following labs were reviewed by me and discussed with the patient:  PSA undetectable    Assessment & Plan      VUAS -- stable with self-dilation. No need for surgery at this time. For weak stream could try going up to 16F for self catheterization.        Abdon Wells MD  [facility]OLOGY [facility] [place]      ==========================      Additional Coding Information:    Problems:  3 -- one stable chronic illness    Data Reviewed  PSA    Level of risk:  3 -- continue catheterizing     Time spent:  25  minutes spent on the date of the encounter doing chart review, history and exam, documentation and further activities per the note    Video Visit Technology for this patient: [employer] Video Visit- Patient was left in waiting room    Juventino is a 65 year old who is being evaluated via a billable video visit.      How would you like to obtain your AVS? MyChart  If the video visit is dropped, the invitation should be resent by: phone: [phone]  Will anyone else be joining your video visit? No    Video Start Time: 10:30  Video-Visit Details    Type of service:  Video Visit    Video End Time:10:46 AM    Originating Location (pt. Location): Home    Distant Location (provider location):  [facility] UROLOGY [facility]     Platform used for Video Visit: [employer]

## 2021-05-10 NOTE — PROGRESS NOTES
HPI:  Lucio Hernandez is a 65 year old male being seen for h/o VUAS after RP+EBRT. Had recurrence of CaP late 2019 for which he saw Daniel Palomino. Placed on ADT. Has been having trouble with self catheterization. He caths q3d with 14F Vanessa coude catheter.    Duration of problem: 2 years    Reviewed previous notes from Dr. Palomino    Exam:  There were no vitals taken for this visit.  GENERAL: Healthy, alert and no distress  EYES: Eyes grossly normal to inspection.  No discharge or erythema, or obvious scleral/conjunctival abnormalities.  RESP: No audible wheeze, cough, or visible cyanosis.  No visible retractions or increased work of breathing.    SKIN: Visible skin clear. No significant rash, abnormal pigmentation or lesions.  NEURO: Cranial nerves grossly intact.  Mentation and speech appropriate for age.  PSYCH: Mentation appears normal, affect normal/bright, judgement and insight intact, normal speech and appearance well-groomed.    Review of Imaging:  The following imaging exams were independently viewed and interpreted by me and discussed with patient:  None recent    Review of Labs:  The following labs were reviewed by me and discussed with the patient:  PSA undetectable    Assessment & Plan      VUAS -- stable with self-dilation. No need for surgery at this time. For weak stream could try going up to 16F for self catheterization.        Abdon Wells MD  [facility] UROLOGY CLINIC [place]      ==========================      Additional Coding Information:    Problems:  3 -- one stable chronic illness    Data Reviewed  PSA    Level of risk:  3 -- continue catheterizing     Time spent:  25 minutes spent on the date of the encounter doing chart review, history and exam, documentation and further activities per the note            Video Visit Technology for this patient: [employer] Video Visit- Patient was left in waiting room    Juventino is a 65 year old who is being evaluated via a billable video visit.       How would you like to obtain your AVS? [employer]  If the video visit is dropped, the invitation should be resent by: phone: [phone]  Will anyone else be joining your video visit? No    Video Start Time: 10:30  Video-Visit Details    Type of service:  Video Visit    Video End Time:10:46 AM    Originating Location (pt. Location): Home    Distant Location (provider location):  [facility] UROLOGY [facility]     Platform used for Video Visit: [employer]

## 2021-05-20 ENCOUNTER — HOSPITAL ENCOUNTER (OUTPATIENT)
Dept: LAB | Facility: CLINIC | Age: 66
Discharge: HOME | End: 2021-05-20
Attending: INTERNAL MEDICINE | Admitting: INTERNAL MEDICINE
Payer: MEDICARE

## 2021-05-20 DIAGNOSIS — C61 PROSTATE CANCER (H): ICD-10-CM

## 2021-05-20 LAB — PSA SERPL-MCNC: <0.01 UG/L (ref 0–4)

## 2021-05-20 PROCEDURE — 84153 ASSAY OF PSA TOTAL: CPT | Performed by: INTERNAL MEDICINE

## 2021-05-20 PROCEDURE — 36415 COLL VENOUS BLD VENIPUNCTURE: CPT | Performed by: INTERNAL MEDICINE

## 2021-05-20 PROCEDURE — 84403 ASSAY OF TOTAL TESTOSTERONE: CPT | Performed by: INTERNAL MEDICINE

## 2021-05-21 LAB — TESTOST SERPL-MCNC: 149 NG/DL (ref 240–950)

## 2021-05-24 ENCOUNTER — ONCOLOGY VISIT (OUTPATIENT)
Dept: ONCOLOGY | Facility: CLINIC | Age: 66
End: 2021-05-24
Attending: INTERNAL MEDICINE
Payer: MEDICARE

## 2021-05-24 VITALS
OXYGEN SATURATION: 96 % | RESPIRATION RATE: 18 BRPM | HEART RATE: 75 BPM | WEIGHT: 212.5 LBS | TEMPERATURE: 98.5 F | BODY MASS INDEX: 31.38 KG/M2 | SYSTOLIC BLOOD PRESSURE: 129 MMHG | DIASTOLIC BLOOD PRESSURE: 85 MMHG

## 2021-05-24 DIAGNOSIS — C61 PROSTATE CANCER (H): Primary | ICD-10-CM

## 2021-05-24 PROCEDURE — 99214 OFFICE O/P EST MOD 30 MIN: CPT | Performed by: INTERNAL MEDICINE

## 2021-05-24 PROCEDURE — G0463 HOSPITAL OUTPT CLINIC VISIT: HCPCS

## 2021-05-24 ASSESSMENT — MIFFLIN-ST. JEOR: SCORE: 1717.93

## 2021-05-24 ASSESSMENT — PAIN SCALES - GENERAL: PAINLEVEL: SEVERE PAIN (6)

## 2021-05-24 NOTE — LETTER
5/24/2021         RE: Lucio Hernandez  [address] [facility] [address]        Dear Colleague,    Thank you for referring your patient, Lucio Hernandez, to the [facility] CANCER CLINIC. Please see a copy of my visit note below.          [facility] Oncology Followup  In person visit  5/24/2021         CC: Prostate cancer     HPI: Lucio Hernandez is a 65 year old male who has prostate cancer. He had a radical prostatectomy in 2006 demonstrating Gj 7 disease with a positive margin.  He had salvage radiation therapy in 2006 when his PSA was around 0.4 per the patient's recollection.  He notes his PSA was undetectable for 4 years, but then began coming back and recently has noted to have PSAs in the 1.2-1.4 range in May of this year.  He underwent an Axumin PET scan recently that shows no evidence of metastatic disease though there was uptake in the prostate bed.       Had Prior history of urethral stricture. This may have been due to the prior radiation therapy and a Olsen Catheter complication postop.     He was enrolled in a clinical trial AFT19 with apalutamide and degarelix which started on 11/14/19.         4/11/18            PSA = 0.56  10/30/18          PSA =0.48  5/14/19            PSA =1.2  5/29/19            PSA=1.4  7/29/19            PSA=1.45  9/5/19              PSA =1.84  11/4/19            PSA=2.19  11/14/19          PSA= 2.27-- START AFT-19  12/12/19          PSA= 0.04  1/9/2020          PSA= <0.01  2/5/2020          PSA  <0.01  2/26/2020        PSA <0.01  4/6/20              PSA  <0.01  6/30/20 PSA   <0.01 Testosterone =11  7/27/20 PSA   < 0.01  9/21/20 PSA     <0.01  11/16/20 PSA     <0.01 - Discontinue Study therapy.   2/11/201 PSA  <0.01 Testosterone 54  5/20/21 PSA  <0.01 Testosterone 149    INTERIM HISTORY:  --prickly hot flashes, worse at night. Continues to have 10 or more hot flashes per day.   --improving exercise tolerance -   --felt significant improvement in  cognition with discontinuation of apalutamide  --right knee issues - had tibial edema which prompted DEXA.   -- gained about 23 lbs on study, has lost about 10 in recent months.     -- taking atorvastatin for hyperlipidemia  --Knee surgery (right TKA) planned for Shaneka 15 2021  --Continues to struggle with fatigue weight gain and generally not feeling well much which he attributes to the hormonal therapy.  He is glad to be off the study and is hopeful that the knee replacement and other things will help him to regain his stamina and a pain-free existence.      ROS: 10 point ROS neg other than the symptoms noted above in the HPI.     PAST MEDICAL HISTORY:  Otherwise significant for the prostate cancer,  history of pulmonary embolism and rib fractures from coughing fits and appendicitis.      PAST SURGICAL HISTORY:  Includes the RRP, a lumbar fusion, a thoracotomy for the history of rib fractures and the pulmonic hernia, shoulder surgery x3, appendectomy and a cervical spine surgery.      MEDICATIONS:     1.  Zyrtec.   2.  Lisinopril.   3.  Gabapentin     ALLERGIES:  NO KNOWN DRUG ALLERGIES.      FAMILY HISTORY:  Noncontributory.      SOCIAL HISTORY:  Negative for tobacco.  Alcohol 1 drink a day. Pt is a retired Otolaryngologist (ENT surgeon).      REVIEW OF SYSTEMS:  Negative for fevers, chills, sweats, nausea, vomiting, unexplained weight changes.       Physical Exam  /85   Pulse 75   Temp 98.5  F (36.9  C) (Tympanic)   Resp 18   Wt 96.4 kg (212 lb 8 oz)   SpO2 96%   BMI 31.38 kg/m    Head is normocephalic atraumatic  Sclera anicteric   oropharynx is clear the   neck is supple without adenopathy  lungs are clear to auscultation bilaterally   heart is regular rate and rhythm   abdomen is soft nontender nondistended  extremities are without cyanosis clubbing or edema.    Neurologic exam is grossly nonfocal.      Wt Readings from Last 4 Encounters:   05/24/21 96.4 kg (212 lb 8 oz)   11/16/20 94.3 kg (207 lb  12.8 oz)   10/22/20 94.8 kg (209 lb)   09/21/20 93.8 kg (206 lb 12.8 oz)             LABS        HDL 55    12/15/20 TSH = 4.07    PSA undetectable and testosterone is 54.    PSA per above  White blood cell count is 3.8 testosterone is 8 remainder of the blood work is normal.      DX HIP/PELVIS/SPINE February 12, 2021 10:12 AM     HISTORY: Prostate cancer hormonal therapy, rule out osteoporosis.  Prostate cancer (H). Androgen deprivation therapy.     FINDINGS: This DEXA scan was performed using a [employer]) scanner.  DEXA results are reported according to T-score.  The T-score is the  standard deviation from the peak bone mass in a normal young adult  population. In accordance with the ISCD (International Society of  Clinical Densitometry), the lower of the total proximal femur vs  femoral neck T-score is reported. Osteopenia is defined as a T-score  of -1.0 to -2.5. Osteoporosis is defined as a T-score of less than  -2.5.     T-SCORES: Lumbar spine imaging not performed because of hardware.     Left Hip mean T-score: -0.2.  Right Hip (Neck) T-score: -0.3.     Hip lowest neck BMD: 1.031 gm/cm2.     No prior studies for comparison.     FRAX 10-YEAR PROBABILITY OF FRACTURE*:  Major Osteoporotic: 7.7%  Hip: 0.6%     *All treatment decisions require clinical judgment and consideration  of individual patient factors which may not be captured in the FRAX  model and the risk of fracture may be over- or under-estimated by  FRAX.                                                                      IMPRESSION/ PLAN    This is a 65 year old gentleman with a serologic relapse of a Jg 4+3 = 7 with a tertiary pattern of [place] 5.  He has a doubling time that is under 9 months based on the most recent 2 measurements.  He is also status post salvage radiation therapy.     Prostate Cancer  This was an unplanned visit to discuss some of the lingering effects of the androgen deprivation therapy that the patient  received over the course of the past year.  He continues to feel slightly depressed and is relatively immobile due to knee issues and low energy.  He also complained of a loss of libido which is slightly bothersome for him.    I mainly reassured him at this visit that we hope that the testosterone will rise and that his spirits will improve and his energy level and muscle mass may improve with it.  I continue to encourage him to exercise.  And I encouraged him that after the knee replacement he may be beginning to become more active and that altogether he may feel better.    We will continue to follow him for outcomes on the AFT-19 study where he was treated for 1 year with androgen deprivation therapy plus apalutamide    He will follow up per that protocol.    Signed Alejandro Palomino MD.          Again, thank you for allowing me to participate in the care of your patient.        Sincerely,        Alejandro Palomino MD

## 2021-05-24 NOTE — PROGRESS NOTES
[facility] Oncology Followup  In person visit  5/24/2021         CC: Prostate cancer     HPI: Lucio Hernandez is a 65 year old male who has prostate cancer. He had a radical prostatectomy in 2006 demonstrating Jg 7 disease with a positive margin.  He had salvage radiation therapy in 2006 when his PSA was around 0.4 per the patient's recollection.  He notes his PSA was undetectable for 4 years, but then began coming back and recently has noted to have PSAs in the 1.2-1.4 range in May of this year.  He underwent an Axumin PET scan recently that shows no evidence of metastatic disease though there was uptake in the prostate bed.       Had Prior history of urethral stricture. This may have been due to the prior radiation therapy and a Olsen Catheter complication postop.     He was enrolled in a clinical trial AFT19 with apalutamide and degarelix which started on 11/14/19.         4/11/18            PSA = 0.56  10/30/18          PSA =0.48  5/14/19            PSA =1.2  5/29/19            PSA=1.4  7/29/19            PSA=1.45  9/5/19              PSA =1.84  11/4/19            PSA=2.19  11/14/19          PSA= 2.27-- START AFT-19  12/12/19          PSA= 0.04  1/9/2020          PSA= <0.01  2/5/2020          PSA  <0.01  2/26/2020        PSA <0.01  4/6/20              PSA  <0.01  6/30/20 PSA   <0.01 Testosterone =11  7/27/20 PSA   < 0.01  9/21/20 PSA     <0.01  11/16/20 PSA     <0.01 - Discontinue Study therapy.   2/11/201 PSA  <0.01 Testosterone 54  5/20/21 PSA  <0.01 Testosterone 149    INTERIM HISTORY:  --prickly hot flashes, worse at night. Continues to have 10 or more hot flashes per day.   --improving exercise tolerance -   --felt significant improvement in cognition with discontinuation of apalutamide  --right knee issues - had tibial edema which prompted DEXA.   -- gained about 23 lbs on study, has lost about 10 in recent months.     -- taking atorvastatin for hyperlipidemia  --Knee surgery (right TKA) planned  for Shaneka 15 2021  --Continues to struggle with fatigue weight gain and generally not feeling well much which he attributes to the hormonal therapy.  He is glad to be off the study and is hopeful that the knee replacement and other things will help him to regain his stamina and a pain-free existence.      ROS: 10 point ROS neg other than the symptoms noted above in the HPI.     PAST MEDICAL HISTORY:  Otherwise significant for the prostate cancer,  history of pulmonary embolism and rib fractures from coughing fits and appendicitis.      PAST SURGICAL HISTORY:  Includes the RRP, a lumbar fusion, a thoracotomy for the history of rib fractures and the pulmonic hernia, shoulder surgery x3, appendectomy and a cervical spine surgery.      MEDICATIONS:     1.  Zyrtec.   2.  Lisinopril.   3.  Gabapentin     ALLERGIES:  NO KNOWN DRUG ALLERGIES.      FAMILY HISTORY:  Noncontributory.      SOCIAL HISTORY:  Negative for tobacco.  Alcohol 1 drink a day. Pt is a retired Otolaryngologist (ENT surgeon).      REVIEW OF SYSTEMS:  Negative for fevers, chills, sweats, nausea, vomiting, unexplained weight changes.       Physical Exam  /85   Pulse 75   Temp 98.5  F (36.9  C) (Tympanic)   Resp 18   Wt 96.4 kg (212 lb 8 oz)   SpO2 96%   BMI 31.38 kg/m    Head is normocephalic atraumatic  Sclera anicteric   oropharynx is clear the   neck is supple without adenopathy  lungs are clear to auscultation bilaterally   heart is regular rate and rhythm   abdomen is soft nontender nondistended  extremities are without cyanosis clubbing or edema.    Neurologic exam is grossly nonfocal.      Wt Readings from Last 4 Encounters:   05/24/21 96.4 kg (212 lb 8 oz)   11/16/20 94.3 kg (207 lb 12.8 oz)   10/22/20 94.8 kg (209 lb)   09/21/20 93.8 kg (206 lb 12.8 oz)             LABS        HDL 55    12/15/20 TSH = 4.07    PSA undetectable and testosterone is 54.    PSA per above  White blood cell count is 3.8 testosterone is 8  remainder of the blood work is normal.      DX HIP/PELVIS/SPINE February 12, 2021 10:12 AM     HISTORY: Prostate cancer hormonal therapy, rule out osteoporosis.  Prostate cancer (H). Androgen deprivation therapy.     FINDINGS: This DEXA scan was performed using a [employer] scanner.  DEXA results are reported according to T-score.  The T-score is the  standard deviation from the peak bone mass in a normal young adult  population. In accordance with the ISCD (International Society of  Clinical Densitometry), the lower of the total proximal femur vs  femoral neck T-score is reported. Osteopenia is defined as a T-score  of -1.0 to -2.5. Osteoporosis is defined as a T-score of less than  -2.5.     T-SCORES: Lumbar spine imaging not performed because of hardware.     Left Hip mean T-score: -0.2.  Right Hip (Neck) T-score: -0.3.     Hip lowest neck BMD: 1.031 gm/cm2.     No prior studies for comparison.     FRAX 10-YEAR PROBABILITY OF FRACTURE*:  Major Osteoporotic: 7.7%  Hip: 0.6%     *All treatment decisions require clinical judgment and consideration  of individual patient factors which may not be captured in the FRAX  model and the risk of fracture may be over- or under-estimated by  FRAX.                                                                      IMPRESSION/ PLAN    This is a 65 year old gentleman with a serologic relapse of a [place] 4+3 = 7 with a tertiary pattern of Jg 5.  He has a doubling time that is under 9 months based on the most recent 2 measurements.  He is also status post salvage radiation therapy.     Prostate Cancer  This was an unplanned visit to discuss some of the lingering effects of the androgen deprivation therapy that the patient received over the course of the past year.  He continues to feel slightly depressed and is relatively immobile due to knee issues and low energy.  He also complained of a loss of libido which is slightly bothersome for him.    I mainly reassured him at  this visit that we hope that the testosterone will rise and that his spirits will improve and his energy level and muscle mass may improve with it.  I continue to encourage him to exercise.  And I encouraged him that after the knee replacement he may be beginning to become more active and that altogether he may feel better.    We will continue to follow him for outcomes on the AFT-19 study where he was treated for 1 year with androgen deprivation therapy plus apalutamide    He will follow up per that protocol.    Signed Alejandro Palomino MD.

## 2021-05-24 NOTE — NURSING NOTE
"Oncology Rooming Note    May 24, 2021 8:22 AM   Lucio Hernandez is a 65 year old male who presents for:    Chief Complaint   Patient presents with     Oncology Clinic Visit     Pt is here for a rnt for Prstate Cancer      Initial Vitals: Blood Pressure 129/85   Pulse 75   Temperature 98.5  F (36.9  C) (Tympanic)   Respiration 18   Weight 96.4 kg (212 lb 8 oz)   Oxygen Saturation 96%   Body Mass Index 31.38 kg/m   Estimated body mass index is 31.38 kg/m  as calculated from the following:    Height as of 11/16/20: 1.753 m (5' 9\").    Weight as of this encounter: 96.4 kg (212 lb 8 oz). Body surface area is 2.17 meters squared.  Severe Pain (6) Comment: Data Unavailable   No LMP for male patient.  Allergies reviewed: Yes  Medications reviewed: Yes    Medications: Medication refills not needed today.  Pharmacy name entered into [employer]: [id number] [place] [address] [address]    Clinical concerns: none       Melvi Durán MA            "

## 2021-05-25 DIAGNOSIS — Z11.59 ENCOUNTER FOR SCREENING FOR OTHER VIRAL DISEASES: ICD-10-CM

## 2021-05-28 RX ORDER — MULTIPLE VITAMINS W/ MINERALS TAB 9MG-400MCG
1 TAB ORAL DAILY
COMMUNITY

## 2021-06-08 NOTE — PROGRESS NOTES
Ortho:  I spoke with Juventino today regarding his upcoming surgery and his urologic history.  Due to baseline urologic and bladder issues, Juventino normal self catheterize. In light of his upcoming surgery, we will plan to have a 14 Citizen of [place] coudé silvestre catheter kit available in the pre-op area that HE will place himself and this will be left in until morning of POD 1 at which time he will remove himself.     This has been discussed with him and he is in agreement with this plan.  Helen Fraser PA-C  [phone]

## 2021-06-12 DIAGNOSIS — Z11.59 ENCOUNTER FOR SCREENING FOR OTHER VIRAL DISEASES: ICD-10-CM

## 2021-06-12 LAB
SARS-COV-2 RNA RESP QL NAA+PROBE: NORMAL
SPECIMEN SOURCE: NORMAL

## 2021-06-12 PROCEDURE — U0003 INFECTIOUS AGENT DETECTION BY NUCLEIC ACID (DNA OR RNA); SEVERE ACUTE RESPIRATORY SYNDROME CORONAVIRUS 2 (SARS-COV-2) (CORONAVIRUS DISEASE [COVID-19]), AMPLIFIED PROBE TECHNIQUE, MAKING USE OF HIGH THROUGHPUT TECHNOLOGIES AS DESCRIBED BY CMS-2020-01-R: HCPCS | Performed by: ORTHOPAEDIC SURGERY

## 2021-06-12 PROCEDURE — U0005 INFEC AGEN DETEC AMPLI PROBE: HCPCS | Performed by: ORTHOPAEDIC SURGERY

## 2021-06-12 NOTE — PHARMACY-ADMISSION MEDICATION HISTORY
Admission medication history interview status for this patient is complete. See [facility] admission navigator for allergy information, prior to admission medications and immunization status.     PTA meds completed by pre-admitting nurse, Oneida Mcintyre, and reviewed by pharmacy.    Prior to Admission medications    Medication Sig Last Dose Taking? Auth Provider   atorvastatin (LIPITOR) 20 MG tablet Take 20 mg by mouth At Bedtime   Yes Reported, Patient   cetirizine (ZYRTEC) 10 MG tablet Take 10 mg by mouth daily  Yes Reported, Patient   clonazePAM (KLONOPIN) 0.5 MG tablet Take 0.5 mg by mouth nightly as needed   Yes Reported, Patient   fluticasone (FLONASE) 50 MCG/ACT nasal spray Spray 2 sprays into both nostrils daily   Yes Reported, Patient   ibuprofen (ADVIL/MOTRIN) 200 MG tablet Take 600-800 mg by mouth every 8 hours as needed for pain   Yes Reported, Patient   levothyroxine (SYNTHROID/LEVOTHROID) 75 MCG tablet TAKE 1 TABLET BY MOUTH EVERY DAY  Yes Alejandro Palomino MD   lisinopril (ZESTRIL) 20 MG tablet Take 20 mg by mouth daily   Yes Reported, Patient   multivitamin w/minerals (MULTI-VITAMIN) tablet Take 1 tablet by mouth daily  Yes Reported, Patient

## 2021-06-13 LAB
LABORATORY COMMENT REPORT: NORMAL
SARS-COV-2 RNA RESP QL NAA+PROBE: NEGATIVE
SPECIMEN SOURCE: NORMAL

## 2021-06-14 ENCOUNTER — ANESTHESIA (OUTPATIENT)
Dept: SURGERY | Facility: CLINIC | Age: 66
End: 2021-06-14
Payer: MEDICARE

## 2021-06-14 ENCOUNTER — APPOINTMENT (OUTPATIENT)
Dept: PHYSICAL THERAPY | Facility: CLINIC | Age: 66
End: 2021-06-14
Attending: ORTHOPAEDIC SURGERY
Payer: MEDICARE

## 2021-06-14 ENCOUNTER — APPOINTMENT (OUTPATIENT)
Dept: GENERAL RADIOLOGY | Facility: CLINIC | Age: 66
End: 2021-06-14
Attending: ORTHOPAEDIC SURGERY
Payer: MEDICARE

## 2021-06-14 ENCOUNTER — HOSPITAL ENCOUNTER (OUTPATIENT)
Facility: CLINIC | Age: 66
Discharge: HOME | End: 2021-06-15
Attending: ORTHOPAEDIC SURGERY | Admitting: ORTHOPAEDIC SURGERY
Payer: MEDICARE

## 2021-06-14 ENCOUNTER — ANESTHESIA EVENT (OUTPATIENT)
Dept: SURGERY | Facility: CLINIC | Age: 66
End: 2021-06-14
Payer: MEDICARE

## 2021-06-14 DIAGNOSIS — Z96.659 HISTORY OF TOTAL KNEE REPLACEMENT, UNSPECIFIED LATERALITY: Primary | ICD-10-CM

## 2021-06-14 LAB
CREAT SERPL-MCNC: 0.86 MG/DL (ref 0.66–1.25)
GFR SERPL CREATININE-BSD FRML MDRD: 89 ML/MIN/{1.73_M2}

## 2021-06-14 PROCEDURE — C1776 JOINT DEVICE (IMPLANTABLE): HCPCS | Performed by: ORTHOPAEDIC SURGERY

## 2021-06-14 PROCEDURE — 272N000001 HC OR GENERAL SUPPLY STERILE: Performed by: ORTHOPAEDIC SURGERY

## 2021-06-14 PROCEDURE — 250N000011 HC RX IP 250 OP 636: Performed by: NURSE ANESTHETIST, CERTIFIED REGISTERED

## 2021-06-14 PROCEDURE — 258N000003 HC RX IP 258 OP 636: Performed by: PHYSICIAN ASSISTANT

## 2021-06-14 PROCEDURE — 999N000141 HC STATISTIC PRE-PROCEDURE NURSING ASSESSMENT: Performed by: ORTHOPAEDIC SURGERY

## 2021-06-14 PROCEDURE — 258N000003 HC RX IP 258 OP 636: Performed by: ORTHOPAEDIC SURGERY

## 2021-06-14 PROCEDURE — 97161 PT EVAL LOW COMPLEX 20 MIN: CPT | Mod: GP | Performed by: PHYSICAL THERAPIST

## 2021-06-14 PROCEDURE — 710N000009 HC RECOVERY PHASE 1, LEVEL 1, PER MIN: Performed by: ORTHOPAEDIC SURGERY

## 2021-06-14 PROCEDURE — 250N000013 HC RX MED GY IP 250 OP 250 PS 637: Performed by: ORTHOPAEDIC SURGERY

## 2021-06-14 PROCEDURE — 82565 ASSAY OF CREATININE: CPT | Performed by: ANESTHESIOLOGY

## 2021-06-14 PROCEDURE — 360N000077 HC SURGERY LEVEL 4, PER MIN: Performed by: ORTHOPAEDIC SURGERY

## 2021-06-14 PROCEDURE — 370N000017 HC ANESTHESIA TECHNICAL FEE, PER MIN: Performed by: ORTHOPAEDIC SURGERY

## 2021-06-14 PROCEDURE — 250N000009 HC RX 250: Performed by: NURSE ANESTHETIST, CERTIFIED REGISTERED

## 2021-06-14 PROCEDURE — 250N000011 HC RX IP 250 OP 636: Performed by: ANESTHESIOLOGY

## 2021-06-14 PROCEDURE — 97530 THERAPEUTIC ACTIVITIES: CPT | Mod: GP | Performed by: PHYSICAL THERAPIST

## 2021-06-14 PROCEDURE — 36415 COLL VENOUS BLD VENIPUNCTURE: CPT | Performed by: ANESTHESIOLOGY

## 2021-06-14 PROCEDURE — 250N000009 HC RX 250: Performed by: ANESTHESIOLOGY

## 2021-06-14 PROCEDURE — 250N000011 HC RX IP 250 OP 636: Performed by: PHYSICIAN ASSISTANT

## 2021-06-14 PROCEDURE — 97116 GAIT TRAINING THERAPY: CPT | Mod: GP | Performed by: PHYSICAL THERAPIST

## 2021-06-14 PROCEDURE — 250N000009 HC RX 250: Performed by: ORTHOPAEDIC SURGERY

## 2021-06-14 PROCEDURE — 278N000051 HC OR IMPLANT GENERAL: Performed by: ORTHOPAEDIC SURGERY

## 2021-06-14 PROCEDURE — 258N000001 HC RX 258: Performed by: ORTHOPAEDIC SURGERY

## 2021-06-14 PROCEDURE — 999N000065 XR KNEE PORT RIGHT 1/2 VIEWS: Mod: RT

## 2021-06-14 PROCEDURE — 258N000003 HC RX IP 258 OP 636: Performed by: ANESTHESIOLOGY

## 2021-06-14 PROCEDURE — 250N000011 HC RX IP 250 OP 636: Performed by: ORTHOPAEDIC SURGERY

## 2021-06-14 PROCEDURE — 250N000013 HC RX MED GY IP 250 OP 250 PS 637: Performed by: PHYSICIAN ASSISTANT

## 2021-06-14 DEVICE — IMPLANTABLE DEVICE: Type: IMPLANTABLE DEVICE | Site: KNEE | Status: FUNCTIONAL

## 2021-06-14 DEVICE — BONE CEMENT SIMPLEX FULL DOSE 6191-1-001: Type: IMPLANTABLE DEVICE | Site: KNEE | Status: FUNCTIONAL

## 2021-06-14 DEVICE — IMP PATELLA ZIM KNEE ALL POLLY 35MM 42-5400-000-35: Type: IMPLANTABLE DEVICE | Site: KNEE | Status: FUNCTIONAL

## 2021-06-14 DEVICE — IMP TIBIAL ZIM PSN NP STM 5DEG SZ ER 42-5320-071-02: Type: IMPLANTABLE DEVICE | Site: KNEE | Status: FUNCTIONAL

## 2021-06-14 RX ORDER — NALOXONE HYDROCHLORIDE 0.4 MG/ML
0.4 INJECTION, SOLUTION INTRAMUSCULAR; INTRAVENOUS; SUBCUTANEOUS
Status: DISCONTINUED | OUTPATIENT
Start: 2021-06-14 | End: 2021-06-14 | Stop reason: HOSPADM

## 2021-06-14 RX ORDER — SODIUM CHLORIDE, SODIUM LACTATE, POTASSIUM CHLORIDE, CALCIUM CHLORIDE 600; 310; 30; 20 MG/100ML; MG/100ML; MG/100ML; MG/100ML
INJECTION, SOLUTION INTRAVENOUS CONTINUOUS
Status: DISCONTINUED | OUTPATIENT
Start: 2021-06-14 | End: 2021-06-14 | Stop reason: HOSPADM

## 2021-06-14 RX ORDER — POLYETHYLENE GLYCOL 3350 17 G/17G
17 POWDER, FOR SOLUTION ORAL DAILY
Status: DISCONTINUED | OUTPATIENT
Start: 2021-06-15 | End: 2021-06-15 | Stop reason: HOSPADM

## 2021-06-14 RX ORDER — OXYCODONE HYDROCHLORIDE 5 MG/1
TABLET ORAL
Qty: 50 TABLET | Refills: 0 | Status: SHIPPED | OUTPATIENT
Start: 2021-06-14 | End: 2021-11-08

## 2021-06-14 RX ORDER — CEFAZOLIN SODIUM 2 G/100ML
2 INJECTION, SOLUTION INTRAVENOUS
Status: DISCONTINUED | OUTPATIENT
Start: 2021-06-14 | End: 2021-06-14 | Stop reason: HOSPADM

## 2021-06-14 RX ORDER — PROPOFOL 10 MG/ML
INJECTION, EMULSION INTRAVENOUS CONTINUOUS PRN
Status: DISCONTINUED | OUTPATIENT
Start: 2021-06-14 | End: 2021-06-14

## 2021-06-14 RX ORDER — HYDROXYZINE HYDROCHLORIDE 10 MG/1
10 TABLET, FILM COATED ORAL EVERY 6 HOURS PRN
Status: DISCONTINUED | OUTPATIENT
Start: 2021-06-14 | End: 2021-06-15 | Stop reason: HOSPADM

## 2021-06-14 RX ORDER — NALOXONE HYDROCHLORIDE 0.4 MG/ML
0.2 INJECTION, SOLUTION INTRAMUSCULAR; INTRAVENOUS; SUBCUTANEOUS
Status: DISCONTINUED | OUTPATIENT
Start: 2021-06-14 | End: 2021-06-15 | Stop reason: HOSPADM

## 2021-06-14 RX ORDER — BUPIVACAINE HYDROCHLORIDE 7.5 MG/ML
INJECTION, SOLUTION INTRASPINAL PRN
Status: DISCONTINUED | OUTPATIENT
Start: 2021-06-14 | End: 2021-06-14

## 2021-06-14 RX ORDER — BUPIVACAINE HYDROCHLORIDE AND EPINEPHRINE 5; 5 MG/ML; UG/ML
INJECTION, SOLUTION PERINEURAL PRN
Status: DISCONTINUED | OUTPATIENT
Start: 2021-06-14 | End: 2021-06-14

## 2021-06-14 RX ORDER — IBUPROFEN 600 MG/1
600 TABLET, FILM COATED ORAL EVERY 6 HOURS PRN
Qty: 30 TABLET | Refills: 0 | Status: SHIPPED | OUTPATIENT
Start: 2021-06-14 | End: 2021-06-14

## 2021-06-14 RX ORDER — BISACODYL 10 MG
10 SUPPOSITORY, RECTAL RECTAL DAILY PRN
Status: DISCONTINUED | OUTPATIENT
Start: 2021-06-14 | End: 2021-06-15 | Stop reason: HOSPADM

## 2021-06-14 RX ORDER — LABETALOL HYDROCHLORIDE 5 MG/ML
10 INJECTION, SOLUTION INTRAVENOUS
Status: DISCONTINUED | OUTPATIENT
Start: 2021-06-14 | End: 2021-06-14 | Stop reason: HOSPADM

## 2021-06-14 RX ORDER — LIDOCAINE 40 MG/G
CREAM TOPICAL
Status: DISCONTINUED | OUTPATIENT
Start: 2021-06-14 | End: 2021-06-15

## 2021-06-14 RX ORDER — HYDROMORPHONE HYDROCHLORIDE 1 MG/ML
.3-.5 INJECTION, SOLUTION INTRAMUSCULAR; INTRAVENOUS; SUBCUTANEOUS EVERY 10 MIN PRN
Status: DISCONTINUED | OUTPATIENT
Start: 2021-06-14 | End: 2021-06-14 | Stop reason: HOSPADM

## 2021-06-14 RX ORDER — ACETAMINOPHEN 325 MG/1
975 TABLET ORAL 3 TIMES DAILY
Status: DISCONTINUED | OUTPATIENT
Start: 2021-06-14 | End: 2021-06-15 | Stop reason: HOSPADM

## 2021-06-14 RX ORDER — ACETAMINOPHEN 325 MG/1
650 TABLET ORAL EVERY 4 HOURS PRN
Qty: 100 TABLET | Refills: 0 | Status: SHIPPED | OUTPATIENT
Start: 2021-06-14

## 2021-06-14 RX ORDER — NALOXONE HYDROCHLORIDE 0.4 MG/ML
0.4 INJECTION, SOLUTION INTRAMUSCULAR; INTRAVENOUS; SUBCUTANEOUS
Status: DISCONTINUED | OUTPATIENT
Start: 2021-06-14 | End: 2021-06-15 | Stop reason: HOSPADM

## 2021-06-14 RX ORDER — ACETAMINOPHEN 325 MG/1
975 TABLET ORAL ONCE
Status: COMPLETED | OUTPATIENT
Start: 2021-06-14 | End: 2021-06-14

## 2021-06-14 RX ORDER — CEFAZOLIN SODIUM 2 G/100ML
2 INJECTION, SOLUTION INTRAVENOUS EVERY 8 HOURS
Status: COMPLETED | OUTPATIENT
Start: 2021-06-14 | End: 2021-06-15

## 2021-06-14 RX ORDER — DOCUSATE SODIUM 100 MG/1
100 CAPSULE, LIQUID FILLED ORAL 2 TIMES DAILY
Status: DISCONTINUED | OUTPATIENT
Start: 2021-06-14 | End: 2021-06-15 | Stop reason: HOSPADM

## 2021-06-14 RX ORDER — SODIUM CHLORIDE, SODIUM LACTATE, POTASSIUM CHLORIDE, CALCIUM CHLORIDE 600; 310; 30; 20 MG/100ML; MG/100ML; MG/100ML; MG/100ML
INJECTION, SOLUTION INTRAVENOUS CONTINUOUS
Status: DISCONTINUED | OUTPATIENT
Start: 2021-06-14 | End: 2021-06-15

## 2021-06-14 RX ORDER — MEPERIDINE HYDROCHLORIDE 25 MG/ML
12.5 INJECTION INTRAMUSCULAR; INTRAVENOUS; SUBCUTANEOUS
Status: DISCONTINUED | OUTPATIENT
Start: 2021-06-14 | End: 2021-06-14 | Stop reason: HOSPADM

## 2021-06-14 RX ORDER — ONDANSETRON 4 MG/1
4 TABLET, ORALLY DISINTEGRATING ORAL EVERY 30 MIN PRN
Status: DISCONTINUED | OUTPATIENT
Start: 2021-06-14 | End: 2021-06-14 | Stop reason: HOSPADM

## 2021-06-14 RX ORDER — METHOCARBAMOL 500 MG/1
500 TABLET, FILM COATED ORAL EVERY 6 HOURS PRN
Status: DISCONTINUED | OUTPATIENT
Start: 2021-06-14 | End: 2021-06-15 | Stop reason: HOSPADM

## 2021-06-14 RX ORDER — AMOXICILLIN 250 MG
1-2 CAPSULE ORAL 2 TIMES DAILY
Qty: 30 TABLET | Refills: 0 | Status: SHIPPED | OUTPATIENT
Start: 2021-06-14 | End: 2021-11-08

## 2021-06-14 RX ORDER — IBUPROFEN 400 MG/1
400 TABLET, FILM COATED ORAL EVERY 4 HOURS PRN
Status: DISCONTINUED | OUTPATIENT
Start: 2021-06-14 | End: 2021-06-15 | Stop reason: HOSPADM

## 2021-06-14 RX ORDER — ONDANSETRON 2 MG/ML
4 INJECTION INTRAMUSCULAR; INTRAVENOUS EVERY 6 HOURS PRN
Status: DISCONTINUED | OUTPATIENT
Start: 2021-06-14 | End: 2021-06-15 | Stop reason: HOSPADM

## 2021-06-14 RX ORDER — CEFAZOLIN SODIUM 2 G/100ML
2 INJECTION, SOLUTION INTRAVENOUS SEE ADMIN INSTRUCTIONS
Status: DISCONTINUED | OUTPATIENT
Start: 2021-06-14 | End: 2021-06-14 | Stop reason: HOSPADM

## 2021-06-14 RX ORDER — TRANEXAMIC ACID 650 MG/1
1950 TABLET ORAL ONCE
Status: COMPLETED | OUTPATIENT
Start: 2021-06-14 | End: 2021-06-14

## 2021-06-14 RX ORDER — NALOXONE HYDROCHLORIDE 0.4 MG/ML
0.2 INJECTION, SOLUTION INTRAMUSCULAR; INTRAVENOUS; SUBCUTANEOUS
Status: DISCONTINUED | OUTPATIENT
Start: 2021-06-14 | End: 2021-06-14 | Stop reason: HOSPADM

## 2021-06-14 RX ORDER — ALBUTEROL SULFATE 0.83 MG/ML
2.5 SOLUTION RESPIRATORY (INHALATION) EVERY 4 HOURS PRN
Status: DISCONTINUED | OUTPATIENT
Start: 2021-06-14 | End: 2021-06-14 | Stop reason: HOSPADM

## 2021-06-14 RX ORDER — FENTANYL CITRATE 50 UG/ML
25-50 INJECTION, SOLUTION INTRAMUSCULAR; INTRAVENOUS
Status: DISCONTINUED | OUTPATIENT
Start: 2021-06-14 | End: 2021-06-14 | Stop reason: HOSPADM

## 2021-06-14 RX ORDER — HYDROMORPHONE HCL IN WATER/PF 6 MG/30 ML
0.4 PATIENT CONTROLLED ANALGESIA SYRINGE INTRAVENOUS
Status: DISCONTINUED | OUTPATIENT
Start: 2021-06-14 | End: 2021-06-15 | Stop reason: HOSPADM

## 2021-06-14 RX ORDER — ACETAMINOPHEN 325 MG/1
650 TABLET ORAL EVERY 4 HOURS PRN
Status: DISCONTINUED | OUTPATIENT
Start: 2021-06-17 | End: 2021-06-15 | Stop reason: HOSPADM

## 2021-06-14 RX ORDER — OXYCODONE HYDROCHLORIDE 5 MG/1
5 TABLET ORAL EVERY 4 HOURS PRN
Status: DISCONTINUED | OUTPATIENT
Start: 2021-06-14 | End: 2021-06-15 | Stop reason: HOSPADM

## 2021-06-14 RX ORDER — FENTANYL CITRATE 50 UG/ML
INJECTION, SOLUTION INTRAMUSCULAR; INTRAVENOUS PRN
Status: DISCONTINUED | OUTPATIENT
Start: 2021-06-14 | End: 2021-06-14

## 2021-06-14 RX ORDER — GLYCOPYRROLATE 0.2 MG/ML
INJECTION, SOLUTION INTRAMUSCULAR; INTRAVENOUS PRN
Status: DISCONTINUED | OUTPATIENT
Start: 2021-06-14 | End: 2021-06-14

## 2021-06-14 RX ORDER — HYDROXYZINE HYDROCHLORIDE 10 MG/1
10 TABLET, FILM COATED ORAL EVERY 6 HOURS PRN
Qty: 30 TABLET | Refills: 0 | Status: SHIPPED | OUTPATIENT
Start: 2021-06-14 | End: 2021-06-28

## 2021-06-14 RX ORDER — ONDANSETRON 2 MG/ML
4 INJECTION INTRAMUSCULAR; INTRAVENOUS EVERY 30 MIN PRN
Status: DISCONTINUED | OUTPATIENT
Start: 2021-06-14 | End: 2021-06-14 | Stop reason: HOSPADM

## 2021-06-14 RX ORDER — PROCHLORPERAZINE MALEATE 5 MG
5 TABLET ORAL EVERY 6 HOURS PRN
Status: DISCONTINUED | OUTPATIENT
Start: 2021-06-14 | End: 2021-06-15 | Stop reason: HOSPADM

## 2021-06-14 RX ORDER — ONDANSETRON 4 MG/1
4 TABLET, ORALLY DISINTEGRATING ORAL EVERY 6 HOURS PRN
Status: DISCONTINUED | OUTPATIENT
Start: 2021-06-14 | End: 2021-06-15 | Stop reason: HOSPADM

## 2021-06-14 RX ORDER — OXYCODONE HYDROCHLORIDE 5 MG/1
10 TABLET ORAL EVERY 4 HOURS PRN
Status: DISCONTINUED | OUTPATIENT
Start: 2021-06-14 | End: 2021-06-15 | Stop reason: HOSPADM

## 2021-06-14 RX ORDER — HYDROMORPHONE HCL IN WATER/PF 6 MG/30 ML
0.2 PATIENT CONTROLLED ANALGESIA SYRINGE INTRAVENOUS
Status: DISCONTINUED | OUTPATIENT
Start: 2021-06-14 | End: 2021-06-15 | Stop reason: HOSPADM

## 2021-06-14 RX ORDER — AMOXICILLIN 250 MG
1 CAPSULE ORAL 2 TIMES DAILY
Status: DISCONTINUED | OUTPATIENT
Start: 2021-06-14 | End: 2021-06-15 | Stop reason: HOSPADM

## 2021-06-14 RX ORDER — HYDRALAZINE HYDROCHLORIDE 20 MG/ML
2.5-5 INJECTION INTRAMUSCULAR; INTRAVENOUS EVERY 10 MIN PRN
Status: DISCONTINUED | OUTPATIENT
Start: 2021-06-14 | End: 2021-06-14 | Stop reason: HOSPADM

## 2021-06-14 RX ADMIN — OXYCODONE HYDROCHLORIDE 10 MG: 5 TABLET ORAL at 22:42

## 2021-06-14 RX ADMIN — DOCUSATE SODIUM AND SENNOSIDES 1 TABLET: 8.6; 5 TABLET, FILM COATED ORAL at 20:33

## 2021-06-14 RX ADMIN — CEFAZOLIN SODIUM 2 G: 2 INJECTION, SOLUTION INTRAVENOUS at 18:13

## 2021-06-14 RX ADMIN — ACETAMINOPHEN 975 MG: 325 TABLET, FILM COATED ORAL at 22:42

## 2021-06-14 RX ADMIN — HYDROMORPHONE HYDROCHLORIDE 0.4 MG: 0.2 INJECTION, SOLUTION INTRAMUSCULAR; INTRAVENOUS; SUBCUTANEOUS at 23:59

## 2021-06-14 RX ADMIN — BUPIVACAINE HYDROCHLORIDE IN DEXTROSE 1.4 ML: 7.5 INJECTION, SOLUTION SUBARACHNOID at 10:19

## 2021-06-14 RX ADMIN — FENTANYL CITRATE 50 MCG: 50 INJECTION, SOLUTION INTRAMUSCULAR; INTRAVENOUS at 14:32

## 2021-06-14 RX ADMIN — TRANEXAMIC ACID 1950 MG: 650 TABLET ORAL at 08:53

## 2021-06-14 RX ADMIN — METHOCARBAMOL 500 MG: 500 TABLET ORAL at 16:46

## 2021-06-14 RX ADMIN — PROPOFOL 100 MCG/KG/MIN: 10 INJECTION, EMULSION INTRAVENOUS at 10:21

## 2021-06-14 RX ADMIN — BUPIVACAINE HYDROCHLORIDE AND EPINEPHRINE BITARTRATE 30 ML: 5; .005 INJECTION, SOLUTION EPIDURAL; INTRACAUDAL; PERINEURAL at 12:17

## 2021-06-14 RX ADMIN — ACETAMINOPHEN 975 MG: 325 TABLET, FILM COATED ORAL at 16:46

## 2021-06-14 RX ADMIN — MIDAZOLAM 2 MG: 1 INJECTION INTRAMUSCULAR; INTRAVENOUS at 10:15

## 2021-06-14 RX ADMIN — CEFAZOLIN SODIUM 2 G: 2 INJECTION, SOLUTION INTRAVENOUS at 10:21

## 2021-06-14 RX ADMIN — SODIUM CHLORIDE, POTASSIUM CHLORIDE, SODIUM LACTATE AND CALCIUM CHLORIDE: 600; 310; 30; 20 INJECTION, SOLUTION INTRAVENOUS at 10:15

## 2021-06-14 RX ADMIN — SODIUM CHLORIDE, POTASSIUM CHLORIDE, SODIUM LACTATE AND CALCIUM CHLORIDE: 600; 310; 30; 20 INJECTION, SOLUTION INTRAVENOUS at 16:41

## 2021-06-14 RX ADMIN — GLYCOPYRROLATE 0.2 MG: 0.2 INJECTION, SOLUTION INTRAMUSCULAR; INTRAVENOUS at 10:51

## 2021-06-14 RX ADMIN — FENTANYL CITRATE 50 MCG: 50 INJECTION, SOLUTION INTRAMUSCULAR; INTRAVENOUS at 10:15

## 2021-06-14 RX ADMIN — ACETAMINOPHEN 975 MG: 325 TABLET, FILM COATED ORAL at 08:53

## 2021-06-14 RX ADMIN — DOCUSATE SODIUM 100 MG: 100 CAPSULE, LIQUID FILLED ORAL at 20:34

## 2021-06-14 RX ADMIN — OXYCODONE HYDROCHLORIDE 10 MG: 5 TABLET ORAL at 18:21

## 2021-06-14 RX ADMIN — OXYCODONE HYDROCHLORIDE 5 MG: 5 TABLET ORAL at 14:34

## 2021-06-14 ASSESSMENT — MIFFLIN-ST. JEOR: SCORE: 1717.93

## 2021-06-14 NOTE — ANESTHESIA POSTPROCEDURE EVALUATION
Patient: Lucio Hernandez    Procedure(s):  Right total knee arthroplasty    Diagnosis:DJD (degenerative joint disease) [M19.90]  Osteoarthritis [M19.90]  Diagnosis Additional Information: No value filed.    Anesthesia Type:  Spinal    Note:  Disposition: Admission   Postop Pain Control: Uneventful            Sign Out: Well controlled pain   PONV: No   Neuro/Psych: Uneventful            Sign Out: Acceptable/Baseline neuro status   Airway/Respiratory: Uneventful            Sign Out: Acceptable/Baseline resp. status   CV/Hemodynamics: Uneventful            Sign Out: Acceptable CV status   Other NRE: NONE   DID A NON-ROUTINE EVENT OCCUR? No    Event details/Postop Comments:  PNB for post op pain in pacu           Last vitals:  Vitals:    06/14/21 0835 06/14/21 1155 06/14/21 1200   BP: (!) 159/96 131/77 (!) 145/73   Pulse:   78   Resp: 20 11 16   Temp: 97.2  F (36.2  C) 96.9  F (36.1  C)    SpO2:  100% 100%       Last vitals prior to Anesthesia Care Transfer:  CRNA VITALS  6/14/2021 1125 - 6/14/2021 1225      6/14/2021             NIBP:  102/60    Pulse:  79    NIBP Mean:  75    SpO2:  98 %          Electronically Signed By: Kelvin Garcia MD  June 14, 2021  12:26 PM

## 2021-06-14 NOTE — ANESTHESIA PROCEDURE NOTES
Saphenous (Via adductor) Procedure Note  Pre-Procedure   Staff -        Anesthesiologist:  Kelvin Garcia MD       Performed By: anesthesiologist       Referred By: donato       Location: post-op       Pre-Anesthestic Checklist: patient identified, IV checked, site marked, risks and benefits discussed, informed consent, monitors and equipment checked, pre-op evaluation, at physician/surgeon's request and post-op pain management  Timeout:       Correct Patient: Yes        Correct Procedure: Yes        Correct Site: Yes        Correct Position: Yes        Correct Laterality: Yes        Site Marked: Yes  Procedure Documentation  Procedure: Saphenous (Via adductor)       Diagnosis: ARTHRITIS       Laterality: right       Patient Position: supine       Patient Prep/Sterile Barriers: sterile gloves, mask       Skin prep: Chloraprep       Needle Type: insulated and short bevel       Needle Gauge: 21.        Needle Length (Inches): 4        Ultrasound guided       1. Ultrasound was used to identify targeted nerve, plexus, vascular marker, or fascial plane and place a needle adjacent to it in real-time.       2. Ultrasound was used to visualize the spread of anesthetic in close proximity to the above referenced structure.       4. The visualized anatomic structures appeared normal.       5. There were no apparent abnormal pathologic findings.    Assessment/Narrative         The placement was negative for: blood aspirated, painful injection and site bleeding       Paresthesias: No.     Bolus given via needle..        Secured via.        Insertion/Infusion Method: Single Shot       Complications: none       Injection made incrementally with aspirations every 5 mL.    Comments:  Good LA spread in the adductor canal

## 2021-06-14 NOTE — ANESTHESIA PROCEDURE NOTES
Intrathecal injection Procedure Note  Pre-Procedure   Staff -        Anesthesiologist:  Kelvin Garcia MD       Performed By: anesthesiologist       Referred By: donato       Location: OR       Pre-Anesthestic Checklist: patient identified, IV checked, risks and benefits discussed, informed consent, monitors and equipment checked, pre-op evaluation and at physician/surgeon's request  Timeout:       Correct Patient: Yes        Correct Procedure: Yes        Correct Site: Yes        Correct Position: Yes   Procedure Documentation  Procedure: intrathecal injection       Diagnosis: arthritis       Patient Position: sitting       Patient Prep/Sterile Barriers: sterile gloves, mask, patient draped       Skin prep: Chloraprep       Insertion Site: L3-4. (midline approach).       Needle Gauge: 25.        Needle Length (Inches): 3.5        Spinal Needle Type: Pencan       Introducer used       Introducer: 20 G       # of attempts: 1 and  # of redirects:  0    Assessment/Narrative         Paresthesias: No.       CSF fluid: clear.

## 2021-06-14 NOTE — ANESTHESIA PREPROCEDURE EVALUATION
Anesthesia Pre-Procedure Evaluation    Patient: Lucio Hernandez   MRN: [id number] : 1955        Preoperative Diagnosis: DJD (degenerative joint disease) [M19.90]  Osteoarthritis [M19.90]   Procedure : Procedure(s):  Right total knee arthroplasty (spinal with adductor canal block)     Past Medical History:   Diagnosis Date     Arthritis      Cancer (H)      Hx of blood clots     blood clot in upper arm after back surgery     Hypertension      Pulmonary embolism (H)     after thoracotomy     Thyroid disease      Uncomplicated asthma     seasonal       Past Surgical History:   Procedure Laterality Date     BACK SURGERY      C5-C6 framinotomy     BLADDER SURGERY       CYSTOSCOPY       CYSTOSCOPY, BLADDER NECK CUTS, COMBINED N/A 2015    Procedure: COMBINED CYSTOSCOPY, BLADDER NECK CUTS;  Surgeon: Paco Haskins MD;  Location:  OR     CYSTOSCOPY, DILATE URETHRA, COMBINED N/A 2015    Procedure: COMBINED CYSTOSCOPY, DILATE URETHRA;  Surgeon: Paco Haskins MD;  Location:  OR     CYSTOSCOPY, DILATE URETHRA, COMBINED N/A 2015    Procedure: COMBINED CYSTOSCOPY, DILATE URETHRA;  Surgeon: Eduardo Landaverde MD;  Location:  OR     CYSTOSCOPY, DILATE URETHRA, COMBINED N/A 2015    Procedure: COMBINED CYSTOSCOPY, DILATE URETHRA;  Surgeon: Paco Haskins MD;  Location:  OR     ENT SURGERY      tonsillectomy     GENITOURINARY SURGERY      prostatectomy     INSERT CATHETER BLADDER N/A 2015    Procedure: INSERT CATHETER BLADDER;  Surgeon: Paco Haskins MD;  Location:  OR     LAPAROSCOPIC APPENDECTOMY  2014    Procedure: LAPAROSCOPIC APPENDECTOMY;  Surgeon: Scar Schroeder MD;  Location:  OR     ORTHOPEDIC SURGERY      shoulder x 3     PROSTATE SURGERY  2006     THORACOTOMY Left 2017    Procedure: THORACOTOMY;  LEFT THORACOTOMY, REPAIR OF INTERCOSTAL HERNIA  AND INTERNAL FIXATION RIB FRACTURES OF RIBS 7 AND 8;  Surgeon: Myron Damon  MD Nando;  Location: [facility] OR     VASECTOMY  1987      No Known Allergies   Social History     Tobacco Use     Smoking status: Never Smoker     Smokeless tobacco: Never Used   Substance Use Topics     Alcohol use: Yes     Comment: one drink a day       Wt Readings from Last 1 Encounters:   06/14/21 95.3 kg (210 lb)        Anesthesia Evaluation   Pt has had prior anesthetic. Type: General.    No history of anesthetic complications       ROS/MED HX  ENT/Pulmonary:     (+) Intermittent, asthma Treatment: Inhaler prn,      Neurologic:  - neg neurologic ROS   (+) no peripheral neuropathy     Cardiovascular:     (+) hypertension----- (-) taking anticoagulants/antiplatelets   METS/Exercise Tolerance:     Hematologic:     (+) History of blood clots, pt is not anticoagulated,     Musculoskeletal:  - neg musculoskeletal ROS     GI/Hepatic:  - neg GI/hepatic ROS     Renal/Genitourinary:     (+) BPH,     Endo:     (+) thyroid problem,     Psychiatric/Substance Use:  - neg psychiatric ROS     Infectious Disease:  - neg infectious disease ROS     Malignancy:       Other:            Physical Exam    Airway        Mallampati: II   TM distance: > 3 FB   Neck ROM: full   Mouth opening: > 3 cm    Respiratory Devices and Support         Dental  no notable dental history         Cardiovascular   cardiovascular exam normal          Pulmonary   pulmonary exam normal                OUTSIDE LABS:  CBC:   Lab Results   Component Value Date    WBC 3.8 (L) 11/16/2020    WBC 6.1 10/22/2020    HGB 14.6 11/16/2020    HGB 15.3 10/22/2020    HCT 42.3 11/16/2020    HCT 44.1 10/22/2020     11/16/2020     10/22/2020     BMP:   Lab Results   Component Value Date     11/16/2020     10/22/2020    POTASSIUM 4.1 11/16/2020    POTASSIUM 3.6 10/22/2020    CHLORIDE 104 11/16/2020    CHLORIDE 105 10/22/2020    CO2 25 11/16/2020    CO2 25 10/22/2020    BUN 18 11/16/2020    BUN 19 10/22/2020    CR 0.83 11/16/2020    CR 0.87 10/22/2020     GLC 95 11/16/2020    GLC 82 10/22/2020     COAGS:   Lab Results   Component Value Date    PTT 25 06/28/2006    INR 0.81 (L) 12/02/2017     POC:   Lab Results   Component Value Date     (H) 08/21/2020     HEPATIC:   Lab Results   Component Value Date    ALBUMIN 3.7 11/16/2020    PROTTOTAL 6.7 (L) 11/16/2020    ALT 35 11/16/2020    AST 23 11/16/2020    ALKPHOS 88 11/16/2020    BILITOTAL 0.4 11/16/2020     OTHER:   Lab Results   Component Value Date    GIANA 8.8 11/16/2020    LIPASE 49 06/11/2014    TSH 2.90 04/16/2021    T4 0.88 11/16/2020    SED 11 01/06/2012       Anesthesia Plan    ASA Status:  2   NPO Status:  NPO Appropriate    Anesthesia Type: Spinal.   Induction: Intravenous.   Maintenance: TIVA.        Consents    Anesthesia Plan(s) and associated risks, benefits, and realistic alternatives discussed. Questions answered and patient/representative(s) expressed understanding.     - Discussed with:  Patient      - Extended Intubation/Ventilatory Support Discussed: No.      - Patient is DNR/DNI Status: No    Use of blood products discussed: No .     Postoperative Care    Pain management: IV analgesics, Oral pain medications, Multi-modal analgesia, Peripheral nerve block (Single Shot).   PONV prophylaxis: Ondansetron (or other 5HT-3), Dexamethasone or Solumedrol     Comments:        Spinal Block: Following an discussion of the procedure, expectations, and risks and benefits (risks including, but not limited to, nerve damage, infection, bleeding/hematoma, spinal headache, partial or failed block), the patient appears to understand and consents to proceed. Discussed conversion to general anesthesia PRN in the event of a failed block or patient intolerance to the procedure. Questions were encouraged and answered. Patient wishes to proceed.     The surgeon has given a verbal order transferring care of this patient to me for the performance of a regional analgesia block for post-op pain control. It is requested  of me because I am uniquely trained and qualified to perform this block and the surgeon is neither trained nor qualified to perform this procedure. To be done post op            Kelvin Garcia MD

## 2021-06-14 NOTE — OP NOTE
Procedure Date: 06/14/2021    PREOPERATIVE DIAGNOSIS:  Osteoarthritis, right knee.    POSTOPERATIVE DIAGNOSIS:  Osteoarthritis, right knee.    PROCEDURE PERFORMED:  Right total knee arthroplasty.    SURGEON:  Attila Castellanos MD    ASSISTANT:  Helen Fraser PA-C    ANESTHESIA:  Spinal with postoperative adductor block and local.    ESTIMATED BLOOD LOSS:  25 mL    TOURNIQUET TIME:  Approximately 70 minutes.    SPECIMENS:  None.    DESCRIPTION OF PROCEDURE:  The patient was taken to the operating room where after successful administration of spinal anesthetic and later adductor block, tranexamic acid, antibiotic prophylaxis and sterile prep and drape, the leg was exsanguinated and an anterior incision was made followed by medial arthrotomy with a predominantly posteromedial joint line release.  There were patchy grade 4 arthritic changes predominantly in the patellofemoral joint, but also in the medial femoral condyle.  An intramedullary 5-degree guide was used with anterior referencing at 5 degrees of external rotation, which best matched the epicondylar axis and a box cut was made for PCL substitution.  Retractors were carefully placed about the proximal tibia and a cut was made perpendicular to the mechanical axis of the tibia, removing a total of approximately 3 mm of bone from the medial side.  Tibial rotation was matched to the junction of the medial and middle thirds of the tubercle and the proximal tibia was prepared.  Posterior osteophytes were removed under direct vision with the knee in hyperflexion, carefully protecting the neurovascular structures and a 10 mm was resected from the undersurface of a 23 mm patella and sized appropriately.  Gaps were equal.  After copious lavage and drying, Simplex cementing was performed for a Jake Persona size 9 narrow femur cruciate substituting, size E tibia, 12 mm vitamin E polyethylene insert, 35 mm patellar button.  Range of motion, balance and tracking were all  excellent.  There was no laxity at any point in the flexion arc.  He had easy gravity flexion to 125 degrees.      Copious saline and Betadine irrigation was performed followed by layered anatomic closure.  There were no complications.    Attila Castellanos MD        D: 2021   T: 2021   MT: [id number]    Name:     JAYME PINEDA  MRN:      [id number]        Account:        [id number]   :      1955           Procedure Date: 2021     Document: [id number]

## 2021-06-14 NOTE — ANESTHESIA CARE TRANSFER NOTE
Patient: Lucio Hernandez    Procedure(s):  Right total knee arthroplasty    Diagnosis: DJD (degenerative joint disease) [M19.90]  Osteoarthritis [M19.90]  Diagnosis Additional Information: No value filed.    Anesthesia Type:   Spinal     Note:    Oropharynx: oropharynx clear of all foreign objects  Level of Consciousness: drowsy  Oxygen Supplementation: face mask  Level of Supplemental Oxygen (L/min / FiO2): 6  Independent Airway: airway patency satisfactory and stable  Dentition: dentition unchanged  Vital Signs Stable: post-procedure vital signs reviewed and stable  Report to RN Given: handoff report given  Patient transferred to: PACU    Handoff Report: Identifed the Patient, Identified the Reponsible Provider, Reviewed the pertinent medical history, Discussed the surgical course, Reviewed Intra-OP anesthesia mangement and issues during anesthesia, Set expectations for post-procedure period and Allowed opportunity for questions and acknowledgement of understanding      Vitals: (Last set prior to Anesthesia Care Transfer)  CRNA VITALS  6/14/2021 1129 - 6/14/2021 1159      6/14/2021             NIBP:  102/60    Pulse:  79    NIBP Mean:  75    SpO2:  98 %        Electronically Signed By: Dean Dennis Severson, APRN CRNA  June 14, 2021  11:59 AM

## 2021-06-14 NOTE — PROGRESS NOTES
06/14/21 1757   Quick Adds   Type of Visit Initial PT Evaluation   Living Environment   People in home spouse   Current Living Arrangements house   Home Accessibility stairs to enter home;stairs within home   Number of Stairs, Main Entrance 2   Stair Railings, Main Entrance none   Number of Stairs, Within Home, Primary 10   Stair Railings, Within Home, Primary railing on left side (ascending)   Transportation Anticipated family or friend will provide   Self-Care   Usual Activity Tolerance good   Current Activity Tolerance moderate   Regular Exercise Yes   Activity/Exercise Type strength training   Exercise Amount/Frequency 3-5 times/wk   Equipment Currently Used at Home none   Activity/Exercise/Self-Care Comment owns a walker and a cane   Disability/Function   Fall history within last six months no   Change in Functional Status Since Onset of Current Illness/Injury yes   General Information   Onset of Illness/Injury or Date of Surgery 06/14/21   Referring Physician amos Castellanos MD   Patient/Family Therapy Goals Statement (PT) Discharge home with OP PT   Pertinent History of Current Problem (include personal factors and/or comorbidities that impact the POC) Pt is POD0 R TKA.    Existing Precautions/Restrictions fall   Weight-Bearing Status - RLE weight-bearing as tolerated   Cognition   Orientation Status (Cognition) oriented x 4   Affect/Mental Status (Cognition) WNL   Follows Commands (Cognition) WNL   Pain Assessment   Patient Currently in Pain Yes, see Vital Sign flowsheet   Integumentary/Edema   Integumentary/Edema Comments Ace wrap intact to R LE   Posture    Posture Forward head position;Protracted shoulders   Range of Motion (ROM)   ROM Comment R knee flexion to ~80 degrees sitting EOB   Strength   Strength Comments Able to SLR B LE   Bed Mobility   Comment (Bed Mobility) sit<>supine with SBA   Transfers   Transfer Safety Comments sit<>stand with CGA   Gait/Stairs (Locomotion)   Distance in Feet  (Required for LE Total Joints) 5'   Comment (Gait/Stairs) CGA with FWW   Balance   Balance Comments benefits from B UE support for safe dynamic mobility   Sensory Examination   Sensory Perception patient reports no sensory changes   Coordination   Coordination no deficits were identified   Muscle Tone   Muscle Tone no deficits were identified   Clinical Impression   Criteria for Skilled Therapeutic Intervention yes, treatment indicated   PT Diagnosis (PT) Impaired functional mobility   Influenced by the following impairments Pain, weakness, impaired R knee flexion   Functional limitations due to impairments Difficulty with bed mobility, transfers, ambulation, stairs   Clinical Presentation Stable/Uncomplicated   Clinical Presentation Rationale medically stable, clear POC   Clinical Decision Making (Complexity) low complexity   Therapy Frequency (PT) Daily   Predicted Duration of Therapy Intervention (days/wks) 2 days   Planned Therapy Interventions (PT) balance training;bed mobility training;gait training;home exercise program;patient/family education;ROM (range of motion);stair training;strengthening;stretching;transfer training   Risk & Benefits of therapy have been explained evaluation/treatment results reviewed;care plan/treatment goals reviewed;risks/benefits reviewed;current/potential barriers reviewed;participants voiced agreement with care plan;participants included;patient   PT Discharge Planning    PT Discharge Recommendation (DC Rec)   (per ortho MD)   PT Rationale for DC Rec Anticipate that at time of discharge pt will be SBA or less for bed mobility, transfers, ambulation, and stairs   Total Evaluation Time   Total Evaluation Time (Minutes) 8

## 2021-06-14 NOTE — PROGRESS NOTES
Ortho:  He will initiate Xeralto 10mg q day starting am of POD1 for 2 weeks post-op due to his hx of DVT/PE in past.  Helen Fraser PA-C  [phone]

## 2021-06-14 NOTE — OR NURSING
Patient unable to place silvestre catheter in preoperative area. Patient wanted writer to attempt catheter placement again intraoperatively once sedative medicine was being infused. Writer unable to place silvestre. Per patient request preoperatively, patient will self catheterize with normal supply of home straight catheters in the recovery area.    Rodolfo Ramirez RN on 6/14/2021 at 10:39 AM

## 2021-06-14 NOTE — BRIEF OP NOTE
TKR for Pre/Post OA knee  Emanuel  TT est 60 w EBL min (see dictation for full)  Spec none  No anticipated complications

## 2021-06-15 ENCOUNTER — APPOINTMENT (OUTPATIENT)
Dept: PHYSICAL THERAPY | Facility: CLINIC | Age: 66
End: 2021-06-15
Attending: ORTHOPAEDIC SURGERY
Payer: MEDICARE

## 2021-06-15 ENCOUNTER — APPOINTMENT (OUTPATIENT)
Dept: OCCUPATIONAL THERAPY | Facility: CLINIC | Age: 66
End: 2021-06-15
Attending: ORTHOPAEDIC SURGERY
Payer: MEDICARE

## 2021-06-15 VITALS
BODY MASS INDEX: 31.1 KG/M2 | RESPIRATION RATE: 16 BRPM | HEART RATE: 78 BPM | SYSTOLIC BLOOD PRESSURE: 136 MMHG | HEIGHT: 69 IN | WEIGHT: 210 LBS | TEMPERATURE: 97.3 F | OXYGEN SATURATION: 94 % | DIASTOLIC BLOOD PRESSURE: 81 MMHG

## 2021-06-15 LAB
GLUCOSE SERPL-MCNC: 111 MG/DL (ref 70–99)
HGB BLD-MCNC: 13.5 G/DL (ref 13.3–17.7)

## 2021-06-15 PROCEDURE — 250N000011 HC RX IP 250 OP 636: Performed by: ORTHOPAEDIC SURGERY

## 2021-06-15 PROCEDURE — 82947 ASSAY GLUCOSE BLOOD QUANT: CPT | Performed by: ORTHOPAEDIC SURGERY

## 2021-06-15 PROCEDURE — 97530 THERAPEUTIC ACTIVITIES: CPT | Mod: GP | Performed by: PHYSICAL THERAPIST

## 2021-06-15 PROCEDURE — 85018 HEMOGLOBIN: CPT | Performed by: ORTHOPAEDIC SURGERY

## 2021-06-15 PROCEDURE — 36415 COLL VENOUS BLD VENIPUNCTURE: CPT | Performed by: ORTHOPAEDIC SURGERY

## 2021-06-15 PROCEDURE — 97116 GAIT TRAINING THERAPY: CPT | Mod: GP | Performed by: PHYSICAL THERAPIST

## 2021-06-15 PROCEDURE — 250N000013 HC RX MED GY IP 250 OP 250 PS 637: Performed by: PHYSICIAN ASSISTANT

## 2021-06-15 PROCEDURE — 250N000013 HC RX MED GY IP 250 OP 250 PS 637: Performed by: ORTHOPAEDIC SURGERY

## 2021-06-15 PROCEDURE — 97165 OT EVAL LOW COMPLEX 30 MIN: CPT | Mod: GO | Performed by: REHABILITATION PRACTITIONER

## 2021-06-15 PROCEDURE — 97110 THERAPEUTIC EXERCISES: CPT | Mod: GP | Performed by: PHYSICAL THERAPIST

## 2021-06-15 PROCEDURE — 97535 SELF CARE MNGMENT TRAINING: CPT | Mod: GO | Performed by: REHABILITATION PRACTITIONER

## 2021-06-15 PROCEDURE — 99223 1ST HOSP IP/OBS HIGH 75: CPT | Performed by: PHYSICIAN ASSISTANT

## 2021-06-15 RX ORDER — CLONAZEPAM 0.5 MG/1
0.5 TABLET ORAL
Status: DISCONTINUED | OUTPATIENT
Start: 2021-06-15 | End: 2021-06-15 | Stop reason: HOSPADM

## 2021-06-15 RX ORDER — LEVOTHYROXINE SODIUM 75 UG/1
75 TABLET ORAL DAILY
Status: DISCONTINUED | OUTPATIENT
Start: 2021-06-15 | End: 2021-06-15 | Stop reason: HOSPADM

## 2021-06-15 RX ORDER — LISINOPRIL 20 MG/1
20 TABLET ORAL DAILY
Status: DISCONTINUED | OUTPATIENT
Start: 2021-06-15 | End: 2021-06-15 | Stop reason: HOSPADM

## 2021-06-15 RX ORDER — ATORVASTATIN CALCIUM 20 MG/1
20 TABLET, FILM COATED ORAL AT BEDTIME
Status: DISCONTINUED | OUTPATIENT
Start: 2021-06-15 | End: 2021-06-15 | Stop reason: HOSPADM

## 2021-06-15 RX ORDER — CETIRIZINE HYDROCHLORIDE 10 MG/1
10 TABLET ORAL DAILY
Status: DISCONTINUED | OUTPATIENT
Start: 2021-06-15 | End: 2021-06-15 | Stop reason: HOSPADM

## 2021-06-15 RX ADMIN — RIVAROXABAN 10 MG: 10 TABLET, FILM COATED ORAL at 07:49

## 2021-06-15 RX ADMIN — OXYCODONE HYDROCHLORIDE 10 MG: 5 TABLET ORAL at 11:46

## 2021-06-15 RX ADMIN — OXYCODONE HYDROCHLORIDE 10 MG: 5 TABLET ORAL at 02:31

## 2021-06-15 RX ADMIN — ACETAMINOPHEN 975 MG: 325 TABLET, FILM COATED ORAL at 07:49

## 2021-06-15 RX ADMIN — CEFAZOLIN SODIUM 2 G: 2 INJECTION, SOLUTION INTRAVENOUS at 01:55

## 2021-06-15 RX ADMIN — LEVOTHYROXINE SODIUM 75 MCG: 0.07 TABLET ORAL at 11:44

## 2021-06-15 RX ADMIN — POLYETHYLENE GLYCOL 3350 17 G: 17 POWDER, FOR SOLUTION ORAL at 07:47

## 2021-06-15 RX ADMIN — CETIRIZINE HYDROCHLORIDE 10 MG: 10 TABLET, FILM COATED ORAL at 11:44

## 2021-06-15 RX ADMIN — DOCUSATE SODIUM 100 MG: 100 CAPSULE, LIQUID FILLED ORAL at 07:48

## 2021-06-15 RX ADMIN — HYDROXYZINE HYDROCHLORIDE 10 MG: 10 TABLET ORAL at 07:48

## 2021-06-15 RX ADMIN — DOCUSATE SODIUM AND SENNOSIDES 1 TABLET: 8.6; 5 TABLET, FILM COATED ORAL at 07:48

## 2021-06-15 RX ADMIN — OXYCODONE HYDROCHLORIDE 10 MG: 5 TABLET ORAL at 07:49

## 2021-06-15 RX ADMIN — LISINOPRIL 20 MG: 20 TABLET ORAL at 11:44

## 2021-06-15 NOTE — PLAN OF CARE
Occupational Therapy Discharge Summary    Reason for therapy discharge:    All goals and outcomes met, no further needs identified.    Progress towards therapy goal(s). See goals on Care Plan in [facility] electronic health record for goal details.  Goals met    Therapy recommendation(s):    No further therapy is recommended.

## 2021-06-15 NOTE — CONSULTS
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 Pt has had a hx of PE after a thoracotomy in 2017 then a DVT on the RUE in 2018 from a PICC line.  Has seen Dr. Olsen from vascular medicine in the past who felt that these were provoked clots and did not recommend long-term anticoagulation.    1. S/p RTKA: doing well, cont PT/OT and pain control as per primary  2. HTN: BP doing well, Ok to resume Lisinopril   3. HLP: resume PPI   4. Hypothyroidism: Resume Synthroid.   5. DVT Prophylaxis: on Xarelto for 2-4 weeks as per primary myles given hx of DVT/PE postop   1. D/C planning: To home today              History of Present Illness:   This patient is a 65 year old male who is POD 1 s/p RTKA for progressive DJD.   Intra-op report reviewed and showed no intra-op complications.   I/o's reviewed, Currently net -33cc with good UOP since OR. Hgb stable this am at 13.5  Overnight did pretty well, no complaints, VSS.   Currently, today rob diet, pain controlled, no CP, SOB, no n/v.   O/w other medical problems have been stable, with no recent c/o illness.               Past Medical History:     Past Medical History:   Diagnosis Date     Arthritis      Cancer (H)      Hx of blood clots     blood clot in upper arm after back surgery     Hypertension      Pulmonary embolism (H)     after thoracotomy     Thyroid disease      Uncomplicated asthma     seasonal                Past Surgical History:     Past Surgical History:   Procedure Laterality Date     BACK SURGERY  2000    C5-C6 framinotomy     BLADDER SURGERY  2015     CYSTOSCOPY  2015     CYSTOSCOPY, BLADDER NECK CUTS, COMBINED N/A  8/27/2015    Procedure: COMBINED CYSTOSCOPY, BLADDER NECK CUTS;  Surgeon: Paco Haskins MD;  Location:  OR     CYSTOSCOPY, DILATE URETHRA, COMBINED N/A 2/27/2015    Procedure: COMBINED CYSTOSCOPY, DILATE URETHRA;  Surgeon: Paco Haskins MD;  Location:  OR     CYSTOSCOPY, DILATE URETHRA, COMBINED N/A 4/21/2015    Procedure: COMBINED CYSTOSCOPY, DILATE URETHRA;  Surgeon: Eduardo Landaverde MD;  Location:  OR     CYSTOSCOPY, DILATE URETHRA, COMBINED N/A 4/23/2015    Procedure: COMBINED CYSTOSCOPY, DILATE URETHRA;  Surgeon: Paco Haskins MD;  Location:  OR     ENT SURGERY      tonsillectomy     GENITOURINARY SURGERY  2006    prostatectomy     INSERT CATHETER BLADDER N/A 4/23/2015    Procedure: INSERT CATHETER BLADDER;  Surgeon: Paco Haskins MD;  Location:  OR     LAPAROSCOPIC APPENDECTOMY  6/11/2014    Procedure: LAPAROSCOPIC APPENDECTOMY;  Surgeon: Scar Schroeder MD;  Location:  OR     ORTHOPEDIC SURGERY      shoulder x 3     PROSTATE SURGERY  2006     THORACOTOMY Left 12/2/2017    Procedure: THORACOTOMY;  LEFT THORACOTOMY, REPAIR OF INTERCOSTAL HERNIA  AND INTERNAL FIXATION RIB FRACTURES OF RIBS 7 AND 8;  Surgeon: Myron Damon MD;  Location:  OR     VASECTOMY  1987                 Social History:     Social History     Tobacco Use     Smoking status: Never Smoker     Smokeless tobacco: Never Used   Substance Use Topics     Alcohol use: Yes     Comment: one drink a day      Drug use: No                 Family History:     family history includes Arthritis in his mother; Diabetes in his brother; Hypertension in his brother; Myocardial Infarction in his brother; Prostate Cancer in his paternal grandfather.  Family Hx fully reviewed and is non contributory to this admission.             Allergies:   No Known Allergies          Medications:     Prior to Admission medications    Medication Sig Last Dose Taking? Auth Provider   acetaminophen (TYLENOL) 325 MG tablet  Take 2 tablets (650 mg) by mouth every 4 hours as needed for other (mild pain)  Yes Attila Castellanos MD   atorvastatin (LIPITOR) 20 MG tablet Take 20 mg by mouth At Bedtime  6/13/2021 at Unknown time Yes Reported, Patient   cetirizine (ZYRTEC) 10 MG tablet Take 10 mg by mouth daily 6/13/2021 at Unknown time Yes Reported, Patient   clonazePAM (KLONOPIN) 0.5 MG tablet Take 0.5 mg by mouth nightly as needed  Past Week at Unknown time Yes Reported, Patient   fluticasone (FLONASE) 50 MCG/ACT nasal spray Spray 2 sprays into both nostrils daily   Yes Reported, Patient   hydrOXYzine (ATARAX) 10 MG tablet Take 1 tablet (10 mg) by mouth every 6 hours as needed for itching or anxiety (with pain, moderate pain)  Yes Attila Castellanos MD   levothyroxine (SYNTHROID/LEVOTHROID) 75 MCG tablet TAKE 1 TABLET BY MOUTH EVERY DAY 6/13/2021 at Unknown time Yes Alejandro Palomino MD   lisinopril (ZESTRIL) 20 MG tablet Take 20 mg by mouth daily  6/13/2021 at Unknown time Yes Reported, Patient   multivitamin w/minerals (MULTI-VITAMIN) tablet Take 1 tablet by mouth daily 6/1/2021 Yes Reported, Patient   oxyCODONE (ROXICODONE) 5 MG tablet 1 tab po q 4-6 hrs prn pain 3-6 on pain scale  2 tabs po q 4-6hrs prn pain 7-10 on pain scale  Yes Attila Castellanos MD   rivaroxaban ANTICOAGULANT (XARELTO) 10 MG TABS tablet Take 1 tablet (10 mg) by mouth daily for 14 days  Yes Helen Fraser PA-C   senna-docusate (SENOKOT-S/PERICOLACE) 8.6-50 MG tablet Take 1-2 tablets by mouth 2 times daily Take while on oral narcotics to prevent or treat constipation.  Yes Attila Castellanos MD               Review of Systems:   A comprehensive greater than 10 system review of systems was carried out.  Pertinent positives and negatives are noted above.  Otherwise negative for contributory info.            Physical Exam:   Vitals were reviewed  Blood pressure 124/73, pulse 83, temperature 97.3  F (36.3  C), temperature source Temporal, resp.  "rate 16, height 1.753 m (5' 9\"), weight 95.3 kg (210 lb), SpO2 94 %.  Exam:    GENERAL:  Comfortable.  PSYCH: pleasant, oriented, No acute distress.  HEENT:  PERRLA. Normal conjunctiva, normal hearing, nasal mucosa and Oropharynx are normal.  NECK:  Supple, no neck vein distention, adenopathy or bruits, normal thyroid.  HEART:  Normal S1, S2 with no murmur, no pericardial rub, S3 or S4.  LUNGS:  Clear to auscultation, normal Respiratory effort.  ABDOMEN:  Soft, no hepatosplenomegaly, normal bowel sounds.  EXTREMITIES:  No pedal edema, +2 pulses bilateral and equal.  Right knee ace dressing c/d/i  SKIN:  Dry to touch, No rash, wound or ulcerations.  NEUROLOGIC:  Nonfocal with normal cranial nerve and motor power and sensation.            Data:   Past 24 hours labs, studies, and imaging were reviewed.  Recent Labs   Lab 06/15/21  0718   HGB 13.5     Recent Labs   Lab 06/15/21  0718 06/14/21  0827   *  --    CR  --  0.86   GFRESTIMATED  --  89   GFRESTBLACK  --  >90     No results for input(s): CULT in the last 168 hours.    Tangela Barrios PA-C    "

## 2021-06-15 NOTE — PROGRESS NOTES
"Orthopedic Surgery  6/15/2021  POD 1    S: Patient voices no unexpected ortho complaints today. Denies chest pain or shortness of breath. Had pain overnight but was sleeping when I saw this am.     O: Blood pressure (!) 141/71, pulse 82, temperature 97.7  F (36.5  C), temperature source Temporal, resp. rate 18, height 1.753 m (5' 9\"), weight 95.3 kg (210 lb), SpO2 94 %.  Lab Results   Component Value Date    HGB 14.6 11/16/2020     Lab Results   Component Value Date    INR 0.81 12/02/2017     I/O last 3 completed shifts:  In: 1317 [P.O.:800; I.V.:517]  Out: 1150 [Urine:1125; Blood:25]  Distal extremity CMSI bilaterally.  Calves are negative bilaterally, both soft and nontender.  The dressing is C/D/I.      A: Mr. Hernandez is doing well status post Procedure(s):  Right total knee arthroplasty.    P: Continue physical therapy. Continue DVT pphx with Xarelto due to hx of PE. Voiding on own and self caths every 3 days to prevent strictures. Has been voiding on own today. Anticipate discharge to home today. Has outpatient PTset-up.    Helen Fraser PA-C  [phone]  "

## 2021-06-15 NOTE — PLAN OF CARE
VSS RA on Capno post surgery. RPIV: SL. Pt c/o of breakthrough pain from day/gino ambulation, 0.4mg IV Dilaudid given x1 and 10mg of Oxy given for pain control. Finished Ancef. SBA w/ walker & gb. Plan to discharge home today. Pt makes needs known, will cont plan of care.

## 2021-06-15 NOTE — PLAN OF CARE
Patient vital signs are at baseline: Yes  Patient able to ambulate as they were prior to admission or with assist devices provided by therapies during their stay:  Yes. SBA with walker & GB. Ambulated in the halls. Up in recliner for dinner.  Patient MUST void prior to discharge:  Yes. Voiding spontaneously. Straight cath's every 3-4 days due to stricture.  Patient able to tolerate oral intake:  Yes. Tolerated regular diet, no N/V.  Pain has adequate pain control using Oral analgesics:  Yes. Pain managed with tylenol, Robaxin x1, oxycodone 10mg x2.    A/Ox4. Removed ACE wrap from RLE to assess skin. Drsg intact with small amount of sanguinous drainage. WBAT. CMS intact. ABX Ancef. PT tomorrow at 0815.

## 2021-06-15 NOTE — PLAN OF CARE
Physical Therapy Discharge Summary    Reason for therapy discharge:    All goals and outcomes met, no further needs identified.    Progress towards therapy goal(s). See goals on Care Plan in [facility] electronic health record for goal details.  Goals met    Therapy recommendation(s):    Per Ortho MD

## 2021-06-15 NOTE — PROGRESS NOTES
06/15/21 0936   Quick Adds   Type of Visit Initial Occupational Therapy Evaluation   Living Environment   People in home spouse   Current Living Arrangements house   Number of Stairs, Main Entrance 2   Number of Stairs, Within Home, Primary 10   Transportation Anticipated family or friend will provide   Living Environment Comments walk in shower, elevated toilet with vanity support   Self-Care   Usual Activity Tolerance good   Regular Exercise Yes   Activity/Exercise Type strength training   Activity/Exercise/Self-Care Comment Patient was I with ADls/IADls- spouse s/p 10 weeks from RTC surgery   Disability/Function   Fall history within last six months no   Change in Functional Status Since Onset of Current Illness/Injury yes   General Information   Onset of Illness/Injury or Date of Surgery 06/14/21   Referring Physician Dr. Castellanos   Patient/Family Therapy Goal Statement (OT) to return home today   Additional Occupational Profile Info/Pertinent History of Current Problem Patient is POD #1 for R TKA   Existing Precautions/Restrictions fall   Right Lower Extremity (Weight-bearing Status) weight-bearing as tolerated (WBAT)   General Observations and Info patient was in bed and agreeable to OT session   Cognitive Status Examination   Orientation Status orientation to person, place and time   Sensory   Sensory Quick Adds No deficits were identified   Pain Assessment   Patient Currently in Pain   (rating pain 3/10)   Posture   Posture not impaired   Range of Motion Comprehensive   General Range of Motion no range of motion deficits identified   Strength Comprehensive (MMT)   General Manual Muscle Testing (MMT) Assessment no strength deficits identified   Muscle Tone Assessment   Muscle Tone Quick Adds No deficits were identified   Coordination   Upper Extremity Coordination No deficits were identified   Bed Mobility   Comment (Bed Mobility) SBA for sit in bed to sit at EOB   Transfers   Transfer Comments CGA, fww    Balance   Balance Comments LOB was bnot   Instrumental Activities of Daily Living (IADL)   IADL Comments has hired neighbor boy to complete yardwork, spouse to complete IADLs as needed   Clinical Impression   Criteria for Skilled Therapeutic Interventions Met (OT) yes;meets criteria;skilled treatment is necessary   OT Diagnosis decreased ADls   OT Problem List-Impairments impacting ADL activity tolerance impaired;pain;strength;range of motion (ROM)   Assessment of Occupational Performance 5 or more Performance Deficits   Identified Performance Deficits dsg, toileting, bathing, community/functional mobility, household chores, driving, errrands, outdoor chores   Planned Therapy Interventions (OT) ADL retraining;transfer training;home program guidelines   Clinical Decision Making Complexity (OT) low complexity   Therapy Frequency (OT) 1x eval and treat   Risk & Benefits of therapy have been explained evaluation/treatment results reviewed;care plan/treatment goals reviewed;risks/benefits reviewed;current/potential barriers reviewed;participants voiced agreement with care plan;patient   OT Discharge Planning    OT Rationale for DC Rec defer to ortho team for dc plan- patient has met needed goals for safe return home with spouse A as needed.    Total Evaluation Time (Minutes)   Total Evaluation Time (Minutes) 8

## 2021-06-15 NOTE — PROGRESS NOTES
Patient vital signs are at baseline: Yes  Patient able to ambulate as they were prior to admission or with assist devices provided by therapies during their stay:  Yes  Patient MUST void prior to discharge:  Yes  Patient able to tolerate oral intake:  Yes  Pain has adequate pain control using Oral analgesics:  Yes     Reviewed discharge instructions and medications with patient and wife. Questions answered. Patient to discharge to home with wife, discharge instructions, medications (Tylenol, Atarax, Xarelto, Senna, Oxycodone), and belongings.

## 2021-06-18 ENCOUNTER — HOSPITAL ENCOUNTER (OUTPATIENT)
Dept: LAB | Facility: CLINIC | Age: 66
Discharge: HOME | End: 2021-06-18
Attending: INTERNAL MEDICINE | Admitting: INTERNAL MEDICINE
Payer: MEDICARE

## 2021-06-18 DIAGNOSIS — C61 PROSTATE CANCER (H): ICD-10-CM

## 2021-06-18 LAB — PSA SERPL-MCNC: <0.01 UG/L (ref 0–4)

## 2021-06-18 PROCEDURE — 84403 ASSAY OF TOTAL TESTOSTERONE: CPT | Performed by: INTERNAL MEDICINE

## 2021-06-18 PROCEDURE — 36415 COLL VENOUS BLD VENIPUNCTURE: CPT | Performed by: INTERNAL MEDICINE

## 2021-06-18 PROCEDURE — 84153 ASSAY OF PSA TOTAL: CPT | Performed by: INTERNAL MEDICINE

## 2021-06-22 LAB — TESTOST SERPL-MCNC: 55 NG/DL (ref 240–950)

## 2021-07-14 ENCOUNTER — LAB (OUTPATIENT)
Dept: LAB | Facility: CLINIC | Age: 66
End: 2021-07-14
Attending: INTERNAL MEDICINE
Payer: MEDICARE

## 2021-07-14 DIAGNOSIS — C61 PROSTATE CANCER (H): ICD-10-CM

## 2021-07-14 LAB — PSA SERPL-MCNC: 0.03 UG/L (ref 0–4)

## 2021-07-14 PROCEDURE — 84403 ASSAY OF TOTAL TESTOSTERONE: CPT

## 2021-07-14 PROCEDURE — 84153 ASSAY OF PSA TOTAL: CPT

## 2021-07-14 PROCEDURE — 36415 COLL VENOUS BLD VENIPUNCTURE: CPT

## 2021-07-15 ENCOUNTER — HOSPITAL ENCOUNTER (OUTPATIENT)
Dept: ULTRASOUND IMAGING | Facility: CLINIC | Age: 66
Discharge: HOME | End: 2021-07-15
Attending: ORTHOPAEDIC SURGERY | Admitting: ORTHOPAEDIC SURGERY
Payer: MEDICARE

## 2021-07-15 DIAGNOSIS — M79.604 TENDERNESS OF RIGHT LOWER EXTREMITY: ICD-10-CM

## 2021-07-15 DIAGNOSIS — Z47.1 AFTERCARE FOLLOWING JOINT REPLACEMENT: ICD-10-CM

## 2021-07-15 PROCEDURE — 93971 EXTREMITY STUDY: CPT | Mod: RT

## 2021-07-16 ENCOUNTER — PATIENT OUTREACH (OUTPATIENT)
Dept: ONCOLOGY | Facility: CLINIC | Age: 66
End: 2021-07-16

## 2021-07-16 LAB — TESTOST SERPL-MCNC: 239 NG/DL (ref 240–950)

## 2021-07-16 NOTE — PROGRESS NOTES
TC to pt to discuss slight rise in PSA from undetectable to 0.03. Pt had a recent knee surgery. Told pt that this rise is nominal and could be resulting from systemic inflammation related to his knee surgery. Told pt writer will review with Dr. Palomino and update him on pt's condition and call him back with an update. Pt stated understanding of all and agreed to call clinic with any questions or concerns.

## 2021-08-16 ENCOUNTER — APPOINTMENT (OUTPATIENT)
Dept: LAB | Facility: CLINIC | Age: 66
End: 2021-08-16
Attending: INTERNAL MEDICINE
Payer: MEDICARE

## 2021-08-16 ENCOUNTER — ONCOLOGY VISIT (OUTPATIENT)
Dept: ONCOLOGY | Facility: CLINIC | Age: 66
End: 2021-08-16
Attending: INTERNAL MEDICINE
Payer: MEDICARE

## 2021-08-16 VITALS
BODY MASS INDEX: 30.36 KG/M2 | RESPIRATION RATE: 18 BRPM | SYSTOLIC BLOOD PRESSURE: 124 MMHG | HEIGHT: 69 IN | OXYGEN SATURATION: 98 % | DIASTOLIC BLOOD PRESSURE: 76 MMHG | TEMPERATURE: 78 F | HEART RATE: 97 BPM | WEIGHT: 205 LBS

## 2021-08-16 DIAGNOSIS — C61 PROSTATE CANCER (H): ICD-10-CM

## 2021-08-16 LAB
ALBUMIN SERPL-MCNC: 3.6 G/DL (ref 3.4–5)
ALP SERPL-CCNC: 80 U/L (ref 40–150)
ALT SERPL W P-5'-P-CCNC: 33 U/L (ref 0–70)
ANION GAP SERPL CALCULATED.3IONS-SCNC: 9 MMOL/L (ref 3–14)
AST SERPL W P-5'-P-CCNC: 22 U/L (ref 0–45)
BILIRUB SERPL-MCNC: 0.4 MG/DL (ref 0.2–1.3)
BUN SERPL-MCNC: 14 MG/DL (ref 7–30)
CALCIUM SERPL-MCNC: 8.8 MG/DL (ref 8.5–10.1)
CHLORIDE BLD-SCNC: 112 MMOL/L (ref 94–109)
CO2 SERPL-SCNC: 21 MMOL/L (ref 20–32)
CREAT SERPL-MCNC: 0.94 MG/DL (ref 0.66–1.25)
GFR SERPL CREATININE-BSD FRML MDRD: 84 ML/MIN/1.73M2
GLUCOSE BLD-MCNC: 114 MG/DL (ref 70–99)
POTASSIUM BLD-SCNC: 4 MMOL/L (ref 3.4–5.3)
PROT SERPL-MCNC: 7.1 G/DL (ref 6.8–8.8)
PSA SERPL-MCNC: 0.03 UG/L (ref 0–4)
SODIUM SERPL-SCNC: 142 MMOL/L (ref 133–144)

## 2021-08-16 PROCEDURE — 84403 ASSAY OF TOTAL TESTOSTERONE: CPT | Performed by: INTERNAL MEDICINE

## 2021-08-16 PROCEDURE — 36591 DRAW BLOOD OFF VENOUS DEVICE: CPT

## 2021-08-16 PROCEDURE — 99214 OFFICE O/P EST MOD 30 MIN: CPT | Performed by: INTERNAL MEDICINE

## 2021-08-16 PROCEDURE — 80053 COMPREHEN METABOLIC PANEL: CPT

## 2021-08-16 PROCEDURE — G0463 HOSPITAL OUTPT CLINIC VISIT: HCPCS

## 2021-08-16 PROCEDURE — 36415 COLL VENOUS BLD VENIPUNCTURE: CPT

## 2021-08-16 PROCEDURE — 84153 ASSAY OF PSA TOTAL: CPT | Performed by: INTERNAL MEDICINE

## 2021-08-16 ASSESSMENT — MIFFLIN-ST. JEOR: SCORE: 1695.5

## 2021-08-16 ASSESSMENT — PAIN SCALES - GENERAL: PAINLEVEL: MILD PAIN (2)

## 2021-08-16 NOTE — NURSING NOTE
Chief Complaint   Patient presents with     Port Draw     Labs drawn via  by RN in lab. VS taken.      Jenifer Carrizales RN

## 2021-08-16 NOTE — NURSING NOTE
"Oncology Rooming Note    August 16, 2021 5:06 PM   Lucio Hernandez is a 65 year old male who presents for:    Chief Complaint   Patient presents with     Port Draw     Labs drawn via  by RN in lab. VS taken.      Oncology Clinic Visit     [facility] RETURN - PROSTATE CANCER     Initial Vitals: /76   Pulse 97   Temp (!) 78  F (25.6  C) (Oral)   Resp 18   Ht 1.753 m (5' 9.02\")   Wt 93 kg (205 lb)   SpO2 98%   BMI 30.26 kg/m   Estimated body mass index is 30.26 kg/m  as calculated from the following:    Height as of this encounter: 1.753 m (5' 9.02\").    Weight as of this encounter: 93 kg (205 lb). Body surface area is 2.13 meters squared.  Mild Pain (2) Comment: Data Unavailable   No LMP for male patient.  Allergies reviewed: Yes  Medications reviewed: Yes    Medications: Medication refills not needed today.  Pharmacy name entered into [employer]: [facility] [id number] [place] [facility] [address]    Clinical concerns: No new concerns. Dr Palomino was notified.      Guille Pereyra LPN            "

## 2021-08-16 NOTE — LETTER
8/16/2021         RE: Lucio Hernandez  [address] [place] [facility] [address]        Dear Colleague,    Thank you for referring your patient, Lucio Hernandez, to the [facility]. Please see a copy of my visit note below.          [facility] Oncology Followup  In person visit  8/16/2021         CC: Prostate cancer     HPI: Lucio Hernandez is a 65 year old male who has prostate cancer. He had a radical prostatectomy in 2006 demonstrating Jg 7 disease with a positive margin.  He had salvage radiation therapy in 2006 when his PSA was around 0.4 per the patient's recollection.  He notes his PSA was undetectable for 4 years, but then began coming back and recently has noted to have PSAs in the 1.2-1.4 range in May of this year.  He underwent an Axumin PET scan recently that shows no evidence of metastatic disease though there was uptake in the prostate bed.       Had Prior history of urethral stricture. This may have been due to the prior radiation therapy and a Olsen Catheter complication postop.     He was enrolled in a clinical trial AFT19 with apalutamide and degarelix which started on 11/14/19.         4/11/18            PSA = 0.56  10/30/18          PSA =0.48  5/14/19            PSA =1.2  5/29/19            PSA=1.4  7/29/19            PSA=1.45  9/5/19              PSA =1.84  11/4/19            PSA=2.19  11/14/19          PSA= 2.27-- START AFT-19  12/12/19          PSA= 0.04  1/9/2020          PSA= <0.01  2/5/2020          PSA  <0.01  2/26/2020        PSA <0.01  4/6/20              PSA  <0.01  6/30/20 PSA   <0.01 Testosterone =11  7/27/20 PSA   < 0.01  9/21/20 PSA     <0.01  11/16/20 PSA     <0.01 - Discontinue Study therapy.   2/11/201 PSA  <0.01 Testosterone 54  5/20/21 PSA  <0.01 Testosterone 149  7/14/21 PSA =  0.03 Testosterone =  239        INTERIM HISTORY:  --fatigue.  --on Cipro for possible UTI due to dysuria  --Shaking chills/ nightsweats several nights ago.        --  taking atorvastatin for hyperlipidemia  --Knee surgery (right TKA) on Shaneka 15 2021  --Continues to struggle with fatigue weight gain and generally not feeling well much which he attributes to the hormonal therapy.  He is glad to be off the study and is hopeful that the knee replacement and other things will help him to regain his stamina and a pain-free existence.  --no significant new complants    ROS: 10 point ROS neg other than the symptoms noted above in the HPI.     PAST MEDICAL HISTORY:  Otherwise significant for the prostate cancer,  history of pulmonary embolism and rib fractures from coughing fits and appendicitis.      PAST SURGICAL HISTORY:  Includes the RRP, a lumbar fusion, a thoracotomy for the history of rib fractures and the pulmonic hernia, shoulder surgery x3, appendectomy and a cervical spine surgery.      MEDICATIONS:       Current Outpatient Medications   Medication Instructions     acetaminophen (TYLENOL) 650 mg, Oral, EVERY 4 HOURS PRN     atorvastatin (LIPITOR) 20 mg, Oral, AT BEDTIME     cetirizine (ZYRTEC) 10 mg, Oral, DAILY     clonazePAM (KLONOPIN) 0.5 mg, Oral, AT BEDTIME PRN     fluticasone (FLONASE) 50 MCG/ACT nasal spray 2 sprays, Both Nostrils, DAILY     levothyroxine (SYNTHROID/LEVOTHROID) 75 MCG tablet TAKE 1 TABLET BY MOUTH EVERY DAY     lisinopril (ZESTRIL) 20 mg, Oral, DAILY     multivitamin w/minerals (MULTI-VITAMIN) tablet 1 tablet, Oral, DAILY     oxyCODONE (ROXICODONE) 5 MG tablet 1 tab po q 4-6 hrs prn pain 3-6 on pain scale2 tabs po q 4-6hrs prn pain 7-10 on pain scale     rivaroxaban ANTICOAGULANT (XARELTO) 10 mg, Oral, DAILY     senna-docusate (SENOKOT-S/PERICOLACE) 8.6-50 MG tablet 1-2 tablets, Oral, 2 TIMES DAILY, Take while on oral narcotics to prevent or treat constipation.          ALLERGIES:  NO KNOWN DRUG ALLERGIES.      FAMILY HISTORY:  Noncontributory.      SOCIAL HISTORY:  Negative for tobacco.  Alcohol 1 drink a day. Pt is a retired Otolaryngologist (ENT  surgeon).      REVIEW OF SYSTEMS:  Negative for fevers, chills, sweats, nausea, vomiting, unexplained weight changes.       Physical Exam  /76   Pulse 97   Temp (!) 78  F (25.6  C) (Oral)   Resp 18   Wt 93 kg (205 lb)   SpO2 98%   BMI 30.27 kg/m    Exam deferred today      Wt Readings from Last 4 Encounters:   08/16/21 93 kg (205 lb)   06/14/21 95.3 kg (210 lb)   05/24/21 96.4 kg (212 lb 8 oz)   11/16/20 94.3 kg (207 lb 12.8 oz)             LABS  Results for CAIT PINEDA (MRN [id number]) as of 8/16/2021 17:15   Ref. Range 8/16/2021 16:37   Sodium Latest Ref Range: 133 - 144 mmol/L 142   Potassium Latest Ref Range: 3.4 - 5.3 mmol/L 4.0   Chloride Latest Ref Range: 94 - 109 mmol/L 112 (H)   Carbon Dioxide Latest Ref Range: 20 - 32 mmol/L 21   Urea Nitrogen Latest Ref Range: 7 - 30 mg/dL 14   Creatinine Latest Ref Range: 0.66 - 1.25 mg/dL 0.94   GFR Estimate Latest Ref Range: >60 mL/min/1.73m2 84   Calcium Latest Ref Range: 8.5 - 10.1 mg/dL 8.8   Anion Gap Latest Ref Range: 3 - 14 mmol/L 9   Albumin Latest Ref Range: 3.4 - 5.0 g/dL 3.6   Protein Total Latest Ref Range: 6.8 - 8.8 g/dL 7.1   Bilirubin Total Latest Ref Range: 0.2 - 1.3 mg/dL 0.4   Alkaline Phosphatase Latest Ref Range: 40 - 150 U/L 80   ALT Latest Ref Range: 0 - 70 U/L 33   AST Latest Ref Range: 0 - 45 U/L 22   PSA Latest Ref Range: 0.00 - 4.00 ug/L 0.03       DX HIP/PELVIS/SPINE February 12, 2021 10:12 AM     HISTORY: Prostate cancer hormonal therapy, rule out osteoporosis.  Prostate cancer (H). Androgen deprivation therapy.     FINDINGS: This DEXA scan was performed using a [facility] scanner.  DEXA results are reported according to T-score.  The T-score is the  standard deviation from the peak bone mass in a normal young adult  population. In accordance with the ISCD (International Society of  Clinical Densitometry), the lower of the total proximal femur vs  femoral neck T-score is reported. Osteopenia is defined as a T-score  of -1.0 to  -2.5. Osteoporosis is defined as a T-score of less than  -2.5.     T-SCORES: Lumbar spine imaging not performed because of hardware.     Left Hip mean T-score: -0.2.  Right Hip (Neck) T-score: -0.3.     Hip lowest neck BMD: 1.031 gm/cm2.     No prior studies for comparison.     FRAX 10-YEAR PROBABILITY OF FRACTURE*:  Major Osteoporotic: 7.7%  Hip: 0.6%     *All treatment decisions require clinical judgment and consideration  of individual patient factors which may not be captured in the FRAX  model and the risk of fracture may be over- or under-estimated by  FRAX.                                                                      IMPRESSION/ PLAN    This is a 65 year old gentleman with a serologic relapse of a [place] 4+3 = 7 with a tertiary pattern of Jg 5.  He has a doubling time that is under 9 months based on the most recent 2 measurements.  He is also status post salvage radiation therapy.     Prostate Cancer  He has not come off of androgen deprivation therapy on the AFT-19 study.  He is generally doing well but continues to suffer from significant fatigue related to the androgen deprivation therapy.  This has been compounded by the recent knee surgery and a protracted recovery.      I encouraged him that with the recovery of testosterone the symptoms may resolve and that he should be able to increase his physical activity which will further help the situation.    He will follow up per that protocol.    Signed Alejandro Palomino MD.          Again, thank you for allowing me to participate in the care of your patient.        Sincerely,        Alejandro Palomino MD

## 2021-08-16 NOTE — PROGRESS NOTES
[facility] Oncology Followup  In person visit  8/16/2021         CC: Prostate cancer     HPI: Lucio Hernandez is a 65 year old male who has prostate cancer. He had a radical prostatectomy in 2006 demonstrating Jg 7 disease with a positive margin.  He had salvage radiation therapy in 2006 when his PSA was around 0.4 per the patient's recollection.  He notes his PSA was undetectable for 4 years, but then began coming back and recently has noted to have PSAs in the 1.2-1.4 range in May of this year.  He underwent an Axumin PET scan recently that shows no evidence of metastatic disease though there was uptake in the prostate bed.       Had Prior history of urethral stricture. This may have been due to the prior radiation therapy and a Olsen Catheter complication postop.     He was enrolled in a clinical trial AFT19 with apalutamide and degarelix which started on 11/14/19.         4/11/18            PSA = 0.56  10/30/18          PSA =0.48  5/14/19            PSA =1.2  5/29/19            PSA=1.4  7/29/19            PSA=1.45  9/5/19              PSA =1.84  11/4/19            PSA=2.19  11/14/19          PSA= 2.27-- START AFT-19  12/12/19          PSA= 0.04  1/9/2020          PSA= <0.01  2/5/2020          PSA  <0.01  2/26/2020        PSA <0.01  4/6/20              PSA  <0.01  6/30/20 PSA   <0.01 Testosterone =11  7/27/20 PSA   < 0.01  9/21/20 PSA     <0.01  11/16/20 PSA     <0.01 - Discontinue Study therapy.   2/11/201 PSA  <0.01 Testosterone 54  5/20/21 PSA  <0.01 Testosterone 149  7/14/21 PSA =  0.03 Testosterone =  239        INTERIM HISTORY:  --fatigue.  --on Cipro for possible UTI due to dysuria  --Shaking chills/ nightsweats several nights ago.        -- taking atorvastatin for hyperlipidemia  --Knee surgery (right TKA) on Shaneka 15 2021  --Continues to struggle with fatigue weight gain and generally not feeling well much which he attributes to the hormonal therapy.  He is glad to be off the study and is  hopeful that the knee replacement and other things will help him to regain his stamina and a pain-free existence.  --no significant new complants    ROS: 10 point ROS neg other than the symptoms noted above in the HPI.     PAST MEDICAL HISTORY:  Otherwise significant for the prostate cancer,  history of pulmonary embolism and rib fractures from coughing fits and appendicitis.      PAST SURGICAL HISTORY:  Includes the RRP, a lumbar fusion, a thoracotomy for the history of rib fractures and the pulmonic hernia, shoulder surgery x3, appendectomy and a cervical spine surgery.      MEDICATIONS:       Current Outpatient Medications   Medication Instructions     acetaminophen (TYLENOL) 650 mg, Oral, EVERY 4 HOURS PRN     atorvastatin (LIPITOR) 20 mg, Oral, AT BEDTIME     cetirizine (ZYRTEC) 10 mg, Oral, DAILY     clonazePAM (KLONOPIN) 0.5 mg, Oral, AT BEDTIME PRN     fluticasone (FLONASE) 50 MCG/ACT nasal spray 2 sprays, Both Nostrils, DAILY     levothyroxine (SYNTHROID/LEVOTHROID) 75 MCG tablet TAKE 1 TABLET BY MOUTH EVERY DAY     lisinopril (ZESTRIL) 20 mg, Oral, DAILY     multivitamin w/minerals (MULTI-VITAMIN) tablet 1 tablet, Oral, DAILY     oxyCODONE (ROXICODONE) 5 MG tablet 1 tab po q 4-6 hrs prn pain 3-6 on pain scale<BR>2 tabs po q 4-6hrs prn pain 7-10 on pain scale     rivaroxaban ANTICOAGULANT (XARELTO) 10 mg, Oral, DAILY     senna-docusate (SENOKOT-S/PERICOLACE) 8.6-50 MG tablet 1-2 tablets, Oral, 2 TIMES DAILY, Take while on oral narcotics to prevent or treat constipation.          ALLERGIES:  NO KNOWN DRUG ALLERGIES.      FAMILY HISTORY:  Noncontributory.      SOCIAL HISTORY:  Negative for tobacco.  Alcohol 1 drink a day. Pt is a retired Otolaryngologist (ENT surgeon).      REVIEW OF SYSTEMS:  Negative for fevers, chills, sweats, nausea, vomiting, unexplained weight changes.       Physical Exam  /76   Pulse 97   Temp (!) 78  F (25.6  C) (Oral)   Resp 18   Wt 93 kg (205 lb)   SpO2 98%   BMI 30.27  kg/m    Exam deferred today      Wt Readings from Last 4 Encounters:   08/16/21 93 kg (205 lb)   06/14/21 95.3 kg (210 lb)   05/24/21 96.4 kg (212 lb 8 oz)   11/16/20 94.3 kg (207 lb 12.8 oz)             LABS  Results for CAIT PINEDA (MRN [id number]) as of 8/16/2021 17:15   Ref. Range 8/16/2021 16:37   Sodium Latest Ref Range: 133 - 144 mmol/L 142   Potassium Latest Ref Range: 3.4 - 5.3 mmol/L 4.0   Chloride Latest Ref Range: 94 - 109 mmol/L 112 (H)   Carbon Dioxide Latest Ref Range: 20 - 32 mmol/L 21   Urea Nitrogen Latest Ref Range: 7 - 30 mg/dL 14   Creatinine Latest Ref Range: 0.66 - 1.25 mg/dL 0.94   GFR Estimate Latest Ref Range: >60 mL/min/1.73m2 84   Calcium Latest Ref Range: 8.5 - 10.1 mg/dL 8.8   Anion Gap Latest Ref Range: 3 - 14 mmol/L 9   Albumin Latest Ref Range: 3.4 - 5.0 g/dL 3.6   Protein Total Latest Ref Range: 6.8 - 8.8 g/dL 7.1   Bilirubin Total Latest Ref Range: 0.2 - 1.3 mg/dL 0.4   Alkaline Phosphatase Latest Ref Range: 40 - 150 U/L 80   ALT Latest Ref Range: 0 - 70 U/L 33   AST Latest Ref Range: 0 - 45 U/L 22   PSA Latest Ref Range: 0.00 - 4.00 ug/L 0.03       DX HIP/PELVIS/SPINE February 12, 2021 10:12 AM     HISTORY: Prostate cancer hormonal therapy, rule out osteoporosis.  Prostate cancer (H). Androgen deprivation therapy.     FINDINGS: This DEXA scan was performed using a [employer] scanner.  DEXA results are reported according to T-score.  The T-score is the  standard deviation from the peak bone mass in a normal young adult  population. In accordance with the ISCD (International Society of  Clinical Densitometry), the lower of the total proximal femur vs  femoral neck T-score is reported. Osteopenia is defined as a T-score  of -1.0 to -2.5. Osteoporosis is defined as a T-score of less than  -2.5.     T-SCORES: Lumbar spine imaging not performed because of hardware.     Left Hip mean T-score: -0.2.  Right Hip (Neck) T-score: -0.3.     Hip lowest neck BMD: 1.031 gm/cm2.     No prior  studies for comparison.     FRAX 10-YEAR PROBABILITY OF FRACTURE*:  Major Osteoporotic: 7.7%  Hip: 0.6%     *All treatment decisions require clinical judgment and consideration  of individual patient factors which may not be captured in the FRAX  model and the risk of fracture may be over- or under-estimated by  FRAX.                                                                      IMPRESSION/ PLAN    This is a 65 year old gentleman with a serologic relapse of a [place] 4+3 = 7 with a tertiary pattern of [place] 5.  He has a doubling time that is under 9 months based on the most recent 2 measurements.  He is also status post salvage radiation therapy.     Prostate Cancer  He has not come off of androgen deprivation therapy on the AFT-19 study.  He is generally doing well but continues to suffer from significant fatigue related to the androgen deprivation therapy.  This has been compounded by the recent knee surgery and a protracted recovery.      I encouraged him that with the recovery of testosterone the symptoms may resolve and that he should be able to increase his physical activity which will further help the situation.    He will follow up per that protocol.    Signed Alejandro Palomino MD.

## 2021-08-18 LAB — TESTOST SERPL-MCNC: 171 NG/DL (ref 240–950)

## 2021-08-25 ENCOUNTER — MYC MEDICAL ADVICE (OUTPATIENT)
Dept: ONCOLOGY | Facility: CLINIC | Age: 66
End: 2021-08-25

## 2021-08-25 DIAGNOSIS — E03.2 HYPOTHYROIDISM DUE TO MEDICATION: ICD-10-CM

## 2021-08-25 DIAGNOSIS — C61 PROSTATE CANCER (H): ICD-10-CM

## 2021-09-15 ENCOUNTER — PATIENT OUTREACH (OUTPATIENT)
Dept: ONCOLOGY | Facility: CLINIC | Age: 66
End: 2021-09-15

## 2021-09-15 DIAGNOSIS — E03.9 HYPOTHYROIDISM: ICD-10-CM

## 2021-09-15 DIAGNOSIS — C61 PROSTATE CANCER (H): Primary | ICD-10-CM

## 2021-09-15 NOTE — PROGRESS NOTES
TC to pt. No answer. LM stating that writer will ask Dr. Palomino to refill his levothyroxine Rx but that he may want to have his PCP manage this medication moving forward as his next appointment at [facility] isn't until 12/2021 and he should have closer monitoring of his thyroid and subsequent levothyroxine dosage changes if needed. Requested call back to discuss. Await response.

## 2021-09-20 ENCOUNTER — PATIENT OUTREACH (OUTPATIENT)
Dept: ONCOLOGY | Facility: CLINIC | Age: 66
End: 2021-09-20

## 2021-09-20 ENCOUNTER — RESEARCH ENCOUNTER (OUTPATIENT)
Dept: ONCOLOGY | Facility: CLINIC | Age: 66
End: 2021-09-20

## 2021-09-20 RX ORDER — LEVOTHYROXINE SODIUM 75 UG/1
75 TABLET ORAL DAILY
Qty: 90 TABLET | Refills: 1 | Status: SHIPPED | OUTPATIENT
Start: 2021-09-20 | End: 2022-04-19

## 2021-09-20 NOTE — NURSING NOTE
[id number]: Withdrawal of Consent     Lucio Hernandez has chosen to withdraw from the study. Specifically, the patient would like to:  Withdrawal from protocol-directed interventions and protocol-directed tests/procedures, e.g. subject refuses further treatment with the study drug as well as refuses protocol-directed lab draws, physical exams, QOLs, CT scans, etc., but allows continued contact with PI and  staff for collection of primary and secondary study objectives.  Liliya Dawkins RN          Form [id number] (Version 1)     Effective date: 01JUL2018     Next Review Date: 01JUL2020

## 2021-09-20 NOTE — PROGRESS NOTES
TC to pt to discuss upcoming appts. Pt stated he and Dr. Palomino decided that he can get his PSA checked every three months and therefore needs labs/MD visit in November - pt agreeable to get labs a day or two prior to visit to ensure PSA result is in for MD visit. Also, pt agreeable to plan to have PCP order TSH and manage synthroid Rx. Pt states understanding that he should get his TSH checked prior to November as it hasn't been checked since 04/2021. Pt stated understanding of all and agreed to call clinic with any questions or concerns. Message sent to scheduling to change appts

## 2021-10-24 ENCOUNTER — HEALTH MAINTENANCE LETTER (OUTPATIENT)
Age: 66
End: 2021-10-24

## 2021-11-03 ENCOUNTER — PRE VISIT (OUTPATIENT)
Dept: UROLOGY | Facility: CLINIC | Age: 66
End: 2021-11-03

## 2021-11-03 NOTE — TELEPHONE ENCOUNTER
Reason for visit: Discuss higher residuals      Relevant information: bladder neck contracture, CIC    Records/imaging/labs/orders: all records available    Pt called: no need for a call

## 2021-11-08 ENCOUNTER — VIRTUAL VISIT (OUTPATIENT)
Dept: UROLOGY | Facility: CLINIC | Age: 66
End: 2021-11-08
Payer: MEDICARE

## 2021-11-08 DIAGNOSIS — N32.0 BLADDER NECK CONTRACTURE: Primary | ICD-10-CM

## 2021-11-08 DIAGNOSIS — C61 PROSTATE CANCER (H): ICD-10-CM

## 2021-11-08 DIAGNOSIS — T66.XXXS RADIATION ADVERSE EFFECT, SEQUELA: ICD-10-CM

## 2021-11-08 PROCEDURE — 99213 OFFICE O/P EST LOW 20 MIN: CPT | Mod: 95 | Performed by: UROLOGY

## 2021-11-08 NOTE — PATIENT INSTRUCTIONS
Please schedule a RUG/VCUG and a cystoscopy with either Dr. Wells or Dr. Yates.    It was a pleasure meeting with you today.  Thank you for allowing me and my team the privilege of caring for you today.  YOU are the reason we are here, and I truly hope we provided you with the excellent service you deserve.  Please let us know if there is anything else we can do for you so that we can be sure you are leaving completely satisfied with your care experience.        Concetta Cheng, CMA

## 2021-11-08 NOTE — LETTER
11/8/2021       RE: Lucio Hernandez  [address] [place] [facility] [address]     Dear Colleague,    Thank you for referring your patient, Lucio Hernandez, to the [facility]. Please see a copy of my visit note below.    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 He has undergone multiple dilatations and a laser vaporization. He presents here today for evaluation of difficulty with self-catheterization. I last saw the patient on 12/07/2015 for a cystoscopy for bladder neck contracture; at that time he was managing well with self dilation.     11/8/21:   Detectable PSA recurrence -- seeing Daniel Palomino  Caths with a 12F for self dilation every other day, at night. He notes he is having higher residuals when he caths. He is also getting up almost hourly to void and his stream is slower. On the nights he caths he does get 4 hours of sleep. He also feels like the area of scar is longer when he is passing the catheter.        Exam:  There were no vitals taken for this visit.  GENERAL: Healthy, alert and no distress  EYES: Eyes grossly normal to inspection.  No discharge or erythema, or obvious scleral/conjunctival abnormalities.  RESP: No audible wheeze, cough, or visible cyanosis.  No visible retractions or increased work of breathing.    SKIN: Visible skin clear. No significant rash, abnormal pigmentation or lesions.  NEURO: Cranial nerves grossly intact.  Mentation and speech appropriate for age.  PSYCH: Mentation appears normal, affect normal/bright, judgement and insight intact, normal speech and appearance well-groomed.      Assessment & Plan   1. Worsening of stricture in the urethra. Discussed option of increasing self cath to multiple times a day vs. Doing a RUG  and cysto.   2. He will increase his catheterizing to at least 3 times a day. We will discuss outcomes of this when we do the RUG/cysto.   3. He would like to avoid any interventions that would worsen his leakage.     Abdon Wells MD  [facility] UROLOGY [facility]      ==========================      Additional Coding Information:    Problems:  4 -- two or more stable chronic illnesses    Ordered one test: RUG    Discussed risks of cysto and dilation of urethra.     Level of risk:  4 -- minor surgery with patient or procedure risks    Time spent:  25 minutes spent on the date of the encounter doing chart review, history and exam, documentation and further activities per the note        Juventino is a 66 year old who is being evaluated via a billable video visit.      How would you like to obtain your AVS? MyChart  If the video visit is dropped, the invitation should be resent by: Send to e-mail at: [email or web address]  Will anyone else be joining your video visit? No    Video Start Time: 9:25  Video-Visit Details    Type of service:  Video Visit    Video End Time:9:41 AM    Originating Location (pt. Location): Home    Distant Location (provider location):  [facility] UROLOG[facility]     Platform used for Video Visit: [employer]

## 2021-11-08 NOTE — PROGRESS NOTES
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 He has undergone multiple dilatations and a laser vaporization. He presents here today for evaluation of difficulty with self-catheterization. I last saw the patient on 12/07/2015 for a cystoscopy for bladder neck contracture; at that time he was managing well with self dilation.     11/8/21:   Detectable PSA recurrence -- seeing Daniel Palomino  Caths with a 12F for self dilation every other day, at night. He notes he is having higher residuals when he caths. He is also getting up almost hourly to void and his stream is slower. On the nights he caths he does get 4 hours of sleep. He also feels like the area of scar is longer when he is passing the catheter.        Exam:  There were no vitals taken for this visit.  GENERAL: Healthy, alert and no distress  EYES: Eyes grossly normal to inspection.  No discharge or erythema, or obvious scleral/conjunctival abnormalities.  RESP: No audible wheeze, cough, or visible cyanosis.  No visible retractions or increased work of breathing.    SKIN: Visible skin clear. No significant rash, abnormal pigmentation or lesions.  NEURO: Cranial nerves grossly intact.  Mentation and speech appropriate for age.  PSYCH: Mentation appears normal, affect normal/bright, judgement and insight intact, normal speech and appearance well-groomed.      Assessment & Plan   1. Worsening of stricture in the urethra. Discussed option of increasing self cath to multiple times a day vs. Doing a RUG and cysto.   2. He will increase his catheterizing to at least 3 times a day. We will discuss outcomes of this when we do the RUG/cysto.   3. He would like to avoid any interventions that would worsen his leakage.     Abdon Wells MD  [facility] UROLOGY CL[facility]  [place]      ==========================      Additional Coding Information:    Problems:  4 -- two or more stable chronic illnesses    Ordered one test: RUG    Discussed risks of cysto and dilation of urethra.     Level of risk:  4 -- minor surgery with patient or procedure risks    Time spent:  25 minutes spent on the date of the encounter doing chart review, history and exam, documentation and further activities per the note        Juventino is a 66 year old who is being evaluated via a billable video visit.      How would you like to obtain your AVS? MyChart  If the video visit is dropped, the invitation should be resent by: Send to e-mail at: [email or web address]  Will anyone else be joining your video visit? No    Video Start Time: 9:25  Video-Visit Details    Type of service:  Video Visit    Video End Time:9:41 AM    Originating Location (pt. Location): Home    Distant Location (provider location):  [facility] UROLOGY [facility]     Platform used for Video Visit: [employer]

## 2021-11-12 ENCOUNTER — LAB (OUTPATIENT)
Dept: LAB | Facility: CLINIC | Age: 66
End: 2021-11-12
Payer: MEDICARE

## 2021-11-12 DIAGNOSIS — E03.9 HYPOTHYROIDISM: ICD-10-CM

## 2021-11-12 DIAGNOSIS — C61 PROSTATE CANCER (H): ICD-10-CM

## 2021-11-12 LAB
ALBUMIN SERPL-MCNC: 3.9 G/DL (ref 3.4–5)
ALP SERPL-CCNC: 84 U/L (ref 40–150)
ALT SERPL W P-5'-P-CCNC: 44 U/L (ref 0–70)
ANION GAP SERPL CALCULATED.3IONS-SCNC: 6 MMOL/L (ref 3–14)
AST SERPL W P-5'-P-CCNC: 24 U/L (ref 0–45)
BASOPHILS # BLD AUTO: 0 10E3/UL (ref 0–0.2)
BASOPHILS NFR BLD AUTO: 1 %
BILIRUB SERPL-MCNC: 0.7 MG/DL (ref 0.2–1.3)
BUN SERPL-MCNC: 18 MG/DL (ref 7–30)
CALCIUM SERPL-MCNC: 9.2 MG/DL (ref 8.5–10.1)
CHLORIDE BLD-SCNC: 109 MMOL/L (ref 94–109)
CO2 SERPL-SCNC: 23 MMOL/L (ref 20–32)
CREAT SERPL-MCNC: 0.92 MG/DL (ref 0.66–1.25)
EOSINOPHIL # BLD AUTO: 0.2 10E3/UL (ref 0–0.7)
EOSINOPHIL NFR BLD AUTO: 3 %
ERYTHROCYTE [DISTWIDTH] IN BLOOD BY AUTOMATED COUNT: 13.5 % (ref 10–15)
GFR SERPL CREATININE-BSD FRML MDRD: 85 ML/MIN/1.73M2
GLUCOSE BLD-MCNC: 106 MG/DL (ref 70–99)
HCT VFR BLD AUTO: 48.6 % (ref 40–53)
HGB BLD-MCNC: 16.5 G/DL (ref 13.3–17.7)
IMM GRANULOCYTES # BLD: 0 10E3/UL
IMM GRANULOCYTES NFR BLD: 0 %
LYMPHOCYTES # BLD AUTO: 1.2 10E3/UL (ref 0.8–5.3)
LYMPHOCYTES NFR BLD AUTO: 17 %
MCH RBC QN AUTO: 31.3 PG (ref 26.5–33)
MCHC RBC AUTO-ENTMCNC: 34 G/DL (ref 31.5–36.5)
MCV RBC AUTO: 92 FL (ref 78–100)
MONOCYTES # BLD AUTO: 0.7 10E3/UL (ref 0–1.3)
MONOCYTES NFR BLD AUTO: 10 %
NEUTROPHILS # BLD AUTO: 5.1 10E3/UL (ref 1.6–8.3)
NEUTROPHILS NFR BLD AUTO: 69 %
NRBC # BLD AUTO: 0 10E3/UL
NRBC BLD AUTO-RTO: 0 /100
PLATELET # BLD AUTO: 236 10E3/UL (ref 150–450)
POTASSIUM BLD-SCNC: 4.3 MMOL/L (ref 3.4–5.3)
PROT SERPL-MCNC: 7.7 G/DL (ref 6.8–8.8)
PSA SERPL-MCNC: 0.03 UG/L (ref 0–4)
RBC # BLD AUTO: 5.27 10E6/UL (ref 4.4–5.9)
SODIUM SERPL-SCNC: 138 MMOL/L (ref 133–144)
TSH SERPL DL<=0.005 MIU/L-ACNC: 2.4 MU/L (ref 0.4–4)
WBC # BLD AUTO: 7.4 10E3/UL (ref 4–11)

## 2021-11-12 PROCEDURE — 84443 ASSAY THYROID STIM HORMONE: CPT

## 2021-11-12 PROCEDURE — 80053 COMPREHEN METABOLIC PANEL: CPT

## 2021-11-12 PROCEDURE — 85004 AUTOMATED DIFF WBC COUNT: CPT

## 2021-11-12 PROCEDURE — 84403 ASSAY OF TOTAL TESTOSTERONE: CPT

## 2021-11-12 PROCEDURE — 84153 ASSAY OF PSA TOTAL: CPT

## 2021-11-12 PROCEDURE — 36415 COLL VENOUS BLD VENIPUNCTURE: CPT

## 2021-11-15 ENCOUNTER — ONCOLOGY VISIT (OUTPATIENT)
Dept: ONCOLOGY | Facility: CLINIC | Age: 66
End: 2021-11-15
Attending: INTERNAL MEDICINE
Payer: MEDICARE

## 2021-11-15 VITALS
HEIGHT: 69 IN | BODY MASS INDEX: 31.55 KG/M2 | DIASTOLIC BLOOD PRESSURE: 74 MMHG | WEIGHT: 213 LBS | HEART RATE: 81 BPM | RESPIRATION RATE: 16 BRPM | TEMPERATURE: 98.9 F | SYSTOLIC BLOOD PRESSURE: 125 MMHG | OXYGEN SATURATION: 97 %

## 2021-11-15 DIAGNOSIS — C61 PROSTATE CANCER (H): Primary | ICD-10-CM

## 2021-11-15 PROCEDURE — G0463 HOSPITAL OUTPT CLINIC VISIT: HCPCS

## 2021-11-15 PROCEDURE — 99214 OFFICE O/P EST MOD 30 MIN: CPT | Performed by: INTERNAL MEDICINE

## 2021-11-15 ASSESSMENT — MIFFLIN-ST. JEOR: SCORE: 1726.54

## 2021-11-15 NOTE — NURSING NOTE
"Oncology Rooming Note    November 15, 2021 10:15 AM   Lucio Hernandez is a 66 year old male who presents for:    Chief Complaint   Patient presents with     Oncology Clinic Visit     Prostate cancer      Initial Vitals: /74   Pulse 81   Temp 98.9  F (37.2  C)   Resp 16   Ht 1.753 m (5' 9\")   Wt 96.6 kg (213 lb)   SpO2 97%   BMI 31.45 kg/m   Estimated body mass index is 31.45 kg/m  as calculated from the following:    Height as of this encounter: 1.753 m (5' 9\").    Weight as of this encounter: 96.6 kg (213 lb). Body surface area is 2.17 meters squared.  Data Unavailable Comment: Data Unavailable   No LMP for male patient.  Allergies reviewed: Yes  Medications reviewed: Yes    Medications: Medication refills not needed today.  Pharmacy name entered into "[employer]": [id number] [place] [address]    Clinical concerns: Urination restriction does not seem as effective as possible       Vimal Bautista MA            "

## 2021-11-15 NOTE — PROGRESS NOTES
[facility] Oncology Followup  In person visit  11/15/2021         CC: Prostate cancer     HPI: Lucio Hernandez is a 65 year old male who has prostate cancer. He had a radical prostatectomy in 2006 demonstrating Jg 7 disease with a positive margin.  He had salvage radiation therapy in 2006 when his PSA was around 0.4 per the patient's recollection.  He notes his PSA was undetectable for 4 years, but then began coming back and recently has noted to have PSAs in the 1.2-1.4 range in May of this year.  He underwent an Axumin PET scan recently that shows no evidence of metastatic disease though there was uptake in the prostate bed.       Had Prior history of urethral stricture. This may have been due to the prior radiation therapy and a Olsen Catheter complication postop.     He was enrolled in a clinical trial AFT19 with apalutamide and degarelix which started on 11/14/19.         4/11/18            PSA = 0.56  10/30/18          PSA =0.48  5/14/19            PSA =1.2  5/29/19            PSA=1.4  7/29/19            PSA=1.45  9/5/19              PSA =1.84  11/4/19            PSA=2.19  11/14/19          PSA= 2.27-- START AFT-19  12/12/19          PSA= 0.04  1/9/2020          PSA= <0.01  2/5/2020          PSA  <0.01  2/26/2020        PSA <0.01  4/6/20              PSA  <0.01  6/30/20 PSA   <0.01 Testosterone =11  7/27/20 PSA   < 0.01  9/21/20 PSA     <0.01  11/16/20 PSA     <0.01 - Discontinue Study therapy.   2/11/201 PSA  <0.01 Testosterone 54  5/20/21 PSA  <0.01 Testosterone 149  7/14/21 PSA =  0.03 Testosterone =  239      INTERIM HISTORY:  worsening urination  Cathing daily, then QOD, then every third day  increased urgency, frequency and larger cath residuals  going to see Abdon Almazan, who has been reluctant due to the proximity of the stricture to the sphincter      ROS: 10 point ROS neg other than the symptoms noted above in the HPI.     PAST MEDICAL HISTORY:  Otherwise significant for the prostate cancer,   history of pulmonary embolism and rib fractures from coughing fits and appendicitis.      PAST SURGICAL HISTORY:  Includes the RRP, a lumbar fusion, a thoracotomy for the history of rib fractures and the pulmonic hernia, shoulder surgery x3, appendectomy and a cervical spine surgery.      MEDICATIONS:       Current Outpatient Medications   Medication Instructions     acetaminophen (TYLENOL) 650 mg, Oral, EVERY 4 HOURS PRN     atorvastatin (LIPITOR) 20 mg, Oral, AT BEDTIME     cetirizine (ZYRTEC) 10 mg, Oral, DAILY     clonazePAM (KLONOPIN) 0.5 mg, Oral, AT BEDTIME PRN     fluticasone (FLONASE) 50 MCG/ACT nasal spray 2 sprays, Both Nostrils, DAILY     levothyroxine (SYNTHROID/LEVOTHROID) 75 MCG tablet TAKE 1 TABLET BY MOUTH EVERY DAY     lisinopril (ZESTRIL) 20 mg, Oral, DAILY     multivitamin w/minerals (MULTI-VITAMIN) tablet 1 tablet, Oral, DAILY     oxyCODONE (ROXICODONE) 5 MG tablet 1 tab po q 4-6 hrs prn pain 3-6 on pain scale<BR>2 tabs po q 4-6hrs prn pain 7-10 on pain scale     rivaroxaban ANTICOAGULANT (XARELTO) 10 mg, Oral, DAILY     senna-docusate (SENOKOT-S/PERICOLACE) 8.6-50 MG tablet 1-2 tablets, Oral, 2 TIMES DAILY, Take while on oral narcotics to prevent or treat constipation.          ALLERGIES:  NO KNOWN DRUG ALLERGIES.      FAMILY HISTORY:  Noncontributory.      SOCIAL HISTORY:  Negative for tobacco.  Alcohol 1 drink a day. Pt is a retired Otolaryngologist (ENT surgeon).      REVIEW OF SYSTEMS:  Negative for fevers, chills, sweats, nausea, vomiting, unexplained weight changes.       Physical Exam  There were no vitals taken for this visit.  Exam deferred today      Wt Readings from Last 4 Encounters:   08/16/21 93 kg (205 lb)   06/14/21 95.3 kg (210 lb)   05/24/21 96.4 kg (212 lb 8 oz)   11/16/20 94.3 kg (207 lb 12.8 oz)     LABS  Lab Results   Component Value Date    WBC 7.4 11/12/2021    WBC 3.8 11/16/2020     Lab Results   Component Value Date    RBC 5.27 11/12/2021    RBC 4.47 11/16/2020      Lab Results   Component Value Date    HGB 16.5 11/12/2021    HGB 13.5 06/15/2021     Lab Results   Component Value Date    HCT 48.6 11/12/2021    HCT 42.3 11/16/2020     Lab Results   Component Value Date    MCV 92 11/12/2021    MCV 95 11/16/2020     Lab Results   Component Value Date    MCH 31.3 11/12/2021    MCH 32.7 11/16/2020     Lab Results   Component Value Date    MCHC 34.0 11/12/2021    MCHC 34.5 11/16/2020     Lab Results   Component Value Date    RDW 13.5 11/12/2021    RDW 11.9 11/16/2020     Lab Results   Component Value Date     11/12/2021     11/16/2020     Last Comprehensive Metabolic Panel:  Sodium   Date Value Ref Range Status   11/12/2021 138 133 - 144 mmol/L Final   11/16/2020 135 133 - 144 mmol/L Final     Potassium   Date Value Ref Range Status   11/12/2021 4.3 3.4 - 5.3 mmol/L Final   11/16/2020 4.1 3.4 - 5.3 mmol/L Final     Chloride   Date Value Ref Range Status   11/12/2021 109 94 - 109 mmol/L Final   11/16/2020 104 94 - 109 mmol/L Final     Carbon Dioxide   Date Value Ref Range Status   11/16/2020 25 20 - 32 mmol/L Final     Carbon Dioxide (CO2)   Date Value Ref Range Status   11/12/2021 23 20 - 32 mmol/L Final     Anion Gap   Date Value Ref Range Status   11/12/2021 6 3 - 14 mmol/L Final   11/16/2020 7 3 - 14 mmol/L Final     Glucose   Date Value Ref Range Status   11/12/2021 106 (H) 70 - 99 mg/dL Final   06/15/2021 111 (H) 70 - 99 mg/dL Final     Urea Nitrogen   Date Value Ref Range Status   11/12/2021 18 7 - 30 mg/dL Final   11/16/2020 18 7 - 30 mg/dL Final     Creatinine   Date Value Ref Range Status   11/12/2021 0.92 0.66 - 1.25 mg/dL Final   06/14/2021 0.86 0.66 - 1.25 mg/dL Final     GFR Estimate   Date Value Ref Range Status   11/12/2021 85 >60 mL/min/1.73m2 Final     Comment:     As of July 11, 2021, eGFR is calculated by the CKD-EPI creatinine equation, without race adjustment. eGFR can be influenced by muscle mass, exercise, and diet. The reported eGFR is an  estimation only and is only applicable if the renal function is stable.   06/14/2021 89 >60 mL/min/[1.73_m2] Final     Comment:     Non  GFR Calc  Starting 12/18/2018, serum creatinine based estimated GFR (eGFR) will be   calculated using the Chronic Kidney Disease Epidemiology Collaboration   (CKD-EPI) equation.       Calcium   Date Value Ref Range Status   11/12/2021 9.2 8.5 - 10.1 mg/dL Final   11/16/2020 8.8 8.5 - 10.1 mg/dL Final     Bilirubin Total   Date Value Ref Range Status   11/12/2021 0.7 0.2 - 1.3 mg/dL Final   11/16/2020 0.4 0.2 - 1.3 mg/dL Final     Alkaline Phosphatase   Date Value Ref Range Status   11/12/2021 84 40 - 150 U/L Final   11/16/2020 88 40 - 150 U/L Final     ALT   Date Value Ref Range Status   11/12/2021 44 0 - 70 U/L Final   11/16/2020 35 0 - 70 U/L Final     AST   Date Value Ref Range Status   11/12/2021 24 0 - 45 U/L Final   11/16/2020 23 0 - 45 U/L Final     PSA   Date Value Ref Range Status   06/18/2021 <0.01 0 - 4 ug/L Final     Comment:     Assay Method:  Chemiluminescence using Siemens Vista analyzer     PSA Tumor Marker   Date Value Ref Range Status   11/12/2021 0.03 0.00 - 4.00 ug/L Final                                                                       IMPRESSION/ PLAN     This is a 66 year old gentleman with a 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  He has a doubling time that is under 9 months based on the most recent 2 measurements.  He is also status post salvage radiation therapy.Completed 1 year of Triple therapy on the AFT19 study and now the testosterone is in the process of recovering.     1.continue to follwo  2. need to evaluate LUTS  3. agree with visualiztion- VCUG in December w Cysto to follow  4. also some frozen shoulder, seeing ortho and PT.     .

## 2021-11-15 NOTE — LETTER
11/15/2021         RE: Lucio Hernandez  [address] [address]        Dear Colleague,    Thank you for referring your patient, Lucio Hernandez, to the [facility]. Please see a copy of my visit note below.          [facility] Oncology Followup  In person visit  11/15/2021         CC: Prostate cancer     HPI: Lucio Hernandez is a 65 year old male who has prostate cancer. He had a radical prostatectomy in 2006 demonstrating [place] 7 disease with a positive margin.  He had salvage radiation therapy in 2006 when his PSA was around 0.4 per the patient's recollection.  He notes his PSA was undetectable for 4 years, but then began coming back and recently has noted to have PSAs in the 1.2-1.4 range in May of this year.  He underwent an Axumin PET scan recently that shows no evidence of metastatic disease though there was uptake in the prostate bed.       Had Prior history of urethral stricture. This may have been due to the prior radiation therapy and a Olsen Catheter complication postop.     He was enrolled in a clinical trial AFT19 with apalutamide and degarelix which started on 11/14/19.         4/11/18            PSA = 0.56  10/30/18          PSA =0.48  5/14/19            PSA =1.2  5/29/19            PSA=1.4  7/29/19            PSA=1.45  9/5/19              PSA =1.84  11/4/19            PSA=2.19  11/14/19          PSA= 2.27-- START AFT-19  12/12/19          PSA= 0.04  1/9/2020          PSA= <0.01  2/5/2020          PSA  <0.01  2/26/2020        PSA <0.01  4/6/20              PSA  <0.01  6/30/20 PSA   <0.01 Testosterone =11  7/27/20 PSA   < 0.01  9/21/20 PSA     <0.01  11/16/20 PSA     <0.01 - Discontinue Study therapy.   2/11/201 PSA  <0.01 Testosterone 54  5/20/21 PSA  <0.01 Testosterone 149  7/14/21 PSA =  0.03 Testosterone =  239      INTERIM HISTORY:  worsening urination  Cathing daily, then QOD, then every third day  increased urgency, frequency and larger cath  residuals  going to see Abdon Almazan, who has been reluctant due to the proximity of the stricture to the sphincter      ROS: 10 point ROS neg other than the symptoms noted above in the HPI.     PAST MEDICAL HISTORY:  Otherwise significant for the prostate cancer,  history of pulmonary embolism and rib fractures from coughing fits and appendicitis.      PAST SURGICAL HISTORY:  Includes the RRP, a lumbar fusion, a thoracotomy for the history of rib fractures and the pulmonic hernia, shoulder surgery x3, appendectomy and a cervical spine surgery.      MEDICATIONS:       Current Outpatient Medications   Medication Instructions     acetaminophen (TYLENOL) 650 mg, Oral, EVERY 4 HOURS PRN     atorvastatin (LIPITOR) 20 mg, Oral, AT BEDTIME     cetirizine (ZYRTEC) 10 mg, Oral, DAILY     clonazePAM (KLONOPIN) 0.5 mg, Oral, AT BEDTIME PRN     fluticasone (FLONASE) 50 MCG/ACT nasal spray 2 sprays, Both Nostrils, DAILY     levothyroxine (SYNTHROID/LEVOTHROID) 75 MCG tablet TAKE 1 TABLET BY MOUTH EVERY DAY     lisinopril (ZESTRIL) 20 mg, Oral, DAILY     multivitamin w/minerals (MULTI-VITAMIN) tablet 1 tablet, Oral, DAILY     oxyCODONE (ROXICODONE) 5 MG tablet 1 tab po q 4-6 hrs prn pain 3-6 on pain scale2 tabs po q 4-6hrs prn pain 7-10 on pain scale     rivaroxaban ANTICOAGULANT (XARELTO) 10 mg, Oral, DAILY     senna-docusate (SENOKOT-S/PERICOLACE) 8.6-50 MG tablet 1-2 tablets, Oral, 2 TIMES DAILY, Take while on oral narcotics to prevent or treat constipation.          ALLERGIES:  NO KNOWN DRUG ALLERGIES.      FAMILY HISTORY:  Noncontributory.      SOCIAL HISTORY:  Negative for tobacco.  Alcohol 1 drink a day. Pt is a retired Otolaryngologist (ENT surgeon).      REVIEW OF SYSTEMS:  Negative for fevers, chills, sweats, nausea, vomiting, unexplained weight changes.       Physical Exam  There were no vitals taken for this visit.  Exam deferred today      Wt Readings from Last 4 Encounters:   08/16/21 93 kg (205 lb)   06/14/21 95.3 kg  (210 lb)   05/24/21 96.4 kg (212 lb 8 oz)   11/16/20 94.3 kg (207 lb 12.8 oz)     LABS  Lab Results   Component Value Date    WBC 7.4 11/12/2021    WBC 3.8 11/16/2020     Lab Results   Component Value Date    RBC 5.27 11/12/2021    RBC 4.47 11/16/2020     Lab Results   Component Value Date    HGB 16.5 11/12/2021    HGB 13.5 06/15/2021     Lab Results   Component Value Date    HCT 48.6 11/12/2021    HCT 42.3 11/16/2020     Lab Results   Component Value Date    MCV 92 11/12/2021    MCV 95 11/16/2020     Lab Results   Component Value Date    MCH 31.3 11/12/2021    MCH 32.7 11/16/2020     Lab Results   Component Value Date    MCHC 34.0 11/12/2021    MCHC 34.5 11/16/2020     Lab Results   Component Value Date    RDW 13.5 11/12/2021    RDW 11.9 11/16/2020     Lab Results   Component Value Date     11/12/2021     11/16/2020     Last Comprehensive Metabolic Panel:  Sodium   Date Value Ref Range Status   11/12/2021 138 133 - 144 mmol/L Final   11/16/2020 135 133 - 144 mmol/L Final     Potassium   Date Value Ref Range Status   11/12/2021 4.3 3.4 - 5.3 mmol/L Final   11/16/2020 4.1 3.4 - 5.3 mmol/L Final     Chloride   Date Value Ref Range Status   11/12/2021 109 94 - 109 mmol/L Final   11/16/2020 104 94 - 109 mmol/L Final     Carbon Dioxide   Date Value Ref Range Status   11/16/2020 25 20 - 32 mmol/L Final     Carbon Dioxide (CO2)   Date Value Ref Range Status   11/12/2021 23 20 - 32 mmol/L Final     Anion Gap   Date Value Ref Range Status   11/12/2021 6 3 - 14 mmol/L Final   11/16/2020 7 3 - 14 mmol/L Final     Glucose   Date Value Ref Range Status   11/12/2021 106 (H) 70 - 99 mg/dL Final   06/15/2021 111 (H) 70 - 99 mg/dL Final     Urea Nitrogen   Date Value Ref Range Status   11/12/2021 18 7 - 30 mg/dL Final   11/16/2020 18 7 - 30 mg/dL Final     Creatinine   Date Value Ref Range Status   11/12/2021 0.92 0.66 - 1.25 mg/dL Final   06/14/2021 0.86 0.66 - 1.25 mg/dL Final     GFR Estimate   Date Value Ref Range  Status   11/12/2021 85 >60 mL/min/1.73m2 Final     Comment:     As of July 11, 2021, eGFR is calculated by the CKD-EPI creatinine equation, without race adjustment. eGFR can be influenced by muscle mass, exercise, and diet. The reported eGFR is an estimation only and is only applicable if the renal function is stable.   06/14/2021 89 >60 mL/min/[1.73_m2] Final     Comment:     Non  GFR Calc  Starting 12/18/2018, serum creatinine based estimated GFR (eGFR) will be   calculated using the Chronic Kidney Disease Epidemiology Collaboration   (CKD-EPI) equation.       Calcium   Date Value Ref Range Status   11/12/2021 9.2 8.5 - 10.1 mg/dL Final   11/16/2020 8.8 8.5 - 10.1 mg/dL Final     Bilirubin Total   Date Value Ref Range Status   11/12/2021 0.7 0.2 - 1.3 mg/dL Final   11/16/2020 0.4 0.2 - 1.3 mg/dL Final     Alkaline Phosphatase   Date Value Ref Range Status   11/12/2021 84 40 - 150 U/L Final   11/16/2020 88 40 - 150 U/L Final     ALT   Date Value Ref Range Status   11/12/2021 44 0 - 70 U/L Final   11/16/2020 35 0 - 70 U/L Final     AST   Date Value Ref Range Status   11/12/2021 24 0 - 45 U/L Final   11/16/2020 23 0 - 45 U/L Final     PSA   Date Value Ref Range Status   06/18/2021 <0.01 0 - 4 ug/L Final     Comment:     Assay Method:  Chemiluminescence using Siemens Vista analyzer     PSA Tumor Marker   Date Value Ref Range Status   11/12/2021 0.03 0.00 - 4.00 ug/L Final                                                                       IMPRESSION/ PLAN     This is a 66 year old gentleman with a 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  He has a doubling time that is under 9 months based on the most recent 2 measurements.  He is also status post salvage radiation therapy.Completed 1 year of Triple therapy on the AFT19 study and now the testosterone is in the process of recovering.     1.continue to follwo  2. need to evaluate LUTS  3. agree with visualiztion-  VCUG in December w Cysto to follow  4. also some frozen shoulder, seeing ortho and PT.       Alejandro Palomino MD

## 2021-11-16 LAB — TESTOST SERPL-MCNC: 309 NG/DL (ref 240–950)

## 2021-11-20 NOTE — PROGRESS NOTES
Followup Visit Note  Feb 26, 2020    CC: Prostate cancer     HPI: Lucio Hernandez is a 64 year old male who has prostate cancer. He had a radical prostatectomy in 2006 demonstrating [place] 7 disease with a positive margin.  He had salvage radiation therapy in 2006 when his PSA was around 0.4 per the patient's recollection.  He notes his PSA was undetectable for 4 years, but then began coming back and recently has noted to have PSAs in the 1.2-1.4 range in May of this year.  He underwent an Axumin PET scan recently that shows no evidence of metastatic disease though there was uptake in the prostate bed.      Had Prior history of urethral stricture. This may have been due to the prior radiation therapy and a Olsen Catheter complication postop.    He was enrolled in a clinical trial AFT19 with apalutamide and degarelix which started on 11/14/19.       4/11/18 PSA = 0.56  10/30/18 PSA =0.48  5/14/19 PSA =1.2  5/29/19 PSA=1.4  7/29/19 PSA=1.45  9/5/19  PSA =1.84  11/4/19 PSA=2.19  11/14/19 PSA= 2.27  12/12/19 PSA= 0.04  1/9/2020 PSA= <0.01  2/5/2020 PSA = <0.01  2/26/2020 PSA= <0.01    INTERIM HISTORY:  Lucio presents today unaccompanied for cycle 5 clinical trial (early due to vacation).     He continues to do fairly well.  He does notice some further changes.  He states he has noticed that his skin is softer and his hair is not estranged.  He also feels like his muscles are getting smaller even though he continues to work out.  He did start the gabapentin at night and this is been helpful for his sleep and has decreased the amount of hot flashes.  Hot flashes are still worse at night and with exercise.  He notes that his head fog has improved.  For example he went to the  last week and was able to understand what was going on and processed the information better.  Otherwise he is feeling great and has no new or different complaints.    ROS: 10 point ROS neg other than the symptoms noted above in the  HPI.    PAST MEDICAL HISTORY:  Otherwise significant for the prostate cancer,  history of pulmonary embolism and rib fractures from coughing fits and appendicitis.      PAST SURGICAL HISTORY:  Includes the RRP, a lumbar fusion, a thoracotomy for the history of rib fractures and the pulmonic hernia, shoulder surgery x3, appendectomy and a cervical spine surgery.      MEDICATIONS:     1.  Zyrtec.   2.  Lisinopril.      ALLERGIES:  NO KNOWN DRUG ALLERGIES.      FAMILY HISTORY:  Noncontributory.      SOCIAL HISTORY:  Negative for tobacco.  Alcohol 1 drink a day. Pt is a retired Otolaryngologist (ENT surgeon).      REVIEW OF SYSTEMS:  Negative for fevers, chills, sweats, nausea, vomiting, unexplained weight changes.      Physical Exam  /74 (BP Location: Left arm, Patient Position: Sitting, Cuff Size: Adult Regular)   Pulse 68   Temp 97.9  F (36.6  C) (Oral)   Resp 16   Wt 84.7 kg (186 lb 11.2 oz)   SpO2 99%   BMI 27.56 kg/m    Wt Readings from Last 4 Encounters:   02/26/20 84.7 kg (186 lb 11.2 oz)   02/05/20 86.4 kg (190 lb 8 oz)   01/09/20 85.7 kg (189 lb)   12/12/19 86.3 kg (190 lb 3.2 oz)     General: Pleasant male in no acute distress  HEENT: EOMI, PERRLA, no scleral icterus, mucus membranes moist and no lesions or thrush  Lymph: Neck is supple and shows no nodes in cervical or supraclavicular   Heart:  RRR, no murmur, gallop or rub  Lungs: CTA-B, no wheezes, rhonchi or rales  Abdomen: Normal bowel sounds, soft, non tender, not distended, no rebound or guarding, no palpable masses  Extremities: No pitting edema  Skin: No significant rashes or lesions  Neuro: CN II-XII are intact    Laboratory Data   1/9/2020 10:45 2/5/2020 13:08 2/26/2020 11:02   PSA <0.01 <0.01 <0.01   T4 Free 0.92 0.81 0.84   Testosterone Total 10 (L) 7 (L)    TSH 6.93 (H) 8.39 (H) 6.36 (H)       Impression and Plan  This is a 64 year old gentleman with a serologic relapse of a Jg 4+3 = 7 with a tertiary pattern of [place] 5.  He  "has a doubling time that is under 9 months based on the most recent 2 measurements.  He is also status post salvage radiation therapy.    Prostate Cancer  -enrolled in AFT19 study with apalutamide and degarelix.   -symptoms are improving; hot flashes improved with gabapentin and his brain fog is less. Still very active and overall tolerating very well  -Continue Apalutamide is 240 mg daily (gave 40 days). Continue monthly degarelix 80 mg; giving today which is a week early due to upcoming vacation, this was approved by sponsor and [employer].  -will follow-up per trial protocol    Hot Flashes  -Started him on a gabapentin 300 mg at bedtime which has been beneficial for him. He feels that he sleeps better and that the hot flashes have improved. Will refill script for him today     Emotional/Mood  -has been struggling with mortality, lack of motivation and more emotions. I do not think he is at a point where medication is necessary though he previously agreed to let me know if he feels he needs help  -his mental \"fog\" has improved and this has been helpful     Urinary Retention  -self cathing every other day. No new symptoms or concerns     Hypothyroidism  -Started synthroid 50 mcg daily on 12/12/19. TSH still elevated though improved and T4 remains normal. No dose adjustment needed. Will not increase dose unless TSH >10 or T4 low     Brandi Soto PA-C  [facility]  [address] [place] [address] [phone]        " psych diagnoses

## 2021-12-07 ENCOUNTER — ANCILLARY PROCEDURE (OUTPATIENT)
Dept: GENERAL RADIOLOGY | Facility: CLINIC | Age: 66
End: 2021-12-07
Attending: UROLOGY
Payer: MEDICARE

## 2021-12-07 DIAGNOSIS — N32.0 BLADDER NECK CONTRACTURE: ICD-10-CM

## 2021-12-07 DIAGNOSIS — C61 PROSTATE CANCER (H): ICD-10-CM

## 2021-12-07 DIAGNOSIS — T66.XXXS RADIATION ADVERSE EFFECT, SEQUELA: ICD-10-CM

## 2021-12-07 PROCEDURE — 74455 X-RAY URETHRA/BLADDER: CPT | Mod: GC | Performed by: RADIOLOGY

## 2021-12-07 PROCEDURE — 51610 INJECTION FOR BLADDER X-RAY: CPT | Mod: GC | Performed by: RADIOLOGY

## 2021-12-07 RX ORDER — LIDOCAINE HYDROCHLORIDE 20 MG/ML
JELLY TOPICAL ONCE
Status: COMPLETED | OUTPATIENT
Start: 2021-12-07 | End: 2021-12-07

## 2021-12-07 RX ADMIN — LIDOCAINE HYDROCHLORIDE: 20 JELLY TOPICAL at 10:45

## 2021-12-13 ENCOUNTER — OFFICE VISIT (OUTPATIENT)
Dept: UROLOGY | Facility: CLINIC | Age: 66
End: 2021-12-13
Payer: MEDICARE

## 2021-12-13 VITALS
HEART RATE: 74 BPM | SYSTOLIC BLOOD PRESSURE: 134 MMHG | BODY MASS INDEX: 31.1 KG/M2 | DIASTOLIC BLOOD PRESSURE: 81 MMHG | WEIGHT: 210 LBS | HEIGHT: 69 IN

## 2021-12-13 DIAGNOSIS — N32.0 BLADDER NECK CONTRACTURE: Primary | ICD-10-CM

## 2021-12-13 PROCEDURE — 52000 CYSTOURETHROSCOPY: CPT | Mod: 52 | Performed by: STUDENT IN AN ORGANIZED HEALTH CARE EDUCATION/TRAINING PROGRAM

## 2021-12-13 ASSESSMENT — PAIN SCALES - GENERAL: PAINLEVEL: NO PAIN (0)

## 2021-12-13 ASSESSMENT — MIFFLIN-ST. JEOR: SCORE: 1712.93

## 2021-12-13 NOTE — PROGRESS NOTES
CYSTOSCOPY PROCEDURE    Pre-Procedure Diagnosis: Urethral stricture  Procedure: Cystoscopy    Surgeon: Griselda Payton MD    Indications:  Mr. Lucio Hernandez is a 66 year old-year-old male with a history of prostate cancer sp RRP in 2006 followed by IMRT cb [facility] w mult dilations and laser. Has been self cathing with a 14F coude - initially every few days to keep the area patent, now nightly to empty his bladder. Catheter is increasingly difficult to pass. His urgency, frequency, nocturia (now up to 4-6 times per night) have worsened over the last few months. Now has trouble emptying. Cathing before bed has helped this somewhat because he starts the night with an empty bladder.     RUG/VCUG  I reviewed and independently interpreted his VCUG from 12/7/21  Findings:   Mild diffuse narrowing of the prostatic urethra, most pronounced at  the bladder neck and increased from 2015. Otherwise no focal  stricture.       Description of Procedure:  After informed consent was obtained, the patient was brought to the procedure room and placed in the low lithotomy position.  The perineum was prepped and draped in a sterile fashion.    External genital exam was normal.     A 16F flexible cystoscope was introduced through a well lubricated urethra and into the urinary bladder. The urethra was normal up until the sphincter. Just past the sphincter there was a dorsal lip of scar. The bladder neck was high/anteriorly displaced and oblong in shape (wide but not tall) and narrow to about 14F. Given the angle, the lumen, and patient discomfort I was not able to get the scope into the bladder.    The cystoscope was removed and the findings were explained to the patient.    Assessment and Plan:  66 M w prostate cancer sp RRP and IMRt with VUAS. Incomplete emptying, bothersome nocturia likely from bladder instability. We discussed fluid restriction at night and emptying bladder befored bed which he is already doing. Increasingly difficult  self cath, likely from narrowing BNC. Not interested in definitive repair, would like to get back to cathing every other day.   - refill script for 14F coude caths, continue catheterizing once daily for incomplete emptying.   - VV with Dr Wells to discuss surgery - likely bladder neck incision in OR      Griselda Payton MD  December 13, 2021

## 2021-12-14 ENCOUNTER — PRE VISIT (OUTPATIENT)
Dept: UROLOGY | Facility: CLINIC | Age: 66
End: 2021-12-14
Payer: MEDICARE

## 2021-12-14 NOTE — TELEPHONE ENCOUNTER
Reason for visit: Discuss surgical options     Relevant information: bladder neck contracture    Records/imaging/labs/orders: all records available    Pt called: no need for a call

## 2021-12-20 ENCOUNTER — TELEPHONE (OUTPATIENT)
Dept: UROLOGY | Facility: CLINIC | Age: 66
End: 2021-12-20

## 2021-12-20 ENCOUNTER — VIRTUAL VISIT (OUTPATIENT)
Dept: UROLOGY | Facility: CLINIC | Age: 66
End: 2021-12-20
Payer: MEDICARE

## 2021-12-20 DIAGNOSIS — C61 PROSTATE CANCER (H): ICD-10-CM

## 2021-12-20 DIAGNOSIS — N32.0 BLADDER NECK CONTRACTURE: Primary | ICD-10-CM

## 2021-12-20 DIAGNOSIS — T66.XXXS RADIATION ADVERSE EFFECT, SEQUELA: ICD-10-CM

## 2021-12-20 PROCEDURE — 99214 OFFICE O/P EST MOD 30 MIN: CPT | Mod: 95 | Performed by: UROLOGY

## 2021-12-20 NOTE — LETTER
12/20/2021       RE: Lucio Hernandez  [address] [address]     Dear Colleague,    Thank you for referring your patient, Lucio Hernandez, to the [facility]OLOG[facility] [place] [facility]. Please see a copy of my visit note below.    HPI:  Lucio Hernandez is a 66 year old male being seen for VUAS after RP+IMRT in 2005. Was managing okay with self dilation but on every 2nd or 3rd day. He uses a 14F catheter daily for urinary retention. Reduced quality of life the last 6 months. Caths now every night at bedtime. Has to get up every 3 hours and has started catheterizing every night in order to get better sleep.  Some soreness after catheterizing but no difficulty catheterizing.     Would like to be able to use a larger catheter to achieve a stronger stream.     Exam:  There were no vitals taken for this visit.  GENERAL: Healthy, alert and no distress  EYES: Eyes grossly normal to inspection.  No discharge or erythema, or obvious scleral/conjunctival abnormalities.  RESP: No audible wheeze, cough, or visible cyanosis.  No visible retractions or increased work of breathing.    SKIN: Visible skin clear. No significant rash, abnormal pigmentation or lesions.  NEURO: Cranial nerves grossly intact.  Mentation and speech appropriate for age.  PSYCH: Mentation appears normal, affect normal/bright, judgement and insight intact, normal speech and appearance well-groomed.    Review of Imaging:  The following imaging exams were independently viewed and interpreted by me and discussed with patient:  none    Review of Labs:  The following labs were reviewed by me and discussed with the patient:  none    Assessment & Plan   1. VUAS after RP+IMRT with sub-optimal QOL on daily self dilation.   2. Urinary retention  3. Radiation cystitis   4. He does not have any trouble inserting the catheter. He does not have complaints of slow stream during the day. His main  complaint is having to get up at night to void. He has to void about 3 hours after cathing at nighttime. He gets about 100 cc at night before he goes to bed. I explained that given his complaints, a transurethral incision of the bladder neck could improve the ability to pass the catheter and it could improve the strength of his stream got by getting him up to 18 or 20 Mauritian. But, we cannot improve the fact that he has to urinate again 3 hours after cathing. This is due to some loss of bladder compliance. The loss of bladder compliance is either due to years of urinating against an obstruction and consequent detrusor hyperplasia or more likely it is due to radiation cystitis. This will not be improved with transurethral incision of bladder neck. He will consider his options and send us a message or call us to let us know if he wants to proceed with surgery. I did explain the risks to include rectal injury, pubourethral fistula and worsening of incontinence. We would plan on leaving a catheter for 3 days and then he would resume intermittent self dilation 1 week after surgery.    Abdon Wells MD  [facility] UROLOGY CLINIC [place]      ==========================      Additional Coding Information:    Problems:  4 -- one or more chronic illnesses with exacerbation or side effects    Data Reviewed  Level of risk:  4 -- minor surgery with patient or procedure risks    Time spent:  30 minutes spent on the date of the encounter doing chart review, history and exam, documentation and further activities per the note            Juventino is a 66 year old who is being evaluated via a billable video visit.      How would you like to obtain your AVS? MyChart  If the video visit is dropped, the invitation should be resent by: Send to e-mail at: [email or web address]  Will anyone else be joining your video visit? No    Video Start Time: 8:00  Video-Visit Details    Type of service:  Video Visit    Video End  Time:8:20    Originating Location (pt. Location): Home    Distant Location (provider location):  [facility]OLOGY [facility] [place]     Platform used for Video Visit: [employer]

## 2021-12-20 NOTE — PROGRESS NOTES
HPI:  Lucio Hernandez is a 66 year old male being seen for VUAS after RP+IMRT in 2005. Was managing okay with self dilation but on every 2nd or 3rd day. He uses a 14F catheter daily for urinary retention. Reduced quality of life the last 6 months. Caths now every night at bedtime. Has to get up every 3 hours and has started catheterizing every night in order to get better sleep.  Some soreness after catheterizing but no difficulty catheterizing.     Would like to be able to use a larger catheter to achieve a stronger stream.     Exam:  There were no vitals taken for this visit.  GENERAL: Healthy, alert and no distress  EYES: Eyes grossly normal to inspection.  No discharge or erythema, or obvious scleral/conjunctival abnormalities.  RESP: No audible wheeze, cough, or visible cyanosis.  No visible retractions or increased work of breathing.    SKIN: Visible skin clear. No significant rash, abnormal pigmentation or lesions.  NEURO: Cranial nerves grossly intact.  Mentation and speech appropriate for age.  PSYCH: Mentation appears normal, affect normal/bright, judgement and insight intact, normal speech and appearance well-groomed.    Review of Imaging:  The following imaging exams were independently viewed and interpreted by me and discussed with patient:  none    Review of Labs:  The following labs were reviewed by me and discussed with the patient:  none    Assessment & Plan   1. VUAS after RP+IMRT with sub-optimal QOL on daily self dilation.   2. Urinary retention  3. Radiation cystitis   4. He does not have any trouble inserting the catheter. He does not have complaints of slow stream during the day. His main complaint is having to get up at night to void. He has to void about 3 hours after cathing at nighttime. He gets about 100 cc at night before he goes to bed. I explained that given his complaints, a transurethral incision of the bladder neck could improve the ability to pass the catheter and it could improve  the strength of his stream got by getting him up to 18 or 20 Syriac. But, we cannot improve the fact that he has to urinate again 3 hours after cathing. This is due to some loss of bladder compliance. The loss of bladder compliance is either due to years of urinating against an obstruction and consequent detrusor hyperplasia or more likely it is due to radiation cystitis. This will not be improved with transurethral incision of bladder neck. He will consider his options and send us a message or call us to let us know if he wants to proceed with surgery. I did explain the risks to include rectal injury, pubourethral fistula and worsening of incontinence. We would plan on leaving a catheter for 3 days and then he would resume intermittent self dilation 1 week after surgery.    Abdon Wells MD  [facility] UROLOGY [facility]      ==========================      Additional Coding Information:    Problems:  4 -- one or more chronic illnesses with exacerbation or side effects    Data Reviewed  Level of risk:  4 -- minor surgery with patient or procedure risks    Time spent:  30 minutes spent on the date of the encounter doing chart review, history and exam, documentation and further activities per the note            Juventino is a 66 year old who is being evaluated via a billable video visit.      How would you like to obtain your AVS? MyChart  If the video visit is dropped, the invitation should be resent by: Send to e-mail at: [email or web address]  Will anyone else be joining your video visit? No    Video Start Time: 8:00  Video-Visit Details    Type of service:  Video Visit    Video End Time:8:20    Originating Location (pt. Location): Home    Distant Location (provider location):  [facility] UROLOGY [facility]     Platform used for Video Visit: [email or web address]

## 2021-12-20 NOTE — PATIENT INSTRUCTIONS
Follow up as needed, or schedule surgery.    Continue self catheterizing for urinary retention to drain your bladder, self dilation is a secondary diagnosis as shared in Dr. Wells's note. You will need to continue to self catheterize for urinary retention after bladder neck surgery due to urinary retention being the result of a loss of bladder compliance either due to years of urinating against an obstruction, requiring a coude tipped catheter as you can not pass a straight tip, and consequent detrusor hyperplasia or more likely it is due to radiation cystitis.    It was a pleasure meeting with you today.  Thank you for allowing me and my team the privilege of caring for you today.  YOU are the reason we are here, and I truly hope we provided you with the excellent service you deserve.  Please let us know if there is anything else we can do for you so that we can be sure you are leaving completely satisfied with your care experience.        Concetta Cheng, CMA

## 2021-12-31 ENCOUNTER — TELEPHONE (OUTPATIENT)
Dept: UROLOGY | Facility: CLINIC | Age: 66
End: 2021-12-31
Payer: MEDICARE

## 2022-02-13 ENCOUNTER — HEALTH MAINTENANCE LETTER (OUTPATIENT)
Age: 67
End: 2022-02-13

## 2022-02-18 ENCOUNTER — LAB (OUTPATIENT)
Dept: LAB | Facility: CLINIC | Age: 67
End: 2022-02-18
Payer: MEDICARE

## 2022-02-18 DIAGNOSIS — C61 PROSTATE CANCER (H): ICD-10-CM

## 2022-02-18 DIAGNOSIS — E03.9 HYPOTHYROIDISM: ICD-10-CM

## 2022-02-18 LAB
ALBUMIN SERPL-MCNC: 3.7 G/DL (ref 3.4–5)
ALP SERPL-CCNC: 73 U/L (ref 40–150)
ALT SERPL W P-5'-P-CCNC: 55 U/L (ref 0–70)
ANION GAP SERPL CALCULATED.3IONS-SCNC: 4 MMOL/L (ref 3–14)
AST SERPL W P-5'-P-CCNC: 25 U/L (ref 0–45)
BASOPHILS # BLD AUTO: 0 10E3/UL (ref 0–0.2)
BASOPHILS NFR BLD AUTO: 1 %
BILIRUB SERPL-MCNC: 0.9 MG/DL (ref 0.2–1.3)
BUN SERPL-MCNC: 17 MG/DL (ref 7–30)
CALCIUM SERPL-MCNC: 8.3 MG/DL (ref 8.5–10.1)
CHLORIDE BLD-SCNC: 109 MMOL/L (ref 94–109)
CO2 SERPL-SCNC: 23 MMOL/L (ref 20–32)
CREAT SERPL-MCNC: 0.87 MG/DL (ref 0.66–1.25)
EOSINOPHIL # BLD AUTO: 0.2 10E3/UL (ref 0–0.7)
EOSINOPHIL NFR BLD AUTO: 2 %
ERYTHROCYTE [DISTWIDTH] IN BLOOD BY AUTOMATED COUNT: 12.9 % (ref 10–15)
GFR SERPL CREATININE-BSD FRML MDRD: >90 ML/MIN/1.73M2
GLUCOSE BLD-MCNC: 146 MG/DL (ref 70–99)
HCT VFR BLD AUTO: 52.2 % (ref 40–53)
HGB BLD-MCNC: 17.2 G/DL (ref 13.3–17.7)
IMM GRANULOCYTES # BLD: 0.1 10E3/UL
IMM GRANULOCYTES NFR BLD: 1 %
LYMPHOCYTES # BLD AUTO: 1.4 10E3/UL (ref 0.8–5.3)
LYMPHOCYTES NFR BLD AUTO: 20 %
MCH RBC QN AUTO: 32.3 PG (ref 26.5–33)
MCHC RBC AUTO-ENTMCNC: 33 G/DL (ref 31.5–36.5)
MCV RBC AUTO: 98 FL (ref 78–100)
MONOCYTES # BLD AUTO: 0.7 10E3/UL (ref 0–1.3)
MONOCYTES NFR BLD AUTO: 9 %
NEUTROPHILS # BLD AUTO: 4.9 10E3/UL (ref 1.6–8.3)
NEUTROPHILS NFR BLD AUTO: 67 %
NRBC # BLD AUTO: 0 10E3/UL
NRBC BLD AUTO-RTO: 0 /100
PLATELET # BLD AUTO: 203 10E3/UL (ref 150–450)
POTASSIUM BLD-SCNC: 3.8 MMOL/L (ref 3.4–5.3)
PROT SERPL-MCNC: 6.9 G/DL (ref 6.8–8.8)
PSA SERPL-MCNC: 0.08 UG/L (ref 0–4)
RBC # BLD AUTO: 5.33 10E6/UL (ref 4.4–5.9)
SODIUM SERPL-SCNC: 136 MMOL/L (ref 133–144)
TSH SERPL DL<=0.005 MIU/L-ACNC: 1.57 MU/L (ref 0.4–4)
WBC # BLD AUTO: 7 10E3/UL (ref 4–11)

## 2022-02-18 PROCEDURE — 84153 ASSAY OF PSA TOTAL: CPT

## 2022-02-18 PROCEDURE — 84403 ASSAY OF TOTAL TESTOSTERONE: CPT

## 2022-02-18 PROCEDURE — 85025 COMPLETE CBC W/AUTO DIFF WBC: CPT

## 2022-02-18 PROCEDURE — 80053 COMPREHEN METABOLIC PANEL: CPT

## 2022-02-18 PROCEDURE — 36415 COLL VENOUS BLD VENIPUNCTURE: CPT

## 2022-02-18 PROCEDURE — 84443 ASSAY THYROID STIM HORMONE: CPT

## 2022-02-21 ENCOUNTER — ONCOLOGY VISIT (OUTPATIENT)
Dept: ONCOLOGY | Facility: CLINIC | Age: 67
End: 2022-02-21
Attending: INTERNAL MEDICINE
Payer: MEDICARE

## 2022-02-21 VITALS
WEIGHT: 214.6 LBS | TEMPERATURE: 98.5 F | BODY MASS INDEX: 31.78 KG/M2 | DIASTOLIC BLOOD PRESSURE: 98 MMHG | SYSTOLIC BLOOD PRESSURE: 150 MMHG | HEIGHT: 69 IN | OXYGEN SATURATION: 96 % | HEART RATE: 91 BPM

## 2022-02-21 DIAGNOSIS — C61 PROSTATE CANCER (H): Primary | ICD-10-CM

## 2022-02-21 PROCEDURE — G0463 HOSPITAL OUTPT CLINIC VISIT: HCPCS

## 2022-02-21 PROCEDURE — 99213 OFFICE O/P EST LOW 20 MIN: CPT | Performed by: INTERNAL MEDICINE

## 2022-02-21 ASSESSMENT — PAIN SCALES - GENERAL: PAINLEVEL: NO PAIN (0)

## 2022-02-21 NOTE — LETTER
2/21/2022     RE: Lucio Hernandez  [address] [id number]    Dear Colleague,    Thank you for referring your patient, Lucio Hernandez, to the [facility] CAN[facility]. Please see a copy of my visit note below.          [facility] Oncology Followup  In person visit  2/21/22       CC: Prostate cancer     HPI: Lucio Hernandez is a 65 year old male who has prostate cancer. He had a radical prostatectomy in 2006 demonstrating Jg 7 disease with a positive margin.  He had salvage radiation therapy in 2006 when his PSA was around 0.4 per the patient's recollection.  He notes his PSA was undetectable for 4 years, but then began coming back and recently has noted to have PSAs in the 1.2-1.4 range in May of this year.  He underwent an Axumin PET scan recently that shows no evidence of metastatic disease though there was uptake in the prostate bed.       Had Prior history of urethral stricture. This may have been due to the prior radiation therapy and a Olsen Catheter complication postop.     He was enrolled in a clinical trial AFT19 with apalutamide and degarelix which started on 11/14/19.         4/11/18            PSA = 0.56  10/30/18          PSA =0.48  5/14/19            PSA =1.2  5/29/19            PSA=1.4  7/29/19            PSA=1.45  9/5/19              PSA =1.84  11/4/19            PSA=2.19  11/14/19          PSA= 2.27-- START AFT-19  12/12/19          PSA= 0.04  1/9/2020          PSA= <0.01  2/5/2020          PSA  <0.01  2/26/2020        PSA <0.01  4/6/20              PSA  <0.01  6/30/20 PSA   <0.01 Testosterone =11  7/27/20 PSA   < 0.01  9/21/20 PSA     <0.01  11/16/20 PSA     <0.01 - Discontinue Study therapy.   2/11/201 PSA  <0.01  Testosterone = 54  5/20/21 PSA  <0.01  Testosterone = 149  7/14/21 PSA  =0.03 Testosterone = 239  11/12/21 PSA  =0.03 Testosterone = 309  2/18/22 PSA  =0.08      INTERIM HISTORY:  Improved urination.   Treated for UTI which helped a lot.   Cathing  nightly now.   Urinary stream good during the day.  Feeling generally better since the return of testosterone. Getting exercise. No real complaints today.       ROS: 10 point ROS neg other than the symptoms noted above in the HPI.     PAST MEDICAL HISTORY:  Otherwise significant for the prostate cancer,  history of pulmonary embolism and rib fractures from coughing fits and appendicitis.      PAST SURGICAL HISTORY:  Includes the RRP, a lumbar fusion, a thoracotomy for the history of rib fractures and the pulmonic hernia, shoulder surgery x3, appendectomy and a cervical spine surgery.   --knee replacement much better. Redid PT        MEDICATIONS:       Current Outpatient Medications   Medication Instructions     acetaminophen (TYLENOL) 650 mg, Oral, EVERY 4 HOURS PRN     atorvastatin (LIPITOR) 20 mg, Oral, AT BEDTIME     cetirizine (ZYRTEC) 10 mg, Oral, DAILY     clonazePAM (KLONOPIN) 0.5 mg, Oral, AT BEDTIME PRN     fluticasone (FLONASE) 50 MCG/ACT nasal spray 2 sprays, Both Nostrils, DAILY     levothyroxine (SYNTHROID/LEVOTHROID) 75 MCG tablet TAKE 1 TABLET BY MOUTH EVERY DAY     lisinopril (ZESTRIL) 20 mg, Oral, DAILY     multivitamin w/minerals (MULTI-VITAMIN) tablet 1 tablet, Oral, DAILY     oxyCODONE (ROXICODONE) 5 MG tablet 1 tab po q 4-6 hrs prn pain 3-6 on pain scale2 tabs po q 4-6hrs prn pain 7-10 on pain scale     rivaroxaban ANTICOAGULANT (XARELTO) 10 mg, Oral, DAILY     senna-docusate (SENOKOT-S/PERICOLACE) 8.6-50 MG tablet 1-2 tablets, Oral, 2 TIMES DAILY, Take while on oral narcotics to prevent or treat constipation.          ALLERGIES:  NO KNOWN DRUG ALLERGIES.      FAMILY HISTORY:  Noncontributory.      SOCIAL HISTORY:  Negative for tobacco.  Alcohol 1 drink a day. Pt is a retired Otolaryngologist (ENT surgeon).      REVIEW OF SYSTEMS:  Negative for fevers, chills, sweats, nausea, vomiting, unexplained weight changes.       Physical Exam  BP (!) 150/98 (BP Location: Right arm, Patient Position:  "Sitting, Cuff Size: Adult Large)   Pulse 91   Temp 98.5  F (36.9  C) (Oral)   Ht 1.753 m (5' 9\")   Wt 97.3 kg (214 lb 9.6 oz)   SpO2 96%   BMI 31.69 kg/m    Exam deferred today      Wt Readings from Last 4 Encounters:   02/21/22 97.3 kg (214 lb 9.6 oz)   12/13/21 95.3 kg (210 lb)   11/15/21 96.6 kg (213 lb)   08/16/21 93 kg (205 lb)     LABS  Lab Results   Component Value Date    WBC 7.4 11/12/2021    WBC 3.8 11/16/2020     Lab Results   Component Value Date    RBC 5.27 11/12/2021    RBC 4.47 11/16/2020     Lab Results   Component Value Date    HGB 16.5 11/12/2021    HGB 13.5 06/15/2021     Lab Results   Component Value Date    HCT 48.6 11/12/2021    HCT 42.3 11/16/2020     Lab Results   Component Value Date    MCV 92 11/12/2021    MCV 95 11/16/2020     Lab Results   Component Value Date    MCH 31.3 11/12/2021    MCH 32.7 11/16/2020     Lab Results   Component Value Date    MCHC 34.0 11/12/2021    MCHC 34.5 11/16/2020     Lab Results   Component Value Date    RDW 13.5 11/12/2021    RDW 11.9 11/16/2020     Lab Results   Component Value Date     11/12/2021     11/16/2020     Last Comprehensive Metabolic Panel:  Sodium   Date Value Ref Range Status   02/18/2022 136 133 - 144 mmol/L Final   11/16/2020 135 133 - 144 mmol/L Final     Potassium   Date Value Ref Range Status   02/18/2022 3.8 3.4 - 5.3 mmol/L Final   11/16/2020 4.1 3.4 - 5.3 mmol/L Final     Chloride   Date Value Ref Range Status   02/18/2022 109 94 - 109 mmol/L Final   11/16/2020 104 94 - 109 mmol/L Final     Carbon Dioxide   Date Value Ref Range Status   11/16/2020 25 20 - 32 mmol/L Final     Carbon Dioxide (CO2)   Date Value Ref Range Status   02/18/2022 23 20 - 32 mmol/L Final     Anion Gap   Date Value Ref Range Status   02/18/2022 4 3 - 14 mmol/L Final   11/16/2020 7 3 - 14 mmol/L Final     Glucose   Date Value Ref Range Status   02/18/2022 146 (H) 70 - 99 mg/dL Final   06/15/2021 111 (H) 70 - 99 mg/dL Final     Urea Nitrogen   Date " Value Ref Range Status   02/18/2022 17 7 - 30 mg/dL Final   11/16/2020 18 7 - 30 mg/dL Final     Creatinine   Date Value Ref Range Status   02/18/2022 0.87 0.66 - 1.25 mg/dL Final   06/14/2021 0.86 0.66 - 1.25 mg/dL Final     GFR Estimate   Date Value Ref Range Status   02/18/2022 >90 >60 mL/min/1.73m2 Final     Comment:     Effective December 21, 2021 eGFRcr in adults is calculated using the 2021 CKD-EPI creatinine equation which includes age and gender (Saundra et al., NEJM, DOI: 10.1056/SQMCkg8314474)   06/14/2021 89 >60 mL/min/[1.73_m2] Final     Comment:     Non  GFR Calc  Starting 12/18/2018, serum creatinine based estimated GFR (eGFR) will be   calculated using the Chronic Kidney Disease Epidemiology Collaboration   (CKD-EPI) equation.       Calcium   Date Value Ref Range Status   02/18/2022 8.3 (L) 8.5 - 10.1 mg/dL Final   11/16/2020 8.8 8.5 - 10.1 mg/dL Final     Bilirubin Total   Date Value Ref Range Status   02/18/2022 0.9 0.2 - 1.3 mg/dL Final   11/16/2020 0.4 0.2 - 1.3 mg/dL Final     Alkaline Phosphatase   Date Value Ref Range Status   02/18/2022 73 40 - 150 U/L Final   11/16/2020 88 40 - 150 U/L Final     ALT   Date Value Ref Range Status   02/18/2022 55 0 - 70 U/L Final   11/16/2020 35 0 - 70 U/L Final     AST   Date Value Ref Range Status   02/18/2022 25 0 - 45 U/L Final   11/16/2020 23 0 - 45 U/L Final     PSA   Date Value Ref Range Status   06/18/2021 <0.01 0 - 4 ug/L Final     Comment:     Assay Method:  Chemiluminescence using Siemens Vista analyzer     PSA Tumor Marker   Date Value Ref Range Status   02/18/2022 0.08 0.00 - 4.00 ug/L Final                                                                       IMPRESSION/ PLAN   This is a 66 year old gentleman with a serologic relapse of a Wall 4+3 = 7 with a tertiary pattern of Wall 5.  He has a doubling time that is under 9 months based on the most recent 2 measurements.  He is also status post salvage radiation  therapy.Completed 1 year of Triple therapy on the AFT19 study and now the testosterone is in the process of recovering.     1. No Rx at this   2. LUTS - better with night cathing.   3. Weight gain - discussed cutting back ETOH and working on weight loss. Information given on [email or web address] plant based diet program.        Alejandro Palomino MD

## 2022-02-21 NOTE — PROGRESS NOTES
[facility] Oncology Followup  In person visit  2/21/22       CC: Prostate cancer     HPI: Lucio Hernandez is a 65 year old male who has prostate cancer. He had a radical prostatectomy in 2006 demonstrating Jg 7 disease with a positive margin.  He had salvage radiation therapy in 2006 when his PSA was around 0.4 per the patient's recollection.  He notes his PSA was undetectable for 4 years, but then began coming back and recently has noted to have PSAs in the 1.2-1.4 range in May of this year.  He underwent an Axumin PET scan recently that shows no evidence of metastatic disease though there was uptake in the prostate bed.       Had Prior history of urethral stricture. This may have been due to the prior radiation therapy and a Olsen Catheter complication postop.     He was enrolled in a clinical trial AFT19 with apalutamide and degarelix which started on 11/14/19.         4/11/18            PSA = 0.56  10/30/18          PSA =0.48  5/14/19            PSA =1.2  5/29/19            PSA=1.4  7/29/19            PSA=1.45  9/5/19              PSA =1.84  11/4/19            PSA=2.19  11/14/19          PSA= 2.27-- START AFT-19  12/12/19          PSA= 0.04  1/9/2020          PSA= <0.01  2/5/2020          PSA  <0.01  2/26/2020        PSA <0.01  4/6/20              PSA  <0.01  6/30/20 PSA   <0.01 Testosterone =11  7/27/20 PSA   < 0.01  9/21/20 PSA     <0.01  11/16/20 PSA     <0.01 - Discontinue Study therapy.   2/11/201 PSA  <0.01  Testosterone = 54  5/20/21 PSA  <0.01  Testosterone = 149  7/14/21 PSA  =0.03 Testosterone = 239  11/12/21 PSA  =0.03 Testosterone = 309  2/18/22 PSA  =0.08      INTERIM HISTORY:  Improved urination.   Treated for UTI which helped a lot.   Cathing nightly now.   Urinary stream good during the day.  Feeling generally better since the return of testosterone. Getting exercise. No real complaints today.       ROS: 10 point ROS neg other than the symptoms noted above in the HPI.     PAST MEDICAL  "HISTORY:  Otherwise significant for the prostate cancer,  history of pulmonary embolism and rib fractures from coughing fits and appendicitis.      PAST SURGICAL HISTORY:  Includes the RRP, a lumbar fusion, a thoracotomy for the history of rib fractures and the pulmonic hernia, shoulder surgery x3, appendectomy and a cervical spine surgery.   --knee replacement much better. Redid PT        MEDICATIONS:       Current Outpatient Medications   Medication Instructions     acetaminophen (TYLENOL) 650 mg, Oral, EVERY 4 HOURS PRN     atorvastatin (LIPITOR) 20 mg, Oral, AT BEDTIME     cetirizine (ZYRTEC) 10 mg, Oral, DAILY     clonazePAM (KLONOPIN) 0.5 mg, Oral, AT BEDTIME PRN     fluticasone (FLONASE) 50 MCG/ACT nasal spray 2 sprays, Both Nostrils, DAILY     levothyroxine (SYNTHROID/LEVOTHROID) 75 MCG tablet TAKE 1 TABLET BY MOUTH EVERY DAY     lisinopril (ZESTRIL) 20 mg, Oral, DAILY     multivitamin w/minerals (MULTI-VITAMIN) tablet 1 tablet, Oral, DAILY     oxyCODONE (ROXICODONE) 5 MG tablet 1 tab po q 4-6 hrs prn pain 3-6 on pain scale<BR>2 tabs po q 4-6hrs prn pain 7-10 on pain scale     rivaroxaban ANTICOAGULANT (XARELTO) 10 mg, Oral, DAILY     senna-docusate (SENOKOT-S/PERICOLACE) 8.6-50 MG tablet 1-2 tablets, Oral, 2 TIMES DAILY, Take while on oral narcotics to prevent or treat constipation.          ALLERGIES:  NO KNOWN DRUG ALLERGIES.      FAMILY HISTORY:  Noncontributory.      SOCIAL HISTORY:  Negative for tobacco.  Alcohol 1 drink a day. Pt is a retired Otolaryngologist (ENT surgeon).      REVIEW OF SYSTEMS:  Negative for fevers, chills, sweats, nausea, vomiting, unexplained weight changes.       Physical Exam  BP (!) 150/98 (BP Location: Right arm, Patient Position: Sitting, Cuff Size: Adult Large)   Pulse 91   Temp 98.5  F (36.9  C) (Oral)   Ht 1.753 m (5' 9\")   Wt 97.3 kg (214 lb 9.6 oz)   SpO2 96%   BMI 31.69 kg/m    Exam deferred today      Wt Readings from Last 4 Encounters:   02/21/22 97.3 kg (214 " lb 9.6 oz)   12/13/21 95.3 kg (210 lb)   11/15/21 96.6 kg (213 lb)   08/16/21 93 kg (205 lb)     LABS  Lab Results   Component Value Date    WBC 7.4 11/12/2021    WBC 3.8 11/16/2020     Lab Results   Component Value Date    RBC 5.27 11/12/2021    RBC 4.47 11/16/2020     Lab Results   Component Value Date    HGB 16.5 11/12/2021    HGB 13.5 06/15/2021     Lab Results   Component Value Date    HCT 48.6 11/12/2021    HCT 42.3 11/16/2020     Lab Results   Component Value Date    MCV 92 11/12/2021    MCV 95 11/16/2020     Lab Results   Component Value Date    MCH 31.3 11/12/2021    MCH 32.7 11/16/2020     Lab Results   Component Value Date    MCHC 34.0 11/12/2021    MCHC 34.5 11/16/2020     Lab Results   Component Value Date    RDW 13.5 11/12/2021    RDW 11.9 11/16/2020     Lab Results   Component Value Date     11/12/2021     11/16/2020     Last Comprehensive Metabolic Panel:  Sodium   Date Value Ref Range Status   02/18/2022 136 133 - 144 mmol/L Final   11/16/2020 135 133 - 144 mmol/L Final     Potassium   Date Value Ref Range Status   02/18/2022 3.8 3.4 - 5.3 mmol/L Final   11/16/2020 4.1 3.4 - 5.3 mmol/L Final     Chloride   Date Value Ref Range Status   02/18/2022 109 94 - 109 mmol/L Final   11/16/2020 104 94 - 109 mmol/L Final     Carbon Dioxide   Date Value Ref Range Status   11/16/2020 25 20 - 32 mmol/L Final     Carbon Dioxide (CO2)   Date Value Ref Range Status   02/18/2022 23 20 - 32 mmol/L Final     Anion Gap   Date Value Ref Range Status   02/18/2022 4 3 - 14 mmol/L Final   11/16/2020 7 3 - 14 mmol/L Final     Glucose   Date Value Ref Range Status   02/18/2022 146 (H) 70 - 99 mg/dL Final   06/15/2021 111 (H) 70 - 99 mg/dL Final     Urea Nitrogen   Date Value Ref Range Status   02/18/2022 17 7 - 30 mg/dL Final   11/16/2020 18 7 - 30 mg/dL Final     Creatinine   Date Value Ref Range Status   02/18/2022 0.87 0.66 - 1.25 mg/dL Final   06/14/2021 0.86 0.66 - 1.25 mg/dL Final     GFR Estimate   Date  Value Ref Range Status   02/18/2022 >90 >60 mL/min/1.73m2 Final     Comment:     Effective December 21, 2021 eGFRcr in adults is calculated using the 2021 CKD-EPI creatinine equation which includes age and gender (Saundra delacruz al., NEJ, DOI: 10.1056/VSQTft3404528)   06/14/2021 89 >60 mL/min/[1.73_m2] Final     Comment:     Non  GFR Calc  Starting 12/18/2018, serum creatinine based estimated GFR (eGFR) will be   calculated using the Chronic Kidney Disease Epidemiology Collaboration   (CKD-EPI) equation.       Calcium   Date Value Ref Range Status   02/18/2022 8.3 (L) 8.5 - 10.1 mg/dL Final   11/16/2020 8.8 8.5 - 10.1 mg/dL Final     Bilirubin Total   Date Value Ref Range Status   02/18/2022 0.9 0.2 - 1.3 mg/dL Final   11/16/2020 0.4 0.2 - 1.3 mg/dL Final     Alkaline Phosphatase   Date Value Ref Range Status   02/18/2022 73 40 - 150 U/L Final   11/16/2020 88 40 - 150 U/L Final     ALT   Date Value Ref Range Status   02/18/2022 55 0 - 70 U/L Final   11/16/2020 35 0 - 70 U/L Final     AST   Date Value Ref Range Status   02/18/2022 25 0 - 45 U/L Final   11/16/2020 23 0 - 45 U/L Final     PSA   Date Value Ref Range Status   06/18/2021 <0.01 0 - 4 ug/L Final     Comment:     Assay Method:  Chemiluminescence using Siemens Vista analyzer     PSA Tumor Marker   Date Value Ref Range Status   02/18/2022 0.08 0.00 - 4.00 ug/L Final                                                                       IMPRESSION/ PLAN   This is a 66 year old gentleman with a serologic relapse of a [place] 4+3 = 7 with a tertiary pattern of Jg 5.  He has a doubling time that is under 9 months based on the most recent 2 measurements.  He is also status post salvage radiation therapy.Completed 1 year of Triple therapy on the AFT19 study and now the testosterone is in the process of recovering.     1. No Rx at this   2. LUTS - better with night cathing.   3. Weight gain - discussed cutting back ETOH and working on weight loss.  Information given on [email or web address] plant based diet program.        Alejandro Palomino MD

## 2022-02-21 NOTE — NURSING NOTE
"Oncology Rooming Note    February 21, 2022 8:58 AM   Lucio Hernandez is a 66 year old male who presents for:    Chief Complaint   Patient presents with     Oncology Clinic Visit     Prostate Cancer      Initial Vitals: BP (!) 150/98 (BP Location: Right arm, Patient Position: Sitting, Cuff Size: Adult Large)   Pulse 91   Temp 98.5  F (36.9  C) (Oral)   Ht 1.753 m (5' 9\")   Wt 97.3 kg (214 lb 9.6 oz)   SpO2 96%   BMI 31.69 kg/m   Estimated body mass index is 31.69 kg/m  as calculated from the following:    Height as of this encounter: 1.753 m (5' 9\").    Weight as of this encounter: 97.3 kg (214 lb 9.6 oz). Body surface area is 2.18 meters squared.  No Pain (0) Comment: Data Unavailable   No LMP for male patient.  Allergies reviewed: Yes  Medications reviewed: Yes    Medications: Medication refills not needed today.  Pharmacy name entered into [employer]: [facility] [id number] [place] [id number]    Clinical concerns: None     Justin Ly            "

## 2022-02-22 LAB — TESTOST SERPL-MCNC: 218 NG/DL (ref 240–950)

## 2022-04-12 DIAGNOSIS — C61 PROSTATE CANCER (H): ICD-10-CM

## 2022-04-12 DIAGNOSIS — E03.2 HYPOTHYROIDISM DUE TO MEDICATION: ICD-10-CM

## 2022-04-12 NOTE — CONFIDENTIAL NOTE
Levothyroxine refill   Last prescribing provider: Dr Palomino     Last clinic visit date: 2/21/22 Dr Palomino     Any missed appointments or no-shows since last clinic visit?: No     Recommendations for requested medication (if none, N/A): Copied from chart note 2/21/22 Dr Palomino     levothyroxine (SYNTHROID/LEVOTHROID) 75 MCG tablet TAKE 1 TABLET BY MOUTH EVERY DAY       Next clinic visit date: 5/23/22 Dr Palomino     Any other pertinent information (if none, N/A): N/A

## 2022-04-19 RX ORDER — LEVOTHYROXINE SODIUM 75 UG/1
75 TABLET ORAL DAILY
Qty: 90 TABLET | Refills: 1 | Status: SHIPPED | OUTPATIENT
Start: 2022-04-19 | End: 2022-10-03

## 2022-05-20 ENCOUNTER — LAB (OUTPATIENT)
Dept: LAB | Facility: CLINIC | Age: 67
End: 2022-05-20
Payer: MEDICARE

## 2022-05-20 DIAGNOSIS — E03.9 HYPOTHYROIDISM: ICD-10-CM

## 2022-05-20 DIAGNOSIS — C61 PROSTATE CANCER (H): ICD-10-CM

## 2022-05-20 LAB
ALBUMIN SERPL-MCNC: 3.7 G/DL (ref 3.4–5)
ALP SERPL-CCNC: 82 U/L (ref 40–150)
ALT SERPL W P-5'-P-CCNC: 62 U/L (ref 0–70)
ANION GAP SERPL CALCULATED.3IONS-SCNC: 3 MMOL/L (ref 3–14)
AST SERPL W P-5'-P-CCNC: 31 U/L (ref 0–45)
BASOPHILS # BLD AUTO: 0.1 10E3/UL (ref 0–0.2)
BASOPHILS NFR BLD AUTO: 1 %
BILIRUB SERPL-MCNC: 0.9 MG/DL (ref 0.2–1.3)
BUN SERPL-MCNC: 21 MG/DL (ref 7–30)
CALCIUM SERPL-MCNC: 9 MG/DL (ref 8.5–10.1)
CHLORIDE BLD-SCNC: 108 MMOL/L (ref 94–109)
CO2 SERPL-SCNC: 26 MMOL/L (ref 20–32)
CREAT SERPL-MCNC: 0.88 MG/DL (ref 0.66–1.25)
EOSINOPHIL # BLD AUTO: 0.2 10E3/UL (ref 0–0.7)
EOSINOPHIL NFR BLD AUTO: 3 %
ERYTHROCYTE [DISTWIDTH] IN BLOOD BY AUTOMATED COUNT: 12.5 % (ref 10–15)
GFR SERPL CREATININE-BSD FRML MDRD: >90 ML/MIN/1.73M2
GLUCOSE BLD-MCNC: 117 MG/DL (ref 70–99)
HCT VFR BLD AUTO: 52 % (ref 40–53)
HGB BLD-MCNC: 17.8 G/DL (ref 13.3–17.7)
IMM GRANULOCYTES # BLD: 0.1 10E3/UL
IMM GRANULOCYTES NFR BLD: 2 %
LYMPHOCYTES # BLD AUTO: 1.1 10E3/UL (ref 0.8–5.3)
LYMPHOCYTES NFR BLD AUTO: 15 %
MCH RBC QN AUTO: 33.8 PG (ref 26.5–33)
MCHC RBC AUTO-ENTMCNC: 34.2 G/DL (ref 31.5–36.5)
MCV RBC AUTO: 99 FL (ref 78–100)
MONOCYTES # BLD AUTO: 0.7 10E3/UL (ref 0–1.3)
MONOCYTES NFR BLD AUTO: 10 %
NEUTROPHILS # BLD AUTO: 5.2 10E3/UL (ref 1.6–8.3)
NEUTROPHILS NFR BLD AUTO: 69 %
NRBC # BLD AUTO: 0 10E3/UL
NRBC BLD AUTO-RTO: 0 /100
PLATELET # BLD AUTO: 238 10E3/UL (ref 150–450)
POTASSIUM BLD-SCNC: 4.2 MMOL/L (ref 3.4–5.3)
PROT SERPL-MCNC: 6.8 G/DL (ref 6.8–8.8)
PSA SERPL-MCNC: 0.11 UG/L (ref 0–4)
RBC # BLD AUTO: 5.26 10E6/UL (ref 4.4–5.9)
SODIUM SERPL-SCNC: 137 MMOL/L (ref 133–144)
TSH SERPL DL<=0.005 MIU/L-ACNC: 2.61 MU/L (ref 0.4–4)
WBC # BLD AUTO: 7.5 10E3/UL (ref 4–11)

## 2022-05-20 PROCEDURE — 80053 COMPREHEN METABOLIC PANEL: CPT

## 2022-05-20 PROCEDURE — 85014 HEMATOCRIT: CPT

## 2022-05-20 PROCEDURE — 84403 ASSAY OF TOTAL TESTOSTERONE: CPT

## 2022-05-20 PROCEDURE — 84153 ASSAY OF PSA TOTAL: CPT

## 2022-05-20 PROCEDURE — 84443 ASSAY THYROID STIM HORMONE: CPT

## 2022-05-20 PROCEDURE — 36415 COLL VENOUS BLD VENIPUNCTURE: CPT

## 2022-05-23 ENCOUNTER — ONCOLOGY VISIT (OUTPATIENT)
Dept: ONCOLOGY | Facility: CLINIC | Age: 67
End: 2022-05-23
Attending: INTERNAL MEDICINE
Payer: MEDICARE

## 2022-05-23 VITALS
RESPIRATION RATE: 18 BRPM | WEIGHT: 218.8 LBS | SYSTOLIC BLOOD PRESSURE: 143 MMHG | HEART RATE: 79 BPM | OXYGEN SATURATION: 98 % | TEMPERATURE: 98 F | DIASTOLIC BLOOD PRESSURE: 86 MMHG | BODY MASS INDEX: 32.31 KG/M2

## 2022-05-23 DIAGNOSIS — C61 PROSTATE CANCER (H): Primary | ICD-10-CM

## 2022-05-23 PROCEDURE — 99214 OFFICE O/P EST MOD 30 MIN: CPT | Mod: GC | Performed by: INTERNAL MEDICINE

## 2022-05-23 PROCEDURE — G0463 HOSPITAL OUTPT CLINIC VISIT: HCPCS

## 2022-05-23 ASSESSMENT — PAIN SCALES - GENERAL: PAINLEVEL: MILD PAIN (3)

## 2022-05-23 NOTE — LETTER
5/23/2022         RE: Lucio Hernandez  [address] [id number]        Dear Colleague,    Thank you for referring your patient, Lucio Hernandez, to the [facility]. Please see a copy of my visit note below.          [facility] Oncology Followup  In person visit  5/23/22       CC: Prostate cancer     HPI: Lucio Hernandez is a 65 year old male who has prostate cancer. He had a radical prostatectomy in 2006 demonstrating [place] 7 disease with a positive margin. He had salvage radiation therapy in 2006 when his PSA was around 0.4 per the patient's recollection.  He notes his PSA was undetectable for 4 years, but then began coming back and recently has noted to have PSAs in the 1.2-1.4 range in 5/2019. He underwent an Axumin PET scan in 2019 that showed no evidence of metastatic disease though there was uptake in the prostate bed. Has been on AFT-19 trial on apalutamide and degarelix from 11/14/2019-11/16/2020 with PSA <0.01 till 7/2021 (PSA 0.03).      Had Prior history of urethral stricture. This may have been due to the prior radiation therapy and a Olsen Catheter complication postop.       4/11/18            PSA = 0.56  10/30/18          PSA =0.48  5/14/19            PSA =1.2  5/29/19            PSA=1.4  7/29/19            PSA=1.45  9/5/19              PSA =1.84  11/4/19            PSA=2.19  11/14/19          PSA= 2.27-- START AFT-19  12/12/19          PSA= 0.04  1/9/2020          PSA= <0.01  2/5/2020          PSA  <0.01  2/26/2020        PSA <0.01  4/6/20              PSA  <0.01  6/30/20 PSA   <0.01 Testosterone =11  7/27/20 PSA   < 0.01  9/21/20 PSA     <0.01  11/16/20 PSA     <0.01 - Completed AFT-19   2/11/201 PSA  <0.01  Testosterone = 54  5/20/21 PSA  <0.01  Testosterone = 149  7/14/21 PSA  =0.03 Testosterone = 239  11/12/21 PSA  =0.03 Testosterone = 309  2/18/22 PSA  =0.08  5/20/22 PSA     =0.11  T=244        INTERIM HISTORY:  Good urination. Cath every other  night. 1-2 times nocturia, mild urgency, no pain, strain or incontinence, last UTI fall 2021. C/o L testicle discomfort for weeks, no recalled triggers, no pain or tender to palpation, no appearance change, does have h/o b/l varicose, L>R. Endorse some lifting recently but in an appropriate way.     C/o worsened chronic LBP since 3/2022 when he played a lot of golf and also working on new house. Felt more stiff, fluctuated pain at mid low back towards to the L side, also at L buttock and hip, deny shooting pain or red flag sx. Extensive spinal cord history including stenosis s/p fusion and other procedures.     Feeling generally better since the return of testosterone. Getting exercise. Good appetite. Fair sleep d/t back pain. Gain some wt (214 in 2/2022->218). Drink 1 cocktail daily. Heartburn improved, f/u with GI.    Planning trip to the [place] to [place]    Generally feels ok    ROS: 10 point ROS neg other than the symptoms noted above in the HPI.     PAST MEDICAL HISTORY:  Otherwise significant for the prostate cancer,  history of pulmonary embolism and rib fractures from coughing fits and appendicitis.      PAST SURGICAL HISTORY:  Includes the RRP, a lumbar fusion, a thoracotomy for the history of rib fractures and the pulmonic hernia, shoulder surgery x3, appendectomy and a cervical spine surgery.   --knee replacement much better. Redid PT        MEDICATIONS:       Current Outpatient Medications   Medication Instructions     acetaminophen (TYLENOL) 650 mg, Oral, EVERY 4 HOURS PRN     atorvastatin (LIPITOR) 20 mg, Oral, AT BEDTIME     cetirizine (ZYRTEC) 10 mg, Oral, DAILY     clonazePAM (KLONOPIN) 0.5 mg, Oral, AT BEDTIME PRN     fluticasone (FLONASE) 50 MCG/ACT nasal spray 2 sprays, Both Nostrils, DAILY     levothyroxine (SYNTHROID/LEVOTHROID) 75 MCG tablet TAKE 1 TABLET BY MOUTH EVERY DAY     lisinopril (ZESTRIL) 20 mg, Oral, DAILY     multivitamin w/minerals (MULTI-VITAMIN) tablet 1 tablet, Oral,  DAILY     oxyCODONE (ROXICODONE) 5 MG tablet 1 tab po q 4-6 hrs prn pain 3-6 on pain scale2 tabs po q 4-6hrs prn pain 7-10 on pain scale     rivaroxaban ANTICOAGULANT (XARELTO) 10 mg, Oral, DAILY     senna-docusate (SENOKOT-S/PERICOLACE) 8.6-50 MG tablet 1-2 tablets, Oral, 2 TIMES DAILY, Take while on oral narcotics to prevent or treat constipation.          ALLERGIES:  NO KNOWN DRUG ALLERGIES.      FAMILY HISTORY:  Noncontributory.      SOCIAL HISTORY:  Negative for tobacco.  Alcohol 1 drink a day. Pt is a retired Otolaryngologist (ENT surgeon).      REVIEW OF SYSTEMS:  Negative for fevers, chills, sweats, nausea, vomiting, unexplained weight changes.       Physical Exam  BP (!) 143/86   Pulse 79   Temp 98  F (36.7  C) (Oral)   Resp 18   Wt 99.2 kg (218 lb 12.8 oz)   SpO2 98%   BMI 32.31 kg/m    Exam deferred today      Wt Readings from Last 4 Encounters:   05/23/22 99.2 kg (218 lb 12.8 oz)   02/21/22 97.3 kg (214 lb 9.6 oz)   12/13/21 95.3 kg (210 lb)   11/15/21 96.6 kg (213 lb)     LABS  Lab Results   Component Value Date    WBC 7.4 11/12/2021    WBC 3.8 11/16/2020     Lab Results   Component Value Date    RBC 5.27 11/12/2021    RBC 4.47 11/16/2020     Lab Results   Component Value Date    HGB 16.5 11/12/2021    HGB 13.5 06/15/2021     Lab Results   Component Value Date    HCT 48.6 11/12/2021    HCT 42.3 11/16/2020     Lab Results   Component Value Date    MCV 92 11/12/2021    MCV 95 11/16/2020     Lab Results   Component Value Date    MCH 31.3 11/12/2021    MCH 32.7 11/16/2020     Lab Results   Component Value Date    MCHC 34.0 11/12/2021    MCHC 34.5 11/16/2020     Lab Results   Component Value Date    RDW 13.5 11/12/2021    RDW 11.9 11/16/2020     Lab Results   Component Value Date     11/12/2021     11/16/2020     Last Comprehensive Metabolic Panel:  Sodium   Date Value Ref Range Status   05/20/2022 137 133 - 144 mmol/L Final   11/16/2020 135 133 - 144 mmol/L Final     Potassium   Date  Value Ref Range Status   05/20/2022 4.2 3.4 - 5.3 mmol/L Final   11/16/2020 4.1 3.4 - 5.3 mmol/L Final     Chloride   Date Value Ref Range Status   05/20/2022 108 94 - 109 mmol/L Final   11/16/2020 104 94 - 109 mmol/L Final     Carbon Dioxide   Date Value Ref Range Status   11/16/2020 25 20 - 32 mmol/L Final     Carbon Dioxide (CO2)   Date Value Ref Range Status   05/20/2022 26 20 - 32 mmol/L Final     Anion Gap   Date Value Ref Range Status   05/20/2022 3 3 - 14 mmol/L Final   11/16/2020 7 3 - 14 mmol/L Final     Glucose   Date Value Ref Range Status   05/20/2022 117 (H) 70 - 99 mg/dL Final   06/15/2021 111 (H) 70 - 99 mg/dL Final     Urea Nitrogen   Date Value Ref Range Status   05/20/2022 21 7 - 30 mg/dL Final   11/16/2020 18 7 - 30 mg/dL Final     Creatinine   Date Value Ref Range Status   05/20/2022 0.88 0.66 - 1.25 mg/dL Final   06/14/2021 0.86 0.66 - 1.25 mg/dL Final     GFR Estimate   Date Value Ref Range Status   05/20/2022 >90 >60 mL/min/1.73m2 Final     Comment:     Effective December 21, 2021 eGFRcr in adults is calculated using the 2021 CKD-EPI creatinine equation which includes age and gender (Saundra delacruz al., NEJ, DOI: 10.1056/DZRRen5276901)   06/14/2021 89 >60 mL/min/[1.73_m2] Final     Comment:     Non  GFR Calc  Starting 12/18/2018, serum creatinine based estimated GFR (eGFR) will be   calculated using the Chronic Kidney Disease Epidemiology Collaboration   (CKD-EPI) equation.       Calcium   Date Value Ref Range Status   05/20/2022 9.0 8.5 - 10.1 mg/dL Final   11/16/2020 8.8 8.5 - 10.1 mg/dL Final     Bilirubin Total   Date Value Ref Range Status   05/20/2022 0.9 0.2 - 1.3 mg/dL Final   11/16/2020 0.4 0.2 - 1.3 mg/dL Final     Alkaline Phosphatase   Date Value Ref Range Status   05/20/2022 82 40 - 150 U/L Final   11/16/2020 88 40 - 150 U/L Final     ALT   Date Value Ref Range Status   05/20/2022 62 0 - 70 U/L Final   11/16/2020 35 0 - 70 U/L Final     AST   Date Value Ref Range  Status   05/20/2022 31 0 - 45 U/L Final   11/16/2020 23 0 - 45 U/L Final     PSA   Date Value Ref Range Status   06/18/2021 <0.01 0 - 4 ug/L Final     Comment:     Assay Method:  Chemiluminescence using Siemens Vista analyzer     PSA Tumor Marker   Date Value Ref Range Status   05/20/2022 0.11 0.00 - 4.00 ug/L Final                                                                       IMPRESSION/ PLAN   This is a 66 year old gentleman with a serologic relapse of a Jg 4+3 = 7 with a tertiary pattern of [place] 5. PET 2019 showed no mets though there was uptake in the prostate bed. S/p prostatectomy and salvage radiation therapy. Completed 1 year of AFT19 study on apalutamide and degarelix from 11/14/2019-11/16/2020 with PSA <0.01 till 7/2021 (PSA 0.03). He has a doubling time that is about 4-5 months based on the most recent 2 measurements (0.03->0.08). Testosterone is recovering.     # Hormone sensitive prostate cancer, no metastasis  1. Plan for PSMA-PET in 10/2022. Consider SBRT for oligo-mets pending scan result  2. RTC in 4 months with PSA   3. No treatment for now. Will decide during next visit pending PSA and PSMA-PET result  4. Weight gain - discussed cutting back ETOH and working on weight loss  5. Low back pain- will f/u with neurosurgery with repeat MRI      Patient was seen and plans were discussed with Dr. Palomino.    Katie Chun MD, PhD  Internal Medicine, pgy-[id number]  Pager: [phone]    Physician Attestation   I, Alejandro Palomino MD, saw this patient and agree with the findings and plan of care as documented in the note.      Items personally reviewed/procedural attestation: labs and plan.        Again, thank you for allowing me to participate in the care of your patient.      Sincerely,    Alejandro Palomino MD

## 2022-05-23 NOTE — NURSING NOTE
"Oncology Rooming Note    May 23, 2022 10:28 AM   Lucio Hernandez is a 66 year old male who presents for:    Chief Complaint   Patient presents with     Oncology Clinic Visit     Malignant neoplasm of prostate      Initial Vitals: BP (!) 143/86   Pulse 79   Temp 98  F (36.7  C) (Oral)   Resp 18   Wt 99.2 kg (218 lb 12.8 oz)   SpO2 98%   BMI 32.31 kg/m   Estimated body mass index is 32.31 kg/m  as calculated from the following:    Height as of 2/21/22: 1.753 m (5' 9\").    Weight as of this encounter: 99.2 kg (218 lb 12.8 oz). Body surface area is 2.2 meters squared.  Mild Pain (3) Comment: Data Unavailable   No LMP for male patient.  Allergies reviewed: Yes  Medications reviewed: Yes    Medications: Medication refills not needed today.  Pharmacy name entered into [employer]: [facility] [id number] [place] [id number] [address]    Clinical concerns: No new concerns .      Melyssa Butts [facility]            "

## 2022-05-23 NOTE — PROGRESS NOTES
[facility] Oncology Followup  In person visit  5/23/22       CC: Prostate cancer     HPI: Lucio Hernandez is a 65 year old male who has prostate cancer. He had a radical prostatectomy in 2006 demonstrating Jg 7 disease with a positive margin. He had salvage radiation therapy in 2006 when his PSA was around 0.4 per the patient's recollection.  He notes his PSA was undetectable for 4 years, but then began coming back and recently has noted to have PSAs in the 1.2-1.4 range in 5/2019. He underwent an Axumin PET scan in 2019 that showed no evidence of metastatic disease though there was uptake in the prostate bed. Has been on AFT-19 trial on apalutamide and degarelix from 11/14/2019-11/16/2020 with PSA <0.01 till 7/2021 (PSA 0.03).      Had Prior history of urethral stricture. This may have been due to the prior radiation therapy and a Olsen Catheter complication postop.       4/11/18            PSA = 0.56  10/30/18          PSA =0.48  5/14/19            PSA =1.2  5/29/19            PSA=1.4  7/29/19            PSA=1.45  9/5/19              PSA =1.84  11/4/19            PSA=2.19  11/14/19          PSA= 2.27-- START AFT-19  12/12/19          PSA= 0.04  1/9/2020          PSA= <0.01  2/5/2020          PSA  <0.01  2/26/2020        PSA <0.01  4/6/20              PSA  <0.01  6/30/20 PSA   <0.01 Testosterone =11  7/27/20 PSA   < 0.01  9/21/20 PSA     <0.01  11/16/20 PSA     <0.01 - Completed AFT-19   2/11/201 PSA  <0.01  Testosterone = 54  5/20/21 PSA  <0.01  Testosterone = 149  7/14/21 PSA  =0.03 Testosterone = 239  11/12/21 PSA  =0.03 Testosterone = 309  2/18/22 PSA  =0.08  5/20/22 PSA     =0.11  T=244        INTERIM HISTORY:  Good urination. Cath every other night. 1-2 times nocturia, mild urgency, no pain, strain or incontinence, last UTI fall 2021. C/o L testicle discomfort for weeks, no recalled triggers, no pain or tender to palpation, no appearance change, does have h/o b/l varicose, L>R. Endorse some lifting  recently but in an appropriate way.     C/o worsened chronic LBP since 3/2022 when he played a lot of golf and also working on new house. Felt more stiff, fluctuated pain at mid low back towards to the L side, also at L buttock and hip, deny shooting pain or red flag sx. Extensive spinal cord history including stenosis s/p fusion and other procedures.     Feeling generally better since the return of testosterone. Getting exercise. Good appetite. Fair sleep d/t back pain. Gain some wt (214 in 2/2022->218). Drink 1 cocktail daily. Heartburn improved, f/u with GI.    Planning trip to the [place] to [place]    Generally feels ok    ROS: 10 point ROS neg other than the symptoms noted above in the HPI.     PAST MEDICAL HISTORY:  Otherwise significant for the prostate cancer,  history of pulmonary embolism and rib fractures from coughing fits and appendicitis.      PAST SURGICAL HISTORY:  Includes the RRP, a lumbar fusion, a thoracotomy for the history of rib fractures and the pulmonic hernia, shoulder surgery x3, appendectomy and a cervical spine surgery.   --knee replacement much better. Redid PT        MEDICATIONS:       Current Outpatient Medications   Medication Instructions     acetaminophen (TYLENOL) 650 mg, Oral, EVERY 4 HOURS PRN     atorvastatin (LIPITOR) 20 mg, Oral, AT BEDTIME     cetirizine (ZYRTEC) 10 mg, Oral, DAILY     clonazePAM (KLONOPIN) 0.5 mg, Oral, AT BEDTIME PRN     fluticasone (FLONASE) 50 MCG/ACT nasal spray 2 sprays, Both Nostrils, DAILY     levothyroxine (SYNTHROID/LEVOTHROID) 75 MCG tablet TAKE 1 TABLET BY MOUTH EVERY DAY     lisinopril (ZESTRIL) 20 mg, Oral, DAILY     multivitamin w/minerals (MULTI-VITAMIN) tablet 1 tablet, Oral, DAILY     oxyCODONE (ROXICODONE) 5 MG tablet 1 tab po q 4-6 hrs prn pain 3-6 on pain scale<BR>2 tabs po q 4-6hrs prn pain 7-10 on pain scale     rivaroxaban ANTICOAGULANT (XARELTO) 10 mg, Oral, DAILY     senna-docusate (SENOKOT-S/PERICOLACE) 8.6-50 MG tablet  1-2 tablets, Oral, 2 TIMES DAILY, Take while on oral narcotics to prevent or treat constipation.          ALLERGIES:  NO KNOWN DRUG ALLERGIES.      FAMILY HISTORY:  Noncontributory.      SOCIAL HISTORY:  Negative for tobacco.  Alcohol 1 drink a day. Pt is a retired Otolaryngologist (ENT surgeon).      REVIEW OF SYSTEMS:  Negative for fevers, chills, sweats, nausea, vomiting, unexplained weight changes.       Physical Exam  BP (!) 143/86   Pulse 79   Temp 98  F (36.7  C) (Oral)   Resp 18   Wt 99.2 kg (218 lb 12.8 oz)   SpO2 98%   BMI 32.31 kg/m    Exam deferred today      Wt Readings from Last 4 Encounters:   05/23/22 99.2 kg (218 lb 12.8 oz)   02/21/22 97.3 kg (214 lb 9.6 oz)   12/13/21 95.3 kg (210 lb)   11/15/21 96.6 kg (213 lb)     LABS  Lab Results   Component Value Date    WBC 7.4 11/12/2021    WBC 3.8 11/16/2020     Lab Results   Component Value Date    RBC 5.27 11/12/2021    RBC 4.47 11/16/2020     Lab Results   Component Value Date    HGB 16.5 11/12/2021    HGB 13.5 06/15/2021     Lab Results   Component Value Date    HCT 48.6 11/12/2021    HCT 42.3 11/16/2020     Lab Results   Component Value Date    MCV 92 11/12/2021    MCV 95 11/16/2020     Lab Results   Component Value Date    MCH 31.3 11/12/2021    MCH 32.7 11/16/2020     Lab Results   Component Value Date    MCHC 34.0 11/12/2021    MCHC 34.5 11/16/2020     Lab Results   Component Value Date    RDW 13.5 11/12/2021    RDW 11.9 11/16/2020     Lab Results   Component Value Date     11/12/2021     11/16/2020     Last Comprehensive Metabolic Panel:  Sodium   Date Value Ref Range Status   05/20/2022 137 133 - 144 mmol/L Final   11/16/2020 135 133 - 144 mmol/L Final     Potassium   Date Value Ref Range Status   05/20/2022 4.2 3.4 - 5.3 mmol/L Final   11/16/2020 4.1 3.4 - 5.3 mmol/L Final     Chloride   Date Value Ref Range Status   05/20/2022 108 94 - 109 mmol/L Final   11/16/2020 104 94 - 109 mmol/L Final     Carbon Dioxide   Date Value  Ref Range Status   11/16/2020 25 20 - 32 mmol/L Final     Carbon Dioxide (CO2)   Date Value Ref Range Status   05/20/2022 26 20 - 32 mmol/L Final     Anion Gap   Date Value Ref Range Status   05/20/2022 3 3 - 14 mmol/L Final   11/16/2020 7 3 - 14 mmol/L Final     Glucose   Date Value Ref Range Status   05/20/2022 117 (H) 70 - 99 mg/dL Final   06/15/2021 111 (H) 70 - 99 mg/dL Final     Urea Nitrogen   Date Value Ref Range Status   05/20/2022 21 7 - 30 mg/dL Final   11/16/2020 18 7 - 30 mg/dL Final     Creatinine   Date Value Ref Range Status   05/20/2022 0.88 0.66 - 1.25 mg/dL Final   06/14/2021 0.86 0.66 - 1.25 mg/dL Final     GFR Estimate   Date Value Ref Range Status   05/20/2022 >90 >60 mL/min/1.73m2 Final     Comment:     Effective December 21, 2021 eGFRcr in adults is calculated using the 2021 CKD-EPI creatinine equation which includes age and gender (Saundra et al., NEJM, DOI: 10.1056/OWRZnd8498197)   06/14/2021 89 >60 mL/min/[1.73_m2] Final     Comment:     Non  GFR Calc  Starting 12/18/2018, serum creatinine based estimated GFR (eGFR) will be   calculated using the Chronic Kidney Disease Epidemiology Collaboration   (CKD-EPI) equation.       Calcium   Date Value Ref Range Status   05/20/2022 9.0 8.5 - 10.1 mg/dL Final   11/16/2020 8.8 8.5 - 10.1 mg/dL Final     Bilirubin Total   Date Value Ref Range Status   05/20/2022 0.9 0.2 - 1.3 mg/dL Final   11/16/2020 0.4 0.2 - 1.3 mg/dL Final     Alkaline Phosphatase   Date Value Ref Range Status   05/20/2022 82 40 - 150 U/L Final   11/16/2020 88 40 - 150 U/L Final     ALT   Date Value Ref Range Status   05/20/2022 62 0 - 70 U/L Final   11/16/2020 35 0 - 70 U/L Final     AST   Date Value Ref Range Status   05/20/2022 31 0 - 45 U/L Final   11/16/2020 23 0 - 45 U/L Final     PSA   Date Value Ref Range Status   06/18/2021 <0.01 0 - 4 ug/L Final     Comment:     Assay Method:  Chemiluminescence using Siemens Vista analyzer     PSA Tumor Marker   Date Value  Ref Range Status   05/20/2022 0.11 0.00 - 4.00 ug/L Final                                                                       IMPRESSION/ PLAN   This is a 66 year old gentleman with a serologic relapse of a Jg 4+3 = 7 with a tertiary pattern of [place] 5. PET 2019 showed no mets though there was uptake in the prostate bed. S/p prostatectomy and salvage radiation therapy. Completed 1 year of AFT19 study on apalutamide and degarelix from 11/14/2019-11/16/2020 with PSA <0.01 till 7/2021 (PSA 0.03). He has a doubling time that is about 4-5 months based on the most recent 2 measurements (0.03->0.08). Testosterone is recovering.     # Hormone sensitive prostate cancer, no metastasis  1. Plan for PSMA-PET in 10/2022. Consider SBRT for oligo-mets pending scan result  2. RTC in 4 months with PSA   3. No treatment for now. Will decide during next visit pending PSA and PSMA-PET result  4. Weight gain - discussed cutting back ETOH and working on weight loss  5. Low back pain- will f/u with neurosurgery with repeat MRI      Patient was seen and plans were discussed with Dr. Palomino.    Katie Chun MD, PhD  Internal Medicine, pgy-[id number]  Pager: [phone]    Physician Attestation   I, Alejandro Palomino MD, saw this patient and agree with the findings and plan of care as documented in the note.      Items personally reviewed/procedural attestation: labs and plan.    Alejandro Palomino MD

## 2022-05-24 LAB — TESTOST SERPL-MCNC: 244 NG/DL (ref 240–950)

## 2022-09-23 ENCOUNTER — MYC MEDICAL ADVICE (OUTPATIENT)
Dept: ONCOLOGY | Facility: CLINIC | Age: 67
End: 2022-09-23

## 2022-09-23 ENCOUNTER — LAB (OUTPATIENT)
Dept: LAB | Facility: CLINIC | Age: 67
End: 2022-09-23
Payer: MEDICARE

## 2022-09-23 DIAGNOSIS — C61 PROSTATE CANCER (H): ICD-10-CM

## 2022-09-23 DIAGNOSIS — E03.9 HYPOTHYROIDISM: ICD-10-CM

## 2022-09-23 DIAGNOSIS — E03.2 HYPOTHYROIDISM DUE TO MEDICATION: ICD-10-CM

## 2022-09-23 LAB
ALBUMIN SERPL-MCNC: 3.9 G/DL (ref 3.4–5)
ALP SERPL-CCNC: 67 U/L (ref 40–150)
ALT SERPL W P-5'-P-CCNC: 43 U/L (ref 0–70)
ANION GAP SERPL CALCULATED.3IONS-SCNC: 4 MMOL/L (ref 3–14)
AST SERPL W P-5'-P-CCNC: 21 U/L (ref 0–45)
BASOPHILS # BLD AUTO: 0 10E3/UL (ref 0–0.2)
BASOPHILS NFR BLD AUTO: 1 %
BILIRUB SERPL-MCNC: 1.1 MG/DL (ref 0.2–1.3)
BUN SERPL-MCNC: 17 MG/DL (ref 7–30)
CALCIUM SERPL-MCNC: 9.1 MG/DL (ref 8.5–10.1)
CHLORIDE BLD-SCNC: 108 MMOL/L (ref 94–109)
CO2 SERPL-SCNC: 26 MMOL/L (ref 20–32)
CREAT SERPL-MCNC: 0.88 MG/DL (ref 0.66–1.25)
EOSINOPHIL # BLD AUTO: 0.1 10E3/UL (ref 0–0.7)
EOSINOPHIL NFR BLD AUTO: 3 %
ERYTHROCYTE [DISTWIDTH] IN BLOOD BY AUTOMATED COUNT: 12.4 % (ref 10–15)
GFR SERPL CREATININE-BSD FRML MDRD: >90 ML/MIN/1.73M2
GLUCOSE BLD-MCNC: 115 MG/DL (ref 70–99)
HCT VFR BLD AUTO: 47.5 % (ref 40–53)
HGB BLD-MCNC: 16.4 G/DL (ref 13.3–17.7)
IMM GRANULOCYTES # BLD: 0 10E3/UL
IMM GRANULOCYTES NFR BLD: 0 %
LYMPHOCYTES # BLD AUTO: 0.9 10E3/UL (ref 0.8–5.3)
LYMPHOCYTES NFR BLD AUTO: 19 %
MCH RBC QN AUTO: 32.8 PG (ref 26.5–33)
MCHC RBC AUTO-ENTMCNC: 34.5 G/DL (ref 31.5–36.5)
MCV RBC AUTO: 95 FL (ref 78–100)
MONOCYTES # BLD AUTO: 0.5 10E3/UL (ref 0–1.3)
MONOCYTES NFR BLD AUTO: 10 %
NEUTROPHILS # BLD AUTO: 3.2 10E3/UL (ref 1.6–8.3)
NEUTROPHILS NFR BLD AUTO: 67 %
NRBC # BLD AUTO: 0 10E3/UL
NRBC BLD AUTO-RTO: 0 /100
PLATELET # BLD AUTO: 192 10E3/UL (ref 150–450)
POTASSIUM BLD-SCNC: 4.7 MMOL/L (ref 3.4–5.3)
PROT SERPL-MCNC: 6.9 G/DL (ref 6.8–8.8)
PSA SERPL-MCNC: 0.18 NG/ML (ref 0–4.5)
RBC # BLD AUTO: 5 10E6/UL (ref 4.4–5.9)
SODIUM SERPL-SCNC: 138 MMOL/L (ref 133–144)
TSH SERPL DL<=0.005 MIU/L-ACNC: 1.15 MU/L (ref 0.4–4)
WBC # BLD AUTO: 4.8 10E3/UL (ref 4–11)

## 2022-09-23 PROCEDURE — 85025 COMPLETE CBC W/AUTO DIFF WBC: CPT

## 2022-09-23 PROCEDURE — 80053 COMPREHEN METABOLIC PANEL: CPT

## 2022-09-23 PROCEDURE — 84153 ASSAY OF PSA TOTAL: CPT

## 2022-09-23 PROCEDURE — 84443 ASSAY THYROID STIM HORMONE: CPT

## 2022-09-23 PROCEDURE — 36415 COLL VENOUS BLD VENIPUNCTURE: CPT

## 2022-09-23 PROCEDURE — 82040 ASSAY OF SERUM ALBUMIN: CPT

## 2022-09-23 PROCEDURE — 84403 ASSAY OF TOTAL TESTOSTERONE: CPT

## 2022-09-25 LAB — TESTOST SERPL-MCNC: 312 NG/DL (ref 240–950)

## 2022-09-26 ENCOUNTER — ONCOLOGY VISIT (OUTPATIENT)
Dept: ONCOLOGY | Facility: CLINIC | Age: 67
End: 2022-09-26
Attending: INTERNAL MEDICINE
Payer: MEDICARE

## 2022-09-26 VITALS
DIASTOLIC BLOOD PRESSURE: 83 MMHG | TEMPERATURE: 98.1 F | HEART RATE: 60 BPM | SYSTOLIC BLOOD PRESSURE: 148 MMHG | BODY MASS INDEX: 29.55 KG/M2 | HEIGHT: 69 IN | WEIGHT: 199.5 LBS | OXYGEN SATURATION: 99 % | RESPIRATION RATE: 16 BRPM

## 2022-09-26 DIAGNOSIS — C61 PROSTATE CANCER (H): Primary | ICD-10-CM

## 2022-09-26 PROCEDURE — G0463 HOSPITAL OUTPT CLINIC VISIT: HCPCS

## 2022-09-26 PROCEDURE — 99214 OFFICE O/P EST MOD 30 MIN: CPT | Performed by: INTERNAL MEDICINE

## 2022-09-26 ASSESSMENT — PAIN SCALES - GENERAL: PAINLEVEL: MILD PAIN (2)

## 2022-09-26 NOTE — NURSING NOTE
"Oncology Rooming Note    September 26, 2022 9:42 AM   Lucio Hernandez is a 66 year old male who presents for:    Chief Complaint   Patient presents with     Oncology Clinic Visit     [facility] RETURN - PROSTATE CANCER     Initial Vitals: BP (!) 148/83   Pulse 60   Temp 98.1  F (36.7  C)   Resp 16   Ht 1.753 m (5' 9.02\")   Wt 90.5 kg (199 lb 8 oz)   SpO2 99%   BMI 29.45 kg/m   Estimated body mass index is 29.45 kg/m  as calculated from the following:    Height as of this encounter: 1.753 m (5' 9.02\").    Weight as of this encounter: 90.5 kg (199 lb 8 oz). Body surface area is 2.1 meters squared.  Mild Pain (2) Comment: Data Unavailable   No LMP for male patient.  Allergies reviewed: Yes  Medications reviewed: Yes    Medications: Medication refills not needed today.  Pharmacy name entered into [employer]: [id number] [place] [id number]    Clinical concerns: No new concerns. Candelario was notified.      Guille Pereyra LPN            "

## 2022-09-26 NOTE — LETTER
9/26/2022         RE: Lucio Hernandez  [address] [id number]        Dear Colleague,    Thank you for referring your patient, Lucio Hernandez, to the [facility]. Please see a copy of my visit note below.          [facility] Followup  In person visit  9-26-22       CC: Prostate cancer     HPI: Lucio Hernandez is a 66 year old male who has prostate cancer. He had a radical prostatectomy in 2006 demonstrating [place] 7 disease with a positive margin. He had salvage radiation therapy in 2006 when his PSA was around 0.4 per the patient's recollection.  He notes his PSA was undetectable for 4 years, but then began coming back and recently has noted to have PSAs in the 1.2-1.4 range in 5/2019. He underwent an Axumin PET scan in 2019 that showed no evidence of metastatic disease though there was uptake in the prostate bed. Has been on AFT-19 trial on apalutamide and degarelix from 11/14/2019-11/16/2020 with PSA <0.01 till 7/2021 (PSA 0.03).      Had Prior history of urethral stricture. This may have been due to the prior radiation therapy and a Olsen Catheter complication postop.       4/11/18            PSA = 0.56  10/30/18          PSA =0.48  5/14/19            PSA =1.2  5/29/19            PSA=1.4  7/29/19            PSA=1.45  9/5/19              PSA =1.84  11/4/19            PSA=2.19  11/14/19          PSA= 2.27-- START AFT-19 ( APA plus ADT arm)  12/12/19          PSA= 0.04  1/9/2020          PSA= <0.01  2/5/2020          PSA  <0.01  2/26/2020        PSA <0.01  4/6/20              PSA  <0.01  6/30/20 PSA   <0.01 Testosterone =11  7/27/20 PSA   < 0.01  9/21/20 PSA     <0.01  11/16/20 PSA     <0.01 - Completed AFT-19   2/11/201 PSA  <0.01  Testosterone = 54  5/20/21 PSA  <0.01  Testosterone = 149  7/14/21 PSA  =0.03 Testosterone = 239  11/12/21 PSA  =0.03 Testosterone = 309  2/18/22 PSA  =0.08  5/20/22 PSA     =0.11  T=244  9/23/22 PSA  =0.18  T=312      INTERIM  HISTORY:  Good urination. Cath every other night. 1-2 times nocturia, mild urgency, no pain, strain or incontinence, last UTI fall 2021. C/o L testicle discomfort for weeks, no recalled triggers, no pain or tender to palpation, no appearance change, does have h/o b/l varicose, L>R. Endorse some lifting recently but in an appropriate way. Losing weight -  Down 21 lbs and hoping to get 35-40 lbs     Pain in mid thoracic spine. No radiation to arms.   No weakness. Has been active. Worried about metastatic disease.     Generally feels ok    ROS: 10 point ROS neg other than the symptoms noted above in the HPI.     PAST MEDICAL HISTORY:  Otherwise significant for the prostate cancer,  history of pulmonary embolism and rib fractures from coughing fits and appendicitis.      PAST SURGICAL HISTORY:  Includes the RRP, a lumbar fusion, a thoracotomy for the history of rib fractures and the pulmonic hernia, shoulder surgery x3, appendectomy and a cervical spine surgery.   --knee replacement much better. Redid PT        MEDICATIONS:       Current Outpatient Medications   Medication Instructions     acetaminophen (TYLENOL) 650 mg, Oral, EVERY 4 HOURS PRN     atorvastatin (LIPITOR) 20 mg, Oral, AT BEDTIME     cetirizine (ZYRTEC) 10 mg, Oral, DAILY     clonazePAM (KLONOPIN) 0.5 mg, Oral, AT BEDTIME PRN     fluticasone (FLONASE) 50 MCG/ACT nasal spray 2 sprays, Both Nostrils, DAILY     levothyroxine (SYNTHROID/LEVOTHROID) 75 MCG tablet TAKE 1 TABLET BY MOUTH EVERY DAY     lisinopril (ZESTRIL) 20 mg, Oral, DAILY     multivitamin w/minerals (MULTI-VITAMIN) tablet 1 tablet, Oral, DAILY     oxyCODONE (ROXICODONE) 5 MG tablet 1 tab po q 4-6 hrs prn pain 3-6 on pain scale2 tabs po q 4-6hrs prn pain 7-10 on pain scale     rivaroxaban ANTICOAGULANT (XARELTO) 10 mg, Oral, DAILY     senna-docusate (SENOKOT-S/PERICOLACE) 8.6-50 MG tablet 1-2 tablets, Oral, 2 TIMES DAILY, Take while on oral narcotics to prevent or treat constipation.         "  ALLERGIES:  NO KNOWN DRUG ALLERGIES.      FAMILY HISTORY:  Noncontributory.      SOCIAL HISTORY:  Negative for tobacco.  Alcohol 1 drink a day. Pt is a retired Otolaryngologist (ENT surgeon).      REVIEW OF SYSTEMS:  Negative for fevers, chills, sweats, nausea, vomiting, unexplained weight changes.       Physical Exam  BP (!) 148/83   Pulse 60   Temp 98.1  F (36.7  C)   Resp 16   Ht 1.753 m (5' 9.02\")   Wt 90.5 kg (199 lb 8 oz)   SpO2 99%   BMI 29.45 kg/m    NAD - looks fit!  Mild spinous process tenderness along T5      Wt Readings from Last 4 Encounters:   09/26/22 90.5 kg (199 lb 8 oz)   05/23/22 99.2 kg (218 lb 12.8 oz)   02/21/22 97.3 kg (214 lb 9.6 oz)   12/13/21 95.3 kg (210 lb)     LABS     Latest Reference Range & Units 09/23/22 10:17   Sodium 133 - 144 mmol/L 138   Potassium 3.4 - 5.3 mmol/L 4.7   Chloride 94 - 109 mmol/L 108   Carbon Dioxide 20 - 32 mmol/L 26   Urea Nitrogen 7 - 30 mg/dL 17   Creatinine 0.66 - 1.25 mg/dL 0.88   GFR Estimate >60 mL/min/1.73m2 >90   Calcium 8.5 - 10.1 mg/dL 9.1   Anion Gap 3 - 14 mmol/L 4   Albumin 3.4 - 5.0 g/dL 3.9   Protein Total 6.8 - 8.8 g/dL 6.9   Alkaline Phosphatase 40 - 150 U/L 67   ALT 0 - 70 U/L 43   AST 0 - 45 U/L 21   Bilirubin Total 0.2 - 1.3 mg/dL 1.1   PSA Tumor Marker 0.00 - 4.50 ng/mL 0.18   Testosterone Total 240 - 950 ng/dL 312   TSH 0.40 - 4.00 mU/L 1.15   Glucose 70 - 99 mg/dL 115 (H)   (H): Data is abnormally high                                                                      IMPRESSION/ PLAN   This is a 66 year old gentleman with a 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 PET 2019 showed no mets though there was uptake in the prostate bed. S/p prostatectomy and salvage radiation therapy. Completed 1 year of AFT19 study on apalutamide and degarelix from 11/14/2019-11/16/2020 with PSA <0.01 till 7/2021 (PSA 0.03). He has a doubling time that is about 5-6 months based on the most recent 2 measurements " (0.03->0.08). Testosterone is recovering.     # Hormone sensitive prostate cancer, no metastasis  1. Plan for PSMA-PET in 10/2022. Consider SBRT for oligo-mets pending scan result. Discussed cancelling given low PSA but in light of T-spine pain and tenderness, will make sure there is no metastatic disease there.  2. Will discuss PSMA PET findings after the scan.  3. No treatment for now. Will decide during next visit pending PSA and PSMA-PET result  4. Weight gain - continue cutting back ETOH and working on weight loss          Again, thank you for allowing me to participate in the care of your patient.      Sincerely,    Alejandro Palomino MD

## 2022-09-26 NOTE — PROGRESS NOTES
[facility] Oncology Followup  In person visit  9-26-22       CC: Prostate cancer     HPI: Lucio Hernandez is a 66 year old male who has prostate cancer. He had a radical prostatectomy in 2006 demonstrating Jg 7 disease with a positive margin. He had salvage radiation therapy in 2006 when his PSA was around 0.4 per the patient's recollection.  He notes his PSA was undetectable for 4 years, but then began coming back and recently has noted to have PSAs in the 1.2-1.4 range in 5/2019. He underwent an Axumin PET scan in 2019 that showed no evidence of metastatic disease though there was uptake in the prostate bed. Has been on AFT-19 trial on apalutamide and degarelix from 11/14/2019-11/16/2020 with PSA <0.01 till 7/2021 (PSA 0.03).      Had Prior history of urethral stricture. This may have been due to the prior radiation therapy and a Olsen Catheter complication postop.       4/11/18            PSA = 0.56  10/30/18          PSA =0.48  5/14/19            PSA =1.2  5/29/19            PSA=1.4  7/29/19            PSA=1.45  9/5/19              PSA =1.84  11/4/19            PSA=2.19  11/14/19          PSA= 2.27-- START AFT-19 ( APA plus ADT arm)  12/12/19          PSA= 0.04  1/9/2020          PSA= <0.01  2/5/2020          PSA  <0.01  2/26/2020        PSA <0.01  4/6/20              PSA  <0.01  6/30/20 PSA   <0.01 Testosterone =11  7/27/20 PSA   < 0.01  9/21/20 PSA     <0.01  11/16/20 PSA     <0.01 - Completed AFT-19   2/11/201 PSA  <0.01  Testosterone = 54  5/20/21 PSA  <0.01  Testosterone = 149  7/14/21 PSA  =0.03 Testosterone = 239  11/12/21 PSA  =0.03 Testosterone = 309  2/18/22 PSA  =0.08  5/20/22 PSA     =0.11  T=244  9/23/22 PSA  =0.18  T=312      INTERIM HISTORY:  Good urination. Cath every other night. 1-2 times nocturia, mild urgency, no pain, strain or incontinence, last UTI fall 2021. C/o L testicle discomfort for weeks, no recalled triggers, no pain or tender to palpation, no appearance change, does  have h/o b/l varicose, L>R. Endorse some lifting recently but in an appropriate way. Losing weight -  Down 21 lbs and hoping to get 35-40 lbs     Pain in mid thoracic spine. No radiation to arms.   No weakness. Has been active. Worried about metastatic disease.     Generally feels ok    ROS: 10 point ROS neg other than the symptoms noted above in the HPI.     PAST MEDICAL HISTORY:  Otherwise significant for the prostate cancer,  history of pulmonary embolism and rib fractures from coughing fits and appendicitis.      PAST SURGICAL HISTORY:  Includes the RRP, a lumbar fusion, a thoracotomy for the history of rib fractures and the pulmonic hernia, shoulder surgery x3, appendectomy and a cervical spine surgery.   --knee replacement much better. Redid PT        MEDICATIONS:       Current Outpatient Medications   Medication Instructions     acetaminophen (TYLENOL) 650 mg, Oral, EVERY 4 HOURS PRN     atorvastatin (LIPITOR) 20 mg, Oral, AT BEDTIME     cetirizine (ZYRTEC) 10 mg, Oral, DAILY     clonazePAM (KLONOPIN) 0.5 mg, Oral, AT BEDTIME PRN     fluticasone (FLONASE) 50 MCG/ACT nasal spray 2 sprays, Both Nostrils, DAILY     levothyroxine (SYNTHROID/LEVOTHROID) 75 MCG tablet TAKE 1 TABLET BY MOUTH EVERY DAY     lisinopril (ZESTRIL) 20 mg, Oral, DAILY     multivitamin w/minerals (MULTI-VITAMIN) tablet 1 tablet, Oral, DAILY     oxyCODONE (ROXICODONE) 5 MG tablet 1 tab po q 4-6 hrs prn pain 3-6 on pain scale<BR>2 tabs po q 4-6hrs prn pain 7-10 on pain scale     rivaroxaban ANTICOAGULANT (XARELTO) 10 mg, Oral, DAILY     senna-docusate (SENOKOT-S/PERICOLACE) 8.6-50 MG tablet 1-2 tablets, Oral, 2 TIMES DAILY, Take while on oral narcotics to prevent or treat constipation.          ALLERGIES:  NO KNOWN DRUG ALLERGIES.      FAMILY HISTORY:  Noncontributory.      SOCIAL HISTORY:  Negative for tobacco.  Alcohol 1 drink a day. Pt is a retired Otolaryngologist (ENT surgeon).      REVIEW OF SYSTEMS:  Negative for fevers, chills,  "sweats, nausea, vomiting, unexplained weight changes.       Physical Exam  BP (!) 148/83   Pulse 60   Temp 98.1  F (36.7  C)   Resp 16   Ht 1.753 m (5' 9.02\")   Wt 90.5 kg (199 lb 8 oz)   SpO2 99%   BMI 29.45 kg/m    NAD - looks fit!  Mild spinous process tenderness along T5      Wt Readings from Last 4 Encounters:   09/26/22 90.5 kg (199 lb 8 oz)   05/23/22 99.2 kg (218 lb 12.8 oz)   02/21/22 97.3 kg (214 lb 9.6 oz)   12/13/21 95.3 kg (210 lb)     LABS     Latest Reference Range & Units 09/23/22 10:17   Sodium 133 - 144 mmol/L 138   Potassium 3.4 - 5.3 mmol/L 4.7   Chloride 94 - 109 mmol/L 108   Carbon Dioxide 20 - 32 mmol/L 26   Urea Nitrogen 7 - 30 mg/dL 17   Creatinine 0.66 - 1.25 mg/dL 0.88   GFR Estimate >60 mL/min/1.73m2 >90   Calcium 8.5 - 10.1 mg/dL 9.1   Anion Gap 3 - 14 mmol/L 4   Albumin 3.4 - 5.0 g/dL 3.9   Protein Total 6.8 - 8.8 g/dL 6.9   Alkaline Phosphatase 40 - 150 U/L 67   ALT 0 - 70 U/L 43   AST 0 - 45 U/L 21   Bilirubin Total 0.2 - 1.3 mg/dL 1.1   PSA Tumor Marker 0.00 - 4.50 ng/mL 0.18   Testosterone Total 240 - 950 ng/dL 312   TSH 0.40 - 4.00 mU/L 1.15   Glucose 70 - 99 mg/dL 115 (H)   (H): Data is abnormally high                                                                      IMPRESSION/ PLAN   This is a 66 year old gentleman with a serologic relapse of a [place] 4+3 = 7 with a tertiary pattern of [place] 5. PET 2019 showed no mets though there was uptake in the prostate bed. S/p prostatectomy and salvage radiation therapy. Completed 1 year of AFT19 study on apalutamide and degarelix from 11/14/2019-11/16/2020 with PSA <0.01 till 7/2021 (PSA 0.03). He has a doubling time that is about 5-6 months based on the most recent 2 measurements (0.03->0.08). Testosterone is recovering.     # Hormone sensitive prostate cancer, no metastasis  1. Plan for PSMA-PET in 10/2022. Consider SBRT for oligo-mets pending scan result. Discussed cancelling given low PSA but in light of T-spine pain and " tenderness, will make sure there is no metastatic disease there.  2. Will discuss PSMA PET findings after the scan.  3. No treatment for now. Will decide during next visit pending PSA and PSMA-PET result  4. Weight gain - continue cutting back ETOH and working on weight loss      Alejandro Palomino MD

## 2022-10-03 RX ORDER — LEVOTHYROXINE SODIUM 75 UG/1
75 TABLET ORAL DAILY
Qty: 90 TABLET | Refills: 1 | Status: SHIPPED | OUTPATIENT
Start: 2022-10-03 | End: 2023-04-21

## 2022-10-03 NOTE — TELEPHONE ENCOUNTER
Medication:Levothyroxine  Last prescribing provider: Dr. Palomino    Last clinic visit date: 9/26/22  Recommendations for requested medication:   n/a    Any missed appointments or no-shows since last clinic visit?: none recently  Next clinic visit date: 10/24/22    Any other pertinent information:  Routed to Dr. Palomino.

## 2022-10-07 DIAGNOSIS — E78.5 HYPERLIPIDEMIA LDL GOAL <100: Primary | ICD-10-CM

## 2022-10-17 ENCOUNTER — HOSPITAL ENCOUNTER (OUTPATIENT)
Dept: PET IMAGING | Facility: CLINIC | Age: 67
Discharge: HOME | End: 2022-10-17
Attending: INTERNAL MEDICINE | Admitting: INTERNAL MEDICINE
Payer: MEDICARE

## 2022-10-17 DIAGNOSIS — C61 PROSTATE CANCER (H): ICD-10-CM

## 2022-10-17 PROCEDURE — 71260 CT THORAX DX C+: CPT | Mod: 26 | Performed by: RADIOLOGY

## 2022-10-17 PROCEDURE — A9596 HC RX 343: HCPCS | Performed by: INTERNAL MEDICINE

## 2022-10-17 PROCEDURE — 78815 PET IMAGE W/CT SKULL-THIGH: CPT | Mod: ME,PS

## 2022-10-17 PROCEDURE — 74177 CT ABD & PELVIS W/CONTRAST: CPT

## 2022-10-17 PROCEDURE — G1004 CDSM NDSC: HCPCS | Performed by: RADIOLOGY

## 2022-10-17 PROCEDURE — 343N000001 HC RX 343: Performed by: INTERNAL MEDICINE

## 2022-10-17 PROCEDURE — 78815 PET IMAGE W/CT SKULL-THIGH: CPT | Mod: 26 | Performed by: RADIOLOGY

## 2022-10-17 PROCEDURE — 74177 CT ABD & PELVIS W/CONTRAST: CPT | Mod: 26 | Performed by: RADIOLOGY

## 2022-10-17 PROCEDURE — 250N000011 HC RX IP 250 OP 636: Performed by: INTERNAL MEDICINE

## 2022-10-17 PROCEDURE — G1004 CDSM NDSC: HCPCS | Mod: PS

## 2022-10-17 RX ORDER — IOPAMIDOL 755 MG/ML
45-150 INJECTION, SOLUTION INTRAVASCULAR ONCE
Status: COMPLETED | OUTPATIENT
Start: 2022-10-17 | End: 2022-10-17

## 2022-10-17 RX ADMIN — IOPAMIDOL 110 ML: 755 INJECTION, SOLUTION INTRAVENOUS at 13:06

## 2022-10-17 RX ADMIN — KIT FOR THE PREPARATION OF GALLIUM GA 68 GOZETOTIDE INJECTION 5.05 MILLICURIE: KIT INTRAVENOUS at 12:04

## 2022-10-24 ENCOUNTER — ONCOLOGY VISIT (OUTPATIENT)
Dept: ONCOLOGY | Facility: CLINIC | Age: 67
End: 2022-10-24
Attending: INTERNAL MEDICINE
Payer: MEDICARE

## 2022-10-24 VITALS
WEIGHT: 192 LBS | BODY MASS INDEX: 28.34 KG/M2 | SYSTOLIC BLOOD PRESSURE: 120 MMHG | DIASTOLIC BLOOD PRESSURE: 84 MMHG | OXYGEN SATURATION: 99 % | RESPIRATION RATE: 16 BRPM | HEART RATE: 70 BPM

## 2022-10-24 DIAGNOSIS — C61 PROSTATE CANCER (H): Primary | ICD-10-CM

## 2022-10-24 PROCEDURE — 99214 OFFICE O/P EST MOD 30 MIN: CPT | Performed by: INTERNAL MEDICINE

## 2022-10-24 PROCEDURE — G0463 HOSPITAL OUTPT CLINIC VISIT: HCPCS

## 2022-10-24 NOTE — LETTER
10/24/2022         RE: Lucio Hernandez  [address] [id number]        Dear Colleague,    Thank you for referring your patient, Lucio Hernandez, to the [facility]. Please see a copy of my visit note below.           [facility] Oncology Followup  In person visit  10-24-22       CC: Prostate cancer     HPI: Lucio Hernandez is a 66 year old male who has prostate cancer. He had a radical prostatectomy in 2006 demonstrating [place] 7 disease with a positive margin. He had salvage radiation therapy in 2006 when his PSA was around 0.4 per the patient's recollection.  He notes his PSA was undetectable for 4 years, but then began coming back and recently has noted to have PSAs in the 1.2-1.4 range in 5/2019. He underwent an Axumin PET scan in 2019 that showed no evidence of metastatic disease though there was uptake in the prostate bed. Has been on AFT-19 trial on apalutamide and degarelix from 11/14/2019-11/16/2020 with PSA <0.01 till 7/2021 (PSA 0.03).      Had Prior history of urethral stricture. This may have been due to the prior radiation therapy and a Olsen Catheter complication postop.       4/11/18            PSA = 0.56  10/30/18          PSA =0.48  5/14/19            PSA =1.2  5/29/19            PSA=1.4  7/29/19            PSA=1.45  9/5/19              PSA =1.84  11/4/19            PSA=2.19  11/14/19          PSA= 2.27-- START AFT-19 ( APA plus ADT arm)  12/12/19          PSA= 0.04  1/9/2020          PSA= <0.01  2/5/2020          PSA  <0.01  2/26/2020        PSA <0.01  4/6/20              PSA  <0.01  6/30/20 PSA   <0.01 Testosterone =11  7/27/20 PSA   < 0.01  9/21/20 PSA     <0.01  11/16/20 PSA     <0.01 - Completed AFT-19   2/11/201 PSA  <0.01  Testosterone = 54  5/20/21 PSA  <0.01  Testosterone = 149  7/14/21 PSA  =0.03 Testosterone = 239  11/12/21 PSA  =0.03 Testosterone = 309  2/18/22 PSA  =0.08  5/20/22 PSA     =0.11  T=244  9/23/22 PSA  =0.18  T=312      INTERIM  HISTORY:  Good urination. Cath every other night. 1-2 times nocturia, mild urgency, no pain, strain or incontinence, last UTI fall 2021. C/o L testicle discomfort for weeks, no recalled triggers, no pain or tender to palpation, no appearance change, does have h/o b/l varicose, L>R. Endorse some lifting recently but in an appropriate way. Losing weight -  Down 21 lbs and hoping to get 35-40 lbs     Pain in mid thoracic spine. No radiation to arms.   No weakness. Has been active. Worried about metastatic disease.     Generally feels ok    ROS: 10 point ROS neg other than the symptoms noted above in the HPI.     PAST MEDICAL HISTORY:  Otherwise significant for the prostate cancer,  history of pulmonary embolism and rib fractures from coughing fits and appendicitis.      PAST SURGICAL HISTORY:  Includes the RRP, a lumbar fusion, a thoracotomy for the history of rib fractures and the pulmonic hernia, shoulder surgery x3, appendectomy and a cervical spine surgery.   --knee replacement much better. Redid PT        MEDICATIONS:       Current Outpatient Medications   Medication Instructions     acetaminophen (TYLENOL) 650 mg, Oral, EVERY 4 HOURS PRN     atorvastatin (LIPITOR) 20 mg, Oral, AT BEDTIME     cetirizine (ZYRTEC) 10 mg, Oral, DAILY     clonazePAM (KLONOPIN) 0.5 mg, Oral, AT BEDTIME PRN     fluticasone (FLONASE) 50 MCG/ACT nasal spray 2 sprays, Both Nostrils, DAILY     levothyroxine (SYNTHROID/LEVOTHROID) 75 MCG tablet TAKE 1 TABLET BY MOUTH EVERY DAY     lisinopril (ZESTRIL) 20 mg, Oral, DAILY     multivitamin w/minerals (MULTI-VITAMIN) tablet 1 tablet, Oral, DAILY     oxyCODONE (ROXICODONE) 5 MG tablet 1 tab po q 4-6 hrs prn pain 3-6 on pain scale2 tabs po q 4-6hrs prn pain 7-10 on pain scale     rivaroxaban ANTICOAGULANT (XARELTO) 10 mg, Oral, DAILY     senna-docusate (SENOKOT-S/PERICOLACE) 8.6-50 MG tablet 1-2 tablets, Oral, 2 TIMES DAILY, Take while on oral narcotics to prevent or treat constipation.           ALLERGIES:  NO KNOWN DRUG ALLERGIES.      FAMILY HISTORY:  Noncontributory.      SOCIAL HISTORY:  Negative for tobacco.  Alcohol 1 drink a day. Pt is a retired Otolaryngologist (ENT surgeon).      REVIEW OF SYSTEMS:  Negative for fevers, chills, sweats, nausea, vomiting, unexplained weight changes.       Physical Exam  /84   Pulse 70   Resp 16   Wt 87.1 kg (192 lb)   SpO2 99%   BMI 28.34 kg/m    NAD - looks fit!  Mild spinous process tenderness along T5 - likely rhomboid muscle       Wt Readings from Last 4 Encounters:   10/24/22 87.1 kg (192 lb)   09/26/22 90.5 kg (199 lb 8 oz)   05/23/22 99.2 kg (218 lb 12.8 oz)   02/21/22 97.3 kg (214 lb 9.6 oz)     LABS     Latest Reference Range & Units 09/23/22 10:17   Sodium 133 - 144 mmol/L 138   Potassium 3.4 - 5.3 mmol/L 4.7   Chloride 94 - 109 mmol/L 108   Carbon Dioxide 20 - 32 mmol/L 26   Urea Nitrogen 7 - 30 mg/dL 17   Creatinine 0.66 - 1.25 mg/dL 0.88   GFR Estimate >60 mL/min/1.73m2 >90   Calcium 8.5 - 10.1 mg/dL 9.1   Anion Gap 3 - 14 mmol/L 4   Albumin 3.4 - 5.0 g/dL 3.9   Protein Total 6.8 - 8.8 g/dL 6.9   Alkaline Phosphatase 40 - 150 U/L 67   ALT 0 - 70 U/L 43   AST 0 - 45 U/L 21   Bilirubin Total 0.2 - 1.3 mg/dL 1.1   PSA Tumor Marker 0.00 - 4.50 ng/mL 0.18   Testosterone Total 240 - 950 ng/dL 312   TSH 0.40 - 4.00 mU/L 1.15   Glucose 70 - 99 mg/dL 115 (H)   (H): Data is abnormally high      10/17/22    IMPRESSION: In this patient with prostate cancer, status post  radical?prostatectomy and salvage radiation therapy?in 2006, has been  on AFT-19 trial?on apalutamide and degarelix?from  11/14/2019-11/16/2020.      No evidence to suggest local recurrence or distant metastatic disease.                                                                    IMPRESSION/ PLAN   This is a 66 year old gentleman with a serologic relapse of a [place] 4+3 = 7 with a tertiary pattern of [place] 5. PET 2019 showed no mets though there was uptake in the prostate bed.  S/p prostatectomy and salvage radiation therapy. Completed 1 year of AFT19 study on apalutamide and degarelix from 11/14/2019-11/16/2020 with PSA <0.01 till 7/2021 (PSA 0.03). He has a doubling time that is about 5-6 months based on the most recent 2 measurements (0.03->0.08). Testosterone is recovering.     # Hormone sensitive prostate cancer, no metastasis  He currently has a negative PSMA PET scan and a very low but detectable PSA.  We will continue to follow with another PSA check in 4 to 6 months.  We may consider repeating the PSMA PET scan after approximately 6 months with further consideration of SBRT or other treatments as appropriate.  There may be an opportunity to repeat the ADT with next generation hormonal therapies depending on the rate of rise of the PSA.    Patient understands this.  He feels well right now and is focusing on his general fitness and will be spending much of the winter in [place].          Again, thank you for allowing me to participate in the care of your patient.      Sincerely,    Alejandro Palomino MD

## 2022-10-24 NOTE — PATIENT INSTRUCTIONS
Will do PSMA PET when PSA reaches 0.6  Consider SBRT at that point vs ADT again  PSA in February  Video or in person appointment in late February  Heating pad/NSAID for the rhomboid  Continue weight loss/exercise.

## 2022-10-24 NOTE — NURSING NOTE
"Oncology Rooming Note    October 24, 2022 8:54 AM   Lucio Hernandez is a 66 year old male who presents for:    Chief Complaint   Patient presents with     Oncology Clinic Visit     Prostate cancer     Initial Vitals: /84   Pulse 70   Resp 16   Wt 87.1 kg (192 lb)   SpO2 99%   BMI 28.34 kg/m   Estimated body mass index is 28.34 kg/m  as calculated from the following:    Height as of 9/26/22: 1.753 m (5' 9.02\").    Weight as of this encounter: 87.1 kg (192 lb). Body surface area is 2.06 meters squared.  Data Unavailable Comment: Data Unavailable   No LMP for male patient.  Allergies reviewed: Yes  Medications reviewed: Yes    Medications: Medication refills not needed today.  Pharmacy name entered into [employer]: [id number] [place] [facility] [address]    Clinical concerns: none       Dahlia Ornelas CMA            "

## 2022-10-24 NOTE — PROGRESS NOTES
[facility] Oncology Followup  In person visit  10-24-22       CC: Prostate cancer     HPI: Lucio Hernandez is a 66 year old male who has prostate cancer. He had a radical prostatectomy in 2006 demonstrating [place] 7 disease with a positive margin. He had salvage radiation therapy in 2006 when his PSA was around 0.4 per the patient's recollection.  He notes his PSA was undetectable for 4 years, but then began coming back and recently has noted to have PSAs in the 1.2-1.4 range in 5/2019. He underwent an Axumin PET scan in 2019 that showed no evidence of metastatic disease though there was uptake in the prostate bed. Has been on AFT-19 trial on apalutamide and degarelix from 11/14/2019-11/16/2020 with PSA <0.01 till 7/2021 (PSA 0.03).      Had Prior history of urethral stricture. This may have been due to the prior radiation therapy and a Olsen Catheter complication postop.       4/11/18            PSA = 0.56  10/30/18          PSA =0.48  5/14/19            PSA =1.2  5/29/19            PSA=1.4  7/29/19            PSA=1.45  9/5/19              PSA =1.84  11/4/19            PSA=2.19  11/14/19          PSA= 2.27-- START AFT-19 ( APA plus ADT arm)  12/12/19          PSA= 0.04  1/9/2020          PSA= <0.01  2/5/2020          PSA  <0.01  2/26/2020        PSA <0.01  4/6/20              PSA  <0.01  6/30/20 PSA   <0.01 Testosterone =11  7/27/20 PSA   < 0.01  9/21/20 PSA     <0.01  11/16/20 PSA     <0.01 - Completed AFT-19   2/11/201 PSA  <0.01  Testosterone = 54  5/20/21 PSA  <0.01  Testosterone = 149  7/14/21 PSA  =0.03 Testosterone = 239  11/12/21 PSA  =0.03 Testosterone = 309  2/18/22 PSA  =0.08  5/20/22 PSA     =0.11  T=244  9/23/22 PSA  =0.18  T=312      INTERIM HISTORY:  Good urination. Cath every other night. 1-2 times nocturia, mild urgency, no pain, strain or incontinence, last UTI fall 2021. C/o L testicle discomfort for weeks, no recalled triggers, no pain or tender to palpation, no appearance change, does  have h/o b/l varicose, L>R. Endorse some lifting recently but in an appropriate way. Losing weight -  Down 21 lbs and hoping to get 35-40 lbs     Pain in mid thoracic spine. No radiation to arms.   No weakness. Has been active. Worried about metastatic disease.     Generally feels ok    ROS: 10 point ROS neg other than the symptoms noted above in the HPI.     PAST MEDICAL HISTORY:  Otherwise significant for the prostate cancer,  history of pulmonary embolism and rib fractures from coughing fits and appendicitis.      PAST SURGICAL HISTORY:  Includes the RRP, a lumbar fusion, a thoracotomy for the history of rib fractures and the pulmonic hernia, shoulder surgery x3, appendectomy and a cervical spine surgery.   --knee replacement much better. Redid PT        MEDICATIONS:       Current Outpatient Medications   Medication Instructions     acetaminophen (TYLENOL) 650 mg, Oral, EVERY 4 HOURS PRN     atorvastatin (LIPITOR) 20 mg, Oral, AT BEDTIME     cetirizine (ZYRTEC) 10 mg, Oral, DAILY     clonazePAM (KLONOPIN) 0.5 mg, Oral, AT BEDTIME PRN     fluticasone (FLONASE) 50 MCG/ACT nasal spray 2 sprays, Both Nostrils, DAILY     levothyroxine (SYNTHROID/LEVOTHROID) 75 MCG tablet TAKE 1 TABLET BY MOUTH EVERY DAY     lisinopril (ZESTRIL) 20 mg, Oral, DAILY     multivitamin w/minerals (MULTI-VITAMIN) tablet 1 tablet, Oral, DAILY     oxyCODONE (ROXICODONE) 5 MG tablet 1 tab po q 4-6 hrs prn pain 3-6 on pain scale<BR>2 tabs po q 4-6hrs prn pain 7-10 on pain scale     rivaroxaban ANTICOAGULANT (XARELTO) 10 mg, Oral, DAILY     senna-docusate (SENOKOT-S/PERICOLACE) 8.6-50 MG tablet 1-2 tablets, Oral, 2 TIMES DAILY, Take while on oral narcotics to prevent or treat constipation.          ALLERGIES:  NO KNOWN DRUG ALLERGIES.      FAMILY HISTORY:  Noncontributory.      SOCIAL HISTORY:  Negative for tobacco.  Alcohol 1 drink a day. Pt is a retired Otolaryngologist (ENT surgeon).      REVIEW OF SYSTEMS:  Negative for fevers, chills,  sweats, nausea, vomiting, unexplained weight changes.       Physical Exam  /84   Pulse 70   Resp 16   Wt 87.1 kg (192 lb)   SpO2 99%   BMI 28.34 kg/m    NAD - looks fit!  Mild spinous process tenderness along T5 - likely rhomboid muscle       Wt Readings from Last 4 Encounters:   10/24/22 87.1 kg (192 lb)   09/26/22 90.5 kg (199 lb 8 oz)   05/23/22 99.2 kg (218 lb 12.8 oz)   02/21/22 97.3 kg (214 lb 9.6 oz)     LABS     Latest Reference Range & Units 09/23/22 10:17   Sodium 133 - 144 mmol/L 138   Potassium 3.4 - 5.3 mmol/L 4.7   Chloride 94 - 109 mmol/L 108   Carbon Dioxide 20 - 32 mmol/L 26   Urea Nitrogen 7 - 30 mg/dL 17   Creatinine 0.66 - 1.25 mg/dL 0.88   GFR Estimate >60 mL/min/1.73m2 >90   Calcium 8.5 - 10.1 mg/dL 9.1   Anion Gap 3 - 14 mmol/L 4   Albumin 3.4 - 5.0 g/dL 3.9   Protein Total 6.8 - 8.8 g/dL 6.9   Alkaline Phosphatase 40 - 150 U/L 67   ALT 0 - 70 U/L 43   AST 0 - 45 U/L 21   Bilirubin Total 0.2 - 1.3 mg/dL 1.1   PSA Tumor Marker 0.00 - 4.50 ng/mL 0.18   Testosterone Total 240 - 950 ng/dL 312   TSH 0.40 - 4.00 mU/L 1.15   Glucose 70 - 99 mg/dL 115 (H)   (H): Data is abnormally high      10/17/22    IMPRESSION: In this patient with prostate cancer, status post  radical?prostatectomy and salvage radiation therapy?in 2006, has been  on AFT-19 trial?on apalutamide and degarelix?from  11/14/2019-11/16/2020.      No evidence to suggest local recurrence or distant metastatic disease.                                                                    IMPRESSION/ PLAN   This is a 66 year old gentleman with a serologic relapse of a [place] 4+3 = 7 with a tertiary pattern of [place] 5. PET 2019 showed no mets though there was uptake in the prostate bed. S/p prostatectomy and salvage radiation therapy. Completed 1 year of AFT19 study on apalutamide and degarelix from 11/14/2019-11/16/2020 with PSA <0.01 till 7/2021 (PSA 0.03). He has a doubling time that is about 5-6 months based on the most recent  2 measurements (0.03->0.08). Testosterone is recovering.     # Hormone sensitive prostate cancer, no metastasis  He currently has a negative PSMA PET scan and a very low but detectable PSA.  We will continue to follow with another PSA check in 4 to 6 months.  We may consider repeating the PSMA PET scan after approximately 6 months with further consideration of SBRT or other treatments as appropriate.  There may be an opportunity to repeat the ADT with next generation hormonal therapies depending on the rate of rise of the PSA.    Patient understands this.  He feels well right now and is focusing on his general fitness and will be spending much of the winter in [place].      Alejandro Palomino MD

## 2022-12-30 ENCOUNTER — HOSPITAL ENCOUNTER (EMERGENCY)
Facility: CLINIC | Age: 67
Discharge: HOME | End: 2022-12-31
Attending: EMERGENCY MEDICINE | Admitting: EMERGENCY MEDICINE
Payer: MEDICARE

## 2022-12-30 DIAGNOSIS — R06.00 DYSPNEA, UNSPECIFIED TYPE: ICD-10-CM

## 2022-12-30 DIAGNOSIS — R05.9 COUGH, UNSPECIFIED TYPE: ICD-10-CM

## 2022-12-30 LAB
ALBUMIN SERPL-MCNC: 3.8 G/DL (ref 3.4–5)
ALP SERPL-CCNC: 65 U/L (ref 40–150)
ALT SERPL W P-5'-P-CCNC: 42 U/L (ref 0–70)
ANION GAP SERPL CALCULATED.3IONS-SCNC: 9 MMOL/L (ref 3–14)
AST SERPL W P-5'-P-CCNC: 28 U/L (ref 0–45)
ATRIAL RATE - MUSE: 77 BPM
BASOPHILS # BLD AUTO: 0.1 10E3/UL (ref 0–0.2)
BASOPHILS NFR BLD AUTO: 1 %
BILIRUB SERPL-MCNC: 0.6 MG/DL (ref 0.2–1.3)
BUN SERPL-MCNC: 19 MG/DL (ref 7–30)
CALCIUM SERPL-MCNC: 8.6 MG/DL (ref 8.5–10.1)
CHLORIDE BLD-SCNC: 108 MMOL/L (ref 94–109)
CO2 SERPL-SCNC: 23 MMOL/L (ref 20–32)
CREAT SERPL-MCNC: 0.85 MG/DL (ref 0.66–1.25)
DIASTOLIC BLOOD PRESSURE - MUSE: NORMAL MMHG
EOSINOPHIL # BLD AUTO: 0.6 10E3/UL (ref 0–0.7)
EOSINOPHIL NFR BLD AUTO: 7 %
ERYTHROCYTE [DISTWIDTH] IN BLOOD BY AUTOMATED COUNT: 12.6 % (ref 10–15)
FLUAV RNA SPEC QL NAA+PROBE: NEGATIVE
FLUBV RNA RESP QL NAA+PROBE: NEGATIVE
GFR SERPL CREATININE-BSD FRML MDRD: >90 ML/MIN/1.73M2
GLUCOSE BLD-MCNC: 93 MG/DL (ref 70–99)
HCT VFR BLD AUTO: 48 % (ref 40–53)
HGB BLD-MCNC: 16.2 G/DL (ref 13.3–17.7)
HOLD SPECIMEN: NORMAL
IMM GRANULOCYTES # BLD: 0 10E3/UL
IMM GRANULOCYTES NFR BLD: 0 %
INTERPRETATION ECG - MUSE: NORMAL
LYMPHOCYTES # BLD AUTO: 1.6 10E3/UL (ref 0.8–5.3)
LYMPHOCYTES NFR BLD AUTO: 18 %
MCH RBC QN AUTO: 32.7 PG (ref 26.5–33)
MCHC RBC AUTO-ENTMCNC: 33.8 G/DL (ref 31.5–36.5)
MCV RBC AUTO: 97 FL (ref 78–100)
MONOCYTES # BLD AUTO: 0.9 10E3/UL (ref 0–1.3)
MONOCYTES NFR BLD AUTO: 10 %
NEUTROPHILS # BLD AUTO: 5.8 10E3/UL (ref 1.6–8.3)
NEUTROPHILS NFR BLD AUTO: 64 %
NRBC # BLD AUTO: 0 10E3/UL
NRBC BLD AUTO-RTO: 0 /100
NT-PROBNP SERPL-MCNC: 32 PG/ML (ref 0–900)
P AXIS - MUSE: 56 DEGREES
PLATELET # BLD AUTO: 215 10E3/UL (ref 150–450)
POTASSIUM BLD-SCNC: 3.8 MMOL/L (ref 3.4–5.3)
PR INTERVAL - MUSE: 172 MS
PROT SERPL-MCNC: 6.9 G/DL (ref 6.8–8.8)
QRS DURATION - MUSE: 82 MS
QT - MUSE: 388 MS
QTC - MUSE: 439 MS
R AXIS - MUSE: 60 DEGREES
RBC # BLD AUTO: 4.96 10E6/UL (ref 4.4–5.9)
RSV RNA SPEC NAA+PROBE: NEGATIVE
SARS-COV-2 RNA RESP QL NAA+PROBE: NEGATIVE
SODIUM SERPL-SCNC: 140 MMOL/L (ref 133–144)
SYSTOLIC BLOOD PRESSURE - MUSE: NORMAL MMHG
T AXIS - MUSE: 46 DEGREES
TROPONIN I SERPL HS-MCNC: 7 NG/L
VENTRICULAR RATE- MUSE: 77 BPM
WBC # BLD AUTO: 9 10E3/UL (ref 4–11)

## 2022-12-30 PROCEDURE — 85025 COMPLETE CBC W/AUTO DIFF WBC: CPT | Performed by: EMERGENCY MEDICINE

## 2022-12-30 PROCEDURE — 80053 COMPREHEN METABOLIC PANEL: CPT | Performed by: EMERGENCY MEDICINE

## 2022-12-30 PROCEDURE — 87637 SARSCOV2&INF A&B&RSV AMP PRB: CPT | Performed by: EMERGENCY MEDICINE

## 2022-12-30 PROCEDURE — 83880 ASSAY OF NATRIURETIC PEPTIDE: CPT | Performed by: EMERGENCY MEDICINE

## 2022-12-30 PROCEDURE — 250N000009 HC RX 250: Performed by: EMERGENCY MEDICINE

## 2022-12-30 PROCEDURE — 99285 EMERGENCY DEPT VISIT HI MDM: CPT | Mod: 25

## 2022-12-30 PROCEDURE — 36415 COLL VENOUS BLD VENIPUNCTURE: CPT | Performed by: EMERGENCY MEDICINE

## 2022-12-30 PROCEDURE — C9803 HOPD COVID-19 SPEC COLLECT: HCPCS

## 2022-12-30 PROCEDURE — 96374 THER/PROPH/DIAG INJ IV PUSH: CPT | Mod: 59

## 2022-12-30 PROCEDURE — 84484 ASSAY OF TROPONIN QUANT: CPT | Performed by: EMERGENCY MEDICINE

## 2022-12-30 PROCEDURE — 94640 AIRWAY INHALATION TREATMENT: CPT

## 2022-12-30 PROCEDURE — 93005 ELECTROCARDIOGRAM TRACING: CPT

## 2022-12-30 RX ORDER — ALBUTEROL SULFATE 5 MG/ML
2.5 SOLUTION RESPIRATORY (INHALATION) EVERY 10 MIN PRN
Status: DISCONTINUED | OUTPATIENT
Start: 2022-12-30 | End: 2022-12-31 | Stop reason: HOSPADM

## 2022-12-30 RX ADMIN — ALBUTEROL SULFATE 2.5 MG: 2.5 SOLUTION RESPIRATORY (INHALATION) at 23:46

## 2022-12-30 ASSESSMENT — ENCOUNTER SYMPTOMS
SHORTNESS OF BREATH: 1
WHEEZING: 1
COUGH: 1

## 2022-12-31 ENCOUNTER — APPOINTMENT (OUTPATIENT)
Dept: CT IMAGING | Facility: CLINIC | Age: 67
End: 2022-12-31
Attending: EMERGENCY MEDICINE
Payer: MEDICARE

## 2022-12-31 VITALS
HEIGHT: 69 IN | WEIGHT: 183 LBS | OXYGEN SATURATION: 98 % | RESPIRATION RATE: 18 BRPM | DIASTOLIC BLOOD PRESSURE: 84 MMHG | SYSTOLIC BLOOD PRESSURE: 150 MMHG | BODY MASS INDEX: 27.11 KG/M2 | HEART RATE: 68 BPM | TEMPERATURE: 98.7 F

## 2022-12-31 PROCEDURE — 250N000011 HC RX IP 250 OP 636: Performed by: EMERGENCY MEDICINE

## 2022-12-31 PROCEDURE — 71275 CT ANGIOGRAPHY CHEST: CPT | Mod: MA

## 2022-12-31 PROCEDURE — 250N000009 HC RX 250: Performed by: EMERGENCY MEDICINE

## 2022-12-31 RX ORDER — ALBUTEROL SULFATE 0.83 MG/ML
2.5 SOLUTION RESPIRATORY (INHALATION) EVERY 4 HOURS PRN
Qty: 300 ML | Refills: 0 | Status: SHIPPED | OUTPATIENT
Start: 2022-12-31 | End: 2023-03-03

## 2022-12-31 RX ORDER — PREDNISONE 20 MG/1
TABLET ORAL
Qty: 10 TABLET | Refills: 0 | Status: SHIPPED | OUTPATIENT
Start: 2022-12-31 | End: 2023-08-14

## 2022-12-31 RX ORDER — IOPAMIDOL 755 MG/ML
69 INJECTION, SOLUTION INTRAVASCULAR ONCE
Status: COMPLETED | OUTPATIENT
Start: 2022-12-31 | End: 2022-12-31

## 2022-12-31 RX ORDER — BENZONATATE 100 MG/1
100-200 CAPSULE ORAL 3 TIMES DAILY PRN
Qty: 30 CAPSULE | Refills: 0 | Status: SHIPPED | OUTPATIENT
Start: 2022-12-31 | End: 2023-08-14

## 2022-12-31 RX ORDER — METHYLPREDNISOLONE SODIUM SUCCINATE 125 MG/2ML
125 INJECTION, POWDER, LYOPHILIZED, FOR SOLUTION INTRAMUSCULAR; INTRAVENOUS ONCE
Status: COMPLETED | OUTPATIENT
Start: 2022-12-31 | End: 2022-12-31

## 2022-12-31 RX ADMIN — METHYLPREDNISOLONE SODIUM SUCCINATE 125 MG: 125 INJECTION, POWDER, FOR SOLUTION INTRAMUSCULAR; INTRAVENOUS at 00:28

## 2022-12-31 RX ADMIN — SODIUM CHLORIDE 93 ML: 900 INJECTION INTRAVENOUS at 00:47

## 2022-12-31 RX ADMIN — IOPAMIDOL 69 ML: 755 INJECTION, SOLUTION INTRAVENOUS at 00:47

## 2022-12-31 ASSESSMENT — ACTIVITIES OF DAILY LIVING (ADL): ADLS_ACUITY_SCORE: 35

## 2022-12-31 NOTE — ED TRIAGE NOTES
Patient state URI for 6 weeks.  Now having SOB.  Tonight O2 sat 89%, 90%.  Feels wheezes.  Frequent cough.  Thick sputum.  When laying down coughs a lot.  Unable to sleep.

## 2022-12-31 NOTE — ED PROVIDER NOTES
History   Chief Complaint:  Shortness of Breath and Cough       HPI   Lucio Hernadnez is a 67 year old male with history of prostate cancer, HTN, and pulmonary embolism who presents with a cough. The patient states that he picked up a URI from his grandchildren about 6 weeks ago. He went into his primary doctor and received a medrol pack which seemed to help for a while but recently the symptoms have came back and he gets a cough every night and also wheezes quite a lot. He finds it hard to catch his breath at night which scares him. He states that this is worse after he lies down. Tonight he states he checked his oxygen sat levels and it was in the high 80's. He has been trying to use his albuterol inhaler but it has not helped much. He denies any blood in his sputum but it has been discolored. He recently had a clear chest xray but borderline d-dimer levels. The patient works out 5 days a week and states he feels healthy other than this.    Review of Systems   Respiratory: Positive for cough, shortness of breath and wheezing.    All other systems reviewed and are negative.    Allergies:  The patient has no known allergies.     Medications:  atorvastatin  cetirizine  clonazePAM  levothyroxine  lisinopril     Past Medical History:     Malignant neoplasm of prostate  Pulmonary embolism  HTN  Pelvic floor dysfunction  Spinal stenosis  Elevated PSA  Urethral stricture     Past Surgical History:    Knee arthoplasty  C5-C6 foraminotomy  Bladder surgery  Cystoscopy  Tonsillectomy  Appendectomy  Shoulder surgery  Prostate surgery  Thoracotomy  Vasectomy     Family History:    Mother-Arthritis  Father-MI  Brother-Diabetes, MI, HTN    Social History:  The patient presents to the ED with his wife.  PCP: Dean Cody     Physical Exam     Patient Vitals for the past 24 hrs:   BP Temp Temp src Pulse Resp SpO2 Height Weight   12/31/22 0002 -- -- -- 81 20 98 % -- --   12/31/22 0000 136/82 -- -- -- -- 99 % -- --   12/30/22  "2354 -- -- -- 72 18 95 % -- --   12/30/22 2201 139/79 98.7  F (37.1  C) Oral 78 18 96 % 1.753 m (5' 9\") 83 kg (183 lb)       Physical Exam  General: Alert, No distress. Nontoxic appearance  Head: No signs of trauma.   Mouth/Throat: Oropharynx moist.   Eyes: Conjunctivae are normal. Pupils are equal..   Neck: Normal range of motion.    CV: Appears well perfused.  Resp:No respiratory distress.   MSK: Normal range of motion. No obvious deformity.   Neuro: The patient is alert and interactive. LEA. Speech normal. GCS 15  Skin: No lesion or sign of trauma noted.   Psych: normal mood and affect. behavior is normal.     Emergency Department Course   ECG  ECG results from 12/30/22   EKG 12-lead, tracing only     Value    Systolic Blood Pressure     Diastolic Blood Pressure     Ventricular Rate 77    Atrial Rate 77    MN Interval 172    QRS Duration 82        QTc 439    P Axis 56    R AXIS 60    T Axis 46    Interpretation ECG      Sinus rhythm  Anterior infarct , age undetermined  Abnormal ECG  When compared with ECG of 21-AUG-2020 20:56,  Questionable change in QRS axis  Confirmed by GENERATED REPORT, COMPUTER ([id number]),  Kaela Buckley ([id number]) on 12/30/2022 10:15:17 PM         Imaging:  CT Chest Pulmonary Embolism w Contrast   Final Result   IMPRESSION:   1.  No infiltrate, pleural effusion or pulmonary embolism.        Report per radiology    Laboratory:  Labs Ordered and Resulted from Time of ED Arrival to Time of ED Departure   TROPONIN I - Normal       Result Value    Troponin I High Sensitivity 7     COMPREHENSIVE METABOLIC PANEL - Normal    Sodium 140      Potassium 3.8      Chloride 108      Carbon Dioxide (CO2) 23      Anion Gap 9      Urea Nitrogen 19      Creatinine 0.85      Calcium 8.6      Glucose 93      Alkaline Phosphatase 65      AST 28      ALT 42      Protein Total 6.9      Albumin 3.8      Bilirubin Total 0.6      GFR Estimate >90     INFLUENZA A/B & SARS-COV2 PCR MULTIPLEX - Normal    " Influenza A PCR Negative      Influenza B PCR Negative      RSV PCR Negative      SARS CoV2 PCR Negative     NT PROBNP INPATIENT - Normal    N terminal Pro BNP Inpatient 32     CBC WITH PLATELETS AND DIFFERENTIAL    WBC Count 9.0      RBC Count 4.96      Hemoglobin 16.2      Hematocrit 48.0      MCV 97      MCH 32.7      MCHC 33.8      RDW 12.6      Platelet Count 215      % Neutrophils 64      % Lymphocytes 18      % Monocytes 10      % Eosinophils 7      % Basophils 1      % Immature Granulocytes 0      NRBCs per 100 WBC 0      Absolute Neutrophils 5.8      Absolute Lymphocytes 1.6      Absolute Monocytes 0.9      Absolute Eosinophils 0.6      Absolute Basophils 0.1      Absolute Immature Granulocytes 0.0      Absolute NRBCs 0.0        Emergency Department Course:       Reviewed:  I reviewed nursing notes, vitals, past medical history and Care Everywhere    Assessments:  2335 I obtained history and examined the patient as noted above.   0120 I rechecked the patient and explained findings.     Interventions:  2346 Proventil 2.5mg nebulization  0028 Solu-medrol 125mg IV    Disposition:  The patient was discharged to home.     Impression & Plan     Medical Decision Making:  The differential considered for the dyspnea included CHF, PE, ACS, pneumonia, pneumothorax, medication induced pulmonary toxicity, ARDS, metabolic acidosis, airflow obstruction (asthma, COPD, upper airway obstruction), restrictive lung disease (interstitial lung disease, pleural thickening or effusion, respiratory muscle weakness, obesity), aspiration, cardiac arrhythmia, cardiac tamponade, hypercapnia, sepsis, and anemia.  The etiology of these symptoms is unclear.  The patient is not hypoxic nor tachypneic.  The patient is not working hard to breathe.  The workup in the ED is negative.  The patient will continue the workup as an outpatient or return if symptoms worsen.      Diagnosis:    ICD-10-CM    1. Cough, unspecified type  R05.9       2.  Dyspnea, unspecified type  R06.00           Discharge Medications:  New Prescriptions    ALBUTEROL (PROVENTIL) (2.5 MG/3ML) 0.083% NEB SOLUTION    Take 1 vial (2.5 mg) by nebulization every 4 hours as needed for cough, shortness of breath or wheezing    BENZONATATE (TESSALON) 100 MG CAPSULE    Take 1-2 capsules (100-200 mg) by mouth 3 times daily as needed for cough    PREDNISONE (DELTASONE) 20 MG TABLET    Take two tablets (= 40mg) each day for 5 (five) days       Scribe Disclosure:  Jhon HOUSE, am serving as a scribe at 11:30 PM on 12/30/2022 to document services personally performed by Eduardo Christiansen MD based on my observations and the provider's statements to me.          Eduardo Christiansen MD  12/31/22 [id number]

## 2023-02-10 ENCOUNTER — LAB (OUTPATIENT)
Dept: LAB | Facility: CLINIC | Age: 68
End: 2023-02-10
Payer: MEDICARE

## 2023-02-10 DIAGNOSIS — C61 PROSTATE CANCER (H): ICD-10-CM

## 2023-02-10 DIAGNOSIS — E03.9 HYPOTHYROIDISM: ICD-10-CM

## 2023-02-10 LAB
ALBUMIN SERPL BCG-MCNC: 4.2 G/DL (ref 3.5–5.2)
ALP SERPL-CCNC: 96 U/L (ref 40–129)
ALT SERPL W P-5'-P-CCNC: 43 U/L (ref 10–50)
ANION GAP SERPL CALCULATED.3IONS-SCNC: 9 MMOL/L (ref 7–15)
AST SERPL W P-5'-P-CCNC: 37 U/L (ref 10–50)
BASOPHILS # BLD AUTO: 0 10E3/UL (ref 0–0.2)
BASOPHILS NFR BLD AUTO: 1 %
BILIRUB SERPL-MCNC: 0.6 MG/DL
BUN SERPL-MCNC: 20.3 MG/DL (ref 8–23)
CALCIUM SERPL-MCNC: 8.9 MG/DL (ref 8.8–10.2)
CHLORIDE SERPL-SCNC: 107 MMOL/L (ref 98–107)
CREAT SERPL-MCNC: 0.94 MG/DL (ref 0.67–1.17)
DEPRECATED HCO3 PLAS-SCNC: 26 MMOL/L (ref 22–29)
EOSINOPHIL # BLD AUTO: 0.2 10E3/UL (ref 0–0.7)
EOSINOPHIL NFR BLD AUTO: 5 %
ERYTHROCYTE [DISTWIDTH] IN BLOOD BY AUTOMATED COUNT: 12.7 % (ref 10–15)
GFR SERPL CREATININE-BSD FRML MDRD: 88 ML/MIN/1.73M2
GLUCOSE SERPL-MCNC: 101 MG/DL (ref 70–99)
HCT VFR BLD AUTO: 46.5 % (ref 40–53)
HGB BLD-MCNC: 15.7 G/DL (ref 13.3–17.7)
IMM GRANULOCYTES # BLD: 0 10E3/UL
IMM GRANULOCYTES NFR BLD: 1 %
LYMPHOCYTES # BLD AUTO: 0.8 10E3/UL (ref 0.8–5.3)
LYMPHOCYTES NFR BLD AUTO: 16 %
MCH RBC QN AUTO: 33.1 PG (ref 26.5–33)
MCHC RBC AUTO-ENTMCNC: 33.8 G/DL (ref 31.5–36.5)
MCV RBC AUTO: 98 FL (ref 78–100)
MONOCYTES # BLD AUTO: 0.5 10E3/UL (ref 0–1.3)
MONOCYTES NFR BLD AUTO: 11 %
NEUTROPHILS # BLD AUTO: 3.3 10E3/UL (ref 1.6–8.3)
NEUTROPHILS NFR BLD AUTO: 66 %
NRBC # BLD AUTO: 0 10E3/UL
NRBC BLD AUTO-RTO: 0 /100
PLATELET # BLD AUTO: 187 10E3/UL (ref 150–450)
POTASSIUM SERPL-SCNC: 4.9 MMOL/L (ref 3.4–5.3)
PROT SERPL-MCNC: 6.3 G/DL (ref 6.4–8.3)
PSA SERPL-MCNC: 0.18 NG/ML (ref 0–4.5)
RBC # BLD AUTO: 4.74 10E6/UL (ref 4.4–5.9)
SODIUM SERPL-SCNC: 142 MMOL/L (ref 136–145)
TSH SERPL DL<=0.005 MIU/L-ACNC: 3.13 UIU/ML (ref 0.3–4.2)
WBC # BLD AUTO: 4.9 10E3/UL (ref 4–11)

## 2023-02-10 PROCEDURE — 85004 AUTOMATED DIFF WBC COUNT: CPT

## 2023-02-10 PROCEDURE — 36415 COLL VENOUS BLD VENIPUNCTURE: CPT

## 2023-02-10 PROCEDURE — 84403 ASSAY OF TOTAL TESTOSTERONE: CPT

## 2023-02-10 PROCEDURE — 84443 ASSAY THYROID STIM HORMONE: CPT

## 2023-02-10 PROCEDURE — 80053 COMPREHEN METABOLIC PANEL: CPT

## 2023-02-10 PROCEDURE — 84153 ASSAY OF PSA TOTAL: CPT

## 2023-02-11 LAB — TESTOST SERPL-MCNC: 334 NG/DL (ref 240–950)

## 2023-02-13 ENCOUNTER — VIRTUAL VISIT (OUTPATIENT)
Dept: ONCOLOGY | Facility: CLINIC | Age: 68
End: 2023-02-13
Attending: INTERNAL MEDICINE
Payer: MEDICARE

## 2023-02-13 DIAGNOSIS — C61 PROSTATE CANCER (H): Primary | ICD-10-CM

## 2023-02-13 PROCEDURE — 99214 OFFICE O/P EST MOD 30 MIN: CPT | Mod: VID | Performed by: INTERNAL MEDICINE

## 2023-02-13 NOTE — PROGRESS NOTES
Video-Visit Details    Type of service:  Video Visit    Video Start Time (time video started): 1:33  Video End Time (time video stopped): 1:55    Originating Location (pt. Location): Home        Distant Location (provider location):  Off-site    Mode of Communication:  Video Conference via [facility] Followup  In person visit  2-13-23       CC: Prostate cancer     HPI: Lucio Hernandez is a 66 year old male who has prostate cancer. He had a radical prostatectomy in 2006 demonstrating [place] 7 disease with a positive margin. He had salvage radiation therapy in 2006 when his PSA was around 0.4 per the patient's recollection.  He notes his PSA was undetectable for 4 years, but then began coming back and recently has noted to have PSAs in the 1.2-1.4 range in 5/2019. He underwent an Axumin PET scan in 2019 that showed no evidence of metastatic disease though there was uptake in the prostate bed. Has been on AFT-19 trial on apalutamide and degarelix from 11/14/2019-11/16/2020 with PSA <0.01 till 7/2021 (PSA 0.03).      Had Prior history of urethral stricture. This may have been due to the prior radiation therapy and a Olsen Catheter complication postop.       4/11/18            PSA = 0.56  10/30/18          PSA =0.48  5/14/19            PSA =1.2  5/29/19            PSA=1.4  7/29/19            PSA=1.45  9/5/19              PSA =1.84  11/4/19            PSA=2.19  11/14/19          PSA= 2.27-- START AFT-19 ( APA plus ADT arm)  12/12/19          PSA= 0.04  1/9/2020          PSA= <0.01  2/5/2020          PSA  <0.01  2/26/2020        PSA <0.01  4/6/20              PSA  <0.01  6/30/20 PSA   <0.01 Testosterone =11  7/27/20 PSA   < 0.01  9/21/20 PSA     <0.01  11/16/20 PSA     <0.01 - Completed AFT-19   2/11/201 PSA  <0.01  Testosterone = 54  5/20/21 PSA  <0.01  Testosterone = 149  7/14/21 PSA  =0.03 Testosterone = 239  11/12/21 PSA  =0.03 Testosterone = 309  2/18/22 PSA  =0.08  5/20/22 PSA      =0.11  T=244  9/23/22 PSA  =0.18  T=312  2/10/23 PSA  =0.18 T=334      INTERIM HISTORY:  Good urination. Cath every other night. 1-2 times nocturia, mild urgency, no pain, strain or incontinence, last UTI fall 2021. C/o L testicle discomfort for weeks, no recalled triggers, no pain or tender to palpation, no appearance change, does have h/o b/l varicose, L>R. Endorse some lifting recently but in an appropriate way. Losing weight -  Down 21 lbs and hoping to get 35-40 lbs     Pain in mid thoracic spine. No radiation to arms.   No weakness. Has been active. Worried about metastatic disease.     Generally feels ok    ROS: 10 point ROS neg other than the symptoms noted above in the HPI.     PAST MEDICAL HISTORY:  Otherwise significant for the prostate cancer,  history of pulmonary embolism and rib fractures from coughing fits and appendicitis.      PAST SURGICAL HISTORY:  Includes the RRP, a lumbar fusion, a thoracotomy for the history of rib fractures and the pulmonic hernia, shoulder surgery x3, appendectomy and a cervical spine surgery.   --knee replacement much better. Redid PT        MEDICATIONS:       Current Outpatient Medications   Medication Instructions     acetaminophen (TYLENOL) 650 mg, Oral, EVERY 4 HOURS PRN     atorvastatin (LIPITOR) 20 mg, Oral, AT BEDTIME     cetirizine (ZYRTEC) 10 mg, Oral, DAILY     clonazePAM (KLONOPIN) 0.5 mg, Oral, AT BEDTIME PRN     fluticasone (FLONASE) 50 MCG/ACT nasal spray 2 sprays, Both Nostrils, DAILY     levothyroxine (SYNTHROID/LEVOTHROID) 75 MCG tablet TAKE 1 TABLET BY MOUTH EVERY DAY     lisinopril (ZESTRIL) 20 mg, Oral, DAILY     multivitamin w/minerals (MULTI-VITAMIN) tablet 1 tablet, Oral, DAILY     oxyCODONE (ROXICODONE) 5 MG tablet 1 tab po q 4-6 hrs prn pain 3-6 on pain scale<BR>2 tabs po q 4-6hrs prn pain 7-10 on pain scale     rivaroxaban ANTICOAGULANT (XARELTO) 10 mg, Oral, DAILY     senna-docusate (SENOKOT-S/PERICOLACE) 8.6-50 MG tablet 1-2 tablets, Oral, 2  TIMES DAILY, Take while on oral narcotics to prevent or treat constipation.          ALLERGIES:  NO KNOWN DRUG ALLERGIES.      FAMILY HISTORY:  Noncontributory.      SOCIAL HISTORY:  Negative for tobacco.  Alcohol 1 drink a day. Pt is a retired Otolaryngologist (ENT surgeon).      REVIEW OF SYSTEMS:  Negative for fevers, chills, sweats, nausea, vomiting, unexplained weight changes.       Physical Exam  There were no vitals taken for this visit.  NAD - looks fit!  Mild spinous process tenderness along T5 - likely rhomboid muscle       Wt Readings from Last 4 Encounters:   12/30/22 83 kg (183 lb)   10/24/22 87.1 kg (192 lb)   09/26/22 90.5 kg (199 lb 8 oz)   05/23/22 99.2 kg (218 lb 12.8 oz)     LABS     Latest Reference Range & Units 09/23/22 10:17   Sodium 133 - 144 mmol/L 138   Potassium 3.4 - 5.3 mmol/L 4.7   Chloride 94 - 109 mmol/L 108   Carbon Dioxide 20 - 32 mmol/L 26   Urea Nitrogen 7 - 30 mg/dL 17   Creatinine 0.66 - 1.25 mg/dL 0.88   GFR Estimate >60 mL/min/1.73m2 >90   Calcium 8.5 - 10.1 mg/dL 9.1   Anion Gap 3 - 14 mmol/L 4   Albumin 3.4 - 5.0 g/dL 3.9   Protein Total 6.8 - 8.8 g/dL 6.9   Alkaline Phosphatase 40 - 150 U/L 67   ALT 0 - 70 U/L 43   AST 0 - 45 U/L 21   Bilirubin Total 0.2 - 1.3 mg/dL 1.1   PSA Tumor Marker 0.00 - 4.50 ng/mL 0.18   Testosterone Total 240 - 950 ng/dL 312   TSH 0.40 - 4.00 mU/L 1.15   Glucose 70 - 99 mg/dL 115 (H)   (H): Data is abnormally high      10/17/22    IMPRESSION: In this patient with prostate cancer, status post  radical?prostatectomy and salvage radiation therapy?in 2006, has been  on AFT-19 trial?on apalutamide and degarelix?from  11/14/2019-11/16/2020.      No evidence to suggest local recurrence or distant metastatic disease.                                                                    IMPRESSION/ PLAN   This is a 67 year old gentleman with a serologic relapse of a Jg 4+3 = 7 with a tertiary pattern of [place] 5. PET 2019 showed no mets though there was  uptake in the prostate bed. S/p prostatectomy and salvage radiation therapy. Completed 1 year of AFT19 study on apalutamide and degarelix from 11/14/2019-11/16/2020 with PSA <0.01 till 7/2021 (PSA 0.03). He has a doubling time that is about 5-6 months based on the most recent 2 measurements (0.03->0.18 and now stable for two measurements at 0.18). Testosterone recovered.     # Hormone sensitive prostate cancer, no metastasis  He currently has a negative PSMA PET scan and a very low but detectable PSA.  We will continue to follow with another PSA check in 4 to 6 months.  We may consider repeating the PSMA PET scan after approximately 6 months with further consideration of SBRT or other treatments as appropriate.  There may be an opportunity to repeat the ADT with next generation hormonal therapies depending on the rate of rise of the PSA.    Patient understands this.  He feels well right now and is focusing on his general fitness and will be spending much of the winter in [place].      Alejandro Palomino MD

## 2023-02-13 NOTE — LETTER
2/13/2023         RE: Lucio Hernandez  [address] [address]        Dear Colleague,    Thank you for referring your patient, Lucio Hernandez, to the [facility]. Please see a copy of my visit note below.    Video-Visit Details    Type of service:  Video Visit    Video Start Time (time video started): 1:33  Video End Time (time video stopped): 1:55    Originating Location (pt. Location): Home        Distant Location (provider location):  Off-site    Mode of Communication:  Video Conference via [facility] Followup  In person visit  2-13-23       CC: Prostate cancer     HPI: Lucio Hernandez is a 66 year old male who has prostate cancer. He had a radical prostatectomy in 2006 demonstrating Jg 7 disease with a positive margin. He had salvage radiation therapy in 2006 when his PSA was around 0.4 per the patient's recollection.  He notes his PSA was undetectable for 4 years, but then began coming back and recently has noted to have PSAs in the 1.2-1.4 range in 5/2019. He underwent an Axumin PET scan in 2019 that showed no evidence of metastatic disease though there was uptake in the prostate bed. Has been on AFT-19 trial on apalutamide and degarelix from 11/14/2019-11/16/2020 with PSA <0.01 till 7/2021 (PSA 0.03).      Had Prior history of urethral stricture. This may have been due to the prior radiation therapy and a Olsen Catheter complication postop.       4/11/18            PSA = 0.56  10/30/18          PSA =0.48  5/14/19            PSA =1.2  5/29/19            PSA=1.4  7/29/19            PSA=1.45  9/5/19              PSA =1.84  11/4/19            PSA=2.19  11/14/19          PSA= 2.27-- START AFT-19 ( APA plus ADT arm)  12/12/19          PSA= 0.04  1/9/2020          PSA= <0.01  2/5/2020          PSA  <0.01  2/26/2020        PSA <0.01  4/6/20              PSA  <0.01  6/30/20 PSA   <0.01 Testosterone =11  7/27/20 PSA   <  0.01  9/21/20 PSA     <0.01  11/16/20 PSA     <0.01 - Completed AFT-19   2/11/201 PSA  <0.01  Testosterone = 54  5/20/21 PSA  <0.01  Testosterone = 149  7/14/21 PSA  =0.03 Testosterone = 239  11/12/21 PSA  =0.03 Testosterone = 309  2/18/22 PSA  =0.08  5/20/22 PSA     =0.11  T=244  9/23/22 PSA  =0.18  T=312  2/10/23 PSA  =0.18 T=334      INTERIM HISTORY:  Good urination. Cath every other night. 1-2 times nocturia, mild urgency, no pain, strain or incontinence, last UTI fall 2021. C/o L testicle discomfort for weeks, no recalled triggers, no pain or tender to palpation, no appearance change, does have h/o b/l varicose, L>R. Endorse some lifting recently but in an appropriate way. Losing weight -  Down 21 lbs and hoping to get 35-40 lbs     Pain in mid thoracic spine. No radiation to arms.   No weakness. Has been active. Worried about metastatic disease.     Generally feels ok    ROS: 10 point ROS neg other than the symptoms noted above in the HPI.     PAST MEDICAL HISTORY:  Otherwise significant for the prostate cancer,  history of pulmonary embolism and rib fractures from coughing fits and appendicitis.      PAST SURGICAL HISTORY:  Includes the RRP, a lumbar fusion, a thoracotomy for the history of rib fractures and the pulmonic hernia, shoulder surgery x3, appendectomy and a cervical spine surgery.   --knee replacement much better. Redid PT        MEDICATIONS:       Current Outpatient Medications   Medication Instructions     acetaminophen (TYLENOL) 650 mg, Oral, EVERY 4 HOURS PRN     atorvastatin (LIPITOR) 20 mg, Oral, AT BEDTIME     cetirizine (ZYRTEC) 10 mg, Oral, DAILY     clonazePAM (KLONOPIN) 0.5 mg, Oral, AT BEDTIME PRN     fluticasone (FLONASE) 50 MCG/ACT nasal spray 2 sprays, Both Nostrils, DAILY     levothyroxine (SYNTHROID/LEVOTHROID) 75 MCG tablet TAKE 1 TABLET BY MOUTH EVERY DAY     lisinopril (ZESTRIL) 20 mg, Oral, DAILY     multivitamin w/minerals (MULTI-VITAMIN) tablet 1 tablet, Oral, DAILY      oxyCODONE (ROXICODONE) 5 MG tablet 1 tab po q 4-6 hrs prn pain 3-6 on pain scale2 tabs po q 4-6hrs prn pain 7-10 on pain scale     rivaroxaban ANTICOAGULANT (XARELTO) 10 mg, Oral, DAILY     senna-docusate (SENOKOT-S/PERICOLACE) 8.6-50 MG tablet 1-2 tablets, Oral, 2 TIMES DAILY, Take while on oral narcotics to prevent or treat constipation.          ALLERGIES:  NO KNOWN DRUG ALLERGIES.      FAMILY HISTORY:  Noncontributory.      SOCIAL HISTORY:  Negative for tobacco.  Alcohol 1 drink a day. Pt is a retired Otolaryngologist (ENT surgeon).      REVIEW OF SYSTEMS:  Negative for fevers, chills, sweats, nausea, vomiting, unexplained weight changes.       Physical Exam  There were no vitals taken for this visit.  NAD - looks fit!  Mild spinous process tenderness along T5 - likely rhomboid muscle       Wt Readings from Last 4 Encounters:   12/30/22 83 kg (183 lb)   10/24/22 87.1 kg (192 lb)   09/26/22 90.5 kg (199 lb 8 oz)   05/23/22 99.2 kg (218 lb 12.8 oz)     LABS     Latest Reference Range & Units 09/23/22 10:17   Sodium 133 - 144 mmol/L 138   Potassium 3.4 - 5.3 mmol/L 4.7   Chloride 94 - 109 mmol/L 108   Carbon Dioxide 20 - 32 mmol/L 26   Urea Nitrogen 7 - 30 mg/dL 17   Creatinine 0.66 - 1.25 mg/dL 0.88   GFR Estimate >60 mL/min/1.73m2 >90   Calcium 8.5 - 10.1 mg/dL 9.1   Anion Gap 3 - 14 mmol/L 4   Albumin 3.4 - 5.0 g/dL 3.9   Protein Total 6.8 - 8.8 g/dL 6.9   Alkaline Phosphatase 40 - 150 U/L 67   ALT 0 - 70 U/L 43   AST 0 - 45 U/L 21   Bilirubin Total 0.2 - 1.3 mg/dL 1.1   PSA Tumor Marker 0.00 - 4.50 ng/mL 0.18   Testosterone Total 240 - 950 ng/dL 312   TSH 0.40 - 4.00 mU/L 1.15   Glucose 70 - 99 mg/dL 115 (H)   (H): Data is abnormally high      10/17/22    IMPRESSION: In this patient with prostate cancer, status post  radical?prostatectomy and salvage radiation therapy?in 2006, has been  on AFT-19 trial?on apalutamide and degarelix?from  11/14/2019-11/16/2020.      No evidence to suggest local recurrence or  distant metastatic disease.                                                                    IMPRESSION/ PLAN   This is a 67 year old gentleman with a serologic relapse of a [place] 4+3 = 7 with a tertiary pattern of [place] 5. PET 2019 showed no mets though there was uptake in the prostate bed. S/p prostatectomy and salvage radiation therapy. Completed 1 year of AFT19 study on apalutamide and degarelix from 11/14/2019-11/16/2020 with PSA <0.01 till 7/2021 (PSA 0.03). He has a doubling time that is about 5-6 months based on the most recent 2 measurements (0.03->0.18 and now stable for two measurements at 0.18). Testosterone recovered.     # Hormone sensitive prostate cancer, no metastasis  He currently has a negative PSMA PET scan and a very low but detectable PSA.  We will continue to follow with another PSA check in 4 to 6 months.  We may consider repeating the PSMA PET scan after approximately 6 months with further consideration of SBRT or other treatments as appropriate.  There may be an opportunity to repeat the ADT with next generation hormonal therapies depending on the rate of rise of the PSA.    Patient understands this.  He feels well right now and is focusing on his general fitness and will be spending much of the winter in [place].      Alejandro Palomino MD

## 2023-02-13 NOTE — NURSING NOTE
Is the patient currently in the state of [place]? YES    Visit mode:VIDEO    If the visit is dropped, the patient can be reconnected by: VIDEO VISIT: Send to e-mail at: [email or web address]    Will anyone else be joining the visit? NO      How would you like to obtain your AVS? MyChart    Are changes needed to the allergy or medication list? NO    Comments or concerns regarding today's visit:   Patient confirms medications and allergies are accurate via patients echeck in completion, and or denies any changes since last reviewed/verified.     Patient declined individual allergy and medication review by support staff because patient reviewed file online and everything is current and up to date.     Jessica Dunham, Virtual Facilitator

## 2023-03-26 ENCOUNTER — HEALTH MAINTENANCE LETTER (OUTPATIENT)
Age: 68
End: 2023-03-26

## 2023-08-08 ENCOUNTER — LAB (OUTPATIENT)
Dept: LAB | Facility: CLINIC | Age: 68
End: 2023-08-08
Payer: MEDICARE

## 2023-08-08 DIAGNOSIS — E03.9 HYPOTHYROIDISM: ICD-10-CM

## 2023-08-08 DIAGNOSIS — C61 PROSTATE CANCER (H): ICD-10-CM

## 2023-08-08 LAB
ALBUMIN SERPL BCG-MCNC: 4.5 G/DL (ref 3.5–5.2)
ALP SERPL-CCNC: 67 U/L (ref 40–129)
ALT SERPL W P-5'-P-CCNC: 35 U/L (ref 0–70)
ANION GAP SERPL CALCULATED.3IONS-SCNC: 10 MMOL/L (ref 7–15)
AST SERPL W P-5'-P-CCNC: 32 U/L (ref 0–45)
BASOPHILS # BLD AUTO: 0 10E3/UL (ref 0–0.2)
BASOPHILS NFR BLD AUTO: 1 %
BILIRUB SERPL-MCNC: 0.6 MG/DL
BUN SERPL-MCNC: 22.3 MG/DL (ref 8–23)
CALCIUM SERPL-MCNC: 9.2 MG/DL (ref 8.8–10.2)
CHLORIDE SERPL-SCNC: 105 MMOL/L (ref 98–107)
CREAT SERPL-MCNC: 0.85 MG/DL (ref 0.67–1.17)
DEPRECATED HCO3 PLAS-SCNC: 25 MMOL/L (ref 22–29)
EOSINOPHIL # BLD AUTO: 0.2 10E3/UL (ref 0–0.7)
EOSINOPHIL NFR BLD AUTO: 3 %
ERYTHROCYTE [DISTWIDTH] IN BLOOD BY AUTOMATED COUNT: 12 % (ref 10–15)
GFR SERPL CREATININE-BSD FRML MDRD: >90 ML/MIN/1.73M2
GLUCOSE SERPL-MCNC: 89 MG/DL (ref 70–99)
HCT VFR BLD AUTO: 48.8 % (ref 40–53)
HGB BLD-MCNC: 16.5 G/DL (ref 13.3–17.7)
IMM GRANULOCYTES # BLD: 0 10E3/UL
IMM GRANULOCYTES NFR BLD: 1 %
LYMPHOCYTES # BLD AUTO: 1.2 10E3/UL (ref 0.8–5.3)
LYMPHOCYTES NFR BLD AUTO: 22 %
MCH RBC QN AUTO: 32.7 PG (ref 26.5–33)
MCHC RBC AUTO-ENTMCNC: 33.8 G/DL (ref 31.5–36.5)
MCV RBC AUTO: 97 FL (ref 78–100)
MONOCYTES # BLD AUTO: 0.5 10E3/UL (ref 0–1.3)
MONOCYTES NFR BLD AUTO: 9 %
NEUTROPHILS # BLD AUTO: 3.4 10E3/UL (ref 1.6–8.3)
NEUTROPHILS NFR BLD AUTO: 64 %
NRBC # BLD AUTO: 0 10E3/UL
NRBC BLD AUTO-RTO: 0 /100
PLATELET # BLD AUTO: 205 10E3/UL (ref 150–450)
POTASSIUM SERPL-SCNC: 4.4 MMOL/L (ref 3.4–5.3)
PROT SERPL-MCNC: 6.5 G/DL (ref 6.4–8.3)
PSA SERPL DL<=0.01 NG/ML-MCNC: 0.29 NG/ML (ref 0–4.5)
RBC # BLD AUTO: 5.05 10E6/UL (ref 4.4–5.9)
SODIUM SERPL-SCNC: 140 MMOL/L (ref 136–145)
TSH SERPL DL<=0.005 MIU/L-ACNC: 3.5 UIU/ML (ref 0.3–4.2)
WBC # BLD AUTO: 5.3 10E3/UL (ref 4–11)

## 2023-08-08 PROCEDURE — 84153 ASSAY OF PSA TOTAL: CPT

## 2023-08-08 PROCEDURE — 80053 COMPREHEN METABOLIC PANEL: CPT

## 2023-08-08 PROCEDURE — 84403 ASSAY OF TOTAL TESTOSTERONE: CPT

## 2023-08-08 PROCEDURE — 84443 ASSAY THYROID STIM HORMONE: CPT

## 2023-08-08 PROCEDURE — 36415 COLL VENOUS BLD VENIPUNCTURE: CPT

## 2023-08-08 PROCEDURE — 85025 COMPLETE CBC W/AUTO DIFF WBC: CPT

## 2023-08-11 LAB — TESTOST SERPL-MCNC: 236 NG/DL (ref 240–950)

## 2023-08-14 ENCOUNTER — ONCOLOGY VISIT (OUTPATIENT)
Dept: ONCOLOGY | Facility: CLINIC | Age: 68
End: 2023-08-14
Attending: INTERNAL MEDICINE
Payer: MEDICARE

## 2023-08-14 VITALS
BODY MASS INDEX: 28.8 KG/M2 | RESPIRATION RATE: 16 BRPM | WEIGHT: 195 LBS | OXYGEN SATURATION: 97 % | TEMPERATURE: 98.9 F | SYSTOLIC BLOOD PRESSURE: 142 MMHG | HEART RATE: 72 BPM | DIASTOLIC BLOOD PRESSURE: 85 MMHG

## 2023-08-14 DIAGNOSIS — C61 PROSTATE CANCER (H): Primary | ICD-10-CM

## 2023-08-14 PROCEDURE — G0463 HOSPITAL OUTPT CLINIC VISIT: HCPCS | Performed by: INTERNAL MEDICINE

## 2023-08-14 PROCEDURE — 99214 OFFICE O/P EST MOD 30 MIN: CPT | Performed by: INTERNAL MEDICINE

## 2023-08-14 RX ORDER — FLUTICASONE PROPIONATE AND SALMETEROL 232; 14 UG/1; UG/1
1 POWDER, METERED RESPIRATORY (INHALATION) PRN
COMMUNITY
Start: 2023-08-03

## 2023-08-14 ASSESSMENT — PAIN SCALES - GENERAL: PAINLEVEL: MILD PAIN (2)

## 2023-08-14 NOTE — PROGRESS NOTES
[facility] Followup  In person visit  8-14-23       CC: Prostate cancer     HPI: Lucio Hernandez is a 66 year old male who has prostate cancer. He had a radical prostatectomy in 2006 demonstrating Jg 7 disease with a positive margin. He had salvage radiation therapy in 2006 when his PSA was around 0.4 per the patient's recollection.  He notes his PSA was undetectable for 4 years, but then began coming back and recently has noted to have PSAs in the 1.2-1.4 range in 5/2019. He underwent an Axumin PET scan in 2019 that showed no evidence of metastatic disease though there was uptake in the prostate bed. Has been on AFT-19 trial on apalutamide and degarelix from 11/14/2019-11/16/2020 with PSA <0.01 till 7/2021 (PSA 0.03).      Had Prior history of urethral stricture. This may have been due to the prior radiation therapy and a Olsen Catheter complication postop.       4/11/18            PSA = 0.56  10/30/18          PSA =0.48  5/14/19            PSA =1.2  5/29/19            PSA=1.4  7/29/19            PSA=1.45  9/5/19              PSA =1.84  11/4/19            PSA=2.19  11/14/19          PSA= 2.27-- START AFT-19 ( APA plus ADT arm)  12/12/19          PSA= 0.04  1/9/2020          PSA= <0.01  2/5/2020          PSA  <0.01  2/26/2020        PSA <0.01  4/6/20              PSA  <0.01  6/30/20 PSA   <0.01 Testosterone =11  7/27/20 PSA   < 0.01  9/21/20 PSA     <0.01  11/16/20 PSA     <0.01 - Completed AFT-19   2/11/201 PSA  <0.01  Testosterone = 54  5/20/21 PSA  <0.01  Testosterone = 149  7/14/21 PSA  =0.03 Testosterone = 239  11/12/21 PSA  =0.03 Testosterone = 309  2/18/22 PSA  =0.08  5/20/22 PSA     =0.11  T=244  9/23/22 PSA  =0.18  T=312  2/10/23 PSA  =0.18 T=334  8/8/23  PSA =0.29 T=239      INTERIM HISTORY:  Good urination. Cath every other night. 1-2 times nocturia, mild urgency, no pain, strain or incontinence, last UTI fall 2021. C/o L testicle discomfort for weeks, no recalled triggers, no pain or  tender to palpation, no appearance change, does have h/o b/l varicose, L>R. Endorse some lifting recently but in an appropriate way. Losing weight -weight down intentially  -describes improvement in the mild cognitive impairment that he experienced during the ADT treatment    Pain in mid thoracic spine stable and related to DDD    ROS: 10 point ROS neg other than the symptoms noted above in the HPI.     PAST MEDICAL HISTORY:  Otherwise significant for the prostate cancer,  history of pulmonary embolism and rib fractures from coughing fits and appendicitis.      PAST SURGICAL HISTORY:  Includes the RRP, a lumbar fusion, a thoracotomy for the history of rib fractures and the pulmonic hernia, shoulder surgery x3, appendectomy and a cervical spine surgery.   --knee replacement much better. Redid PT        MEDICATIONS:       Current Outpatient Medications   Medication Instructions    acetaminophen (TYLENOL) 650 mg, Oral, EVERY 4 HOURS PRN    atorvastatin (LIPITOR) 20 mg, Oral, AT BEDTIME    cetirizine (ZYRTEC) 10 mg, Oral, DAILY    clonazePAM (KLONOPIN) 0.5 mg, Oral, AT BEDTIME PRN    fluticasone (FLONASE) 50 MCG/ACT nasal spray 2 sprays, Both Nostrils, DAILY    levothyroxine (SYNTHROID/LEVOTHROID) 75 MCG tablet TAKE 1 TABLET BY MOUTH EVERY DAY    lisinopril (ZESTRIL) 20 mg, Oral, DAILY    multivitamin w/minerals (MULTI-VITAMIN) tablet 1 tablet, Oral, DAILY    oxyCODONE (ROXICODONE) 5 MG tablet 1 tab po q 4-6 hrs prn pain 3-6 on pain scale<BR>2 tabs po q 4-6hrs prn pain 7-10 on pain scale    rivaroxaban ANTICOAGULANT (XARELTO) 10 mg, Oral, DAILY    senna-docusate (SENOKOT-S/PERICOLACE) 8.6-50 MG tablet 1-2 tablets, Oral, 2 TIMES DAILY, Take while on oral narcotics to prevent or treat constipation.          ALLERGIES:  NO KNOWN DRUG ALLERGIES.      FAMILY HISTORY:  Noncontributory.      SOCIAL HISTORY:  Negative for tobacco.  Alcohol 1 drink a day. Pt is a retired Otolaryngologist (ENT surgeon).      REVIEW OF SYSTEMS:   Negative for fevers, chills, sweats, nausea, vomiting, unexplained weight changes.       Physical Exam  BP (!) 142/85   Pulse 72   Temp 98.9  F (37.2  C) (Oral)   Resp 16   Wt 88.5 kg (195 lb)   SpO2 97%   BMI 28.80 kg/m    NAD - looks fit!  Mild spinous process tenderness along T5 - likely rhomboid muscle       Wt Readings from Last 4 Encounters:   08/14/23 88.5 kg (195 lb)   12/30/22 83 kg (183 lb)   10/24/22 87.1 kg (192 lb)   09/26/22 90.5 kg (199 lb 8 oz)     LABS    10/17/22    IMPRESSION: In this patient with prostate cancer, status post  radical?prostatectomy and salvage radiation therapy?in 2006, has been  on AFT-19 trial?on apalutamide and degarelix?from  11/14/2019-11/16/2020.      No evidence to suggest local recurrence or distant metastatic disease.                                                                    IMPRESSION/ PLAN   This is a 67 year old gentleman with a serologic relapse of a [place] 4+3 = 7 with a tertiary pattern of [place] 5. PET 2019 showed no mets though there was uptake in the prostate bed. S/p prostatectomy and salvage radiation therapy. Completed 1 year of AFT19 study on apalutamide and degarelix from 11/14/2019-11/16/2020 with PSA <0.01 till 7/2021 (PSA 0.03). He has a doubling time that is about 9-12 months.  Testosterone recovered. PSADT in the 9-12 month timeframe.     # Hormone sensitive prostate cancer, no metastasis  He currently has a negative PSMA PET scan and a very low but detectable PSA.  We will continue to follow with another PSA check in 4 to 6 months.  We will do a PSMA PET scan in approximately 6 months with further consideration of SBRT or other treatments as appropriate.  There may be an opportunity to repeat the ADT with next generation hormonal therapies depending on the rate of rise of the PSA.    Introduction visit with Dr Breaux.   PSA and testosterone in 3 months at that time.   PSMA PET Ordered to be done in six months.         Alejandro WISEMAN  MD [place]

## 2023-08-14 NOTE — LETTER
8/14/2023         RE: Lucio Hernandez  [address] [id number]        Dear Colleague,    Thank you for referring your patient, Lucio Hernandez, to the [facility] CANCER [facility]IC. Please see a copy of my visit note below.       [facility] Oncology Followup  In person visit  8-14-23       CC: Prostate cancer     HPI: Lucio Hernandez is a 66 year old male who has prostate cancer. He had a radical prostatectomy in 2006 demonstrating Jg 7 disease with a positive margin. He had salvage radiation therapy in 2006 when his PSA was around 0.4 per the patient's recollection.  He notes his PSA was undetectable for 4 years, but then began coming back and recently has noted to have PSAs in the 1.2-1.4 range in 5/2019. He underwent an Axumin PET scan in 2019 that showed no evidence of metastatic disease though there was uptake in the prostate bed. Has been on AFT-19 trial on apalutamide and degarelix from 11/14/2019-11/16/2020 with PSA <0.01 till 7/2021 (PSA 0.03).      Had Prior history of urethral stricture. This may have been due to the prior radiation therapy and a Olsen Catheter complication postop.       4/11/18            PSA = 0.56  10/30/18          PSA =0.48  5/14/19            PSA =1.2  5/29/19            PSA=1.4  7/29/19            PSA=1.45  9/5/19              PSA =1.84  11/4/19            PSA=2.19  11/14/19          PSA= 2.27-- START AFT-19 ( APA plus ADT arm)  12/12/19          PSA= 0.04  1/9/2020          PSA= <0.01  2/5/2020          PSA  <0.01  2/26/2020        PSA <0.01  4/6/20              PSA  <0.01  6/30/20 PSA   <0.01 Testosterone =11  7/27/20 PSA   < 0.01  9/21/20 PSA     <0.01  11/16/20 PSA     <0.01 - Completed AFT-19   2/11/201 PSA  <0.01  Testosterone = 54  5/20/21 PSA  <0.01  Testosterone = 149  7/14/21 PSA  =0.03 Testosterone = 239  11/12/21 PSA  =0.03 Testosterone = 309  2/18/22 PSA  =0.08  5/20/22 PSA     =0.11  T=244  9/23/22 PSA  =0.18  T=312  2/10/23 PSA   =0.18 T=334  8/8/23  PSA =0.29 T=239      INTERIM HISTORY:  Good urination. Cath every other night. 1-2 times nocturia, mild urgency, no pain, strain or incontinence, last UTI fall 2021. C/o L testicle discomfort for weeks, no recalled triggers, no pain or tender to palpation, no appearance change, does have h/o b/l varicose, L>R. Endorse some lifting recently but in an appropriate way. Losing weight -weight down intentially  -describes improvement in the mild cognitive impairment that he experienced during the ADT treatment    Pain in mid thoracic spine stable and related to DDD    ROS: 10 point ROS neg other than the symptoms noted above in the HPI.     PAST MEDICAL HISTORY:  Otherwise significant for the prostate cancer,  history of pulmonary embolism and rib fractures from coughing fits and appendicitis.      PAST SURGICAL HISTORY:  Includes the RRP, a lumbar fusion, a thoracotomy for the history of rib fractures and the pulmonic hernia, shoulder surgery x3, appendectomy and a cervical spine surgery.   --knee replacement much better. Redid PT        MEDICATIONS:       Current Outpatient Medications   Medication Instructions    acetaminophen (TYLENOL) 650 mg, Oral, EVERY 4 HOURS PRN    atorvastatin (LIPITOR) 20 mg, Oral, AT BEDTIME    cetirizine (ZYRTEC) 10 mg, Oral, DAILY    clonazePAM (KLONOPIN) 0.5 mg, Oral, AT BEDTIME PRN    fluticasone (FLONASE) 50 MCG/ACT nasal spray 2 sprays, Both Nostrils, DAILY    levothyroxine (SYNTHROID/LEVOTHROID) 75 MCG tablet TAKE 1 TABLET BY MOUTH EVERY DAY    lisinopril (ZESTRIL) 20 mg, Oral, DAILY    multivitamin w/minerals (MULTI-VITAMIN) tablet 1 tablet, Oral, DAILY    oxyCODONE (ROXICODONE) 5 MG tablet 1 tab po q 4-6 hrs prn pain 3-6 on pain scale<BR>2 tabs po q 4-6hrs prn pain 7-10 on pain scale    rivaroxaban ANTICOAGULANT (XARELTO) 10 mg, Oral, DAILY    senna-docusate (SENOKOT-S/PERICOLACE) 8.6-50 MG tablet 1-2 tablets, Oral, 2 TIMES DAILY, Take while on oral narcotics to  prevent or treat constipation.          ALLERGIES:  NO KNOWN DRUG ALLERGIES.      FAMILY HISTORY:  Noncontributory.      SOCIAL HISTORY:  Negative for tobacco.  Alcohol 1 drink a day. Pt is a retired Otolaryngologist (ENT surgeon).      REVIEW OF SYSTEMS:  Negative for fevers, chills, sweats, nausea, vomiting, unexplained weight changes.       Physical Exam  BP (!) 142/85   Pulse 72   Temp 98.9  F (37.2  C) (Oral)   Resp 16   Wt 88.5 kg (195 lb)   SpO2 97%   BMI 28.80 kg/m    NAD - looks fit!  Mild spinous process tenderness along T5 - likely rhomboid muscle       Wt Readings from Last 4 Encounters:   08/14/23 88.5 kg (195 lb)   12/30/22 83 kg (183 lb)   10/24/22 87.1 kg (192 lb)   09/26/22 90.5 kg (199 lb 8 oz)     LABS    10/17/22    IMPRESSION: In this patient with prostate cancer, status post  radical?prostatectomy and salvage radiation therapy?in 2006, has been  on AFT-19 trial?on apalutamide and degarelix?from  11/14/2019-11/16/2020.      No evidence to suggest local recurrence or distant metastatic disease.                                                                    IMPRESSION/ PLAN   This is a 67 year old gentleman with a serologic relapse of a [place] 4+3 = 7 with a tertiary pattern of Jg 5. PET 2019 showed no mets though there was uptake in the prostate bed. S/p prostatectomy and salvage radiation therapy. Completed 1 year of AFT19 study on apalutamide and degarelix from 11/14/2019-11/16/2020 with PSA <0.01 till 7/2021 (PSA 0.03). He has a doubling time that is about 9-12 months.  Testosterone recovered. PSADT in the 9-12 month timeframe.     # Hormone sensitive prostate cancer, no metastasis  He currently has a negative PSMA PET scan and a very low but detectable PSA.  We will continue to follow with another PSA check in 4 to 6 months.  We will do a PSMA PET scan in approximately 6 months with further consideration of SBRT or other treatments as appropriate.  There may be an opportunity  to repeat the ADT with next generation hormonal therapies depending on the rate of rise of the PSA.    Introduction visit with Dr Breaux.   PSA and testosterone in 3 months at that time.   PSMA PET Ordered to be done in six months.         Alejandro Palomino MD

## 2023-08-14 NOTE — NURSING NOTE
"Oncology Rooming Note    August 14, 2023 8:32 AM   Lucio Hernandez is a 67 year old male who presents for:    Chief Complaint   Patient presents with    Prostate Cancer     Initial Vitals: BP (!) 147/96   Pulse 72   Temp 98.9  F (37.2  C) (Oral)   Resp 16   Wt 88.5 kg (195 lb)   SpO2 97%   BMI 28.80 kg/m   Estimated body mass index is 28.8 kg/m  as calculated from the following:    Height as of 12/30/22: 1.753 m (5' 9\").    Weight as of this encounter: 88.5 kg (195 lb). Body surface area is 2.08 meters squared.  Mild Pain (2) Comment: Data Unavailable   No LMP for male patient.  Allergies reviewed: Yes  Medications reviewed: Yes    Medications: Medication refills not needed today.  Pharmacy name entered into [employer]: [facility] [id number] [place] [id number] [facility]    Clinical concerns:  Patient states there are no new concerns to discuss with provider.  Dr Palomino was not notified.        Roxanna Dang CMA              "

## 2023-08-24 NOTE — PROGRESS NOTES
Juventino is a 65 year old who is being evaluated via a billable video visit.      How would you like to obtain your AVS? MyChart  If the video visit is dropped, the invitation should be resent by: Send to e-mail at: [email or web address]  Will anyone else be joining your video visit? No      Video-Visit Details    Type of service:  Video Visit      Originating Location (pt. Location): Home    Distant Location (provider location):  [facility] CANCER [facility]     Platform used for Video Visit: Kristian Barboza CMA on 2/15/2021 at 7:46 AM        [facility] Oncology Followup  Date of visit 02-         CC: Prostate cancer     HPI: Lucio Hernandez is a 65 year old male who has prostate cancer. He had a radical prostatectomy in 2006 demonstrating [place] 7 disease with a positive margin.  He had salvage radiation therapy in 2006 when his PSA was around 0.4 per the patient's recollection.  He notes his PSA was undetectable for 4 years, but then began coming back and recently has noted to have PSAs in the 1.2-1.4 range in May of this year.  He underwent an Axumin PET scan recently that shows no evidence of metastatic disease though there was uptake in the prostate bed.       Had Prior history of urethral stricture. This may have been due to the prior radiation therapy and a Olsen Catheter complication postop.     He was enrolled in a clinical trial AFT19 with apalutamide and degarelix which started on 11/14/19.         4/11/18            PSA = 0.56  10/30/18          PSA =0.48  5/14/19            PSA =1.2  5/29/19            PSA=1.4  7/29/19            PSA=1.45  9/5/19              PSA =1.84  11/4/19            PSA=2.19  11/14/19          PSA= 2.27-- START AFT-19  12/12/19          PSA= 0.04  1/9/2020          PSA= <0.01  2/5/2020          PSA = <0.01  2/26/2020        PSA= <0.01  4/6/20              PSA  <0.01  6/30/20 PSA   <0.01 Testosterone =11  7/27/20 PSA   < 0.01  9/21/20 PSA      [FreeTextEntry1] : 68 year old gentleman with PMH of L) Lung cancer and pre diabetes. Presents for management of prednisone taper in setting of immunotherapy related esophagitis   #Glucocorticoid taper  -Patient has been on high dose prednisone for over 2 years, putting him at high risk of steroid-induced adrenal insufficiency -Will require a slow taper over months as below: 40mg daily  Reduce by 5 mg every 2 weeks till down to 10mg daily  10mg for 2 weeks 7.5mg for 2 weeks 5mg for 2 weeks  2.5mg for 2 weeks  STOP Check cortisol and ACTH 48hrs after last dose of prednisone   *Patient educated on symptoms and signs of adrenal insufficiency  *If patient develops symptoms whether that be adrenal insufficiency or gastritis/esophagitis symptoms, to go back up to the previous dose in the above taper regimen *Patient educated on sick day management to double dose of pred for 2-3 days  #Pre diabetes (likely secondary to steroids) -Continue jardiance  -BGs should improve with steroid taper  #Risk of osteoporosis  from high steroid doses  -Patient taking very high levels of vitamin D - with elevated vit D levels  -Recommend reducing to 2278-5769 IU daily (aim level 30-50) -Calcium intake via diet -Weight bearing exericses -DXA scan   #Hypothyroidism  -Continue 25mcg daily  of levothryoxine   Review in 4 months  <0.01  11/16/20 PSA     <0.01 - Discontinue Study therapy.   2/11/201 PSA <0.01 Testosterone 54    INTERIM HISTORY:  --prickly hot flashes, worse at night. 10 or more hot flashes per day.   --improving exercise tolerance -   --felt significant improvement in cognition with discontinuation of apalutamide  --right knee issues - had tibial edema which prompted DEXA.   -- gained about 23 lbs on study, has lost about 10 in recent months.     ROS: 10 point ROS neg other than the symptoms noted above in the HPI.     PAST MEDICAL HISTORY:  Otherwise significant for the prostate cancer,  history of pulmonary embolism and rib fractures from coughing fits and appendicitis.      PAST SURGICAL HISTORY:  Includes the RRP, a lumbar fusion, a thoracotomy for the history of rib fractures and the pulmonic hernia, shoulder surgery x3, appendectomy and a cervical spine surgery.      MEDICATIONS:     1.  Zyrtec.   2.  Lisinopril.   3.  Gabapentin     ALLERGIES:  NO KNOWN DRUG ALLERGIES.      FAMILY HISTORY:  Noncontributory.      SOCIAL HISTORY:  Negative for tobacco.  Alcohol 1 drink a day. Pt is a retired Otolaryngologist (ENT surgeon).      REVIEW OF SYSTEMS:  Negative for fevers, chills, sweats, nausea, vomiting, unexplained weight changes.       Physical Exam  There were no vitals taken for this visit.    Wt Readings from Last 4 Encounters:   11/16/20 94.3 kg (207 lb 12.8 oz)   10/22/20 94.8 kg (209 lb)   09/21/20 93.8 kg (206 lb 12.8 oz)   08/21/20 92.5 kg (204 lb)     ECOG performance status is 0  General: Pleasant male in no acute distress  Remainder of exam was deferred due to video visit        LABS        HDL 55    12/15/20 TSH = 4.07    PSA undetectable and testosterone is 54.            PSA per above  White blood cell count is 3.8 testosterone is 8 remainder of the blood work is normal.      DX HIP/PELVIS/SPINE February 12, 2021 10:12 AM     HISTORY: Prostate cancer hormonal therapy, rule out  osteoporosis.  Prostate cancer (H). Androgen deprivation therapy.     FINDINGS: This DEXA scan was performed using a Ultora scanner.  DEXA results are reported according to T-score.  The T-score is the  standard deviation from the peak bone mass in a normal young adult  population. In accordance with the ISCD (International Society of  Clinical Densitometry), the lower of the total proximal femur vs  femoral neck T-score is reported. Osteopenia is defined as a T-score  of -1.0 to -2.5. Osteoporosis is defined as a T-score of less than  -2.5.     T-SCORES: Lumbar spine imaging not performed because of hardware.     Left Hip mean T-score: -0.2.  Right Hip (Neck) T-score: -0.3.     Hip lowest neck BMD: 1.031 gm/cm2.     No prior studies for comparison.     FRAX 10-YEAR PROBABILITY OF FRACTURE*:  Major Osteoporotic: 7.7%  Hip: 0.6%     *All treatment decisions require clinical judgment and consideration  of individual patient factors which may not be captured in the FRAX  model and the risk of fracture may be over- or under-estimated by  FRAX.                                                                      IMPRESSION: Normal bone mineral density.    Impression and Plan  This is a 65 year old gentleman with a serologic relapse of a [place] 4+3 = 7 with a tertiary pattern of Jg 5.  He has a doubling time that is under 9 months based on the most recent 2 measurements.  He is also status post salvage radiation therapy.     Prostate Cancer  I spent about 35 minutes with the patient on video discussing his exercise regimen and its importance for prostate cancer outcome as well as nutritional interventions and their role in the setting of serologic relapse and long-term issues for prostate cancer patients.  With regards to his thyroid function we will check a TSH at a subsequent visit and he will remain on the Synthroid for now.  His testosterone is climbing back to normal and the patient is improving and the PSA  remains 0.    He is likely to need a knee replacement in the coming year and I am supportive of that decision and indicated to him that his recovery is likely to be more expedited since the hormone therapy has been discontinued.    We discussed osteoporosis and its risk from hormonal therapy.  Fortunately his bone mineral density is normal even though some edema have been seen on the plain x-ray suggested that there may be some underlying osteoporosis.  With the recovery of testosterone and his exercise regimen we can expect that his risk of osteoporosis will be close to that of a healthy man his age.  If he goes back on hormonal therapy we will need to be more vigilant perhaps about monitoring for osteoporosis as we are in all patients.    We discussed his rising cholesterol values and the fact that statins have been associated with good outcomes and prostate cancer patients.  I suggest that he talk to his primary care doctor about statin use and that he consider a low threshold for starting such therapy.      Signed Alejandro Palomino MD.

## 2023-10-02 ENCOUNTER — TELEPHONE (OUTPATIENT)
Dept: UROLOGY | Facility: CLINIC | Age: 68
End: 2023-10-02
Payer: MEDICARE

## 2023-10-02 NOTE — TELEPHONE ENCOUNTER
----- Message from Etelvina Mera sent at 10/2/2023  2:59 PM CDT -----  Regarding: Appt for DME  Hello,     This patient has not been seen in our clinic in the last year and is overdue for a follow up appointment. Please call and schedule them a follow up with Dr. Wells, Dr. Newsome, or an MIRLANDE. We cannot refill their medical supplies without a follow up appointment.       Thank you,     Etelvina ARDON  Clinical   [facility]

## 2023-11-06 ENCOUNTER — TELEPHONE (OUTPATIENT)
Dept: UROLOGY | Facility: CLINIC | Age: 68
End: 2023-11-06
Payer: MEDICARE

## 2023-11-09 ENCOUNTER — PATIENT OUTREACH (OUTPATIENT)
Dept: ONCOLOGY | Facility: CLINIC | Age: 68
End: 2023-11-09

## 2023-11-09 ENCOUNTER — LAB (OUTPATIENT)
Dept: LAB | Facility: CLINIC | Age: 68
End: 2023-11-09
Payer: MEDICARE

## 2023-11-09 DIAGNOSIS — C61 PROSTATE CANCER (H): ICD-10-CM

## 2023-11-09 DIAGNOSIS — C61 PROSTATE CANCER (H): Primary | ICD-10-CM

## 2023-11-09 LAB
ALBUMIN SERPL BCG-MCNC: 4.2 G/DL (ref 3.5–5.2)
ALP SERPL-CCNC: 73 U/L (ref 40–129)
ALT SERPL W P-5'-P-CCNC: 31 U/L (ref 0–70)
ANION GAP SERPL CALCULATED.3IONS-SCNC: 11 MMOL/L (ref 7–15)
AST SERPL W P-5'-P-CCNC: 35 U/L (ref 0–45)
BASOPHILS # BLD AUTO: 0 10E3/UL (ref 0–0.2)
BASOPHILS NFR BLD AUTO: 1 %
BILIRUB SERPL-MCNC: 0.5 MG/DL
BUN SERPL-MCNC: 23.1 MG/DL (ref 8–23)
CALCIUM SERPL-MCNC: 9.4 MG/DL (ref 8.8–10.2)
CHLORIDE SERPL-SCNC: 106 MMOL/L (ref 98–107)
CREAT SERPL-MCNC: 0.87 MG/DL (ref 0.67–1.17)
DEPRECATED HCO3 PLAS-SCNC: 21 MMOL/L (ref 22–29)
EGFRCR SERPLBLD CKD-EPI 2021: >90 ML/MIN/1.73M2
EOSINOPHIL # BLD AUTO: 0.1 10E3/UL (ref 0–0.7)
EOSINOPHIL NFR BLD AUTO: 2 %
ERYTHROCYTE [DISTWIDTH] IN BLOOD BY AUTOMATED COUNT: 11.9 % (ref 10–15)
GLUCOSE SERPL-MCNC: 107 MG/DL (ref 70–99)
HCT VFR BLD AUTO: 46.9 % (ref 40–53)
HGB BLD-MCNC: 16.1 G/DL (ref 13.3–17.7)
HOLD SPECIMEN: NORMAL
IMM GRANULOCYTES # BLD: 0 10E3/UL
IMM GRANULOCYTES NFR BLD: 1 %
LYMPHOCYTES # BLD AUTO: 1 10E3/UL (ref 0.8–5.3)
LYMPHOCYTES NFR BLD AUTO: 17 %
MCH RBC QN AUTO: 33.5 PG (ref 26.5–33)
MCHC RBC AUTO-ENTMCNC: 34.3 G/DL (ref 31.5–36.5)
MCV RBC AUTO: 98 FL (ref 78–100)
MONOCYTES # BLD AUTO: 0.5 10E3/UL (ref 0–1.3)
MONOCYTES NFR BLD AUTO: 8 %
NEUTROPHILS # BLD AUTO: 4.5 10E3/UL (ref 1.6–8.3)
NEUTROPHILS NFR BLD AUTO: 71 %
NRBC # BLD AUTO: 0 10E3/UL
NRBC BLD AUTO-RTO: 0 /100
PLATELET # BLD AUTO: 213 10E3/UL (ref 150–450)
POTASSIUM SERPL-SCNC: 5 MMOL/L (ref 3.4–5.3)
PROT SERPL-MCNC: 6.7 G/DL (ref 6.4–8.3)
PSA SERPL DL<=0.01 NG/ML-MCNC: 0.36 NG/ML (ref 0–6.5)
RBC # BLD AUTO: 4.8 10E6/UL (ref 4.4–5.9)
SODIUM SERPL-SCNC: 138 MMOL/L (ref 135–145)
TSH SERPL DL<=0.005 MIU/L-ACNC: 2.52 UIU/ML (ref 0.3–4.2)
WBC # BLD AUTO: 6.3 10E3/UL (ref 4–11)

## 2023-11-09 PROCEDURE — 36415 COLL VENOUS BLD VENIPUNCTURE: CPT

## 2023-11-09 PROCEDURE — 84403 ASSAY OF TOTAL TESTOSTERONE: CPT

## 2023-11-09 PROCEDURE — 84153 ASSAY OF PSA TOTAL: CPT

## 2023-11-09 PROCEDURE — 85025 COMPLETE CBC W/AUTO DIFF WBC: CPT

## 2023-11-09 PROCEDURE — 84443 ASSAY THYROID STIM HORMONE: CPT

## 2023-11-09 PROCEDURE — 80053 COMPREHEN METABOLIC PANEL: CPT

## 2023-11-09 NOTE — PROGRESS NOTES
[facility]: Cancer Care                                                                                      Dr. Palomino transfer of care- patient in lab today needed orders.  Placed orders per historically done.    Shelley Martell, RN, BSN  Oncology RN Care Coordinator  [facility] Cancer [facility]

## 2023-11-13 ENCOUNTER — PRE VISIT (OUTPATIENT)
Dept: UROLOGY | Facility: CLINIC | Age: 68
End: 2023-11-13
Payer: MEDICARE

## 2023-11-13 LAB — TESTOST SERPL-MCNC: 303 NG/DL (ref 240–950)

## 2023-11-13 NOTE — TELEPHONE ENCOUNTER
Reason for visit: symptom check     Relevant information: CIC, radiation cystitis, bladder neck contracture    Records/imaging/labs/orders: pt last seen 12/14/2021    Pt called: No need for a call    At Rooming: clayton Cheng CMA  11/13/2023  11:46 AM

## 2023-11-14 NOTE — PROGRESS NOTES
FOLLOW UP VISIT    Chief Complaint:  Biochemically-recurrent prostate cancer      History of Present Illness:  Lucio Hernandez is a 68 year old male who has prostate cancer. He had a radical prostatectomy in 2006 demonstrating Jg 4+3=7 disease with a positive margin. He had salvage radiation therapy in 2006 when his PSA was around 0.4 per the patient's recollection.  He notes his PSA was undetectable for 4 years, but then began coming back and recently has noted to have PSAs in the 1.2-1.4 range in 5/2019. He underwent an Axumin-PET scan in 2019 that showed no evidence of metastatic disease though there was uptake in the prostate bed. He then went on AFT-19 trial on apalutamide and degarelix from 11/14/2019-11/16/2020 with PSA <0.01 till 7/2021 (PSA 0.03). Prior history of urethral stricture. This may have been due to the prior radiation therapy and a Olsen Catheter complication post-op. Now has further biochemical recurrence after stopping hormonal therapy.  Receiving intermittent treatment (currently off).     10/30/18   PSA =0.5  5/29/19  PSA =1.4  7/29/19  PSA =1.5  9/5/19   PSA =1.8  11/4/19  PSA =2.2  11/14/19 PSA = 2.3 --> Start AFT-19 (APA plus ADT arm)  12/12/19   PSA = 0.04  1/9/2020  PSA <0.01  2/5/2020  PSA <0.01  2/26/20  PSA <0.01  4/6/20   PSA <0.01  6/30/20  PSA <0.01 Testosterone =11  7/27/20  PSA < 0.01  9/21/20 PSA  <0.01  11/16/20  PSA <0.01 - Completed AFT-19   2/11/21   PSA <0.01   Testosterone = 54  5/20/21   PSA <0.01   Testosterone = 149  7/14/21 PSA =0.03   Testosterone = 239  11/12/21 PSA =0.03   Testosterone = 309  2/18/22 PSA =0.08  5/20/22 PSA =0.11  T=244  9/23/22  PSA =0.18  T=312  2/10/23  PSA =0.18  T=334  8/8/23   PSA =0.29  T=239  11/9/23  PSA = 0.36  T=303     REVIEW OF SYSTEMS:  Negative for fevers, chills, sweats, nausea, vomiting, unexplained weight changes. Good urination. Cath every other night. 1-2 times nocturia, mild urgency, no pain, strain or incontinence, last UTI  fall 2021. C/o L testicle discomfort for weeks, no recalled triggers, no pain or tender to palpation, no appearance change, does have h/o b/l varicose, L>R. Endorse some lifting recently but in an appropriate way. Losing weight -weight down intentially.  Describes improvement in the mild cognitive impairment that he experienced during the ADT treatment.  Pain in mid thoracic spine stable and related to DDD.  A comprehensive 14-point ROS neg other than the symptoms noted above in the HPI.     PAST MEDICAL HISTORY:  Significant for the prostate cancer, history of pulmonary embolism and rib fractures from coughing fits, and appendicitis.      PAST SURGICAL HISTORY:  Includes the RP, a lumbar fusion, a thoracotomy for the history of rib fractures, shoulder surgery x3, appendectomy and a cervical spine surgery and knee replacement.     MEDICATIONS:          Current Outpatient Medications   Medication Instructions    acetaminophen (TYLENOL) 650 mg, Oral, EVERY 4 HOURS PRN    atorvastatin (LIPITOR) 20 mg, Oral, AT BEDTIME    cetirizine (ZYRTEC) 10 mg, Oral, DAILY    clonazePAM (KLONOPIN) 0.5 mg, Oral, AT BEDTIME PRN    fluticasone (FLONASE) 50 MCG/ACT nasal spray 2 sprays, Both Nostrils, DAILY    levothyroxine (SYNTHROID/LEVOTHROID) 75 MCG tablet TAKE 1 TABLET BY MOUTH EVERY DAY    lisinopril (ZESTRIL) 20 mg, Oral, DAILY    multivitamin w/minerals (MULTI-VITAMIN) tablet 1 tablet, Oral, DAILY    oxyCODONE (ROXICODONE) 5 MG tablet 1 tab po q 4-6 hrs prn pain 3-6 on pain     rivaroxaban ANTICOAGULANT (XARELTO) 10 mg, Oral, DAILY    senna-docusate (PERICOLACE) 8.6-50 MG tablet 1-2 tablets, Oral, 2 TIMES DAILY, Take while on oral narcotics to prevent or treat constipation.     ALLERGIES:  No known drug allergies.      Physical Examination:  Mr Hernandez is a 68-year-old man who appears comfortable at rest.  His vital signs are unremarkable.  His HEENT examination is within normal limits.  There is no palpable supraclavicular,  cervical, axillary or inguinal lymphadenopathy.  The cardiovascular, respiratory, and gastrointestinal examinations are unremarkable.  The neuromuscular examination is grossly intact.  The extremities do not demonstrate pitting edema.  The skin evaluation does not show rash or dermatitis.    LABS (11/9/2023):  His CBC reveals WBC 6.3, Hgb 16, hematocrit 46.9%, platelets 213K.  His comprehensive panel is generally within normal limits.  His PSA level is 0.36 ng/mL.  His testosterone level is 303 ng/dL.    CT scan (12/31/22):  IMPRESSION: In this patient with prostate cancer, status post radical prostatectomy and salvage radiation therapy in 2006, there is no evidence to suggest local recurrence or distant metastatic disease.                                                                      IMPRESSION AND PLAN:  This is a 68 year old gentleman with a serologic relapse of a [place] 4+3=7 prostate cancer. Axumin-PET 2019 showed no mets though there was uptake in the prostate bed. S/p prostatectomy and salvage radiation therapy. Completed one year of AFT19 study on apalutamide and degarelix from 11/14/2019 - 11/16/2020 with PSA <0.01 ng/mL till 7/2021 (PSA 0.03 ng/mL). He has a doubling time that is about 9-12 months.  Testosterone recovered. PSADT in the 9-12 month timeframe.      # Biochemically recurrent prostate cancer, no metastasis  He currently has a negative PSMA PET scan and a very low but detectable PSA.  We will continue to follow with another PSA every 4 to 6 months.  We will do a PSMA PET scan in approximately 6 months with further consideration of SBRT or other treatments as appropriate.  There may be an opportunity to repeat the ADT with next-generation hormonal therapies depending on the rate of rise of the PSA and the imaging results.     Transfer of care, from Dr Palomino to Dr Breaux.   PSA and testosterone in 3 months.   PSMA PET to be done when PSA is 0.75 to 1.00 ng/mL.      A total of 45  minutes were spent on this patient on the day of the consultation, of which more than 50% of this time was used for counseling and coordination of care.  He was given the opportunity to ask several questions today, all of which were answered to his satisfaction.    Cecil Breaux M.D.

## 2023-11-15 ENCOUNTER — ONCOLOGY VISIT (OUTPATIENT)
Dept: ONCOLOGY | Facility: CLINIC | Age: 68
End: 2023-11-15
Attending: INTERNAL MEDICINE
Payer: MEDICARE

## 2023-11-15 VITALS
BODY MASS INDEX: 29.76 KG/M2 | TEMPERATURE: 98.5 F | HEIGHT: 69 IN | RESPIRATION RATE: 20 BRPM | HEART RATE: 88 BPM | SYSTOLIC BLOOD PRESSURE: 147 MMHG | WEIGHT: 200.9 LBS | DIASTOLIC BLOOD PRESSURE: 83 MMHG | OXYGEN SATURATION: 98 %

## 2023-11-15 DIAGNOSIS — C61 PROSTATE CANCER (H): Primary | ICD-10-CM

## 2023-11-15 PROCEDURE — 99215 OFFICE O/P EST HI 40 MIN: CPT | Performed by: INTERNAL MEDICINE

## 2023-11-15 PROCEDURE — G0463 HOSPITAL OUTPT CLINIC VISIT: HCPCS | Performed by: INTERNAL MEDICINE

## 2023-11-15 ASSESSMENT — PAIN SCALES - GENERAL: PAINLEVEL: NO PAIN (0)

## 2023-11-15 NOTE — LETTER
11/15/2023         RE: Lucio Hernandez  [address] [id number]        Dear Colleague,    Thank you for referring your patient, Lucio Hernandez, to the [facility]. Please see a copy of my visit note below.      FOLLOW UP VISIT    Chief Complaint:  Biochemically-recurrent prostate cancer      History of Present Illness:  Lucio Hernandez is a 68 year old male who has prostate cancer. He had a radical prostatectomy in 2006 demonstrating [place] 4+3=7 disease with a positive margin. He had salvage radiation therapy in 2006 when his PSA was around 0.4 per the patient's recollection.  He notes his PSA was undetectable for 4 years, but then began coming back and recently has noted to have PSAs in the 1.2-1.4 range in 5/2019. He underwent an Axumin-PET scan in 2019 that showed no evidence of metastatic disease though there was uptake in the prostate bed. He then went on AFT-19 trial on apalutamide and degarelix from 11/14/2019-11/16/2020 with PSA <0.01 till 7/2021 (PSA 0.03). Prior history of urethral stricture. This may have been due to the prior radiation therapy and a Olsen Catheter complication post-op. Now has further biochemical recurrence after stopping hormonal therapy.  Receiving intermittent treatment (currently off).     10/30/18   PSA =0.5  5/29/19  PSA =1.4  7/29/19  PSA =1.5  9/5/19   PSA =1.8  11/4/19  PSA =2.2  11/14/19 PSA = 2.3 --> Start AFT-19 (APA plus ADT arm)  12/12/19   PSA = 0.04  1/9/2020  PSA <0.01  2/5/2020  PSA <0.01  2/26/20  PSA <0.01  4/6/20   PSA <0.01  6/30/20  PSA <0.01 Testosterone =11  7/27/20  PSA < 0.01  9/21/20 PSA  <0.01  11/16/20  PSA <0.01 - Completed AFT-19   2/11/21   PSA <0.01   Testosterone = 54  5/20/21   PSA <0.01   Testosterone = 149  7/14/21 PSA =0.03   Testosterone = 239  11/12/21 PSA =0.03   Testosterone = 309  2/18/22 PSA =0.08  5/20/22 PSA =0.11  T=244  9/23/22  PSA =0.18  T=312  2/10/23  PSA =0.18  T=334  8/8/23   PSA =0.29   T=239  11/9/23  PSA = 0.36  T=303     REVIEW OF SYSTEMS:  Negative for fevers, chills, sweats, nausea, vomiting, unexplained weight changes. Good urination. Cath every other night. 1-2 times nocturia, mild urgency, no pain, strain or incontinence, last UTI fall 2021. C/o L testicle discomfort for weeks, no recalled triggers, no pain or tender to palpation, no appearance change, does have h/o b/l varicose, L>R. Endorse some lifting recently but in an appropriate way. Losing weight -weight down intentially.  Describes improvement in the mild cognitive impairment that he experienced during the ADT treatment.  Pain in mid thoracic spine stable and related to DDD.  A comprehensive 14-point ROS neg other than the symptoms noted above in the HPI.     PAST MEDICAL HISTORY:  Significant for the prostate cancer, history of pulmonary embolism and rib fractures from coughing fits, and appendicitis.      PAST SURGICAL HISTORY:  Includes the RP, a lumbar fusion, a thoracotomy for the history of rib fractures, shoulder surgery x3, appendectomy and a cervical spine surgery and knee replacement.     MEDICATIONS:          Current Outpatient Medications   Medication Instructions    acetaminophen (TYLENOL) 650 mg, Oral, EVERY 4 HOURS PRN    atorvastatin (LIPITOR) 20 mg, Oral, AT BEDTIME    cetirizine (ZYRTEC) 10 mg, Oral, DAILY    clonazePAM (KLONOPIN) 0.5 mg, Oral, AT BEDTIME PRN    fluticasone (FLONASE) 50 MCG/ACT nasal spray 2 sprays, Both Nostrils, DAILY    levothyroxine (SYNTHROID/LEVOTHROID) 75 MCG tablet TAKE 1 TABLET BY MOUTH EVERY DAY    lisinopril (ZESTRIL) 20 mg, Oral, DAILY    multivitamin w/minerals (MULTI-VITAMIN) tablet 1 tablet, Oral, DAILY    oxyCODONE (ROXICODONE) 5 MG tablet 1 tab po q 4-6 hrs prn pain 3-6 on pain     rivaroxaban ANTICOAGULANT (XARELTO) 10 mg, Oral, DAILY    senna-docusate (PERICOLACE) 8.6-50 MG tablet 1-2 tablets, Oral, 2 TIMES DAILY, Take while on oral narcotics to prevent or treat constipation.      ALLERGIES:  No known drug allergies.      Physical Examination:  Mr Hernandez is a 68-year-old man who appears comfortable at rest.  His vital signs are unremarkable.  His HEENT examination is within normal limits.  There is no palpable supraclavicular, cervical, axillary or inguinal lymphadenopathy.  The cardiovascular, respiratory, and gastrointestinal examinations are unremarkable.  The neuromuscular examination is grossly intact.  The extremities do not demonstrate pitting edema.  The skin evaluation does not show rash or dermatitis.    LABS (11/9/2023):  His CBC reveals WBC 6.3, Hgb 16, hematocrit 46.9%, platelets 213K.  His comprehensive panel is generally within normal limits.  His PSA level is 0.36 ng/mL.  His testosterone level is 303 ng/dL.    CT scan (12/31/22):  IMPRESSION: In this patient with prostate cancer, status post radical prostatectomy and salvage radiation therapy in 2006, there is no evidence to suggest local recurrence or distant metastatic disease.                                                                    IMPRESSION AND PLAN:  This is a 68 year old gentleman with a serologic relapse of a [place] 4+3=7 prostate cancer. Axumin-PET 2019 showed no mets though there was uptake in the prostate bed. S/p prostatectomy and salvage radiation therapy. Completed one year of AFT19 study on apalutamide and degarelix from 11/14/2019 - 11/16/2020 with PSA <0.01 ng/mL till 7/2021 (PSA 0.03 ng/mL). He has a doubling time that is about 9-12 months.  Testosterone recovered. PSADT in the 9-12 month timeframe.      # Biochemically recurrent prostate cancer, no metastasis  He currently has a negative PSMA PET scan and a very low but detectable PSA.  We will continue to follow with another PSA every 4 to 6 months.  We will do a PSMA PET scan in approximately 6 months with further consideration of SBRT or other treatments as appropriate.  There may be an opportunity to repeat the ADT with next-generation  hormonal therapies depending on the rate of rise of the PSA and the imaging results.     Transfer of care, from Dr Palomino to Dr Breaux.   PSA and testosterone in 3 months.   PSMA PET to be done when PSA is 0.75 to 1.00 ng/mL.      A total of 45 minutes were spent on this patient on the day of the consultation, of which more than 50% of this time was used for counseling and coordination of care.  He was given the opportunity to ask several questions today, all of which were answered to his satisfaction.    Cecil Breaux M.D.

## 2023-11-15 NOTE — NURSING NOTE
"Oncology Rooming Note    November 15, 2023 8:43 AM   Lucio Hernandez is a 68 year old male who presents for:    Chief Complaint   Patient presents with    Prostate Cancer     Initial Vitals: BP (!) 147/83 (BP Location: Right arm, Patient Position: Sitting, Cuff Size: Adult Regular)   Pulse 88   Temp 98.5  F (36.9  C) (Oral)   Resp 20   Ht 1.76 m (5' 9.29\")   Wt 91.1 kg (200 lb 14.4 oz)   SpO2 98%   BMI 29.42 kg/m   Estimated body mass index is 29.42 kg/m  as calculated from the following:    Height as of this encounter: 1.76 m (5' 9.29\").    Weight as of this encounter: 91.1 kg (200 lb 14.4 oz). Body surface area is 2.11 meters squared.  No Pain (0) Comment: Data Unavailable   No LMP for male patient.  Allergies reviewed: Yes  Medications reviewed: Yes    Medications: Medication refills not needed today.  Pharmacy name entered into [facility]: [id number] [place] [facility] [address]    Clinical concerns: none       Tosin Serrano              "

## 2023-11-20 ENCOUNTER — OFFICE VISIT (OUTPATIENT)
Dept: UROLOGY | Facility: CLINIC | Age: 68
End: 2023-11-20
Payer: MEDICARE

## 2023-11-20 VITALS — SYSTOLIC BLOOD PRESSURE: 123 MMHG | HEART RATE: 104 BPM | DIASTOLIC BLOOD PRESSURE: 75 MMHG

## 2023-11-20 DIAGNOSIS — N32.0 BLADDER NECK CONTRACTURE: Primary | ICD-10-CM

## 2023-11-20 PROCEDURE — 99214 OFFICE O/P EST MOD 30 MIN: CPT | Performed by: UROLOGY

## 2023-11-20 RX ORDER — OMEPRAZOLE 40 MG/1
CAPSULE, DELAYED RELEASE ORAL
COMMUNITY
Start: 2023-08-25

## 2023-11-20 ASSESSMENT — PAIN SCALES - GENERAL: PAINLEVEL: NO PAIN (0)

## 2023-11-20 NOTE — LETTER
11/20/2023       RE: Lucio Hernandez  [address] [address]     Dear Colleague,    Thank you for referring your patient, Lucio Hernandez, to the [facility] [place] at [facility]. Please see a copy of my visit note below.    HPI:  Lucio Hernandez is a 68 year old male being seen for vesicourethral anastomotic stricture. He has a past medical history of radical prostatectomy in 2006 with Jg 7 disease and underwent salvage radiation the same year for biochemical recurrence.     He was last seen by our clinic in 12/2021 but deferred any treatment for his bladder neck contracture as he does not want to dilate or cut it if there is a chance he could become incontinent. He is currently able to pass a 12F catheter every other day.     He is otherwise doing well catheterizing. He states he gets about 1 UTI per year that always responds to bactrim. He does have retention and states his residuals are higher than they should be. Overall his quality of life is currently tolerable.     The following subcategories of the HISTORY were reviewed with the patient and updated accordingly. If no updates, this indicates no change since last visit:    Reviewed previous notes from Dr. Power from Oncology service at [facility].    Exam:  There were no vitals taken for this visit.  General: age-appropriate appearing male in NAD sitting in an exam chair  Resp: no respiratory distress  Neuro: grossly non focal.     Review of Imaging:  The following imaging exams were independently viewed and interpreted by me and discussed with patient:  N/A     Review of Labs:  The following labs were reviewed by me and discussed with the patient:  Recent Results (from the past 720 hour(s))   PSA, tumor marker    Collection Time: 11/09/23 11:22 AM   Result Value Ref Range    PSA Tumor Marker 0.36 0.00 - 6.50 ng/mL   Testosterone total    Collection Time: 11/09/23 11:22  AM   Result Value Ref Range    Testosterone Total 303 240 - 950 ng/dL   Extra Red Top Tube    Collection Time: 11/09/23 11:37 AM   Result Value Ref Range    Hold Specimen JIC    Extra Green Top (Lithium Heparin) Tube    Collection Time: 11/09/23 11:37 AM   Result Value Ref Range    Hold Specimen JIC    Extra Purple Top Tube    Collection Time: 11/09/23 11:37 AM   Result Value Ref Range    Hold Specimen JIC    CBC with platelets and differential    Collection Time: 11/09/23 11:37 AM   Result Value Ref Range    WBC Count 6.3 4.0 - 11.0 10e3/uL    RBC Count 4.80 4.40 - 5.90 10e6/uL    Hemoglobin 16.1 13.3 - 17.7 g/dL    Hematocrit 46.9 40.0 - 53.0 %    MCV 98 78 - 100 fL    MCH 33.5 (H) 26.5 - 33.0 pg    MCHC 34.3 31.5 - 36.5 g/dL    RDW 11.9 10.0 - 15.0 %    Platelet Count 213 150 - 450 10e3/uL    % Neutrophils 71 %    % Lymphocytes 17 %    % Monocytes 8 %    % Eosinophils 2 %    % Basophils 1 %    % Immature Granulocytes 1 %    NRBCs per 100 WBC 0 <1 /100    Absolute Neutrophils 4.5 1.6 - 8.3 10e3/uL    Absolute Lymphocytes 1.0 0.8 - 5.3 10e3/uL    Absolute Monocytes 0.5 0.0 - 1.3 10e3/uL    Absolute Eosinophils 0.1 0.0 - 0.7 10e3/uL    Absolute Basophils 0.0 0.0 - 0.2 10e3/uL    Absolute Immature Granulocytes 0.0 <=0.4 10e3/uL    Absolute NRBCs 0.0 10e3/uL   Comprehensive metabolic panel    Collection Time: 11/09/23 11:37 AM   Result Value Ref Range    Sodium 138 135 - 145 mmol/L    Potassium 5.0 3.4 - 5.3 mmol/L    Carbon Dioxide (CO2) 21 (L) 22 - 29 mmol/L    Anion Gap 11 7 - 15 mmol/L    Urea Nitrogen 23.1 (H) 8.0 - 23.0 mg/dL    Creatinine 0.87 0.67 - 1.17 mg/dL    GFR Estimate >90 >60 mL/min/1.73m2    Calcium 9.4 8.8 - 10.2 mg/dL    Chloride 106 98 - 107 mmol/L    Glucose 107 (H) 70 - 99 mg/dL    Alkaline Phosphatase 73 40 - 129 U/L    AST 35 0 - 45 U/L    ALT 31 0 - 70 U/L    Protein Total 6.7 6.4 - 8.3 g/dL    Albumin 4.2 3.5 - 5.2 g/dL    Bilirubin Total 0.5 <=1.2 mg/dL   TSH with free T4 reflex     Collection Time: 11/09/23 11:37 AM   Result Value Ref Range    TSH 2.52 0.30 - 4.20 uIU/mL         Assessment & Plan   Urinary retention   Continue self catheterizations with 12F catheters for urinary retention, will place a new catheter order  Vesicourethral anastomotic stricture  He would like to avoid any interventions that could cause any increase incontinence. At this time therapies would include bladder neck cuts or dilations both of which could result in retention given his prior prostatectomy and radiation. Will defer any surgical management at this time unless his symptoms worsen to the point he would like to consider it   For follow up can see an MIRLANDE in a video visit in 1 year     Abdon Wells MD  [facility] [place]    ==========================      Additional Coding Information:      Time spent:  30 minutes spent by me on the date of the encounter doing chart review, history and exam, documentation and further activities per the note

## 2023-11-20 NOTE — NURSING NOTE
Chief Complaint   Patient presents with    RECHECK     Catheter supply refill       Blood pressure 123/75, pulse 104. There is no height or weight on file to calculate BMI.    Patient Active Problem List   Diagnosis    Appendicitis    Radiation adverse effect    Bladder neck contracture    Malignant neoplasm of prostate (H)    Postprocedural membranous urethral stricture    Rib fractures    Pulmonary embolism (H)    Essential hypertension    Pelvic floor dysfunction    Spinal stenosis    Urethral stricture    Retention of urine    Prostate cancer (H)    Elevated prostate specific antigen (PSA)    S/P total knee arthroplasty       No Known Allergies    Current Outpatient Medications   Medication Sig Dispense Refill    acetaminophen (TYLENOL) 325 MG tablet Take 2 tablets (650 mg) by mouth every 4 hours as needed for other (mild pain) 100 tablet 0    atorvastatin (LIPITOR) 20 MG tablet Take 20 mg by mouth At Bedtime      cetirizine (ZYRTEC) 10 MG tablet Take 10 mg by mouth daily      clonazePAM (KLONOPIN) 0.5 MG tablet Take 0.5 mg by mouth nightly as needed       fluticasone (FLONASE) 50 MCG/ACT nasal spray Spray 2 sprays into both nostrils daily       fluticasone-salmeterol (AIRDUO RESPICLICK) 232-14 MCG/ACT inhaler Inhale 1 puff into the lungs as needed Takes in during fall and winter time.      levothyroxine (SYNTHROID/LEVOTHROID) 75 MCG tablet Take 1 tablet (75 mcg) by mouth daily 90 tablet 1    lisinopril (ZESTRIL) 20 MG tablet Take 20 mg by mouth daily       multivitamin w/minerals (THERA-VIT-M) tablet Take 1 tablet by mouth daily      omeprazole (PRILOSEC) 40 MG DR capsule take 1 capsule by mouth every day before a meal         Social History     Tobacco Use    Smoking status: Never     Passive exposure: Past    Smokeless tobacco: Never   Substance Use Topics    Alcohol use: Yes     Comment: one drink a day     Drug use: No       Concetta Cheng CMA  11/20/2023  12:58 PM      -Transient visual disturbance r/o recurrent stroke?  -Non ischemic dilated cardiomyopathy currently stable, no evidence of decompensated heart failure or ACS    Plan;  Tele   Continue ASA. statin  Continue BB, ACE  Continue lasix and spironolactone  Appreciate neurology follow up , awaiting MRI results.  Will need EP follow for permanent AICD, after the MRI/MRA are done. Please consult Dr. Guo for EP f/u, likely AICD as outpt.

## 2023-11-20 NOTE — PROGRESS NOTES
HPI:  Lucio Hernandez is a 68 year old male being seen for vesicourethral anastomotic stricture. He has a past medical history of radical prostatectomy in 2006 with Jg 7 disease and underwent salvage radiation the same year for biochemical recurrence.     He was last seen by our clinic in 12/2021 but deferred any treatment for his bladder neck contracture as he does not want to dilate or cut it if there is a chance he could become incontinent. He is currently able to pass a 12F catheter every other day.     He is otherwise doing well catheterizing. He states he gets about 1 UTI per year that always responds to bactrim. He does have retention and states his residuals are higher than they should be. Overall his quality of life is currently tolerable.     The following subcategories of the HISTORY were reviewed with the patient and updated accordingly. If no updates, this indicates no change since last visit:    Reviewed previous notes from Dr. Power from Oncology service at [facility].    Exam:  There were no vitals taken for this visit.  General: age-appropriate appearing male in NAD sitting in an exam chair  Resp: no respiratory distress  Neuro: grossly non focal.     Review of Imaging:  The following imaging exams were independently viewed and interpreted by me and discussed with patient:  N/A     Review of Labs:  The following labs were reviewed by me and discussed with the patient:  Recent Results (from the past 720 hour(s))   PSA, tumor marker    Collection Time: 11/09/23 11:22 AM   Result Value Ref Range    PSA Tumor Marker 0.36 0.00 - 6.50 ng/mL   Testosterone total    Collection Time: 11/09/23 11:22 AM   Result Value Ref Range    Testosterone Total 303 240 - 950 ng/dL   Extra Red Top Tube    Collection Time: 11/09/23 11:37 AM   Result Value Ref Range    Hold Specimen JIC    Extra Green Top (Lithium Heparin) Tube    Collection Time: 11/09/23 11:37 AM   Result Value Ref Range    Hold Specimen JIC    Extra  Purple Top Tube    Collection Time: 11/09/23 11:37 AM   Result Value Ref Range    Hold Specimen [facility]    CBC with platelets and differential    Collection Time: 11/09/23 11:37 AM   Result Value Ref Range    WBC Count 6.3 4.0 - 11.0 10e3/uL    RBC Count 4.80 4.40 - 5.90 10e6/uL    Hemoglobin 16.1 13.3 - 17.7 g/dL    Hematocrit 46.9 40.0 - 53.0 %    MCV 98 78 - 100 fL    MCH 33.5 (H) 26.5 - 33.0 pg    MCHC 34.3 31.5 - 36.5 g/dL    RDW 11.9 10.0 - 15.0 %    Platelet Count 213 150 - 450 10e3/uL    % Neutrophils 71 %    % Lymphocytes 17 %    % Monocytes 8 %    % Eosinophils 2 %    % Basophils 1 %    % Immature Granulocytes 1 %    NRBCs per 100 WBC 0 <1 /100    Absolute Neutrophils 4.5 1.6 - 8.3 10e3/uL    Absolute Lymphocytes 1.0 0.8 - 5.3 10e3/uL    Absolute Monocytes 0.5 0.0 - 1.3 10e3/uL    Absolute Eosinophils 0.1 0.0 - 0.7 10e3/uL    Absolute Basophils 0.0 0.0 - 0.2 10e3/uL    Absolute Immature Granulocytes 0.0 <=0.4 10e3/uL    Absolute NRBCs 0.0 10e3/uL   Comprehensive metabolic panel    Collection Time: 11/09/23 11:37 AM   Result Value Ref Range    Sodium 138 135 - 145 mmol/L    Potassium 5.0 3.4 - 5.3 mmol/L    Carbon Dioxide (CO2) 21 (L) 22 - 29 mmol/L    Anion Gap 11 7 - 15 mmol/L    Urea Nitrogen 23.1 (H) 8.0 - 23.0 mg/dL    Creatinine 0.87 0.67 - 1.17 mg/dL    GFR Estimate >90 >60 mL/min/1.73m2    Calcium 9.4 8.8 - 10.2 mg/dL    Chloride 106 98 - 107 mmol/L    Glucose 107 (H) 70 - 99 mg/dL    Alkaline Phosphatase 73 40 - 129 U/L    AST 35 0 - 45 U/L    ALT 31 0 - 70 U/L    Protein Total 6.7 6.4 - 8.3 g/dL    Albumin 4.2 3.5 - 5.2 g/dL    Bilirubin Total 0.5 <=1.2 mg/dL   TSH with free T4 reflex    Collection Time: 11/09/23 11:37 AM   Result Value Ref Range    TSH 2.52 0.30 - 4.20 uIU/mL         Assessment & Plan   1. Urinary retention   a. Continue self catheterizations with 12F catheters for urinary retention, will place a new catheter order  2. Vesicourethral anastomotic stricture  a. He would like to avoid  any interventions that could cause any increase incontinence. At this time therapies would include bladder neck cuts or dilations both of which could result in retention given his prior prostatectomy and radiation. Will defer any surgical management at this time unless his symptoms worsen to the point he would like to consider it   b. For follow up can see an MIRLANDE in a video visit in 1 year     Abdon Wells MD  [facility] [place]    ==========================      Additional Coding Information:      Time spent:  30 minutes spent by me on the date of the encounter doing chart review, history and exam, documentation and further activities per the note

## 2023-11-30 ENCOUNTER — DOCUMENTATION ONLY (OUTPATIENT)
Dept: UROLOGY | Facility: CLINIC | Age: 68
End: 2023-11-30
Payer: MEDICARE

## 2023-11-30 NOTE — PROGRESS NOTES
Type of Form Received: Order (coude catheter, lubricant)    Form Received (Date) 11/30/23   Form Filled out Yes, date 11/30/23   Placed in provider folder Yes       Received Completed forms Yes   Faxed Forms Faxed To: [address] [place] [address]  Fax Number: [phone]   Sent to HIM (Date) 12/4/23

## 2023-12-04 ENCOUNTER — MEDICAL CORRESPONDENCE (OUTPATIENT)
Dept: UROLOGY | Facility: CLINIC | Age: 68
End: 2023-12-04
Payer: MEDICARE

## 2024-01-17 NOTE — ED NOTES
"Pt came out to the nursing station and grabbed water; was told to refrain from drinking any fluids until after the xray but pt refused and stated \" I have been drinking water up until this point so it won't make much of a difference.\"  "
None

## 2024-01-31 ENCOUNTER — ANCILLARY PROCEDURE (OUTPATIENT)
Dept: CT IMAGING | Facility: CLINIC | Age: 69
End: 2024-01-31
Attending: THORACIC SURGERY (CARDIOTHORACIC VASCULAR SURGERY)
Payer: MEDICARE

## 2024-01-31 DIAGNOSIS — S22.42XA: ICD-10-CM

## 2024-01-31 PROCEDURE — 71250 CT THORAX DX C-: CPT

## 2024-02-12 ENCOUNTER — LAB (OUTPATIENT)
Dept: LAB | Facility: CLINIC | Age: 69
End: 2024-02-12
Payer: MEDICARE

## 2024-02-12 DIAGNOSIS — C61 PROSTATE CANCER (H): ICD-10-CM

## 2024-02-12 LAB — PSA SERPL DL<=0.01 NG/ML-MCNC: 0.69 NG/ML (ref 0–6.5)

## 2024-02-12 PROCEDURE — 84403 ASSAY OF TOTAL TESTOSTERONE: CPT

## 2024-02-12 PROCEDURE — 36415 COLL VENOUS BLD VENIPUNCTURE: CPT

## 2024-02-12 PROCEDURE — 84153 ASSAY OF PSA TOTAL: CPT

## 2024-02-14 LAB — TESTOST SERPL-MCNC: 240 NG/DL (ref 240–950)

## 2024-02-17 DIAGNOSIS — C61 MALIGNANT NEOPLASM OF PROSTATE (H): Primary | ICD-10-CM

## 2024-02-19 DIAGNOSIS — C61 MALIGNANT NEOPLASM OF PROSTATE (H): Primary | ICD-10-CM

## 2024-03-07 ENCOUNTER — LAB (OUTPATIENT)
Dept: LAB | Facility: CLINIC | Age: 69
End: 2024-03-07
Payer: MEDICARE

## 2024-03-07 DIAGNOSIS — C61 MALIGNANT NEOPLASM OF PROSTATE (H): ICD-10-CM

## 2024-03-07 PROCEDURE — 84153 ASSAY OF PSA TOTAL: CPT

## 2024-03-07 PROCEDURE — 84403 ASSAY OF TOTAL TESTOSTERONE: CPT

## 2024-03-07 PROCEDURE — 36415 COLL VENOUS BLD VENIPUNCTURE: CPT

## 2024-03-08 LAB — PSA SERPL DL<=0.01 NG/ML-MCNC: 0.5 NG/ML (ref 0–6.5)

## 2024-03-10 LAB — TESTOST SERPL-MCNC: 299 NG/DL (ref 240–950)

## 2024-05-08 ENCOUNTER — LAB (OUTPATIENT)
Dept: LAB | Facility: CLINIC | Age: 69
End: 2024-05-08
Payer: MEDICARE

## 2024-05-08 DIAGNOSIS — C61 PROSTATE CANCER (H): ICD-10-CM

## 2024-05-08 LAB — PSA SERPL DL<=0.01 NG/ML-MCNC: 0.59 NG/ML (ref 0–6.5)

## 2024-05-08 PROCEDURE — 36415 COLL VENOUS BLD VENIPUNCTURE: CPT

## 2024-05-08 PROCEDURE — 84153 ASSAY OF PSA TOTAL: CPT

## 2024-05-08 PROCEDURE — 84403 ASSAY OF TOTAL TESTOSTERONE: CPT

## 2024-05-10 LAB — TESTOST SERPL-MCNC: 159 NG/DL (ref 240–950)

## 2024-05-11 NOTE — PROGRESS NOTES
FOLLOW UP VISIT    Chief Complaint:  Biochemically-recurrent prostate cancer, on intermittent ADT (currently off).     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 He had a radical prostatectomy in 2006 demonstrating [place] 4+3=7 disease with a positive margin. He had salvage radiation therapy in 2006 when his PSA was around 0.4 per the patient's recollection.  He notes his PSA was undetectable for 4 years, but then began coming back and recently has noted to have PSAs in the 1.2-1.4 range in 5/2019. He underwent an Axumin-PET scan in 2019 that showed no evidence of metastatic disease though there was uptake in the prostate bed. He then went on AFT-19 trial on apalutamide and degarelix from 11/14/2019-11/16/2020 with PSA <0.01 till 7/2021 (PSA 0.03). Prior history of urethral stricture. This may have been due to the prior radiation therapy and a Olsen Catheter complication post-op. Now has further biochemical recurrence after stopping hormonal therapy.  Receiving intermittent treatment (currently off).     10/30/18   PSA =0.5  5/29/19  PSA =1.4  7/29/19  PSA =1.5  9/5/19   PSA =1.8  11/4/19  PSA =2.2  11/14/19 PSA = 2.3 --> Start AFT-19 (APA plus ADT arm)  12/12/19   PSA = 0.04  1/9/2020  PSA <0.01  2/5/2020  PSA <0.01  2/26/20  PSA <0.01  4/6/20   PSA <0.01  6/30/20  PSA <0.01 Testosterone =11  7/27/20  PSA < 0.01  9/21/20 PSA  <0.01  11/16/20  PSA <0.01 - Completed AFT-19   2/11/21   PSA <0.01   Testosterone = 54  5/20/21   PSA <0.01   Testosterone = 149  7/14/21 PSA =0.03   Testosterone = 239  11/12/21 PSA =0.03   Testosterone = 309  2/18/22 PSA =0.08  5/20/22 PSA =0.11  T=244  9/23/22  PSA =0.18  T=312  2/10/23  PSA =0.18  T=334  8/8/23   PSA =0.29  T=239  11/9/23  PSA = 0.36  T=303  2/12/24  PSA = 0.69  T=240  5/08/24  PSA = 0.59  T=159    REVIEW OF SYSTEMS:  Negative for fevers, chills, sweats, nausea, vomiting, unexplained weight changes. Good  urination. Cath every other night. 1-2 times nocturia, mild urgency, no pain, strain or incontinence, last UTI fall 2021. C/o L testicle discomfort for weeks, no recalled triggers, no pain or tender to palpation, no appearance change, does have h/o b/l varicose, L>R. Endorse some lifting recently but in an appropriate way. Losing weight -weight down intentially.  Describes improvement in the mild cognitive impairment that he experienced during the ADT treatment.  Pain in mid thoracic spine stable and related to DDD.  A comprehensive 14-point ROS neg other than the symptoms noted above in the HPI.     PAST MEDICAL HISTORY:  Significant for the prostate cancer, history of pulmonary embolism and rib fractures from coughing fits, and appendicitis.      PAST SURGICAL HISTORY:  Includes the RP, a lumbar fusion, a thoracotomy for the history of rib fractures, shoulder surgery x3, appendectomy and a cervical spine surgery and knee replacement.     MEDICATIONS:          Current Outpatient Medications   Medication Instructions    acetaminophen (TYLENOL) 650 mg, Oral, EVERY 4 HOURS PRN    atorvastatin (LIPITOR) 20 mg, Oral, AT BEDTIME    cetirizine (ZYRTEC) 10 mg, Oral, DAILY    clonazePAM (KLONOPIN) 0.5 mg, Oral, AT BEDTIME PRN    fluticasone (FLONASE) 50 MCG/ACT nasal spray 2 sprays, Both Nostrils, DAILY    levothyroxine (SYNTHROID/LEVOTHROID) 75 MCG tablet TAKE 1 TABLET BY MOUTH EVERY DAY    lisinopril (ZESTRIL) 20 mg, Oral, DAILY    multivitamin w/minerals (MULTI-VITAMIN) tablet 1 tablet, Oral, DAILY    oxyCODONE (ROXICODONE) 5 MG tablet 1 tab po q 4-6 hrs prn pain 3-6 on pain     rivaroxaban ANTICOAGULANT (XARELTO) 10 mg, Oral, DAILY    senna-docusate (PERICOLACE) 8.6-50 MG tablet 1-2 tablets, Oral, 2 TIMES DAILY, Take while on oral narcotics to prevent or treat constipation.       Physical Examination:  Mr Hernandez is a 68-year-old man who appears comfortable at rest.  His vital signs are unremarkable.  His HEENT examination  is within normal limits.  There is no palpable supraclavicular, cervical, axillary or inguinal lymphadenopathy.  The cardiovascular, respiratory, and gastrointestinal examinations are unremarkable.  The neuromuscular examination is grossly intact.  The extremities do not demonstrate pitting edema.  The skin evaluation does not show rash or dermatitis.    CT scan (12/31/22):  IMPRESSION: In this patient with prostate cancer, status post radical prostatectomy and salvage radiation therapy in 2006, there is no evidence to suggest local recurrence or distant metastatic disease.    Labs (5/8/2024):  His CBC reveals WBC 6.3, Hgb 16, hematocrit 46.9%, platelets 213K.  His comprehensive panel is generally within normal limits.  His PSA level is 0.59 ng/mL.  His testosterone level is 159 ng/dL.                                                                      IMPRESSION AND PLAN:  This is a 68-year-old gentleman with a serologic relapse of a [place] 4+3=7 prostate cancer. Axumin-PET 2019 showed no mets though there was uptake in the prostate bed. S/p prostatectomy and salvage radiation therapy. Completed one year of AFT19 study on apalutamide and degarelix from 11/14/2019 - 11/16/2020 with PSA <0.01 ng/mL until 7/2021 (PSA 0.03 ng/mL). Testosterone recovered. PSADT in the 9-12 month timeframe.      # Biochemically-recurrent prostate cancer, no metastasis  He currently has a negative PSMA PET scan and a very low but detectable PSA.  We will continue to follow with another PSA every 4 to 6 months.  We will do a PSMA PET scan in approximately 6 months (or when PSA is 0.75 to 1.00 ng/mL) with further consideration of SBRT or other treatments as appropriate.  There may be an opportunity to repeat the ADT with next-generation hormonal therapies depending on the rate of rise of the PSA and the imaging results.   - Transfer of care, from Dr Palomino to Dr Breaux.    - PSA and testosterone in 3 months and again in 6 months.    -  PSMA PET to be done when PSA is 0.75 to 1.00 ng/mL.    - MD visit (with Dr Breaux) in 6 months.     A total of 40 minutes were spent on this patient on the date of the encounter conducting chart review, review of test results, interpretation of tests, patient visit, documentation and discussion with other provider(s). The patient was given the opportunity to ask multiple questions today, all of which were answered to his satisfaction.    Cecil Breaux M.D.

## 2024-05-13 ENCOUNTER — ONCOLOGY VISIT (OUTPATIENT)
Dept: ONCOLOGY | Facility: CLINIC | Age: 69
End: 2024-05-13
Attending: INTERNAL MEDICINE
Payer: MEDICARE

## 2024-05-13 VITALS
BODY MASS INDEX: 30.72 KG/M2 | HEART RATE: 76 BPM | DIASTOLIC BLOOD PRESSURE: 85 MMHG | TEMPERATURE: 98.6 F | SYSTOLIC BLOOD PRESSURE: 143 MMHG | HEIGHT: 68 IN | WEIGHT: 202.7 LBS | RESPIRATION RATE: 16 BRPM | OXYGEN SATURATION: 99 %

## 2024-05-13 DIAGNOSIS — C61 MALIGNANT NEOPLASM OF PROSTATE (H): Primary | ICD-10-CM

## 2024-05-13 PROCEDURE — 99215 OFFICE O/P EST HI 40 MIN: CPT | Performed by: INTERNAL MEDICINE

## 2024-05-13 PROCEDURE — G0463 HOSPITAL OUTPT CLINIC VISIT: HCPCS | Performed by: INTERNAL MEDICINE

## 2024-05-13 ASSESSMENT — PAIN SCALES - GENERAL: PAINLEVEL: MILD PAIN (2)

## 2024-05-13 NOTE — LETTER
5/13/2024         RE: Lucio Hernandez  [address] [id number]        Dear Colleague,    Thank you for referring your patient, Lucio Hernandez, to the [facility]. Please see a copy of my visit note below.      FOLLOW UP VISIT    Chief Complaint:  Biochemically-recurrent prostate cancer, on intermittent ADT (currently off).     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 He had a radical prostatectomy in 2006 demonstrating Jg 4+3=7 disease with a positive margin. He had salvage radiation therapy in 2006 when his PSA was around 0.4 per the patient's recollection.  He notes his PSA was undetectable for 4 years, but then began coming back and recently has noted to have PSAs in the 1.2-1.4 range in 5/2019. He underwent an Axumin-PET scan in 2019 that showed no evidence of metastatic disease though there was uptake in the prostate bed. He then went on AFT-19 trial on apalutamide and degarelix from 11/14/2019-11/16/2020 with PSA <0.01 till 7/2021 (PSA 0.03). Prior history of urethral stricture. This may have been due to the prior radiation therapy and a Olsen Catheter complication post-op. Now has further biochemical recurrence after stopping hormonal therapy.  Receiving intermittent treatment (currently off).     10/30/18   PSA =0.5  5/29/19  PSA =1.4  7/29/19  PSA =1.5  9/5/19   PSA =1.8  11/4/19  PSA =2.2  11/14/19 PSA = 2.3 --> Start AFT-19 (APA plus ADT arm)  12/12/19   PSA = 0.04  1/9/2020  PSA <0.01  2/5/2020  PSA <0.01  2/26/20  PSA <0.01  4/6/20   PSA <0.01  6/30/20  PSA <0.01 Testosterone =11  7/27/20  PSA < 0.01  9/21/20 PSA  <0.01  11/16/20  PSA <0.01 - Completed AFT-19   2/11/21   PSA <0.01   Testosterone = 54  5/20/21   PSA <0.01   Testosterone = 149  7/14/21 PSA =0.03   Testosterone = 239  11/12/21 PSA =0.03   Testosterone = 309  2/18/22 PSA =0.08  5/20/22 PSA =0.11  T=244  9/23/22  PSA =0.18   T=312  2/10/23  PSA =0.18  T=334  8/8/23   PSA =0.29  T=239  11/9/23  PSA = 0.36  T=303  2/12/24  PSA = 0.69  T=240  5/08/24  PSA = 0.59  T=159    REVIEW OF SYSTEMS:  Negative for fevers, chills, sweats, nausea, vomiting, unexplained weight changes. Good urination. Cath every other night. 1-2 times nocturia, mild urgency, no pain, strain or incontinence, last UTI fall 2021. C/o L testicle discomfort for weeks, no recalled triggers, no pain or tender to palpation, no appearance change, does have h/o b/l varicose, L>R. Endorse some lifting recently but in an appropriate way. Losing weight -weight down intentially.  Describes improvement in the mild cognitive impairment that he experienced during the ADT treatment.  Pain in mid thoracic spine stable and related to DDD.  A comprehensive 14-point ROS neg other than the symptoms noted above in the HPI.     PAST MEDICAL HISTORY:  Significant for the prostate cancer, history of pulmonary embolism and rib fractures from coughing fits, and appendicitis.      PAST SURGICAL HISTORY:  Includes the RP, a lumbar fusion, a thoracotomy for the history of rib fractures, shoulder surgery x3, appendectomy and a cervical spine surgery and knee replacement.     MEDICATIONS:          Current Outpatient Medications   Medication Instructions    acetaminophen (TYLENOL) 650 mg, Oral, EVERY 4 HOURS PRN    atorvastatin (LIPITOR) 20 mg, Oral, AT BEDTIME    cetirizine (ZYRTEC) 10 mg, Oral, DAILY    clonazePAM (KLONOPIN) 0.5 mg, Oral, AT BEDTIME PRN    fluticasone (FLONASE) 50 MCG/ACT nasal spray 2 sprays, Both Nostrils, DAILY    levothyroxine (SYNTHROID/LEVOTHROID) 75 MCG tablet TAKE 1 TABLET BY MOUTH EVERY DAY    lisinopril (ZESTRIL) 20 mg, Oral, DAILY    multivitamin w/minerals (MULTI-VITAMIN) tablet 1 tablet, Oral, DAILY    oxyCODONE (ROXICODONE) 5 MG tablet 1 tab po q 4-6 hrs prn pain 3-6 on pain     rivaroxaban ANTICOAGULANT (XARELTO) 10 mg, Oral, DAILY    senna-docusate (PERICOLACE) 8.6-50  MG tablet 1-2 tablets, Oral, 2 TIMES DAILY, Take while on oral narcotics to prevent or treat constipation.       Physical Examination:  Mr Hernandez is a 68-year-old man who appears comfortable at rest.  His vital signs are unremarkable.  His HEENT examination is within normal limits.  There is no palpable supraclavicular, cervical, axillary or inguinal lymphadenopathy.  The cardiovascular, respiratory, and gastrointestinal examinations are unremarkable.  The neuromuscular examination is grossly intact.  The extremities do not demonstrate pitting edema.  The skin evaluation does not show rash or dermatitis.    CT scan (12/31/22):  IMPRESSION: In this patient with prostate cancer, status post radical prostatectomy and salvage radiation therapy in 2006, there is no evidence to suggest local recurrence or distant metastatic disease.    Labs (5/8/2024):  His CBC reveals WBC 6.3, Hgb 16, hematocrit 46.9%, platelets 213K.  His comprehensive panel is generally within normal limits.  His PSA level is 0.59 ng/mL.  His testosterone level is 159 ng/dL.                                                                      IMPRESSION AND PLAN:  This is a 68-year-old gentleman with a serologic relapse of a [place] 4+3=7 prostate cancer. Axumin-PET 2019 showed no mets though there was uptake in the prostate bed. S/p prostatectomy and salvage radiation therapy. Completed one year of AFT19 study on apalutamide and degarelix from 11/14/2019 - 11/16/2020 with PSA <0.01 ng/mL until 7/2021 (PSA 0.03 ng/mL). Testosterone recovered. PSADT in the 9-12 month timeframe.      # Biochemically-recurrent prostate cancer, no metastasis  He currently has a negative PSMA PET scan and a very low but detectable PSA.  We will continue to follow with another PSA every 4 to 6 months.  We will do a PSMA PET scan in approximately 6 months (or when PSA is 0.75 to 1.00 ng/mL) with further consideration of SBRT or other treatments as appropriate.  There may be an  opportunity to repeat the ADT with next-generation hormonal therapies depending on the rate of rise of the PSA and the imaging results.   - Transfer of care, from Dr Palomino to Dr Breaux.    - PSA and testosterone in 3 months and again in 6 months.    - PSMA PET to be done when PSA is 0.75 to 1.00 ng/mL.    - MD visit (with Dr Breaux) in 6 months.     A total of 40 minutes were spent on this patient on the date of the encounter conducting chart review, review of test results, interpretation of tests, patient visit, documentation and discussion with other provider(s). The patient was given the opportunity to ask multiple questions today, all of which were answered to his satisfaction.    Cecil Breaux M.D.

## 2024-08-05 ENCOUNTER — LAB (OUTPATIENT)
Dept: LAB | Facility: CLINIC | Age: 69
End: 2024-08-05
Payer: MEDICARE

## 2024-08-05 DIAGNOSIS — C61 MALIGNANT NEOPLASM OF PROSTATE (H): ICD-10-CM

## 2024-08-05 LAB — PSA SERPL DL<=0.01 NG/ML-MCNC: 0.66 NG/ML (ref 0–6.5)

## 2024-08-05 PROCEDURE — 84153 ASSAY OF PSA TOTAL: CPT

## 2024-08-05 PROCEDURE — 36415 COLL VENOUS BLD VENIPUNCTURE: CPT

## 2024-08-05 PROCEDURE — 84403 ASSAY OF TOTAL TESTOSTERONE: CPT

## 2024-08-07 LAB — TESTOST SERPL-MCNC: 205 NG/DL (ref 240–950)

## 2024-08-13 ENCOUNTER — PATIENT OUTREACH (OUTPATIENT)
Dept: ONCOLOGY | Facility: CLINIC | Age: 69
End: 2024-08-13
Payer: MEDICARE

## 2024-08-13 NOTE — PROGRESS NOTES
[facility]: Cancer Care                                                                                      Chart review conducted for [facility] Care Coordination Management.      Updated enrollment status.    Shelley Martell, RN, BSN  Oncology RN Care Coordinator  [facility]

## 2024-11-04 ENCOUNTER — LAB (OUTPATIENT)
Dept: LAB | Facility: CLINIC | Age: 69
End: 2024-11-04
Payer: MEDICARE

## 2024-11-04 DIAGNOSIS — C61 MALIGNANT NEOPLASM OF PROSTATE (H): ICD-10-CM

## 2024-11-04 LAB — PSA SERPL DL<=0.01 NG/ML-MCNC: 0.76 NG/ML (ref 0–6.5)

## 2024-11-04 PROCEDURE — 84403 ASSAY OF TOTAL TESTOSTERONE: CPT

## 2024-11-04 PROCEDURE — 84153 ASSAY OF PSA TOTAL: CPT

## 2024-11-04 PROCEDURE — 36415 COLL VENOUS BLD VENIPUNCTURE: CPT

## 2024-11-06 LAB — TESTOST SERPL-MCNC: 236 NG/DL (ref 240–950)

## 2024-11-10 NOTE — PROGRESS NOTES
FOLLOW UP VISIT     Chief Complaint:  Biochemically-recurrent prostate cancer, on intermittent ADT (currently off).  PSA 0.76 ng/mL, with PSADT 9.7 months.     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 He had a radical prostatectomy in 2006 demonstrating [place] 4+3=7 disease with a positive margin. He had salvage radiation therapy in 2006 when his PSA was around 0.4 per the patient's recollection.  He notes his PSA was undetectable for 4 years, but then began coming back and recently has noted to have PSAs in the 1.2-1.4 range in 5/2019. He underwent an Axumin-PET scan in 2019 that showed no evidence of metastatic disease though there was uptake in the prostate bed. He then went on AFT-19 trial on apalutamide and degarelix from 11/14/2019-11/16/2020 with PSA <0.01 till 7/2021 (PSA 0.03). Prior history of urethral stricture. This may have been due to the prior radiation therapy and a Olsen Catheter complication post-op. Now has further biochemical recurrence after stopping hormonal therapy.  Receiving intermittent treatment (currently off).     10/30/18   PSA =0.5  5/29/19  PSA =1.4  7/29/19  PSA =1.5  9/5/19   PSA =1.8  11/4/19  PSA =2.2  11/14/19 PSA = 2.3 --> Start AFT-19 (APA plus ADT arm)  12/12/19   PSA = 0.04  1/9/2020  PSA <0.01  2/5/2020  PSA <0.01  2/26/20  PSA <0.01  4/6/20   PSA <0.01  6/30/20  PSA <0.01 Testosterone =11  7/27/20  PSA < 0.01  9/21/20 PSA  <0.01  11/16/20  PSA <0.01 - Completed AFT-19   2/11/21   PSA <0.01   Testosterone = 54  5/20/21   PSA <0.01   Testosterone = 149  7/14/21 PSA =0.03   Testosterone = 239  11/12/21 PSA =0.03   Testosterone = 309  2/18/22 PSA =0.08  5/20/22 PSA =0.11  T=244  9/23/22  PSA =0.18  T=312   PSMA-PET scan (10/17/22): negative  2/10/23  PSA =0.18  T=334  8/8/23   PSA =0.29  T=239  11/9/23  PSA = 0.36  T=303  2/12/24  PSA = 0.69  T=240  5/08/24  PSA = 0.59  T=159  8/05/24  PSA = 0.66  T=205  11/04/24   PSA = 0.76  T=236   PSADT = 9.7 months    REVIEW OF SYSTEMS:  Negative for fevers, chills, sweats, nausea, vomiting, unexplained weight changes. Good urination. Cath every other night. 1-2 times nocturia, mild urgency, no pain, strain or incontinence, last UTI fall 2021. C/o L testicle discomfort for weeks, no recalled triggers, no pain or tender to palpation, no appearance change, does have h/o b/l varicose, L>R. Endorse some lifting recently but in an appropriate way. Losing weight -weight down intentially.  Describes improvement in the mild cognitive impairment that he experienced during the ADT treatment.  Pain in mid thoracic spine stable and related to DDD.  A comprehensive 14-point ROS neg other than the symptoms noted above in the HPI.     PAST MEDICAL HISTORY:  Significant for the prostate cancer, history of pulmonary embolism and rib fractures from coughing fits, and appendicitis.      PAST SURGICAL HISTORY:  Includes the RP, a lumbar fusion, a thoracotomy for the history of rib fractures, shoulder surgery x3, appendectomy and a cervical spine surgery and knee replacement.     MEDICATIONS:          Current Outpatient Medications   Medication Instructions    acetaminophen (TYLENOL) 650 mg, Oral, EVERY 4 HOURS PRN    atorvastatin (LIPITOR) 20 mg, Oral, AT BEDTIME    cetirizine (ZYRTEC) 10 mg, Oral, DAILY    clonazePAM (KLONOPIN) 0.5 mg, Oral, AT BEDTIME PRN    fluticasone (FLONASE) 50 MCG/ACT nasal spray 2 sprays, Both Nostrils, DAILY    levothyroxine (SYNTHROID/LEVOTHROID) 75 MCG tablet TAKE 1 TABLET BY MOUTH EVERY DAY    lisinopril (ZESTRIL) 20 mg, Oral, DAILY    multivitamin w/minerals (MULTI-VITAMIN) tablet 1 tablet, Oral, DAILY    oxyCODONE (ROXICODONE) 5 MG tablet 1 tab po q 4-6 hrs prn pain 3-6 on pain     rivaroxaban ANTICOAGULANT (XARELTO) 10 mg, Oral, DAILY    senna-docusate (PERICOLACE) 8.6-50 MG tablet 1-2 tablets, Oral, 2 TIMES DAILY, Take while on oral narcotics to prevent or treat  constipation.       Physical Examination:  Mr Hernandez is a 69-year-old man who appears comfortable at rest.  His vital signs are unremarkable.  His HEENT examination is within normal limits.  There is no palpable supraclavicular, cervical, axillary or inguinal lymphadenopathy.  The cardiovascular, respiratory, and gastrointestinal examinations are unremarkable.  The neuromuscular examination is grossly intact.  The extremities do not demonstrate pitting edema.  The skin evaluation does not show rash or dermatitis.    CT scan (12/31/22):  In this patient with prostate cancer, status post radical prostatectomy and salvage radiation therapy in 2006, there is no evidence to suggest local recurrence or distant metastatic disease.    Labs (11/4/2024):  His CBC reveals WBC 6.3, Hgb 16, hematocrit 46.9%, platelets 213K.  His comprehensive panel is generally within normal limits.  His PSA level is 0.76 ng/mL.  His testosterone level is 236 ng/dL.  The PSADT is 9.7 months.                                                                      IMPRESSION AND PLAN:  This is a 69-year-old gentleman with a serologic relapse of a [place] 4+3=7 prostate cancer. Axumin-PET 2019 showed no mets though there was uptake in the prostate bed. S/p prostatectomy and salvage radiation therapy. Completed one year of AFT19 study on apalutamide and degarelix from 11/14/2019 - 11/16/2020 with PSA <0.01 ng/mL until 7/2021 (PSA 0.03 ng/mL). Testosterone recovered. PSADT in the 9-12 month timeframe.      # Biochemically-recurrent prostate cancer, no metastasis  He currently has a negative PSMA PET scan and a very low but detectable PSA.  We will continue to follow with another PSA every 3 to 6 months.  We will do a PSMA PET scan in approximately 6 months (or when PSA is 0.75 to 1.00 ng/mL) with further consideration of SBRT or other treatments as appropriate.  There may be an opportunity to repeat the ADT with next-generation hormonal therapies  depending on the rate of rise of the PSA and the imaging results.   - Transfer of care, from Dr Palomino to Dr Breaux.    - PSA and testosterone in 3 months and again in 6 months.    - PSMA PET to be done when PSA is 0.75 to 1.00 ng/mL.    - MD visit (with Dr Breaux) in 3 months.   - PSMA PET scan will be ordered after the next visit.     A total of 40 minutes were spent on this patient on the date of the encounter conducting chart review, review of test results, interpretation of tests, patient visit, documentation and discussion with other provider(s). The patient was given the opportunity to ask multiple questions today, all of which were answered to his satisfaction.    Cecil Breaux M.D.

## 2024-11-11 ENCOUNTER — ONCOLOGY VISIT (OUTPATIENT)
Dept: ONCOLOGY | Facility: CLINIC | Age: 69
End: 2024-11-11
Attending: INTERNAL MEDICINE
Payer: MEDICARE

## 2024-11-11 VITALS
WEIGHT: 201.7 LBS | BODY MASS INDEX: 30.57 KG/M2 | RESPIRATION RATE: 16 BRPM | SYSTOLIC BLOOD PRESSURE: 145 MMHG | HEART RATE: 68 BPM | HEIGHT: 68 IN | OXYGEN SATURATION: 97 % | DIASTOLIC BLOOD PRESSURE: 83 MMHG

## 2024-11-11 DIAGNOSIS — C61 MALIGNANT NEOPLASM OF PROSTATE (H): Primary | ICD-10-CM

## 2024-11-11 PROCEDURE — G0463 HOSPITAL OUTPT CLINIC VISIT: HCPCS | Performed by: INTERNAL MEDICINE

## 2024-11-11 PROCEDURE — 99215 OFFICE O/P EST HI 40 MIN: CPT | Performed by: INTERNAL MEDICINE

## 2024-11-11 RX ORDER — SULFAMETHOXAZOLE AND TRIMETHOPRIM 400; 80 MG/1; MG/1
1 TABLET ORAL 2 TIMES DAILY
COMMUNITY

## 2024-11-11 RX ORDER — IBUPROFEN 200 MG
200 TABLET ORAL PRN
COMMUNITY

## 2024-11-11 NOTE — LETTER
11/11/2024      Lucio Hernandez  [address] [facility] [address]      Dear Colleague,    Thank you for referring your patient, Lucio Hernandez, to the [facility]. Please see a copy of my visit note below.      FOLLOW UP VISIT     Chief Complaint:  Biochemically-recurrent prostate cancer, on intermittent ADT (currently off).  PSA 0.76 ng/mL, with PSADT 9.7 months.     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 He had a radical prostatectomy in 2006 demonstrating Jg 4+3=7 disease with a positive margin. He had salvage radiation therapy in 2006 when his PSA was around 0.4 per the patient's recollection.  He notes his PSA was undetectable for 4 years, but then began coming back and recently has noted to have PSAs in the 1.2-1.4 range in 5/2019. He underwent an Axumin-PET scan in 2019 that showed no evidence of metastatic disease though there was uptake in the prostate bed. He then went on AFT-19 trial on apalutamide and degarelix from 11/14/2019-11/16/2020 with PSA <0.01 till 7/2021 (PSA 0.03). Prior history of urethral stricture. This may have been due to the prior radiation therapy and a Olsen Catheter complication post-op. Now has further biochemical recurrence after stopping hormonal therapy.  Receiving intermittent treatment (currently off).     10/30/18   PSA =0.5  5/29/19  PSA =1.4  7/29/19  PSA =1.5  9/5/19   PSA =1.8  11/4/19  PSA =2.2  11/14/19 PSA = 2.3 --> Start AFT-19 (APA plus ADT arm)  12/12/19   PSA = 0.04  1/9/2020  PSA <0.01  2/5/2020  PSA <0.01  2/26/20  PSA <0.01  4/6/20   PSA <0.01  6/30/20  PSA <0.01 Testosterone =11  7/27/20  PSA < 0.01  9/21/20 PSA  <0.01  11/16/20  PSA <0.01 - Completed AFT-19   2/11/21   PSA <0.01   Testosterone = 54  5/20/21   PSA <0.01   Testosterone = 149  7/14/21 PSA =0.03   Testosterone = 239  11/12/21 PSA =0.03   Testosterone = 309  2/18/22 PSA =0.08  5/20/22 PSA =0.11   T=244  9/23/22  PSA =0.18  T=312   PSMA-PET scan (10/17/22): negative  2/10/23  PSA =0.18  T=334  8/8/23   PSA =0.29  T=239  11/9/23  PSA = 0.36  T=303  2/12/24  PSA = 0.69  T=240  5/08/24  PSA = 0.59  T=159  8/05/24  PSA = 0.66  T=205  11/04/24  PSA = 0.76  T=236   PSADT = 9.7 months    REVIEW OF SYSTEMS:  Negative for fevers, chills, sweats, nausea, vomiting, unexplained weight changes. Good urination. Cath every other night. 1-2 times nocturia, mild urgency, no pain, strain or incontinence, last UTI fall 2021. C/o L testicle discomfort for weeks, no recalled triggers, no pain or tender to palpation, no appearance change, does have h/o b/l varicose, L>R. Endorse some lifting recently but in an appropriate way. Losing weight -weight down intentially.  Describes improvement in the mild cognitive impairment that he experienced during the ADT treatment.  Pain in mid thoracic spine stable and related to DDD.  A comprehensive 14-point ROS neg other than the symptoms noted above in the HPI.     PAST MEDICAL HISTORY:  Significant for the prostate cancer, history of pulmonary embolism and rib fractures from coughing fits, and appendicitis.      PAST SURGICAL HISTORY:  Includes the RP, a lumbar fusion, a thoracotomy for the history of rib fractures, shoulder surgery x3, appendectomy and a cervical spine surgery and knee replacement.     MEDICATIONS:          Current Outpatient Medications   Medication Instructions     acetaminophen (TYLENOL) 650 mg, Oral, EVERY 4 HOURS PRN     atorvastatin (LIPITOR) 20 mg, Oral, AT BEDTIME     cetirizine (ZYRTEC) 10 mg, Oral, DAILY     clonazePAM (KLONOPIN) 0.5 mg, Oral, AT BEDTIME PRN     fluticasone (FLONASE) 50 MCG/ACT nasal spray 2 sprays, Both Nostrils, DAILY     levothyroxine (SYNTHROID/LEVOTHROID) 75 MCG tablet TAKE 1 TABLET BY MOUTH EVERY DAY     lisinopril (ZESTRIL) 20 mg, Oral, DAILY     multivitamin w/minerals (MULTI-VITAMIN) tablet 1 tablet, Oral, DAILY     oxyCODONE  (ROXICODONE) 5 MG tablet 1 tab po q 4-6 hrs prn pain 3-6 on pain      rivaroxaban ANTICOAGULANT (XARELTO) 10 mg, Oral, DAILY     senna-docusate (PERICOLACE) 8.6-50 MG tablet 1-2 tablets, Oral, 2 TIMES DAILY, Take while on oral narcotics to prevent or treat constipation.       Physical Examination:  Mr Hernandez is a 69-year-old man who appears comfortable at rest.  His vital signs are unremarkable.  His HEENT examination is within normal limits.  There is no palpable supraclavicular, cervical, axillary or inguinal lymphadenopathy.  The cardiovascular, respiratory, and gastrointestinal examinations are unremarkable.  The neuromuscular examination is grossly intact.  The extremities do not demonstrate pitting edema.  The skin evaluation does not show rash or dermatitis.    CT scan (12/31/22):  In this patient with prostate cancer, status post radical prostatectomy and salvage radiation therapy in 2006, there is no evidence to suggest local recurrence or distant metastatic disease.    Labs (11/4/2024):  His CBC reveals WBC 6.3, Hgb 16, hematocrit 46.9%, platelets 213K.  His comprehensive panel is generally within normal limits.  His PSA level is 0.76 ng/mL.  His testosterone level is 236 ng/dL.  The PSADT is 9.7 months.                                                                      IMPRESSION AND PLAN:  This is a 69-year-old gentleman with a serologic relapse of a [place] 4+3=7 prostate cancer. Axumin-PET 2019 showed no mets though there was uptake in the prostate bed. S/p prostatectomy and salvage radiation therapy. Completed one year of AFT19 study on apalutamide and degarelix from 11/14/2019 - 11/16/2020 with PSA <0.01 ng/mL until 7/2021 (PSA 0.03 ng/mL). Testosterone recovered. PSADT in the 9-12 month timeframe.      # Biochemically-recurrent prostate cancer, no metastasis  He currently has a negative PSMA PET scan and a very low but detectable PSA.  We will continue to follow with another PSA every 3 to 6 months.   We will do a PSMA PET scan in approximately 6 months (or when PSA is 0.75 to 1.00 ng/mL) with further consideration of SBRT or other treatments as appropriate.  There may be an opportunity to repeat the ADT with next-generation hormonal therapies depending on the rate of rise of the PSA and the imaging results.   - Transfer of care, from Dr Palomino to Dr Breaux.    - PSA and testosterone in 3 months and again in 6 months.    - PSMA PET to be done when PSA is 0.75 to 1.00 ng/mL.    - MD visit (with Dr Breaux) in 3 months.   - PSMA PET scan will be ordered after the next visit.     A total of 40 minutes were spent on this patient on the date of the encounter conducting chart review, review of test results, interpretation of tests, patient visit, documentation and discussion with other provider(s). The patient was given the opportunity to ask multiple questions today, all of which were answered to his satisfaction.    Cecil Breaux M.D.      Again, thank you for allowing me to participate in the care of your patient.        Sincerely,        Cecil Breaux MD

## 2024-11-11 NOTE — NURSING NOTE
"Oncology Rooming Note    November 11, 2024 10:19 AM   Lucio Hernandez is a 69 year old male who presents for:    Chief Complaint   Patient presents with    Oncology Clinic Visit     RTN Prostate CA      Initial Vitals: BP (!) 145/83 (BP Location: Right arm, Patient Position: Sitting, Cuff Size: Adult Large)   Pulse 68   Resp 16   Ht 1.725 m (5' 7.91\")   Wt 91.5 kg (201 lb 11.2 oz)   SpO2 97%   BMI 30.75 kg/m   Estimated body mass index is 30.75 kg/m  as calculated from the following:    Height as of this encounter: 1.725 m (5' 7.91\").    Weight as of this encounter: 91.5 kg (201 lb 11.2 oz). Body surface area is 2.09 meters squared.  Data Unavailable Comment: 1   No LMP for male patient.  Allergies reviewed: Yes  Medications reviewed: Yes    Medications: MEDICATION REFILLS NEEDED TODAY. Provider was NOT notified.  Pharmacy name entered into [facility]: [facility] [id number] [place] [id number] [employer]    Frailty Screening:   Is the patient here for a new oncology consult visit in cancer care? 2. No      Clinical concerns: Request refill for Synthroid discuss if still needs to be on.       Татьяна Augustin             "

## 2024-11-19 ENCOUNTER — PRE VISIT (OUTPATIENT)
Dept: UROLOGY | Facility: CLINIC | Age: 69
End: 2024-11-19

## 2024-11-19 NOTE — TELEPHONE ENCOUNTER
Reason for visit: 1 year follow up       Dx/Hx/Sx: Bladder neck contracture      Records/imaging/labs/orders: records in EPIC     At Rooming: standard    Yue Celis LPN on 11/19/2024 at 1:00 PM

## 2025-01-27 ENCOUNTER — LAB (OUTPATIENT)
Dept: LAB | Facility: CLINIC | Age: 70
End: 2025-01-27
Payer: MEDICARE

## 2025-01-27 DIAGNOSIS — C61 MALIGNANT NEOPLASM OF PROSTATE (H): ICD-10-CM

## 2025-01-27 LAB — PSA SERPL DL<=0.01 NG/ML-MCNC: 0.9 NG/ML (ref 0–6.5)

## 2025-01-27 PROCEDURE — 84153 ASSAY OF PSA TOTAL: CPT

## 2025-01-27 PROCEDURE — 36415 COLL VENOUS BLD VENIPUNCTURE: CPT

## 2025-01-27 PROCEDURE — 84403 ASSAY OF TOTAL TESTOSTERONE: CPT

## 2025-01-30 LAB — TESTOST SERPL-MCNC: 203 NG/DL (ref 240–950)

## 2025-02-02 NOTE — PROGRESS NOTES
FOLLOW-UP VISIT       Chief Complaint:  Biochemically-recurrent prostate cancer, on intermittent ADT (currently off).  PSA 0.9 ng/mL, with PSADT 13 months.     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 He had a radical prostatectomy in 2006 demonstrating [place] 4+3=7 disease with a positive margin. He had salvage radiation therapy in late 2006 when his PSA was around 0.4 per the patient's recollection.  He notes his PSA was undetectable for 4 years, but then began coming back and recently has noted to have PSAs in the 1.2-1.4 range in 5/2019. He underwent an Axumin-PET scan in 2019 that showed no evidence of metastatic disease though there was uptake in the prostate bed. He then went on AFT-19 trial on apalutamide and degarelix from 11/14/2019 to 11/16/2020 with PSA <0.01 till 7/2021 (PSA 0.03). Prior history of urethral stricture. This may have been due to the prior radiation therapy and a Olsen Catheter complication post-op. Now has further biochemical recurrence after stopping hormonal therapy.  Receiving intermittent hormonal treatment (currently off ADT).     10/30/18   PSA =0.5  5/29/19  PSA =1.4  7/29/19  PSA =1.5  9/5/19   PSA =1.8  11/4/19  PSA =2.2  11/14/19 PSA = 2.3 --> Start AFT-19 (Apa plus ADT arm)  12/12/19   PSA = 0.04  1/9/2020  PSA <0.01  2/5/2020  PSA <0.01  2/26/20  PSA <0.01  4/6/20   PSA <0.01  6/30/20  PSA <0.01 Testosterone =11  7/27/20  PSA < 0.01  9/21/20 PSA  <0.01  11/16/20  PSA <0.01 - Completed AFT-19   2/11/21   PSA <0.01   Testosterone = 54  5/20/21   PSA <0.01   Testosterone = 149  7/14/21 PSA =0.03   Testosterone = 239  11/12/21 PSA =0.03   Testosterone = 309  2/18/22 PSA =0.08  5/20/22 PSA =0.11  T=244  9/23/22  PSA =0.18  T=312   PSMA-PET scan (10/17/22): negative  2/10/23  PSA =0.18  T=334  8/8/23   PSA =0.29  T=239  11/9/23  PSA = 0.36  T=303  2/12/24  PSA = 0.69  T=240  5/08/24  PSA = 0.59  T=159  8/05/24  PSA =  0.66  T=205  11/04/24  PSA = 0.76  T=236   1/27/25  PSA = 0.90  T=203  PSADT = 13 months    REVIEW OF SYSTEMS:  He has experienced an upper respiratory tract infection for the past several weeks.  This is associated with cough as well as wheeze.  He has also had some myalgias recently. Otherwise he is feeling generally well.  Good urination. Cath every other night. 1-2 times nocturia, mild urgency, no pain, strain or incontinence, last UTI fall 2021. C/o L testicle discomfort for weeks, no recalled triggers, no pain or tender to palpation, no appearance change, does have h/o b/l varicose, L>R. Endorse some lifting recently but in an appropriate way. Losing weight -weight down intentially.  Describes improvement in the mild cognitive impairment that he experienced during the ADT treatment.  Pain in mid thoracic spine stable and related to DDD.  A comprehensive 14-point ROS neg other than the symptoms noted above in the HPI.     PAST MEDICAL HISTORY:  Significant for the prostate cancer, history of pulmonary embolism and rib fractures from coughing fits, and appendicitis.      PAST SURGICAL HISTORY:  Includes the RP, a lumbar fusion, a thoracotomy for the history of rib fractures, shoulder surgery x3, appendectomy and a cervical spine surgery and knee replacement.     MEDICATIONS:          Current Outpatient Medications   Medication Instructions    acetaminophen (TYLENOL) 650 mg, Oral, EVERY 4 HOURS PRN    atorvastatin (LIPITOR) 20 mg, Oral, AT BEDTIME    cetirizine (ZYRTEC) 10 mg, Oral, DAILY    clonazePAM (KLONOPIN) 0.5 mg, Oral, AT BEDTIME PRN    fluticasone (FLONASE) 50 MCG/ACT nasal spray 2 sprays, Both Nostrils, DAILY    levothyroxine (SYNTHROID/LEVOTHROID) 75 MCG tablet TAKE 1 TABLET BY MOUTH EVERY DAY    lisinopril (ZESTRIL) 20 mg, Oral, DAILY    multivitamin w/minerals (MULTI-VITAMIN) tablet 1 tablet, Oral, DAILY    oxyCODONE (ROXICODONE) 5 MG tablet 1 tab po q 4-6 hrs prn pain 3-6 on pain     rivaroxaban  ANTICOAGULANT (XARELTO) 10 mg, Oral, DAILY    senna-docusate (PERICOLACE) 8.6-50 MG tablet 1-2 tablets, Oral, 2 TIMES DAILY, as needed.       Physical Examination:  Mr Hernandez is a 69-year-old man who appears comfortable at rest.  His vital signs are unremarkable.  His HEENT examination is within normal limits.  There is no palpable supraclavicular, cervical, axillary or inguinal lymphadenopathy.  The cardiovascular, respiratory, and gastrointestinal examinations are unremarkable except for bilateral fine inspiratory crackles at the bases without wheeze.  The neuromuscular examination is grossly intact.  The extremities do not demonstrate pitting edema.  The skin evaluation does not show rash or dermatitis.    CT scan (12/31/22):  In this patient with prostate cancer, status post radical prostatectomy and salvage radiation therapy in 2006, there is no evidence to suggest local recurrence or distant metastatic disease.    PSMA-PET (10/17/22):  In this patient with prostate cancer, status post radical prostatectomy and salvage radiotherapy, there is no evidence to suggest local recurrence or distant metastatic disease.    Labs (1/27/2025):  His CBC reveals WBC 6.3, Hgb 16, hematocrit 46.9%, platelets 213K.  His comprehensive panel is generally within normal limits.  His PSA level is 0.90 ng/mL.  His testosterone level is 203 ng/dL.  The PSADT is 13 months.                                                                      IMPRESSION AND PLAN:  This is a 69-year-old gentleman with a serologic relapse of a Jg 4+3=7 prostate cancer. Axumin-PET 2019 showed no mets though there was uptake in the prostate bed. S/p prostatectomy and salvage radiation therapy. Completed one year of AFT19 study on apalutamide and degarelix from 11/14/2019 - 11/16/2020 with PSA dropping to <0.01 ng/mL until 7/2021. Testosterone recovered. PSADT in the 12-14 month timeframe.      # Biochemically-recurrent prostate cancer, no metastasis  He  currently has a negative PSMA-PET scan and a very low but detectable PSA.  We will continue to follow with another PSA every 3 to 6 months.  We will do a PSMA-PET scan in approximately 6 months (or when PSA is 0.75 to 1.00 ng/mL) with further consideration of SBRT or other treatments as appropriate.  There may be an opportunity to repeat the ADT with next-generation hormonal therapies depending on the rate of rise of the PSA and the imaging results.   - PSA and testosterone in 3 months and again in 6 months.    - PSMA PET to be done when PSA is 0.75 to 1.00 ng/mL.    - PSMA scan will be ordered at this time.    - MD visit (with Dr Breaux) one week after the PET scan.    A total of 40 minutes were spent on this patient on the date of the encounter conducting chart review, review of test results, interpretation of tests, patient visit, documentation and discussion with other provider(s). The patient was given the opportunity to ask multiple questions today, all of which were answered to his satisfaction.    Cecil Breaux M.D.

## 2025-02-03 ENCOUNTER — ONCOLOGY VISIT (OUTPATIENT)
Dept: ONCOLOGY | Facility: CLINIC | Age: 70
End: 2025-02-03
Attending: INTERNAL MEDICINE
Payer: MEDICARE

## 2025-02-03 VITALS
WEIGHT: 212.6 LBS | OXYGEN SATURATION: 98 % | BODY MASS INDEX: 31.86 KG/M2 | TEMPERATURE: 98.5 F | SYSTOLIC BLOOD PRESSURE: 145 MMHG | DIASTOLIC BLOOD PRESSURE: 90 MMHG | RESPIRATION RATE: 20 BRPM | HEART RATE: 82 BPM

## 2025-02-03 DIAGNOSIS — C61 MALIGNANT NEOPLASM OF PROSTATE (H): ICD-10-CM

## 2025-02-03 DIAGNOSIS — J18.9 ATYPICAL PNEUMONIA: Primary | ICD-10-CM

## 2025-02-03 PROCEDURE — G0463 HOSPITAL OUTPT CLINIC VISIT: HCPCS | Performed by: INTERNAL MEDICINE

## 2025-02-03 PROCEDURE — 99215 OFFICE O/P EST HI 40 MIN: CPT | Performed by: INTERNAL MEDICINE

## 2025-02-03 RX ORDER — FLUTICASONE PROPIONATE AND SALMETEROL 500; 50 UG/1; UG/1
POWDER RESPIRATORY (INHALATION)
COMMUNITY
Start: 2024-10-15

## 2025-02-03 RX ORDER — AZITHROMYCIN 250 MG/1
250 TABLET, FILM COATED ORAL DAILY
Qty: 6 TABLET | Refills: 1 | Status: SHIPPED | OUTPATIENT
Start: 2025-02-03

## 2025-02-03 ASSESSMENT — PAIN SCALES - GENERAL: PAINLEVEL_OUTOF10: NO PAIN (0)

## 2025-02-03 NOTE — LETTER
2/3/2025      Lucio Hernandez  [address] [facility] [address]      Dear Colleague,    Thank you for referring your patient, Lucio Hernandez, to the [facility]. Please see a copy of my visit note below.      FOLLOW-UP VISIT       Chief Complaint:  Biochemically-recurrent prostate cancer, on intermittent ADT (currently off).  PSA 0.9 ng/mL, with PSADT 13 months.     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 He had salvage radiation therapy in late 2006 when his PSA was around 0.4 per the patient's recollection.  He notes his PSA was undetectable for 4 years, but then began coming back and recently has noted to have PSAs in the 1.2-1.4 range in 5/2019. He underwent an Axumin-PET scan in 2019 that showed no evidence of metastatic disease though there was uptake in the prostate bed. He then went on AFT-19 trial on apalutamide and degarelix from 11/14/2019 to 11/16/2020 with PSA <0.01 till 7/2021 (PSA 0.03). Prior history of urethral stricture. This may have been due to the prior radiation therapy and a Olsen Catheter complication post-op. Now has further biochemical recurrence after stopping hormonal therapy.  Receiving intermittent hormonal treatment (currently off ADT).     10/30/18   PSA =0.5  5/29/19  PSA =1.4  7/29/19  PSA =1.5  9/5/19   PSA =1.8  11/4/19  PSA =2.2  11/14/19 PSA = 2.3 --> Start AFT-19 (Apa plus ADT arm)  12/12/19   PSA = 0.04  1/9/2020  PSA <0.01  2/5/2020  PSA <0.01  2/26/20  PSA <0.01  4/6/20   PSA <0.01  6/30/20  PSA <0.01 Testosterone =11  7/27/20  PSA < 0.01  9/21/20 PSA  <0.01  11/16/20  PSA <0.01 - Completed AFT-19   2/11/21   PSA <0.01   Testosterone = 54  5/20/21   PSA <0.01   Testosterone = 149  7/14/21 PSA =0.03   Testosterone = 239  11/12/21 PSA =0.03   Testosterone = 309  2/18/22 PSA  =0.08  5/20/22 PSA =0.11  T=244  9/23/22  PSA =0.18  T=312   PSMA-PET scan (10/17/22): negative  2/10/23  PSA =0.18  T=334  8/8/23   PSA =0.29  T=239  11/9/23  PSA = 0.36  T=303  2/12/24  PSA = 0.69  T=240  5/08/24  PSA = 0.59  T=159  8/05/24  PSA = 0.66  T=205  11/04/24  PSA = 0.76  T=236   1/27/25  PSA = 0.90  T=203  PSADT = 13 months    REVIEW OF SYSTEMS:  He has experienced an upper respiratory tract infection for the past several weeks.  This is associated with cough as well as wheeze.  He has also had some myalgias recently. Otherwise he is feeling generally well.  Good urination. Cath every other night. 1-2 times nocturia, mild urgency, no pain, strain or incontinence, last UTI fall 2021. C/o L testicle discomfort for weeks, no recalled triggers, no pain or tender to palpation, no appearance change, does have h/o b/l varicose, L>R. Endorse some lifting recently but in an appropriate way. Losing weight -weight down intentially.  Describes improvement in the mild cognitive impairment that he experienced during the ADT treatment.  Pain in mid thoracic spine stable and related to DDD.  A comprehensive 14-point ROS neg other than the symptoms noted above in the HPI.     PAST MEDICAL HISTORY:  Significant for the prostate cancer, history of pulmonary embolism and rib fractures from coughing fits, and appendicitis.      PAST SURGICAL HISTORY:  Includes the RP, a lumbar fusion, a thoracotomy for the history of rib fractures, shoulder surgery x3, appendectomy and a cervical spine surgery and knee replacement.     MEDICATIONS:          Current Outpatient Medications   Medication Instructions     acetaminophen (TYLENOL) 650 mg, Oral, EVERY 4 HOURS PRN     atorvastatin (LIPITOR) 20 mg, Oral, AT BEDTIME     cetirizine (ZYRTEC) 10 mg, Oral, DAILY     clonazePAM (KLONOPIN) 0.5 mg, Oral, AT BEDTIME PRN     fluticasone (FLONASE) 50 MCG/ACT nasal spray 2 sprays, Both Nostrils, DAILY     levothyroxine (SYNTHROID/LEVOTHROID) 75  MCG tablet TAKE 1 TABLET BY MOUTH EVERY DAY     lisinopril (ZESTRIL) 20 mg, Oral, DAILY     multivitamin w/minerals (MULTI-VITAMIN) tablet 1 tablet, Oral, DAILY     oxyCODONE (ROXICODONE) 5 MG tablet 1 tab po q 4-6 hrs prn pain 3-6 on pain      rivaroxaban ANTICOAGULANT (XARELTO) 10 mg, Oral, DAILY     senna-docusate (PERICOLACE) 8.6-50 MG tablet 1-2 tablets, Oral, 2 TIMES DAILY, as needed.       Physical Examination:  Mr Hernandez is a 69-year-old man who appears comfortable at rest.  His vital signs are unremarkable.  His HEENT examination is within normal limits.  There is no palpable supraclavicular, cervical, axillary or inguinal lymphadenopathy.  The cardiovascular, respiratory, and gastrointestinal examinations are unremarkable except for bilateral fine inspiratory crackles at the bases without wheeze.  The neuromuscular examination is grossly intact.  The extremities do not demonstrate pitting edema.  The skin evaluation does not show rash or dermatitis.    CT scan (12/31/22):  In this patient with prostate cancer, status post radical prostatectomy and salvage radiation therapy in 2006, there is no evidence to suggest local recurrence or distant metastatic disease.    PSMA-PET (10/17/22):  In this patient with prostate cancer, status post radical prostatectomy and salvage radiotherapy, there is no evidence to suggest local recurrence or distant metastatic disease.    Labs (1/27/2025):  His CBC reveals WBC 6.3, Hgb 16, hematocrit 46.9%, platelets 213K.  His comprehensive panel is generally within normal limits.  His PSA level is 0.90 ng/mL.  His testosterone level is 203 ng/dL.  The PSADT is 13 months.                                                                      IMPRESSION AND PLAN:  This is a 69-year-old gentleman with a serologic relapse of a Jg 4+3=7 prostate cancer. Axumin-PET 2019 showed no mets though there was uptake in the prostate bed. S/p prostatectomy and salvage radiation therapy.  Completed one year of AFT19 study on apalutamide and degarelix from 11/14/2019 - 11/16/2020 with PSA dropping to <0.01 ng/mL until 7/2021. Testosterone recovered. PSADT in the 12-14 month timeframe.      # Biochemically-recurrent prostate cancer, no metastasis  He currently has a negative PSMA-PET scan and a very low but detectable PSA.  We will continue to follow with another PSA every 3 to 6 months.  We will do a PSMA-PET scan in approximately 6 months (or when PSA is 0.75 to 1.00 ng/mL) with further consideration of SBRT or other treatments as appropriate.  There may be an opportunity to repeat the ADT with next-generation hormonal therapies depending on the rate of rise of the PSA and the imaging results.   - PSA and testosterone in 3 months and again in 6 months.    - PSMA PET to be done when PSA is 0.75 to 1.00 ng/mL.    - PSMA scan will be ordered at this time.    - MD visit (with Dr Breaux) one week after the PET scan.    A total of 40 minutes were spent on this patient on the date of the encounter conducting chart review, review of test results, interpretation of tests, patient visit, documentation and discussion with other provider(s). The patient was given the opportunity to ask multiple questions today, all of which were answered to his satisfaction.    Cecil Breaux M.D.      Again, thank you for allowing me to participate in the care of your patient.        Sincerely,        Cecil Breaux MD    Electronically signed

## 2025-02-03 NOTE — NURSING NOTE
"Oncology Rooming Note    February 3, 2025 12:47 PM   Lucio Hernandez is a 69 year old male who presents for:    Chief Complaint   Patient presents with    Oncology Clinic Visit     RTN Malignant neoplasm of prostate     Initial Vitals: BP (!) 145/90 (BP Location: Right arm, Patient Position: Sitting, Cuff Size: Adult Large)   Pulse 82   Temp 98.5  F (36.9  C) (Oral)   Resp 20   Wt 96.4 kg (212 lb 9.6 oz)   SpO2 98%   BMI 31.86 kg/m   Estimated body mass index is 31.86 kg/m  as calculated from the following:    Height as of 11/22/24: 1.74 m (5' 8.5\").    Weight as of this encounter: 96.4 kg (212 lb 9.6 oz). Body surface area is 2.16 meters squared.  No Pain (0) Comment: Data Unavailable   No LMP for male patient.  Allergies reviewed: Yes  Medications reviewed: Yes    Medications: Medication refills not needed today.  Pharmacy [facility] into [facility] [id number] [place] [id number]    Frailty Screening:   Is the patient here for a new oncology consult visit in cancer care? 2. No      Clinical concerns: none      Татьяна Augustin             "

## 2025-02-22 ENCOUNTER — HEALTH MAINTENANCE LETTER (OUTPATIENT)
Age: 70
End: 2025-02-22

## 2025-03-17 ENCOUNTER — LAB (OUTPATIENT)
Dept: LAB | Facility: CLINIC | Age: 70
End: 2025-03-17
Attending: INTERNAL MEDICINE
Payer: MEDICARE

## 2025-03-17 ENCOUNTER — HOSPITAL ENCOUNTER (OUTPATIENT)
Dept: PET IMAGING | Facility: CLINIC | Age: 70
Setting detail: NUCLEAR MEDICINE
Discharge: HOME | End: 2025-03-17
Attending: INTERNAL MEDICINE | Admitting: INTERNAL MEDICINE
Payer: MEDICARE

## 2025-03-17 DIAGNOSIS — C61 MALIGNANT NEOPLASM OF PROSTATE (H): ICD-10-CM

## 2025-03-17 LAB — PSA SERPL DL<=0.01 NG/ML-MCNC: 1.12 NG/ML (ref 0–6.5)

## 2025-03-17 PROCEDURE — A9596 HC RX 343 MED OP 636: HCPCS | Performed by: INTERNAL MEDICINE

## 2025-03-17 PROCEDURE — 71260 CT THORAX DX C+: CPT | Mod: 26 | Performed by: STUDENT IN AN ORGANIZED HEALTH CARE EDUCATION/TRAINING PROGRAM

## 2025-03-17 PROCEDURE — 84153 ASSAY OF PSA TOTAL: CPT

## 2025-03-17 PROCEDURE — 74177 CT ABD & PELVIS W/CONTRAST: CPT

## 2025-03-17 PROCEDURE — 74177 CT ABD & PELVIS W/CONTRAST: CPT | Mod: 26 | Performed by: STUDENT IN AN ORGANIZED HEALTH CARE EDUCATION/TRAINING PROGRAM

## 2025-03-17 PROCEDURE — 78815 PET IMAGE W/CT SKULL-THIGH: CPT | Mod: PS

## 2025-03-17 PROCEDURE — 71260 CT THORAX DX C+: CPT

## 2025-03-17 PROCEDURE — 343N000001 HC RX 343 MED OP 636: Performed by: INTERNAL MEDICINE

## 2025-03-17 PROCEDURE — 78813 PET IMAGE FULL BODY: CPT | Mod: 26 | Performed by: STUDENT IN AN ORGANIZED HEALTH CARE EDUCATION/TRAINING PROGRAM

## 2025-03-17 PROCEDURE — 250N000011 HC RX IP 250 OP 636: Performed by: INTERNAL MEDICINE

## 2025-03-17 RX ORDER — IOPAMIDOL 755 MG/ML
45-135 INJECTION, SOLUTION INTRAVASCULAR ONCE
Status: COMPLETED | OUTPATIENT
Start: 2025-03-17 | End: 2025-03-17

## 2025-03-17 RX ADMIN — KIT FOR THE PREPARATION OF GALLIUM GA 68 GOZETOTIDE INJECTION 5.9 MILLICURIE: KIT INTRAVENOUS at 13:19

## 2025-03-17 RX ADMIN — IOPAMIDOL 130 ML: 755 INJECTION, SOLUTION INTRAVENOUS at 14:34

## 2025-03-23 NOTE — PROGRESS NOTES
FOLLOW-UP VISIT       Chief Complaint:  Biochemically-recurrent prostate cancer, on intermittent ADT (currently off).  PSA 1.12 ng/mL, with PSADT 11.7 months.     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 He had a radical prostatectomy in 2006 demonstrating [place] 4+3=7 disease with a positive margin. He had salvage radiation therapy in late 2006 when his PSA was 0.4 ng/mL.  He notes his PSA was undetectable for 4 years, but then began coming back and recently has noted to have PSAs in the 1.2-1.4 range in 5/2019. He underwent an Axumin-PET scan in 2019 that showed no evidence of metastatic disease though there was uptake in the prostate bed. He then went on AFT-19 trial on apalutamide and degarelix from 11/14/2019 to 11/16/2020 with PSA <0.01 until 7/2021 (PSA 0.03).  Now has further biochemical recurrence after stopping hormonal therapy.  Receiving intermittent hormonal treatment (currently off ADT).     10/30/18   PSA =0.5  5/29/19  PSA =1.4  7/29/19  PSA =1.5  9/5/19   PSA =1.8  11/4/19  PSA =2.2  11/14/19 PSA = 2.3 ng/mL --> Start AFT-19 (Apa plus ADT arm)  12/12/19   PSA = 0.04  1/9/2020  PSA <0.01  2/5/2020  PSA <0.01  2/26/20  PSA <0.01  4/6/20   PSA <0.01  6/30/20  PSA <0.01 Testosterone =11  7/27/20  PSA < 0.01  9/21/20 PSA  <0.01  11/16/20  PSA <0.01 - Completed AFT-19   2/11/21   PSA <0.01   Testosterone = 54  5/20/21   PSA <0.01   Testosterone = 149  7/14/21 PSA =0.03   Testosterone = 239  11/12/21 PSA =0.03   Testosterone = 309  2/18/22 PSA =0.08  5/20/22 PSA =0.11  T=244  9/23/22  PSA =0.18  T=312   PSMA-PET scan (10/17/22): negative  2/10/23  PSA =0.18  T=334  8/8/23   PSA =0.29  T=239  11/9/23  PSA = 0.36  T=303  2/12/24  PSA = 0.69  T=240  5/08/24  PSA = 0.59  T=159  8/05/24  PSA = 0.66  T=205  11/04/24  PSA = 0.76  T=236   1/27/25  PSA = 0.90  3/17/25  PSA = 1.12  T=203   PSADT = 11.7 months    REVIEW OF SYSTEMS:  He has experienced an  upper respiratory tract infection for the past several weeks.  This is associated with cough as well as wheeze.  He has also had some myalgias recently. Otherwise he is feeling generally well.  Good urination. Cath every other night. 1-2 times nocturia, mild urgency, no pain, strain or incontinence, last UTI fall 2021. C/o L testicle discomfort for weeks, no recalled triggers, no pain or tender to palpation, no appearance change, does have h/o b/l varicose, L>R. Endorse some lifting recently but in an appropriate way. Losing weight -weight down intentially.  Describes improvement in the mild cognitive impairment that he experienced during the ADT treatment.  Pain in mid thoracic spine stable and related to DDD.  A comprehensive 14-point ROS neg other than the symptoms noted above in the HPI.     PAST MEDICAL HISTORY:  Significant for the prostate cancer, post-radiation urethral stricture, history of pulmonary embolism and rib fractures from coughing fits, and appendicitis.      PAST SURGICAL HISTORY:  Includes the RP, a lumbar fusion, a thoracotomy for the history of rib fractures, shoulder surgery x3, appendectomy and a cervical spine surgery and knee replacement.     MEDICATIONS:          Current Outpatient Medications   Medication Instructions    acetaminophen (TYLENOL) 650 mg, Oral, EVERY 4 HOURS PRN    atorvastatin (LIPITOR) 20 mg, Oral, AT BEDTIME    cetirizine (ZYRTEC) 10 mg, Oral, DAILY    clonazePAM (KLONOPIN) 0.5 mg, Oral, AT BEDTIME PRN    fluticasone (FLONASE) 50 MCG/ACT nasal spray 2 sprays, Both Nostrils, DAILY    levothyroxine (SYNTHROID/LEVOTHROID) 75 MCG tablet TAKE 1 TABLET BY MOUTH EVERY DAY    lisinopril (ZESTRIL) 20 mg, Oral, DAILY    multivitamin w/minerals (MULTI-VITAMIN) tablet 1 tablet, Oral, DAILY    oxyCODONE (ROXICODONE) 5 MG tablet 1 tab po q 4-6 hrs prn pain 3-6 on pain     rivaroxaban ANTICOAGULANT (XARELTO) 10 mg, Oral, DAILY    senna-docusate (PERICOLACE) 8.6-50 MG tablet 1-2  tablets, Oral, 2 TIMES DAILY, as needed.       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  His vital signs are unremarkable.  His HEENT examination is within normal limits.  There is no palpable supraclavicular, cervical, axillary or inguinal lymphadenopathy.  The cardiovascular, respiratory, and gastrointestinal examinations are unremarkable except for bilateral fine inspiratory crackles at the bases without wheeze.  The neuromuscular examination is grossly intact.  The extremities do not demonstrate pitting edema.  The skin evaluation does not show rash or dermatitis.    CT scan (12/31/22):  In this patient with prostate cancer, status post radical prostatectomy and salvage radiation therapy in 2006, there is no evidence to suggest local recurrence or distant metastatic disease.    PSMA-PET (10/17/22):  In this patient with prostate cancer, status post radical prostatectomy and salvage radiotherapy, there is no evidence to suggest local recurrence or distant metastatic disease.    PSMA-PET (3/17/25):  Prostatectomy bed: New moderate focal uptake associated with an enhancing nodule (SUVmean=8.4) measuring approximately 1.0 x 0.7 cm right adjacent to the urinary bladder neck and anterior to the surgical clip, worrisome for recurrent disease.  Tavares disease: No PSMA-avid tavares disease.  Bones: No PSMA-avid osseous lesion.        Labs (3/17/2025):  His PSA level is 1.12 ng/mL.  His testosterone level is 203 ng/dL.  The PSADT is 11.7 months.                                                                      IMPRESSION AND PLAN:  This is a 69-year-old gentleman with a serologic relapse of a [place] 4+3=7 prostate cancer. S/p prostatectomy and salvage radiation therapy. Completed one year of AFT19 study on apalutamide and degarelix from 11/14/2019 - 11/16/2020 with PSA dropping to <0.01 ng/mL until 7/2021. Testosterone recovered. PSA now 1.12 ng/mL, with PSADT 11.7 months.   PSMA-PET (3/17/25) shows local prostatectomy bed recurrence.      # Biochemically-recurrent prostate cancer, local recurrence, no metastasis  In 10/2022 he had a negative PSMA-PET scan and a very low but detectable PSA.  We continued to follow PSA and T every 3 months.  We planned to repeat a PSMA-PET scan when his PSA level reached 1.00 ng/mL.  After that, there may be further consideration of SBRT or other systemic treatments as appropriate.  There may be an opportunity to repeat the ADT with next-generation hormonal therapies depending on the rate of rise of the PSA and the imaging results.   - PSA level is now 1.12 ng/mL, with PSADT 11.7 months (this is speeding up slightly).    - PSMA PET scan (3/17/25) shows a prostatectomy bed recurrence adjacent to the urinary bladder neck; no bailey or osseous mets.    - We discussed options which include: continued observation, targeted radiotherapy, or initiation of ADT +/- ARPI agent.    - At the end of our detailed discussion, the patient has elected to begin ADT alone after he returns from a trip to [facility].   - His first Eligard injection will be on 4/28/25.  We will check a PSA every 6 weeks thereafter.  ARPI may be added if PSA doesn't drop to zero.   - We will plan to treat him with 12 months of hormonal therapy, followed by another treatment break (i.e. intermittent ADT)    A total of 40 minutes were spent on this patient on the date of the encounter conducting chart review, review of test results, interpretation of tests, patient visit, documentation and discussion with other provider(s).  The patient was given the opportunity to ask multiple questions today, all of which were answered to his satisfaction.    The longitudinal plan of care for the diagnosis(es)/condition(s) as documented were addressed during this visit. Due to the added complexity in care, I will continue to support Dr. Hernandez in the subsequent management and with ongoing continuity of  care.    Cecil Breaux M.D.

## 2025-03-24 ENCOUNTER — ONCOLOGY VISIT (OUTPATIENT)
Dept: ONCOLOGY | Facility: CLINIC | Age: 70
End: 2025-03-24
Attending: INTERNAL MEDICINE
Payer: MEDICARE

## 2025-03-24 VITALS
TEMPERATURE: 98.4 F | WEIGHT: 209.7 LBS | SYSTOLIC BLOOD PRESSURE: 135 MMHG | HEART RATE: 101 BPM | OXYGEN SATURATION: 95 % | RESPIRATION RATE: 18 BRPM | DIASTOLIC BLOOD PRESSURE: 77 MMHG | BODY MASS INDEX: 31.42 KG/M2

## 2025-03-24 DIAGNOSIS — C61 MALIGNANT NEOPLASM OF PROSTATE (H): Primary | ICD-10-CM

## 2025-03-24 PROCEDURE — 99215 OFFICE O/P EST HI 40 MIN: CPT | Performed by: INTERNAL MEDICINE

## 2025-03-24 PROCEDURE — G0463 HOSPITAL OUTPT CLINIC VISIT: HCPCS | Performed by: INTERNAL MEDICINE

## 2025-03-24 ASSESSMENT — PAIN SCALES - GENERAL: PAINLEVEL_OUTOF10: MILD PAIN (2)

## 2025-03-24 NOTE — LETTER
3/24/2025      Lucio Hernandez  [address] [address]      Dear Colleague,    Thank you for referring your patient, Lucio Hernandez, to the [facility]. Please see a copy of my visit note below.      FOLLOW-UP VISIT       Chief Complaint:  Biochemically-recurrent prostate cancer, on intermittent ADT (currently off).  PSA 1.12 ng/mL, with PSADT 11.7 months.     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 He had a radical prostatectomy in 2006 demonstrating [place] 4+3=7 disease with a positive margin. He had salvage radiation therapy in late 2006 when his PSA was 0.4 ng/mL.  He notes his PSA was undetectable for 4 years, but then began coming back and recently has noted to have PSAs in the 1.2-1.4 range in 5/2019. He underwent an Axumin-PET scan in 2019 that showed no evidence of metastatic disease though there was uptake in the prostate bed. He then went on AFT-19 trial on apalutamide and degarelix from 11/14/2019 to 11/16/2020 with PSA <0.01 until 7/2021 (PSA 0.03).  Now has further biochemical recurrence after stopping hormonal therapy.  Receiving intermittent hormonal treatment (currently off ADT).     10/30/18   PSA =0.5  5/29/19  PSA =1.4  7/29/19  PSA =1.5  9/5/19   PSA =1.8  11/4/19  PSA =2.2  11/14/19 PSA = 2.3 ng/mL --> Start AFT-19 (Apa plus ADT arm)  12/12/19   PSA = 0.04  1/9/2020  PSA <0.01  2/5/2020  PSA <0.01  2/26/20  PSA <0.01  4/6/20   PSA <0.01  6/30/20  PSA <0.01 Testosterone =11  7/27/20  PSA < 0.01  9/21/20 PSA  <0.01  11/16/20  PSA <0.01 - Completed AFT-19   2/11/21   PSA <0.01   Testosterone = 54  5/20/21   PSA <0.01   Testosterone = 149  7/14/21 PSA =0.03   Testosterone = 239  11/12/21 PSA =0.03   Testosterone = 309  2/18/22 PSA =0.08  5/20/22 PSA =0.11  T=244  9/23/22  PSA =0.18  T=312   PSMA-PET scan (10/17/22): negative  2/10/23  PSA =0.18  T=334  8/8/23   PSA =0.29  T=239  11/9/23  PSA =  0.36  T=303  2/12/24  PSA = 0.69  T=240  5/08/24  PSA = 0.59  T=159  8/05/24  PSA = 0.66  T=205  11/04/24  PSA = 0.76  T=236   1/27/25  PSA = 0.90  3/17/25  PSA = 1.12  T=203   PSADT = 11.7 months    REVIEW OF SYSTEMS:  He has experienced an upper respiratory tract infection for the past several weeks.  This is associated with cough as well as wheeze.  He has also had some myalgias recently. Otherwise he is feeling generally well.  Good urination. Cath every other night. 1-2 times nocturia, mild urgency, no pain, strain or incontinence, last UTI fall 2021. C/o L testicle discomfort for weeks, no recalled triggers, no pain or tender to palpation, no appearance change, does have h/o b/l varicose, L>R. Endorse some lifting recently but in an appropriate way. Losing weight -weight down intentially.  Describes improvement in the mild cognitive impairment that he experienced during the ADT treatment.  Pain in mid thoracic spine stable and related to DDD.  A comprehensive 14-point ROS neg other than the symptoms noted above in the HPI.     PAST MEDICAL HISTORY:  Significant for the prostate cancer, post-radiation urethral stricture, history of pulmonary embolism and rib fractures from coughing fits, and appendicitis.      PAST SURGICAL HISTORY:  Includes the RP, a lumbar fusion, a thoracotomy for the history of rib fractures, shoulder surgery x3, appendectomy and a cervical spine surgery and knee replacement.     MEDICATIONS:          Current Outpatient Medications   Medication Instructions     acetaminophen (TYLENOL) 650 mg, Oral, EVERY 4 HOURS PRN     atorvastatin (LIPITOR) 20 mg, Oral, AT BEDTIME     cetirizine (ZYRTEC) 10 mg, Oral, DAILY     clonazePAM (KLONOPIN) 0.5 mg, Oral, AT BEDTIME PRN     fluticasone (FLONASE) 50 MCG/ACT nasal spray 2 sprays, Both Nostrils, DAILY     levothyroxine (SYNTHROID/LEVOTHROID) 75 MCG tablet TAKE 1 TABLET BY MOUTH EVERY DAY     lisinopril (ZESTRIL) 20 mg, Oral, DAILY     multivitamin  w/minerals (MULTI-VITAMIN) tablet 1 tablet, Oral, DAILY     oxyCODONE (ROXICODONE) 5 MG tablet 1 tab po q 4-6 hrs prn pain 3-6 on pain      rivaroxaban ANTICOAGULANT (XARELTO) 10 mg, Oral, DAILY     senna-docusate (PERICOLACE) 8.6-50 MG tablet 1-2 tablets, Oral, 2 TIMES DAILY, as needed.       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  His vital signs are unremarkable.  His HEENT examination is within normal limits.  There is no palpable supraclavicular, cervical, axillary or inguinal lymphadenopathy.  The cardiovascular, respiratory, and gastrointestinal examinations are unremarkable except for bilateral fine inspiratory crackles at the bases without wheeze.  The neuromuscular examination is grossly intact.  The extremities do not demonstrate pitting edema.  The skin evaluation does not show rash or dermatitis.    CT scan (12/31/22):  In this patient with prostate cancer, status post radical prostatectomy and salvage radiation therapy in 2006, there is no evidence to suggest local recurrence or distant metastatic disease.    PSMA-PET (10/17/22):  In this patient with prostate cancer, status post radical prostatectomy and salvage radiotherapy, there is no evidence to suggest local recurrence or distant metastatic disease.    PSMA-PET (3/17/25):  Prostatectomy bed: New moderate focal uptake associated with an enhancing nodule (SUVmean=8.4) measuring approximately 1.0 x 0.7 cm right adjacent to the urinary bladder neck and anterior to the surgical clip, worrisome for recurrent disease.  Tavares disease: No PSMA-avid tavares disease.  Bones: No PSMA-avid osseous lesion.        Labs (3/17/2025):  His PSA level is 1.12 ng/mL.  His testosterone level is 203 ng/dL.  The PSADT is 11.7 months.                                                                      IMPRESSION AND PLAN:  This is a 69-year-old gentleman with a serologic relapse of a [place] 4+3=7 prostate cancer. S/p  prostatectomy and salvage radiation therapy. Completed one year of AFT19 study on apalutamide and degarelix from 11/14/2019 - 11/16/2020 with PSA dropping to <0.01 ng/mL until 7/2021. Testosterone recovered. PSA now 1.12 ng/mL, with PSADT 11.7 months.  PSMA-PET (3/17/25) shows local prostatectomy bed recurrence.      # Biochemically-recurrent prostate cancer, local recurrence, no metastasis  In 10/2022 he had a negative PSMA-PET scan and a very low but detectable PSA.  We continued to follow PSA and T every 3 months.  We planned to repeat a PSMA-PET scan when his PSA level reached 1.00 ng/mL.  After that, there may be further consideration of SBRT or other systemic treatments as appropriate.  There may be an opportunity to repeat the ADT with next-generation hormonal therapies depending on the rate of rise of the PSA and the imaging results.   - PSA level is now 1.12 ng/mL, with PSADT 11.7 months (this is speeding up slightly).    - PSMA PET scan (3/17/25) shows a prostatectomy bed recurrence adjacent to the urinary bladder neck; no bailey or osseous mets.    - We discussed options which include: continued observation, targeted radiotherapy, or initiation of ADT +/- ARPI agent.    - At the end of our detailed discussion, the patient has elected to begin ADT alone after he returns from a trip to [facility].   - His first Eligard injection will be on 4/28/25.  We will check a PSA every 6 weeks thereafter.  ARPI may be added if PSA doesn't drop to zero.   - We will plan to treat him with 12 months of hormonal therapy, followed by another treatment break (i.e. intermittent ADT)    A total of 40 minutes were spent on this patient on the date of the encounter conducting chart review, review of test results, interpretation of tests, patient visit, documentation and discussion with other provider(s).  The patient was given the opportunity to ask multiple questions today, all of which were answered to his satisfaction.    The  longitudinal plan of care for the diagnosis(es)/condition(s) as documented were addressed during this visit. 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    Cecil Breaux M.D.      Again, thank you for allowing me to participate in the care of your patient.        Sincerely,        Cecil Breaux MD    Electronically signed

## 2025-03-24 NOTE — NURSING NOTE
"Oncology Rooming Note    March 24, 2025 12:33 PM   Lucio Hernandez is a 69 year old male who presents for:    Chief Complaint   Patient presents with    Oncology Clinic Visit     Malignant neoplasm of prostate     Initial Vitals: /77 (BP Location: Right arm, Patient Position: Sitting, Cuff Size: Adult Regular)   Pulse 101   Temp 98.4  F (36.9  C) (Oral)   Resp 18   Wt 95.1 kg (209 lb 11.2 oz)   SpO2 95%   BMI 31.42 kg/m   Estimated body mass index is 31.42 kg/m  as calculated from the following:    Height as of 11/22/24: 1.74 m (5' 8.5\").    Weight as of this encounter: 95.1 kg (209 lb 11.2 oz). Body surface area is 2.14 meters squared.  Mild Pain (2) Comment: Data Unavailable   No LMP for male patient.  Allergies reviewed: Yes  Medications reviewed: Yes    Medications: Medication refills not needed today.  Pharmacy name entered into [facility]: [facility] [id number] [place] [id number] [facility]    Frailty Screening:   Is the patient here for a new oncology consult visit in cancer care? 2. No    PHQ9:  Did this patient require a PHQ9?: No      Clinical concerns: none.      Zak Valdivia"

## 2025-04-28 ENCOUNTER — INFUSION THERAPY VISIT (OUTPATIENT)
Dept: ONCOLOGY | Facility: CLINIC | Age: 70
End: 2025-04-28
Attending: INTERNAL MEDICINE
Payer: MEDICARE

## 2025-04-28 VITALS
OXYGEN SATURATION: 95 % | WEIGHT: 208 LBS | SYSTOLIC BLOOD PRESSURE: 142 MMHG | HEART RATE: 75 BPM | BODY MASS INDEX: 31.17 KG/M2 | DIASTOLIC BLOOD PRESSURE: 92 MMHG | TEMPERATURE: 98.1 F | RESPIRATION RATE: 16 BRPM

## 2025-04-28 DIAGNOSIS — C61 MALIGNANT NEOPLASM OF PROSTATE (H): Primary | ICD-10-CM

## 2025-04-28 LAB — PSA SERPL DL<=0.01 NG/ML-MCNC: 1.27 NG/ML (ref 0–6.5)

## 2025-04-28 PROCEDURE — 36415 COLL VENOUS BLD VENIPUNCTURE: CPT

## 2025-04-28 PROCEDURE — 84153 ASSAY OF PSA TOTAL: CPT

## 2025-04-28 PROCEDURE — 96402 CHEMO HORMON ANTINEOPL SQ/IM: CPT

## 2025-04-28 PROCEDURE — 250N000011 HC RX IP 250 OP 636: Mod: JZ | Performed by: INTERNAL MEDICINE

## 2025-04-28 RX ADMIN — LEUPROLIDE ACETATE 22.5 MG: 22.5 INJECTION, SUSPENSION, EXTENDED RELEASE SUBCUTANEOUS at 09:39

## 2025-04-28 ASSESSMENT — PAIN SCALES - GENERAL: PAINLEVEL_OUTOF10: NO PAIN (0)

## 2025-04-28 NOTE — PROGRESS NOTES
Infusion Injection Note:  Lucio Hernandez presents today for Eligard Injection.    Patient seen by provider today: No   present during visit today: Not Applicable.    Patient declined to speak with an RN today.       Treatment Conditions:  Not Applicable.    Pre and Post Injection:  Eligard injection given to Left Arm without incident.   Patient tolerated procedure well..    Discharge Plan:  Patient discharged in stable condition accompanied by: self.

## 2025-04-28 NOTE — NURSING NOTE
Nursing Note:    Lucio FALL Mary presents today for Eligard (NEW).   Patient seen by provider today: No   present during visit today: Not Applicable.     Note: I released the medication and delegated administration to the supportive staff team member. Patient declined the need to speak to an RN today. Please see MAR and administration note for additional details.    Plan for patient to follow up with Dr. Breaux on 06/09/25.     Celeste Carlin RN

## 2025-04-28 NOTE — NURSING NOTE
Chief Complaint   Patient presents with    Labs Only     venipuncture     Nena Nguyen RN on 4/28/2025 at 9:24 AM

## 2025-05-13 ENCOUNTER — MYC MEDICAL ADVICE (OUTPATIENT)
Dept: ONCOLOGY | Facility: CLINIC | Age: 70
End: 2025-05-13
Payer: MEDICARE

## 2025-05-13 NOTE — TELEPHONE ENCOUNTER
Called patient to follow up on symptoms sent through a [employer] message on 05/13/25. Left a voicemail message requesting the patient call [phone], option 5, option 2 and speak with any Triage nurse available.

## 2025-05-13 NOTE — CONFIDENTIAL NOTE
Oncology Nurse Triage - Reporting Symptoms    Situation:   Lucio reporting the following symptoms:Per My Chart Message:   Just a question or two. The anxiety seems perhaps a little better, but I am pretty darn tired and I have odd feelings such as my skin hurting and a number of unusual feelings that I don't seem to remember from last time I have very mild hot flashes that really are not disturbing but there's almost a sense of lightheadedness and palpitations during these experiences. Anything I might take to blunt this I'm finding it's slowing me down a fair bit and I wake up a number of times each evening thanks for your help and if there's nothing we can do I understand it's just a bit more than I was expecting.     Background:   Treating Provider:   Dr Breaux     Date of last office visit: 3/24/25 DR Breaux     Recent treatments: Yes: 4/28/25 Eligard Injection.       Assessment  Onset of symptoms: having a number of things going on that he has a number of other feelings   Also has felt more anxious  Skin hurts and feels a little crawly   Can feel hot flash coming on and his wave of feeling like when you drop on a roller coaster.   He is waking up at 4am. Goes to bed at 10:30 and he can get back to sleep by reading. But some of these feelings wake him up 3-4 times per night. Has waves of symptoms are more at night. He is more tired. He is working in the [employer]. Feels like he has to push himself to go out and do work.    Pulse is regular.   Eligard has hit him a little harder than he thought it would.    This has bothered him a moderate amount.     Wondering if there are any medications that would reduce his symptoms.     Recommendations:   Per Dr Breaux: No, there are no medications to fix this.  Perhaps we should refer him to a dietician/nutritionist.  Can you please place that referral for me, if he is interested?  He might also want to work with a .     If these symptoms persist,  then I may need to send him to a rheumatologist to make sure that he is not developing a rheumatological condition like polymyalgia rheumatica or dermatomyositis.  This is a bit strange    Call placed to Juventino:   Patient states he has polymyalgia rheumatica and is reducing steroid by 2.5 mg every 2 weeks   Is on 12.5 mg currently. Doesn't want a dietician referral. He feels he eats a pretty good diet and feels he gets good exercise.

## 2025-05-30 ENCOUNTER — TRANSFERRED RECORDS (OUTPATIENT)
Dept: HEALTH INFORMATION MANAGEMENT | Facility: CLINIC | Age: 70
End: 2025-05-30
Payer: MEDICARE

## 2025-06-06 ENCOUNTER — LAB (OUTPATIENT)
Dept: LAB | Facility: CLINIC | Age: 70
End: 2025-06-06
Payer: MEDICARE

## 2025-06-06 DIAGNOSIS — C61 MALIGNANT NEOPLASM OF PROSTATE (H): ICD-10-CM

## 2025-06-06 LAB — PSA SERPL DL<=0.01 NG/ML-MCNC: 0.04 NG/ML (ref 0–6.5)

## 2025-06-06 PROCEDURE — 84153 ASSAY OF PSA TOTAL: CPT

## 2025-06-06 PROCEDURE — 36415 COLL VENOUS BLD VENIPUNCTURE: CPT

## 2025-06-06 PROCEDURE — 84403 ASSAY OF TOTAL TESTOSTERONE: CPT

## 2025-06-09 ENCOUNTER — ONCOLOGY VISIT (OUTPATIENT)
Dept: ONCOLOGY | Facility: CLINIC | Age: 70
End: 2025-06-09
Attending: INTERNAL MEDICINE
Payer: MEDICARE

## 2025-06-09 VITALS
SYSTOLIC BLOOD PRESSURE: 129 MMHG | TEMPERATURE: 98.3 F | WEIGHT: 208 LBS | HEART RATE: 78 BPM | DIASTOLIC BLOOD PRESSURE: 82 MMHG | RESPIRATION RATE: 18 BRPM | BODY MASS INDEX: 31.17 KG/M2

## 2025-06-09 DIAGNOSIS — C61 MALIGNANT NEOPLASM OF PROSTATE (H): Primary | ICD-10-CM

## 2025-06-09 PROCEDURE — G0463 HOSPITAL OUTPT CLINIC VISIT: HCPCS | Performed by: INTERNAL MEDICINE

## 2025-06-09 PROCEDURE — G2211 COMPLEX E/M VISIT ADD ON: HCPCS | Performed by: INTERNAL MEDICINE

## 2025-06-09 PROCEDURE — 99215 OFFICE O/P EST HI 40 MIN: CPT | Performed by: INTERNAL MEDICINE

## 2025-06-09 RX ORDER — PREDNISONE 1 MG/1
1 TABLET ORAL DAILY
COMMUNITY
Start: 2025-05-30

## 2025-06-09 ASSESSMENT — PAIN SCALES - GENERAL: PAINLEVEL_OUTOF10: NO PAIN (0)

## 2025-06-09 NOTE — PROGRESS NOTES
FOLLOW-UP VISIT       Chief Complaint:  Biochemically-recurrent prostate cancer, on intermittent ADT.  PSA reached 1.12 ng/mL, with PSADT 11.7 months.  Restarted ADT on 4/28/25.     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 He had a radical prostatectomy in 2006 demonstrating Jg 4+3=7 disease with a positive margin. He had salvage radiation therapy in late 2006 when his PSA was 0.4 ng/mL.  He notes his PSA was undetectable for 4 years, but then began coming back and recently has noted to have PSAs in the 1.2-1.4 range in 5/2019. He underwent an Axumin-PET scan in 2019 that showed no evidence of metastatic disease though there was uptake in the prostate bed. He then went on AFT-19 trial on apalutamide and degarelix from 11/14/2019 to 11/16/2020 with PSA <0.01 until 7/2021 (PSA 0.03).  Now he has further biochemical recurrence after stopping hormonal therapy.  Receiving intermittent hormonal treatment.  The PSA level reached 1.12 ng/mL.  He restarted ADT on 4/28/25, resulting in a 97% PSA reduction.     10/30/18   PSA =0.5  5/29/19  PSA =1.4  7/29/19  PSA =1.5  9/5/19   PSA =1.8  11/4/19  PSA =2.2  11/14/19 PSA = 2.3 ng/mL --> Start AFT-19 (Apa plus ADT arm)  12/12/19   PSA = 0.04  1/9/2020  PSA <0.01  2/5/2020  PSA <0.01  2/26/20  PSA <0.01  4/6/20   PSA <0.01  6/30/20  PSA <0.01 Testosterone =11  7/27/20  PSA < 0.01  9/21/20 PSA  <0.01  11/16/20  PSA <0.01 - Completed AFT-19   2/11/21   PSA <0.01   Testosterone = 54  5/20/21   PSA <0.01   Testosterone = 149  7/14/21 PSA =0.03   Testosterone = 239  11/12/21 PSA =0.03   Testosterone = 309  2/18/22 PSA =0.08  5/20/22 PSA =0.11  T=244  9/23/22  PSA =0.18  T=312   PSMA-PET scan (10/17/22): negative  2/10/23  PSA =0.18  T=334  8/8/23   PSA =0.29  T=239  11/9/23  PSA = 0.36  T=303  2/12/24  PSA = 0.69  T=240  5/08/24  PSA = 0.59  T=159  8/05/24  PSA = 0.66  T=205  11/04/24  PSA = 0.76  T=236   1/27/25  PSA =  0.90  3/17/25  PSA = 1.12  T=203   PSADT = 11.7 months  6/06/25  PSA = 0.04    REVIEW OF SYSTEMS:  He has experienced an upper respiratory tract infection for the past several weeks.  This is associated with cough as well as wheeze.  He has also had some myalgias recently. Otherwise he is feeling generally well.  Good urination. Cath every other night. 1-2 times nocturia, mild urgency, no pain, strain or incontinence, last UTI fall 2021. C/o L testicle discomfort for weeks, no recalled triggers, no pain or tender to palpation, no appearance change, does have h/o b/l varicose, L>R. Endorse some lifting recently but in an appropriate way. Losing weight -weight down intentially.  Describes improvement in the mild cognitive impairment that he experienced during the ADT treatment.  Pain in mid thoracic spine stable and related to DDD.  A comprehensive 14-point ROS neg other than the symptoms noted above in the HPI.     PAST MEDICAL HISTORY:  Significant for the prostate cancer, post-radiation urethral stricture, history of pulmonary embolism and rib fractures from coughing fits, and appendicitis.      PAST SURGICAL HISTORY:  Includes the RP, a lumbar fusion, a thoracotomy for the history of rib fractures, shoulder surgery x3, appendectomy and a cervical spine surgery and knee replacement.     MEDICATIONS:          Current Outpatient Medications   Medication Instructions    leuprolide (ELIGARD) 22.5 mg SQ q3mo, next dose on 7/28/25    atorvastatin (LIPITOR) 20 mg, Oral, AT BEDTIME    cetirizine (ZYRTEC) 10 mg, Oral, DAILY    clonazePAM (KLONOPIN) 0.5 mg, Oral, AT BEDTIME PRN    fluticasone (FLONASE) 50 MCG/ACT nasal spray 2 sprays, Both Nostrils, DAILY    levothyroxine (SYNTHROID) 75 MCG tablet TAKE 1 TABLET BY MOUTH EVERY DAY    lisinopril (ZESTRIL) 20 mg, Oral, DAILY    multivitamin w/minerals (MULTI-VITAMIN) tablet 1 tablet, Oral, DAILY    oxycodone (ROXICODONE) 5 MG tablet 1 tab po q 4-6 hrs prn pain 3-6 on pain      rivaroxaban (XARELTO) 10 mg, Oral, DAILY    senna-docusate (PERICOLACE) 8.6-50 MG tablet 1-2 tablets, Oral, 2 TIMES DAILY, as needed.       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  His vital signs are unremarkable.  His HEENT examination is within normal limits.  There is no palpable supraclavicular, cervical, axillary or inguinal lymphadenopathy.  The cardiovascular, respiratory, and gastrointestinal examinations are unremarkable except for bilateral fine inspiratory crackles at the bases without wheeze.  The neuromuscular examination is grossly intact.  The extremities do not demonstrate pitting edema.  The skin evaluation does not show rash or dermatitis.    PSMA-PET (10/17/22):  In this patient with prostate cancer, status post radical prostatectomy and salvage radiotherapy, there is no evidence to suggest local recurrence or distant metastatic disease.    PSMA-PET (3/17/25):  Prostatectomy bed: New moderate focal uptake associated with an enhancing nodule (SUVmean=8.4) measuring approximately 1.0 x 0.7 cm right adjacent to the urinary bladder neck and anterior to the surgical clip, worrisome for recurrent disease.  Tavares disease: No PSMA-avid tavares disease.  Bones: No PSMA-avid osseous lesion.    Labs (6/6/2025):  His PSA level is 0.04 ng/mL, which is a 97% decline.                                                                       IMPRESSION AND PLAN:  This is a 69-year-old gentleman with a serologic relapse of a Jg 4+3=7 prostate cancer s/p prostatectomy and salvage radiation therapy. Completed one year of AFT19 study on apalutamide and degarelix from 11/14/2019 - 11/16/2020 with PSA dropping to <0.01 ng/mL until 7/2021. Testosterone recovered. PSA then increased to 1.12 ng/mL, with PSADT 11.7 months.  PSMA-PET (3/17/25) shows local prostatectomy bed recurrence.  Restarted ADT on 4/28/25, with ensuing 97% reduction in PSA level.     #  Biochemically-recurrent prostate cancer, local recurrence, no metastasis  In 10/2022 he had a negative PSMA-PET scan and a very low but detectable PSA.  We continued to follow PSA and T every 3 months.  We planned to repeat a PSMA-PET scan when his PSA level reached 1.00 ng/mL.  After that, there may be further consideration of SBRT or other systemic treatments as appropriate.  There may be an opportunity to repeat the ADT with next-generation hormonal therapies depending on the rate of rise of the PSA and the imaging results.   - PSA level is now 1.12 ng/mL, with PSADT 11.7 months (this is speeding up slightly).    - PSMA PET scan (3/17/25) shows a prostatectomy bed recurrence adjacent to the urinary bladder neck; no bailey or osseous mets.    - We discussed options which include: continued observation, targeted radiotherapy, or initiation of ADT +/- ARPI agent.    - At the end of our detailed discussion, the patient has elected to begin ADT alone after he returns from a trip to [facility].   - His first Eligard injection was on 4/28/25.  We will check a PSA every 6 weeks thereafter.  ARPI may be added if PSA doesn't drop to zero in 3 months.   - We will plan to treat him with 12 months of hormonal therapy, followed by another treatment break (i.e. intermittent ADT)    A total of 40 minutes were spent on this patient on the date of the encounter conducting chart review, review of test results, interpretation of tests, patient visit, documentation and discussion with other provider(s).  The patient was given the opportunity to ask multiple questions today, all of which were answered to his satisfaction.    The longitudinal plan of care for the diagnosis(es)/condition(s) as documented were addressed during this visit. 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    Cecil Breaux M.D.

## 2025-06-09 NOTE — LETTER
6/9/2025      Lucio Hernandez  [address] [id number]      Dear Colleague,    Thank you for referring your patient, Lucio Hernandez, to the [facility]. Please see a copy of my visit note below.      FOLLOW-UP VISIT       Chief Complaint:  Biochemically-recurrent prostate cancer, on intermittent ADT.  PSA reached 1.12 ng/mL, with PSADT 11.7 months.  Restarted ADT on 4/28/25.     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 He had a radical prostatectomy in 2006 demonstrating [place] 4+3=7 disease with a positive margin. He had salvage radiation therapy in late 2006 when his PSA was 0.4 ng/mL.  He notes his PSA was undetectable for 4 years, but then began coming back and recently has noted to have PSAs in the 1.2-1.4 range in 5/2019. He underwent an Axumin-PET scan in 2019 that showed no evidence of metastatic disease though there was uptake in the prostate bed. He then went on AFT-19 trial on apalutamide and degarelix from 11/14/2019 to 11/16/2020 with PSA <0.01 until 7/2021 (PSA 0.03).  Now he has further biochemical recurrence after stopping hormonal therapy.  Receiving intermittent hormonal treatment.  The PSA level reached 1.12 ng/mL.  He restarted ADT on 4/28/25, resulting in a 97% PSA reduction.     10/30/18   PSA =0.5  5/29/19  PSA =1.4  7/29/19  PSA =1.5  9/5/19   PSA =1.8  11/4/19  PSA =2.2  11/14/19 PSA = 2.3 ng/mL --> Start AFT-19 (Apa plus ADT arm)  12/12/19   PSA = 0.04  1/9/2020  PSA <0.01  2/5/2020  PSA <0.01  2/26/20  PSA <0.01  4/6/20   PSA <0.01  6/30/20  PSA <0.01 Testosterone =11  7/27/20  PSA < 0.01  9/21/20 PSA  <0.01  11/16/20  PSA <0.01 - Completed AFT-19   2/11/21   PSA <0.01   Testosterone = 54  5/20/21   PSA <0.01   Testosterone = 149  7/14/21 PSA =0.03   Testosterone = 239  11/12/21 PSA =0.03   Testosterone = 309  2/18/22 PSA =0.08  5/20/22 PSA =0.11  T=244  9/23/22  PSA =0.18  T=312    PSMA-PET scan (10/17/22): negative  2/10/23  PSA =0.18  T=334  8/8/23   PSA =0.29  T=239  11/9/23  PSA = 0.36  T=303  2/12/24  PSA = 0.69  T=240  5/08/24  PSA = 0.59  T=159  8/05/24  PSA = 0.66  T=205  11/04/24  PSA = 0.76  T=236   1/27/25  PSA = 0.90  3/17/25  PSA = 1.12  T=203   PSADT = 11.7 months  6/06/25  PSA = 0.04    REVIEW OF SYSTEMS:  He has experienced an upper respiratory tract infection for the past several weeks.  This is associated with cough as well as wheeze.  He has also had some myalgias recently. Otherwise he is feeling generally well.  Good urination. Cath every other night. 1-2 times nocturia, mild urgency, no pain, strain or incontinence, last UTI fall 2021. C/o L testicle discomfort for weeks, no recalled triggers, no pain or tender to palpation, no appearance change, does have h/o b/l varicose, L>R. Endorse some lifting recently but in an appropriate way. Losing weight -weight down intentially.  Describes improvement in the mild cognitive impairment that he experienced during the ADT treatment.  Pain in mid thoracic spine stable and related to DDD.  A comprehensive 14-point ROS neg other than the symptoms noted above in the HPI.     PAST MEDICAL HISTORY:  Significant for the prostate cancer, post-radiation urethral stricture, history of pulmonary embolism and rib fractures from coughing fits, and appendicitis.      PAST SURGICAL HISTORY:  Includes the RP, a lumbar fusion, a thoracotomy for the history of rib fractures, shoulder surgery x3, appendectomy and a cervical spine surgery and knee replacement.     MEDICATIONS:          Current Outpatient Medications   Medication Instructions     leuprolide (ELIGARD) 22.5 mg SQ q3mo, next dose on 7/28/25     atorvastatin (LIPITOR) 20 mg, Oral, AT BEDTIME     cetirizine (ZYRTEC) 10 mg, Oral, DAILY     clonazePAM (KLONOPIN) 0.5 mg, Oral, AT BEDTIME PRN     fluticasone (FLONASE) 50 MCG/ACT nasal spray 2 sprays, Both Nostrils, DAILY     levothyroxine  (SYNTHROID) 75 MCG tablet TAKE 1 TABLET BY MOUTH EVERY DAY     lisinopril (ZESTRIL) 20 mg, Oral, DAILY     multivitamin w/minerals (MULTI-VITAMIN) tablet 1 tablet, Oral, DAILY     oxycodone (ROXICODONE) 5 MG tablet 1 tab po q 4-6 hrs prn pain 3-6 on pain      rivaroxaban (XARELTO) 10 mg, Oral, DAILY     senna-docusate (PERICOLACE) 8.6-50 MG tablet 1-2 tablets, Oral, 2 TIMES DAILY, as needed.       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  His vital signs are unremarkable.  His HEENT examination is within normal limits.  There is no palpable supraclavicular, cervical, axillary or inguinal lymphadenopathy.  The cardiovascular, respiratory, and gastrointestinal examinations are unremarkable except for bilateral fine inspiratory crackles at the bases without wheeze.  The neuromuscular examination is grossly intact.  The extremities do not demonstrate pitting edema.  The skin evaluation does not show rash or dermatitis.    PSMA-PET (10/17/22):  In this patient with prostate cancer, status post radical prostatectomy and salvage radiotherapy, there is no evidence to suggest local recurrence or distant metastatic disease.    PSMA-PET (3/17/25):  Prostatectomy bed: New moderate focal uptake associated with an enhancing nodule (SUVmean=8.4) measuring approximately 1.0 x 0.7 cm right adjacent to the urinary bladder neck and anterior to the surgical clip, worrisome for recurrent disease.  Tavares disease: No PSMA-avid tavares disease.  Bones: No PSMA-avid osseous lesion.    Labs (6/6/2025):  His PSA level is 0.04 ng/mL, which is a 97% decline.                                                                       IMPRESSION AND PLAN:  This is a 69-year-old gentleman with a serologic relapse of a [place] 4+3=7 prostate cancer s/p prostatectomy and salvage radiation therapy. Completed one year of AFT19 study on apalutamide and degarelix from 11/14/2019 - 11/16/2020 with PSA dropping to <0.01  ng/mL until 7/2021. Testosterone recovered. PSA then increased to 1.12 ng/mL, with PSADT 11.7 months.  PSMA-PET (3/17/25) shows local prostatectomy bed recurrence.  Restarted ADT on 4/28/25, with ensuing 97% reduction in PSA level.     # Biochemically-recurrent prostate cancer, local recurrence, no metastasis  In 10/2022 he had a negative PSMA-PET scan and a very low but detectable PSA.  We continued to follow PSA and T every 3 months.  We planned to repeat a PSMA-PET scan when his PSA level reached 1.00 ng/mL.  After that, there may be further consideration of SBRT or other systemic treatments as appropriate.  There may be an opportunity to repeat the ADT with next-generation hormonal therapies depending on the rate of rise of the PSA and the imaging results.   - PSA level is now 1.12 ng/mL, with PSADT 11.7 months (this is speeding up slightly).    - PSMA PET scan (3/17/25) shows a prostatectomy bed recurrence adjacent to the urinary bladder neck; no bailey or osseous mets.    - We discussed options which include: continued observation, targeted radiotherapy, or initiation of ADT +/- ARPI agent.    - At the end of our detailed discussion, the patient has elected to begin ADT alone after he returns from a trip to [facility].   - His first Eligard injection was on 4/28/25.  We will check a PSA every 6 weeks thereafter.  ARPI may be added if PSA doesn't drop to zero in 3 months.   - We will plan to treat him with 12 months of hormonal therapy, followed by another treatment break (i.e. intermittent ADT)    A total of 40 minutes were spent on this patient on the date of the encounter conducting chart review, review of test results, interpretation of tests, patient visit, documentation and discussion with other provider(s).  The patient was given the opportunity to ask multiple questions today, all of which were answered to his satisfaction.    The longitudinal plan of care for the diagnosis(es)/condition(s) as documented were  addressed during this visit. 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    Cecil Breaux M.D.      Again, thank you for allowing me to participate in the care of your patient.        Sincerely,        Cecil Breaux MD    Electronically signed

## 2025-06-09 NOTE — NURSING NOTE
"Oncology Rooming Note    June 9, 2025 12:13 PM   Lucio Hernandez is a 69 year old male who presents for:    Chief Complaint   Patient presents with    Oncology Clinic Visit     Prostate Ca     Initial Vitals: /82   Pulse 78   Temp 98.3  F (36.8  C) (Oral)   Resp 18   Wt 94.3 kg (208 lb)   BMI 31.17 kg/m   Estimated body mass index is 31.17 kg/m  as calculated from the following:    Height as of 11/22/24: 1.74 m (5' 8.5\").    Weight as of this encounter: 94.3 kg (208 lb). Body surface area is 2.13 meters squared.  No Pain (0) Comment: Data Unavailable   No LMP for male patient.  Allergies reviewed: Yes  Medications reviewed: Yes    Medications: Medication refills not needed today.  Pharmacy name entered into [employer]: [id number] [place] [id number]    Frailty Screening:   Is the patient here for a new oncology consult visit in cancer care? 2. No    PHQ9:  Did this patient require a PHQ9?: No      Clinical concerns:        Roxanna Dang CMA              "

## 2025-06-10 LAB — TESTOST SERPL-MCNC: 2 NG/DL (ref 240–950)

## 2025-06-18 ENCOUNTER — MYC MEDICAL ADVICE (OUTPATIENT)
Dept: ONCOLOGY | Facility: CLINIC | Age: 70
End: 2025-06-18
Payer: MEDICARE

## 2025-06-19 DIAGNOSIS — N95.1 MENOPAUSAL SYNDROME (HOT FLASHES): Primary | ICD-10-CM

## 2025-06-19 DIAGNOSIS — C61 MALIGNANT NEOPLASM OF PROSTATE (H): ICD-10-CM

## 2025-06-19 RX ORDER — VENLAFAXINE HYDROCHLORIDE 75 MG/1
75 CAPSULE, EXTENDED RELEASE ORAL DAILY
Qty: 30 CAPSULE | Refills: 2 | Status: SHIPPED | OUTPATIENT
Start: 2025-06-19

## 2025-06-19 NOTE — TELEPHONE ENCOUNTER
Oncology Nurse Triage    Situation:   Lucio reporting the following symptoms:  - hot flashes     Background:   Treating Provider:   Dr. Breaux    Date of last office visit: 6/9/2025 Dr. Breaux.     Recent Treatments:4/28/2025 leuprolide    Assessment:     Onset: Started after last 4/28 injection.     Initially felt like dropping down on a rollercoaster ride. This symptoms is better but replaced by hot flashes.     Pt feel hot flashes more than anticipated. Feels like once an hour and also walking up once an hour.     They last about 5minutes or so. But when they occur it interrupts activity patient is doing. For example interrupted piano playing. 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     Denies fever, chest pain, SOB, heart/cardiac symptoms.     Any suggestions for reducing frequency of degree of hot flashes?     [facility] [place]    Recommendations:   Routed MyChart and symptom update to dr. Breaux.     Written reply from Dr. Breaux in MyChart in next couple days is okay.

## 2025-06-27 ENCOUNTER — PATIENT OUTREACH (OUTPATIENT)
Dept: ONCOLOGY | Facility: CLINIC | Age: 70
End: 2025-06-27
Payer: MEDICARE

## 2025-06-27 NOTE — PROGRESS NOTES
[facility]/Voicemail    Clinical Data: Care Coordinator Outreach    Outreach attempted x 1.  Left message on patient's voicemail with call back information and requested return call.    Plan: Care Coordinator will try to reach patient again in 1-2 business days.    LVM for patient to return call. The purpose is to discuss how he is doing on effexor.

## 2025-06-30 ENCOUNTER — NURSE TRIAGE (OUTPATIENT)
Dept: ONCOLOGY | Facility: CLINIC | Age: 70
End: 2025-06-30
Payer: MEDICARE

## 2025-06-30 NOTE — PROGRESS NOTES
[facility]: Cancer Care                                                                                      RNCC spoke with patient today.  Documentation in separate encounter.    Shelley Martell, RN, BSN, OCN  Oncology RN Care Coordinator  [facility]

## 2025-06-30 NOTE — TELEPHONE ENCOUNTER
"Pt returning call to team to follow up on he is doing while on the effexor.   Pt states overall is \"a little better\", estimates feeling \"50% better\".   Last night was more rough- reports having hot flashes every hour.  Pt is open to increasing effexor, if that is recommended.     He reports feeling more tired and elevated HR. He wonders if this is testosterone related?   He reports he wears a watch and a HR monitor while at the gym, reports HR can go up to 130s. Resting HR is in 70-80s. He said, \"it's like pushing the gas pedal on an empty tank\".  Denies chest pain. Reports he gets SOB with exercise, but is able to recover.     Informed will send message to care team and someone will follow up with recommendations.   "

## 2025-07-12 DIAGNOSIS — C61 MALIGNANT NEOPLASM OF PROSTATE (H): ICD-10-CM

## 2025-07-12 DIAGNOSIS — N95.1 MENOPAUSAL SYNDROME (HOT FLASHES): ICD-10-CM

## 2025-07-14 ENCOUNTER — PATIENT OUTREACH (OUTPATIENT)
Dept: ONCOLOGY | Facility: CLINIC | Age: 70
End: 2025-07-14
Payer: MEDICARE

## 2025-07-14 DIAGNOSIS — C61 MALIGNANT NEOPLASM OF PROSTATE (H): ICD-10-CM

## 2025-07-14 DIAGNOSIS — N95.1 MENOPAUSAL SYNDROME (HOT FLASHES): ICD-10-CM

## 2025-07-14 RX ORDER — VENLAFAXINE HYDROCHLORIDE 150 MG/1
150 CAPSULE, EXTENDED RELEASE ORAL DAILY
Qty: 30 CAPSULE | Refills: 2 | Status: SHIPPED | OUTPATIENT
Start: 2025-07-14

## 2025-07-14 RX ORDER — VENLAFAXINE HYDROCHLORIDE 75 MG/1
75 CAPSULE, EXTENDED RELEASE ORAL DAILY
Qty: 90 CAPSULE | Refills: 1 | OUTPATIENT
Start: 2025-07-14

## 2025-07-14 NOTE — PROGRESS NOTES
"[facility]: Cancer Care                                                                                      Attempted to reach patient to discuss hot flashes.  LVM for patient to return call to further discuss.    Patient returned call. He reports hot flashes about once an hour.  These hot flashes wake him up from sleep.  Now he finds that he is wet from sweating upon waking up.    He reports that tasks such as modest yard work and exercising on the elliptical machine cause him to sweat so much \"It looks like I went swimming.\"    Energy levels are low.  He reports trying to maintain his active lifestyle.  \"It feels like I'm pressing on the gas, but the car isn't going.\"    Patient was urged to take frequent breaks during activities and to continue to drink plenty of fluids.    He has 4 Effexor pills left and is wondering if an increase in dose or change in medication is recommended.    Advised patient that this information will be discussed with Dr. Breaux and we'll get back to him with an update.    Shelley Martell, RN, BSN, OCN  Oncology RN Care Coordinator  [facility]  "
Writer called patient to inform him that the Effexor dose has been increased to 150 mg daily and was sent to his [facility] pharmacy in [place].  He was thankful for the call and looks forward to follow up in 2 weeks.  
PAST SURGICAL HISTORY:  Prolapse of female pelvic organs     S/P appendectomy     S/P breast biopsy

## 2025-07-20 DIAGNOSIS — C61 MALIGNANT NEOPLASM OF PROSTATE (H): Primary | ICD-10-CM

## 2025-07-25 ENCOUNTER — LAB (OUTPATIENT)
Dept: LAB | Facility: CLINIC | Age: 70
End: 2025-07-25
Payer: MEDICARE

## 2025-07-25 DIAGNOSIS — C61 MALIGNANT NEOPLASM OF PROSTATE (H): ICD-10-CM

## 2025-07-25 LAB — PSA SERPL DL<=0.01 NG/ML-MCNC: <0.01 NG/ML (ref 0–6.5)

## 2025-07-25 PROCEDURE — 84403 ASSAY OF TOTAL TESTOSTERONE: CPT

## 2025-07-25 PROCEDURE — 36415 COLL VENOUS BLD VENIPUNCTURE: CPT

## 2025-07-25 PROCEDURE — 84153 ASSAY OF PSA TOTAL: CPT

## 2025-07-27 NOTE — PROGRESS NOTES
FOLLOW-UP VISIT       Chief Complaint:  Biochemically-recurrent prostate cancer, on intermittent ADT.  PSA reached 1.12 ng/mL, with PSADT 11.7 months.  Restarted ADT on 4/28/25.     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 He had a radical prostatectomy in 2006 demonstrating [place] 4+3=7 disease with a positive margin. He had salvage radiation therapy in late 2006 when his PSA was 0.4 ng/mL.  He notes his PSA was undetectable for 4 years, but then began coming back and recently has noted to have PSAs in the 1.2-1.4 range in 5/2019. He underwent an Axumin-PET scan in 2019 that showed no evidence of metastatic disease though there was uptake in the prostate bed. He then went on AFT-19 trial on apalutamide and degarelix from 11/14/2019 to 11/16/2020 with PSA <0.01 until 7/2021 (PSA 0.03).  Now he has further biochemical recurrence after stopping hormonal therapy.  Receiving intermittent hormonal treatment.  The PSA level reached 1.12 ng/mL on 3/17/25.  He restarted ADT on 4/28/25, resulting in a PSA reduction down to an undetectable level.    10/30/18   PSA =0.5  5/29/19  PSA =1.4  7/29/19  PSA =1.5  9/5/19   PSA =1.8  11/4/19  PSA =2.2  11/14/19 PSA = 2.3 ng/mL --> Start AFT-19 (Apa plus ADT arm)  12/12/19   PSA = 0.04  1/9/2020  PSA <0.01  2/5/2020  PSA <0.01  2/26/20  PSA <0.01  4/6/20   PSA <0.01  6/30/20  PSA <0.01 Testosterone =11  7/27/20  PSA < 0.01  9/21/20 PSA  <0.01  11/16/20  PSA <0.01 - Completed AFT-19   2/11/21   PSA <0.01   Testosterone = 54  5/20/21   PSA <0.01   Testosterone = 149  7/14/21 PSA =0.03   Testosterone = 239  11/12/21 PSA =0.03   Testosterone = 309  2/18/22 PSA =0.08  5/20/22 PSA =0.11  T=244  9/23/22  PSA =0.18  T=312   PSMA-PET scan (10/17/22): negative  2/10/23  PSA =0.18  T=334  8/8/23   PSA =0.29  T=239  11/9/23  PSA = 0.36  T=303  2/12/24  PSA = 0.69  T=240  5/08/24  PSA = 0.59  T=159  8/05/24  PSA = 0.66  T=205  11/04/24   PSA = 0.76  T=236   1/27/25  PSA = 0.90  3/17/25  PSA = 1.12  T=203   PSADT = 11.7 months  6/06/25  PSA = 0.04  7/25/25  PSA < 0.01    REVIEW OF SYSTEMS:  He has had a bit of a hard time with the ADT.  More hot flashes and brain fog this time.  The hot flashes and sweats have been quite bothersome, and we started venlafaxine for this.  He has experienced an upper respiratory tract infection for the past several weeks.  This is associated with cough as well as wheeze.  He has also had some myalgias recently. Otherwise he is feeling generally well.  Good urination. Cath every other night. 1-2 times nocturia, mild urgency, no pain, strain or incontinence, last UTI fall 2021. C/o L testicle discomfort for weeks, no recalled triggers, no pain or tender to palpation, no appearance change, does have h/o b/l varicose, L>R. Endorse some lifting recently but in an appropriate way. Losing weight -weight down intentially.  Describes improvement in the mild cognitive impairment that he experienced during the ADT treatment.  Pain in mid thoracic spine stable and related to DDD.  A comprehensive 14-point ROS neg other than the symptoms noted above in the HPI.     PAST MEDICAL HISTORY:  Significant for the prostate cancer, post-radiation urethral stricture, history of pulmonary embolism and rib fractures from coughing fits, and appendicitis.      PAST SURGICAL HISTORY:  Includes the RP, a lumbar fusion, a thoracotomy for the history of rib fractures, shoulder surgery x3, appendectomy and a cervical spine surgery and knee replacement.     MEDICATIONS:          Current Outpatient Medications   Medication Instructions    leuprolide (ELIGARD) 22.5 mg SQ q3mo, next dose on 7/28/25    atorvastatin (LIPITOR) 20 mg, Oral, AT BEDTIME    cetirizine (ZYRTEC) 10 mg, Oral, DAILY    clonazePAM (KLONOPIN) 0.5 mg, Oral, AT BEDTIME PRN    fluticasone (FLONASE) 50 MCG/ACT nasal spray 2 sprays, Both Nostrils, DAILY    levothyroxine (SYNTHROID) 75  MCG tablet TAKE 1 TABLET BY MOUTH EVERY DAY    lisinopril (ZESTRIL) 20 mg, Oral, DAILY    multivitamin w/minerals (MULTI-VITAMIN) tablet 1 tablet, Oral, DAILY    oxycodone (ROXICODONE) 5 MG tablet 1 tab po q 4-6 hrs prn pain 3-6 on pain     rivaroxaban (XARELTO) 10 mg, Oral, DAILY    venlafaxine (EFFEXOR) 150 MG tablet 1 tablet, Oral, DAILY       Physical Examination:  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  His vital signs are unremarkable.  His HEENT examination is within normal limits.  There is no palpable supraclavicular, cervical, axillary or inguinal lymphadenopathy.  The cardiovascular, respiratory, and gastrointestinal examinations are unremarkable except for bilateral fine inspiratory crackles at the bases without wheeze.  The neuromuscular examination is grossly intact.  The extremities do not demonstrate pitting edema.  The skin evaluation does not show rash or dermatitis.    PSMA-PET (10/17/22):  In this patient with prostate cancer, status post radical prostatectomy and salvage radiotherapy, there is no evidence to suggest local recurrence or distant metastatic disease.    PSMA-PET (3/17/25):  Prostatectomy bed: New moderate focal uptake associated with an enhancing nodule (SUVmean=8.4) measuring approximately 1.0 x 0.7 cm right adjacent to the urinary bladder neck and anterior to the surgical clip, worrisome for recurrent disease.  Tavares disease: No PSMA-avid tavares disease.  Bones: No PSMA-avid osseous lesion.    Labs (7/25/2025):  His PSA level is <0.01 ng/mL.  Testosterone is 2 ng/dL.                                                                       IMPRESSION AND PLAN:  This is a 69-year-old gentleman with a serologic relapse of a [place] 4+3=7 prostate cancer s/p prostatectomy and salvage radiation therapy. Completed one year of AFT19 study on apalutamide and degarelix from 11/14/2019 - 11/16/2020 with PSA dropping to <0.01 ng/mL until 7/2021. Testosterone recovered. PSA  then increased to 1.12 ng/mL, with PSADT 11.7 months.  PSMA-PET (3/17/25) shows local prostatectomy bed recurrence.  Restarted ADT on 4/28/25, resulting in an undetectable PSA level.     # Biochemically-recurrent prostate cancer, local recurrence, no metastasis  In 10/2022 he had a negative PSMA-PET scan and a very low but detectable PSA.  We continued to follow PSA and T every 3 months.  We planned to repeat a PSMA-PET scan when his PSA level reached 1.00 ng/mL.  After that, there may be further consideration of SBRT or other systemic treatments as appropriate.  There may be an opportunity to repeat the ADT with next-generation hormonal therapies depending on the rate of rise of the PSA and the imaging results.   - PSA level was 1.12 ng/mL, with PSADT 11.7 months (this was speeding up slightly).    - PSMA PET scan (3/17/25) showed a prostatectomy bed recurrence adjacent to the urinary bladder neck; no bailey or osseous mets.    - We discussed options which include: continued observation, targeted radiotherapy, or initiation of ADT +/- ARPI agent.    - At the end of our detailed discussion, the patient has elected to begin ADT alone.   - His first Eligard injection was on 4/28/25.  The PSA has dropped down to the undetectable range.  Thus, ARPI may not need to be added.   - We will plan to treat him with 12 months of hormonal therapy, followed by another treatment break (i.e. intermittent ADT)    A total of 40 minutes were spent on this patient on the date of the encounter conducting chart review, review of test results, interpretation of tests, patient visit, documentation and discussion with other provider(s).  The patient was given the opportunity to ask multiple questions today, all of which were answered to his satisfaction.    Cecil Breaux M.D.

## 2025-07-28 ENCOUNTER — ONCOLOGY VISIT (OUTPATIENT)
Dept: ONCOLOGY | Facility: CLINIC | Age: 70
End: 2025-07-28
Attending: INTERNAL MEDICINE
Payer: MEDICARE

## 2025-07-28 VITALS
WEIGHT: 205 LBS | BODY MASS INDEX: 30.72 KG/M2 | DIASTOLIC BLOOD PRESSURE: 88 MMHG | RESPIRATION RATE: 18 BRPM | OXYGEN SATURATION: 95 % | TEMPERATURE: 98.2 F | HEART RATE: 82 BPM | SYSTOLIC BLOOD PRESSURE: 152 MMHG

## 2025-07-28 DIAGNOSIS — C61 MALIGNANT NEOPLASM OF PROSTATE (H): Primary | ICD-10-CM

## 2025-07-28 PROCEDURE — G0463 HOSPITAL OUTPT CLINIC VISIT: HCPCS | Performed by: INTERNAL MEDICINE

## 2025-07-28 PROCEDURE — 96402 CHEMO HORMON ANTINEOPL SQ/IM: CPT

## 2025-07-28 PROCEDURE — 99215 OFFICE O/P EST HI 40 MIN: CPT | Performed by: INTERNAL MEDICINE

## 2025-07-28 PROCEDURE — 250N000011 HC RX IP 250 OP 636: Mod: JZ | Performed by: INTERNAL MEDICINE

## 2025-07-28 RX ADMIN — LEUPROLIDE ACETATE 22.5 MG: 22.5 INJECTION, SUSPENSION, EXTENDED RELEASE SUBCUTANEOUS at 12:35

## 2025-07-28 ASSESSMENT — PAIN SCALES - GENERAL: PAINLEVEL_OUTOF10: MILD PAIN (2)

## 2025-07-28 NOTE — LETTER
7/28/2025      Lucio Hernandez  [address] [id number]      Dear Colleague,    Thank you for referring your patient, Lucio Hernandez, to the [facility]. Please see a copy of my visit note below.      FOLLOW-UP VISIT       Chief Complaint:  Biochemically-recurrent prostate cancer, on intermittent ADT.  PSA reached 1.12 ng/mL, with PSADT 11.7 months.  Restarted ADT on 4/28/25.     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 He had a radical prostatectomy in 2006 demonstrating [place] 4+3=7 disease with a positive margin. He had salvage radiation therapy in late 2006 when his PSA was 0.4 ng/mL.  He notes his PSA was undetectable for 4 years, but then began coming back and recently has noted to have PSAs in the 1.2-1.4 range in 5/2019. He underwent an Axumin-PET scan in 2019 that showed no evidence of metastatic disease though there was uptake in the prostate bed. He then went on AFT-19 trial on apalutamide and degarelix from 11/14/2019 to 11/16/2020 with PSA <0.01 until 7/2021 (PSA 0.03).  Now he has further biochemical recurrence after stopping hormonal therapy.  Receiving intermittent hormonal treatment.  The PSA level reached 1.12 ng/mL on 3/17/25.  He restarted ADT on 4/28/25, resulting in a PSA reduction down to an undetectable level.    10/30/18   PSA =0.5  5/29/19  PSA =1.4  7/29/19  PSA =1.5  9/5/19   PSA =1.8  11/4/19  PSA =2.2  11/14/19 PSA = 2.3 ng/mL --> Start AFT-19 (Apa plus ADT arm)  12/12/19   PSA = 0.04  1/9/2020  PSA <0.01  2/5/2020  PSA <0.01  2/26/20  PSA <0.01  4/6/20   PSA <0.01  6/30/20  PSA <0.01 Testosterone =11  7/27/20  PSA < 0.01  9/21/20 PSA  <0.01  11/16/20  PSA <0.01 - Completed AFT-19   2/11/21   PSA <0.01   Testosterone = 54  5/20/21   PSA <0.01   Testosterone = 149  7/14/21 PSA =0.03   Testosterone = 239  11/12/21 PSA =0.03   Testosterone = 309  2/18/22 PSA =0.08  5/20/22 PSA =0.11   T=244  9/23/22  PSA =0.18  T=312   PSMA-PET scan (10/17/22): negative  2/10/23  PSA =0.18  T=334  8/8/23   PSA =0.29  T=239  11/9/23  PSA = 0.36  T=303  2/12/24  PSA = 0.69  T=240  5/08/24  PSA = 0.59  T=159  8/05/24  PSA = 0.66  T=205  11/04/24  PSA = 0.76  T=236   1/27/25  PSA = 0.90  3/17/25  PSA = 1.12  T=203   PSADT = 11.7 months  6/06/25  PSA = 0.04  7/25/25  PSA < 0.01    REVIEW OF SYSTEMS:  He has had a bit of a hard time with the ADT.  More hot flashes and brain fog this time.  The hot flashes and sweats have been quite bothersome, and we started venlafaxine for this.  He has experienced an upper respiratory tract infection for the past several weeks.  This is associated with cough as well as wheeze.  He has also had some myalgias recently. Otherwise he is feeling generally well.  Good urination. Cath every other night. 1-2 times nocturia, mild urgency, no pain, strain or incontinence, last UTI fall 2021. C/o L testicle discomfort for weeks, no recalled triggers, no pain or tender to palpation, no appearance change, does have h/o b/l varicose, L>R. Endorse some lifting recently but in an appropriate way. Losing weight -weight down intentially.  Describes improvement in the mild cognitive impairment that he experienced during the ADT treatment.  Pain in mid thoracic spine stable and related to DDD.  A comprehensive 14-point ROS neg other than the symptoms noted above in the HPI.     PAST MEDICAL HISTORY:  Significant for the prostate cancer, post-radiation urethral stricture, history of pulmonary embolism and rib fractures from coughing fits, and appendicitis.      PAST SURGICAL HISTORY:  Includes the RP, a lumbar fusion, a thoracotomy for the history of rib fractures, shoulder surgery x3, appendectomy and a cervical spine surgery and knee replacement.     MEDICATIONS:          Current Outpatient Medications   Medication Instructions     leuprolide (ELIGARD) 22.5 mg SQ q3mo, next dose on 7/28/25      atorvastatin (LIPITOR) 20 mg, Oral, AT BEDTIME     cetirizine (ZYRTEC) 10 mg, Oral, DAILY     clonazePAM (KLONOPIN) 0.5 mg, Oral, AT BEDTIME PRN     fluticasone (FLONASE) 50 MCG/ACT nasal spray 2 sprays, Both Nostrils, DAILY     levothyroxine (SYNTHROID) 75 MCG tablet TAKE 1 TABLET BY MOUTH EVERY DAY     lisinopril (ZESTRIL) 20 mg, Oral, DAILY     multivitamin w/minerals (MULTI-VITAMIN) tablet 1 tablet, Oral, DAILY     oxycodone (ROXICODONE) 5 MG tablet 1 tab po q 4-6 hrs prn pain 3-6 on pain      rivaroxaban (XARELTO) 10 mg, Oral, DAILY     venlafaxine (EFFEXOR) 150 MG tablet 1 tablet, Oral, DAILY       Physical Examination:  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  His vital signs are unremarkable.  His HEENT examination is within normal limits.  There is no palpable supraclavicular, cervical, axillary or inguinal lymphadenopathy.  The cardiovascular, respiratory, and gastrointestinal examinations are unremarkable except for bilateral fine inspiratory crackles at the bases without wheeze.  The neuromuscular examination is grossly intact.  The extremities do not demonstrate pitting edema.  The skin evaluation does not show rash or dermatitis.    PSMA-PET (10/17/22):  In this patient with prostate cancer, status post radical prostatectomy and salvage radiotherapy, there is no evidence to suggest local recurrence or distant metastatic disease.    PSMA-PET (3/17/25):  Prostatectomy bed: New moderate focal uptake associated with an enhancing nodule (SUVmean=8.4) measuring approximately 1.0 x 0.7 cm right adjacent to the urinary bladder neck and anterior to the surgical clip, worrisome for recurrent disease.  Tavares disease: No PSMA-avid tavares disease.  Bones: No PSMA-avid osseous lesion.    Labs (7/25/2025):  His PSA level is <0.01 ng/mL.  Testosterone is 2 ng/dL.                                                                       IMPRESSION AND PLAN:  This is a 69-year-old gentleman with a  serologic relapse of a [place] 4+3=7 prostate cancer s/p prostatectomy and salvage radiation therapy. Completed one year of AFT19 study on apalutamide and degarelix from 11/14/2019 - 11/16/2020 with PSA dropping to <0.01 ng/mL until 7/2021. Testosterone recovered. PSA then increased to 1.12 ng/mL, with PSADT 11.7 months.  PSMA-PET (3/17/25) shows local prostatectomy bed recurrence.  Restarted ADT on 4/28/25, resulting in an undetectable PSA level.     # Biochemically-recurrent prostate cancer, local recurrence, no metastasis  In 10/2022 he had a negative PSMA-PET scan and a very low but detectable PSA.  We continued to follow PSA and T every 3 months.  We planned to repeat a PSMA-PET scan when his PSA level reached 1.00 ng/mL.  After that, there may be further consideration of SBRT or other systemic treatments as appropriate.  There may be an opportunity to repeat the ADT with next-generation hormonal therapies depending on the rate of rise of the PSA and the imaging results.   - PSA level was 1.12 ng/mL, with PSADT 11.7 months (this was speeding up slightly).    - PSMA PET scan (3/17/25) showed a prostatectomy bed recurrence adjacent to the urinary bladder neck; no bailey or osseous mets.    - We discussed options which include: continued observation, targeted radiotherapy, or initiation of ADT +/- ARPI agent.    - At the end of our detailed discussion, the patient has elected to begin ADT alone.   - His first Eligard injection was on 4/28/25.  The PSA has dropped down to the undetectable range.  Thus, ARPI may not need to be added.   - We will plan to treat him with 12 months of hormonal therapy, followed by another treatment break (i.e. intermittent ADT)    A total of 40 minutes were spent on this patient on the date of the encounter conducting chart review, review of test results, interpretation of tests, patient visit, documentation and discussion with other provider(s).  The patient was given the opportunity to  ask multiple questions today, all of which were answered to his satisfaction.    Cecil Breaux M.D.      Again, thank you for allowing me to participate in the care of your patient.        Sincerely,        Cecil Breaux MD    Electronically signed

## 2025-07-28 NOTE — NURSING NOTE
Administrations This Visit       leuprolide (3 Month) (ELIGARD) injection 22.5 mg       Admin Date  07/28/2025 Action  $Given Dose  22.5 mg Route  Subcutaneous Documented By  Grecia Lee RN                     Eligard given in right posterior arm, pt tolerated well.     Grecia Lee, RN

## 2025-07-28 NOTE — NURSING NOTE
"Oncology Rooming Note    July 28, 2025 11:47 AM   Lucio Hernandez is a 69 year old male who presents for:    Chief Complaint   Patient presents with    Oncology Clinic Visit     -Malignant neoplasm of prostate     Initial Vitals: BP (!) 152/88 (BP Location: Right arm, Patient Position: Sitting, Cuff Size: Adult Regular)   Pulse 82   Temp 98.2  F (36.8  C) (Oral)   Resp 18   Wt 93 kg (205 lb)   SpO2 95%   BMI 30.72 kg/m   Estimated body mass index is 30.72 kg/m  as calculated from the following:    Height as of 11/22/24: 1.74 m (5' 8.5\").    Weight as of this encounter: 93 kg (205 lb). Body surface area is 2.12 meters squared.  Mild Pain (2) Comment: Data Unavailable   No LMP for male patient.  Allergies reviewed: Yes  Medications reviewed: Yes    Medications: Medication refills not needed today.  Pharmacy name entered into [employer]: [id number] [place] [facility] [id number]    PHQ9:  Did this patient require a PHQ9?: No      Clinical concerns: none.      Zak Valdivia"

## 2025-07-29 LAB — TESTOST SERPL-MCNC: 3 NG/DL (ref 240–950)

## (undated) DEVICE — SOL WATER IRRIG 1000ML BOTTLE 2F7114

## (undated) DEVICE — SYR 03ML LL W/O NDL 309657

## (undated) DEVICE — LINEN FULL SHEET 5511

## (undated) DEVICE — PREP CHLORAPREP 26ML TINTED ORANGE  260815

## (undated) DEVICE — DECANTER VIAL 2006S

## (undated) DEVICE — BONE CEMENT MIXEVAC III HI VAC KIT  0206-015-000

## (undated) DEVICE — SURGICEL HEMOSTAT 3X4" NUKNIT 1943

## (undated) DEVICE — NDL 22GA 1.5"

## (undated) DEVICE — LINEN ORTHO ACL PACK 5447

## (undated) DEVICE — SUCTION MANIFOLD NEPTUNE 2 SYS 4 PORT 0702-020-000

## (undated) DEVICE — Device

## (undated) DEVICE — GLOVE PROTEXIS POWDER FREE 8.5 ORTHOPEDIC 2D73ET85

## (undated) DEVICE — PACK TOTAL KNEE BOXED LATEX FREE PO15TKFCT

## (undated) DEVICE — SU VICRYL 2-0 CT-1 27" J339H

## (undated) DEVICE — TAPE DRSG UNIVERSAL CLOTH 3" WHITE LATEX 881-3

## (undated) DEVICE — GLOVE PROTEXIS BLUE W/NEU-THERA 8.5  2D73EB85

## (undated) DEVICE — ESU GROUND PAD UNIVERSAL W/O CORD

## (undated) DEVICE — GLOVE PROTEXIS W/NEU-THERA 7.5  2D73TE75

## (undated) DEVICE — SUCTION CANISTER MEDIVAC LINER 3000ML W/LID 65651-530

## (undated) DEVICE — BLADE SAW SAGITTAL STRK 13X90X1.27MM HD SYS 6 6113-127-090

## (undated) DEVICE — PACK MINOR SBA15MIFSE

## (undated) DEVICE — ESU ELEC BLADE 6" COATED/INSULATED E1455-6

## (undated) DEVICE — DRSG GAUZE 4X4" 3033

## (undated) DEVICE — SUCTION DRY CHEST DRAIN OASIS 3600-100

## (undated) DEVICE — BLADE KNIFE SURG 15 371115

## (undated) DEVICE — SOL NACL 0.9% IRRIG 3000ML BAG 2B7477

## (undated) DEVICE — GLOVE PROTEXIS W/NEU-THERA 6.5  2D73TE65

## (undated) DEVICE — PREP CHLORAPREP 26ML TINTED HI-LITE ORANGE 930815

## (undated) DEVICE — BLADE KNIFE SURG 10 371110

## (undated) DEVICE — GLOVE PROTEXIS POWDER FREE 7.0 ORTHOPEDIC 2D73ET70

## (undated) DEVICE — BATTERY PACK FOR POWERED DRIVER 05.000.007.01S

## (undated) DEVICE — GLOVE PROTEXIS W/NEU-THERA 8.5  2D73TE85

## (undated) DEVICE — STPL SKIN SUBCUTICULAR INSORB  2030

## (undated) DEVICE — TOURNIQUET SGL  BLADDER 30"X4" BLUE 5921030135

## (undated) DEVICE — LINEN TOWEL PACK X5 5464

## (undated) DEVICE — CAST PADDING 6" STERILE 9046S

## (undated) DEVICE — TUBING SUCTION 12"X1/4" N612

## (undated) DEVICE — SU ETHIBOND 0 CT-1 CR 8X18" CX21D

## (undated) DEVICE — GOWN IMPERVIOUS ZONED LG

## (undated) DEVICE — GLOVE PROTEXIS W/NEU-THERA 7.0  2D73TE70

## (undated) DEVICE — DRAPE IOBAN INCISE 23X17" 6650EZ

## (undated) DEVICE — SU PROLENE 4-0 RB-1DA 36" 8557H

## (undated) DEVICE — SOL NACL 0.9% IRRIG 1000ML BOTTLE 07138-09

## (undated) DEVICE — SU VICRYL 1 CTX CR 8X18" J765D

## (undated) DEVICE — SU NDL CUT REV MED 3/8 209014

## (undated) DEVICE — DRSG STERI STRIP 1/2X4" R1547

## (undated) DEVICE — DRAPE POUCH INSTRUMENT 1018

## (undated) DEVICE — BLADE SAW SAGITTAL STRK 25X90X1.27MM HD SYS 6 6125-127-090

## (undated) DEVICE — SU VICRYL 2-0 CT-1 27" UND J259H

## (undated) DEVICE — SU SILK 2 REEL 60" SA8H

## (undated) DEVICE — DRSG KERLIX 4 1/2"X4YDS ROLL 6715

## (undated) DEVICE — NDL BLUNT 18GA 1" W/O FILTER 305181

## (undated) DEVICE — SU VICRYL 1 CT-1 27" J341H

## (undated) DEVICE — SU SILK 2-0 TIE 24" SA75H

## (undated) DEVICE — BAG CLEAR TRASH 1.3M 39X33" P4040C

## (undated) DEVICE — ESU GROUND PAD ADULT W/CORD E7507

## (undated) DEVICE — DRAIN PENROSE 0.75"X18" LATEX FREE GR205

## (undated) DEVICE — DRSG AQUACEL AG 3.5X9.75" HYDROFIBER 412011

## (undated) DEVICE — TUBING SUCTION MEDI-VAC SOFT 3/16"X20' N520A

## (undated) DEVICE — SYR BULB IRRIG 50ML LATEX FREE 0035280

## (undated) DEVICE — DRAPE BREAST/CHEST 29420

## (undated) DEVICE — SPONGE LAP 18X18" X8435

## (undated) DEVICE — CLIP APPLIER 11.5" PREMIUM II 134053

## (undated) DEVICE — SET HANDPIECE INTERPULSE W/COAXIAL FAN SPRAY TIP 0210118000

## (undated) DEVICE — SU VICRYL 1 CT 36" J959H

## (undated) DEVICE — BLADE CLIPPER 4406

## (undated) DEVICE — GLOVE PROTEXIS BLUE W/NEU-THERA 7.0  2D73EB70

## (undated) DEVICE — SU PROLENE 3-0 V-7DA 36" 8976H

## (undated) RX ORDER — ACETAMINOPHEN 325 MG/1
TABLET ORAL
Status: DISPENSED
Start: 2021-06-14

## (undated) RX ORDER — ONDANSETRON 2 MG/ML
INJECTION INTRAMUSCULAR; INTRAVENOUS
Status: DISPENSED
Start: 2017-12-02

## (undated) RX ORDER — VECURONIUM BROMIDE 1 MG/ML
INJECTION, POWDER, LYOPHILIZED, FOR SOLUTION INTRAVENOUS
Status: DISPENSED
Start: 2017-12-02

## (undated) RX ORDER — PROPOFOL 10 MG/ML
INJECTION, EMULSION INTRAVENOUS
Status: DISPENSED
Start: 2021-06-14

## (undated) RX ORDER — GLYCOPYRROLATE 0.2 MG/ML
INJECTION INTRAMUSCULAR; INTRAVENOUS
Status: DISPENSED
Start: 2021-06-14

## (undated) RX ORDER — LIDOCAINE HYDROCHLORIDE 20 MG/ML
JELLY TOPICAL
Status: DISPENSED
Start: 2021-12-07

## (undated) RX ORDER — FENTANYL CITRATE 50 UG/ML
INJECTION, SOLUTION INTRAMUSCULAR; INTRAVENOUS
Status: DISPENSED
Start: 2017-12-02

## (undated) RX ORDER — FENTANYL CITRATE 50 UG/ML
INJECTION, SOLUTION INTRAMUSCULAR; INTRAVENOUS
Status: DISPENSED
Start: 2021-06-14

## (undated) RX ORDER — ROPIVACAINE HYDROCHLORIDE 2 MG/ML
INJECTION, SOLUTION EPIDURAL; INFILTRATION; PERINEURAL
Status: DISPENSED
Start: 2017-12-02

## (undated) RX ORDER — LIDOCAINE HYDROCHLORIDE 20 MG/ML
INJECTION, SOLUTION EPIDURAL; INFILTRATION; INTRACAUDAL; PERINEURAL
Status: DISPENSED
Start: 2017-12-02

## (undated) RX ORDER — CEFAZOLIN SODIUM 2 G/100ML
INJECTION, SOLUTION INTRAVENOUS
Status: DISPENSED
Start: 2021-06-14

## (undated) RX ORDER — ONDANSETRON 2 MG/ML
INJECTION INTRAMUSCULAR; INTRAVENOUS
Status: DISPENSED
Start: 2021-06-14

## (undated) RX ORDER — GLYCOPYRROLATE 0.2 MG/ML
INJECTION, SOLUTION INTRAMUSCULAR; INTRAVENOUS
Status: DISPENSED
Start: 2017-12-02

## (undated) RX ORDER — TRANEXAMIC ACID 650 MG/1
TABLET ORAL
Status: DISPENSED
Start: 2021-06-14

## (undated) RX ORDER — OXYCODONE HYDROCHLORIDE 5 MG/1
TABLET ORAL
Status: DISPENSED
Start: 2021-06-14

## (undated) RX ORDER — PROPOFOL 10 MG/ML
INJECTION, EMULSION INTRAVENOUS
Status: DISPENSED
Start: 2017-12-02

## (undated) RX ORDER — BUPIVACAINE HYDROCHLORIDE 5 MG/ML
INJECTION, SOLUTION EPIDURAL; INTRACAUDAL
Status: DISPENSED
Start: 2017-12-02

## (undated) RX ORDER — CEFAZOLIN SODIUM 2 G/100ML
INJECTION, SOLUTION INTRAVENOUS
Status: DISPENSED
Start: 2017-12-02

## (undated) RX ORDER — DEXAMETHASONE SODIUM PHOSPHATE 4 MG/ML
INJECTION, SOLUTION INTRA-ARTICULAR; INTRALESIONAL; INTRAMUSCULAR; INTRAVENOUS; SOFT TISSUE
Status: DISPENSED
Start: 2017-12-02

## (undated) RX ORDER — HYDROMORPHONE HYDROCHLORIDE 1 MG/ML
INJECTION, SOLUTION INTRAMUSCULAR; INTRAVENOUS; SUBCUTANEOUS
Status: DISPENSED
Start: 2017-12-02